# Patient Record
Sex: MALE | Race: WHITE | NOT HISPANIC OR LATINO | Employment: FULL TIME | URBAN - METROPOLITAN AREA
[De-identification: names, ages, dates, MRNs, and addresses within clinical notes are randomized per-mention and may not be internally consistent; named-entity substitution may affect disease eponyms.]

---

## 2017-01-06 DIAGNOSIS — R42 DIZZINESS: ICD-10-CM

## 2017-01-06 DIAGNOSIS — I35.0 SEVERE AORTIC STENOSIS: ICD-10-CM

## 2017-01-06 DIAGNOSIS — R06.02 SHORTNESS OF BREATH: ICD-10-CM

## 2017-01-06 DIAGNOSIS — R42 LIGHTHEADEDNESS: ICD-10-CM

## 2017-01-09 ENCOUNTER — HOSPITAL ENCOUNTER (OUTPATIENT)
Dept: RADIOLOGY | Facility: MEDICAL CENTER | Age: 62
End: 2017-01-09
Attending: INTERNAL MEDICINE
Payer: COMMERCIAL

## 2017-01-09 DIAGNOSIS — I35.0 SEVERE AORTIC STENOSIS: ICD-10-CM

## 2017-01-09 DIAGNOSIS — R42 LIGHTHEADEDNESS: ICD-10-CM

## 2017-01-09 DIAGNOSIS — R42 DIZZINESS: ICD-10-CM

## 2017-01-09 DIAGNOSIS — R06.02 SHORTNESS OF BREATH: ICD-10-CM

## 2017-01-09 PROCEDURE — 74174 CTA ABD&PLVS W/CONTRAST: CPT

## 2017-01-09 PROCEDURE — 700117 HCHG RX CONTRAST REV CODE 255: Performed by: INTERNAL MEDICINE

## 2017-01-09 PROCEDURE — 71275 CT ANGIOGRAPHY CHEST: CPT

## 2017-01-09 RX ADMIN — IOHEXOL 100 ML: 350 INJECTION, SOLUTION INTRAVENOUS at 16:40

## 2017-01-12 ENCOUNTER — HOSPITAL ENCOUNTER (OUTPATIENT)
Dept: OTHER | Facility: MEDICAL CENTER | Age: 62
End: 2017-01-12
Attending: INTERNAL MEDICINE
Payer: COMMERCIAL

## 2017-01-12 DIAGNOSIS — R42 DIZZINESS: ICD-10-CM

## 2017-01-12 DIAGNOSIS — I35.0 SEVERE AORTIC STENOSIS: ICD-10-CM

## 2017-01-12 DIAGNOSIS — R06.02 SHORTNESS OF BREATH: ICD-10-CM

## 2017-01-12 DIAGNOSIS — R42 LIGHTHEADEDNESS: ICD-10-CM

## 2017-01-12 PROCEDURE — 94726 PLETHYSMOGRAPHY LUNG VOLUMES: CPT

## 2017-01-12 PROCEDURE — 94729 DIFFUSING CAPACITY: CPT | Mod: 26 | Performed by: INTERNAL MEDICINE

## 2017-01-12 PROCEDURE — 94726 PLETHYSMOGRAPHY LUNG VOLUMES: CPT | Mod: 26 | Performed by: INTERNAL MEDICINE

## 2017-01-12 PROCEDURE — 94060 EVALUATION OF WHEEZING: CPT | Mod: 26 | Performed by: INTERNAL MEDICINE

## 2017-01-12 PROCEDURE — 94729 DIFFUSING CAPACITY: CPT

## 2017-01-12 PROCEDURE — 94060 EVALUATION OF WHEEZING: CPT

## 2017-01-12 ASSESSMENT — PULMONARY FUNCTION TESTS
FEV1_PERCENT_CHANGE: -2
FEV1_PERCENT_CHANGE: 3
FEV1: 2.83
FEV1_PREDICTED: 3.75
FEV1_PERCENT_PREDICTED: 77
FEV1/FVC_PERCENT_PREDICTED: 76
FEV1/FVC_PERCENT_PREDICTED: 97
FEV1/FVC: 71
FEV1/FVC_PERCENT_PREDICTED: 94
FEV1/FVC: 74.55
FVC_PREDICTED: 4.95
FVC: 3.96
FEV1: 2.9
FVC_PERCENT_PREDICTED: 79
FEV1/FVC_PERCENT_CHANGE: -150
FEV1_PERCENT_PREDICTED: 75
FVC: 3.89
FVC_PERCENT_PREDICTED: 80

## 2017-01-13 ENCOUNTER — OFFICE VISIT (OUTPATIENT)
Dept: CARDIOLOGY | Facility: MEDICAL CENTER | Age: 62
End: 2017-01-13
Payer: OTHER MISCELLANEOUS

## 2017-01-13 ENCOUNTER — HOSPITAL ENCOUNTER (OUTPATIENT)
Dept: RADIOLOGY | Facility: MEDICAL CENTER | Age: 62
End: 2017-01-13
Attending: FAMILY MEDICINE
Payer: COMMERCIAL

## 2017-01-13 VITALS
OXYGEN SATURATION: 98 % | BODY MASS INDEX: 27.9 KG/M2 | HEIGHT: 72 IN | WEIGHT: 206 LBS | HEART RATE: 74 BPM | DIASTOLIC BLOOD PRESSURE: 72 MMHG | SYSTOLIC BLOOD PRESSURE: 108 MMHG

## 2017-01-13 DIAGNOSIS — I35.0 NONRHEUMATIC AORTIC VALVE STENOSIS: ICD-10-CM

## 2017-01-13 DIAGNOSIS — Z95.810 PRESENCE OF BIVENTRICULAR AICD: ICD-10-CM

## 2017-01-13 DIAGNOSIS — I42.0 DILATED CARDIOMYOPATHY (HCC): Chronic | ICD-10-CM

## 2017-01-13 DIAGNOSIS — I25.10 CORONARY ARTERY DISEASE INVOLVING NATIVE CORONARY ARTERY OF NATIVE HEART WITHOUT ANGINA PECTORIS: ICD-10-CM

## 2017-01-13 DIAGNOSIS — I50.22 CHRONIC SYSTOLIC CONGESTIVE HEART FAILURE, NYHA CLASS 3 (HCC): ICD-10-CM

## 2017-01-13 DIAGNOSIS — I35.0 SEVERE AORTIC STENOSIS: ICD-10-CM

## 2017-01-13 PROCEDURE — 99245 OFF/OP CONSLTJ NEW/EST HI 55: CPT | Performed by: INTERNAL MEDICINE

## 2017-01-13 PROCEDURE — 93880 EXTRACRANIAL BILAT STUDY: CPT | Mod: 26 | Performed by: INTERNAL MEDICINE

## 2017-01-13 PROCEDURE — 93880 EXTRACRANIAL BILAT STUDY: CPT

## 2017-01-13 ASSESSMENT — ENCOUNTER SYMPTOMS
PALPITATIONS: 0
MYALGIAS: 0
CHILLS: 0
DIZZINESS: 0
INSOMNIA: 0
ORTHOPNEA: 0
WEAKNESS: 1
PND: 0
ABDOMINAL PAIN: 0
BLURRED VISION: 0
FEVER: 0
LOSS OF CONSCIOUSNESS: 0
BACK PAIN: 1
SHORTNESS OF BREATH: 1

## 2017-01-13 NOTE — PROGRESS NOTES
Subjective:   Alberto Fam is a 61 y.o. male who presents today for interventional consult/TAVR evaluation requested by Abbie Adler for severe symptomatic aortic stenosis.    Thank you for allowing me to evaluate Mr. Fam, who as you know is a year old male with severe aortic stenosis, dilated cardiomyopathy, and history of VSD closure. He was well until one year ago when he began to experience moderate fatigue, weakness, shortness of breath, dyspnea on exertion and frequent dizziness. He denies chest pain or syncope.    Past Medical History   Diagnosis Date   • Congestive heart failure (HCC)    • S/P AV servando ablation     • Dilated cardiomyopathy September 2013     Echocardiogram with dilated LV, moderate concentric LVH, LVEF 35-40%.   • Male erectile disorder     • Ventricular tachycardia ([facility])     • SSS (sick sinus syndrome)     • AV block, 3rd degree     • Aortic stenosis December 2016     Dobutamine stress echo with severe AS (peak 89mmHg, mean 54mmHg, ACE 0.6cm2).   • CHF (congestive heart failure) ([facility])    • Bowel habit changes      Constipation   • Dental disorder      Upper   • Arthritis      Low back   • Hypertension      Pt states well controlled on meds   • Pneumonia 12/2014   • Pain      lower back pain    • Breath shortness      d/t heart condition   • AICD (automatic cardioverter/defibrillator) present      Past Surgical History   Procedure Laterality Date   • Recovery  8/29/2011     Performed by SURGERY, CATH-RECOVERY at SURGERY SAME DAY [facility]   • Aicd implant  August 2011     Medtronic Concerto II CRT-D [id number]   • Ventral septal defect repair  1961   • Pacemaker insertion  2006   • Aicd battery change  November 2016     Generator change with Medtronic Viva S CRT-D [id number] implanted by Dr. Kwong.   • Hernia repair  1966     History reviewed. No pertinent family history.  History   Smoking status   • Never Smoker    Smokeless tobacco   • Never Used     Comment: quit 35 years  ago      Allergies   Allergen Reactions   • Sulfa Drugs Vomiting     Medications reviewed.    Outpatient Encounter Prescriptions as of 1/13/2017   Medication Sig Dispense Refill   • ENTRESTO 49-51 MG Tab tablet Take 1 Tab by mouth every 12 hours. 180 Tab 3   • eplerenone (INSPRA) 25 MG Tab Take 1 Tab by mouth every day. 90 Tab 3   • aspirin 81 MG tablet Take 81 mg by mouth every day.     • carvedilol (COREG) 25 MG Tab Take 1 Tab by mouth 2 times a day, with meals. 180 Tab 3   • docusate sodium (COLACE) 100 MG Cap Take 100 mg by mouth 2 times a day.     • HYDROcodone Bitartrate ER (HYSINGLA ER) 20 MG Tablet Extended Release 24 hour Abuse-Deterrent Take 20 mg by mouth every day.     • therapeutic multivitamin-minerals (THERAGRAN-M) Tab Take 1 Tab by mouth every day.       No facility-administered encounter medications on file as of 1/13/2017.     Review of Systems   Constitutional: Positive for malaise/fatigue. Negative for fever and chills.   HENT: Negative for congestion.    Eyes: Negative for blurred vision.   Respiratory: Positive for shortness of breath.    Cardiovascular: Negative for chest pain, palpitations, orthopnea, leg swelling and PND.   Gastrointestinal: Negative for abdominal pain.   Genitourinary: Negative for dysuria.   Musculoskeletal: Positive for back pain. Negative for myalgias and joint pain.   Skin: Negative for rash.   Neurological: Positive for weakness. Negative for dizziness and loss of consciousness.   Psychiatric/Behavioral: The patient does not have insomnia.         Objective:   /72 mmHg  Pulse 74  Ht 1.829 m (6')  Wt 93.441 kg (206 lb)  BMI 27.93 kg/m2  SpO2 98%    Physical Exam   Constitutional: He is oriented to person, place, and time. He appears well-developed and well-nourished.   HENT:   Head: Normocephalic and atraumatic.   Eyes: Conjunctivae are normal. Pupils are equal, round, and reactive to light.   Neck: Normal range of motion. Neck supple.   Cardiovascular:  Normal rate and regular rhythm.    Murmur heard.  Pulmonary/Chest: Effort normal and breath sounds normal.   Abdominal: Soft. Bowel sounds are normal.   Musculoskeletal: Normal range of motion. He exhibits no edema.   Neurological: He is alert and oriented to person, place, and time.   Skin: Skin is warm and dry.   Psychiatric: He has a normal mood and affect.     CARDIAC STUDIES/PROCEDURES:    CARDIAC CATHETERIZATION CONCLUSIONS by Dr. Arreola (12/16/16)  1.  Minor plaquing in the left anterior descending artery proximal, otherwise, normal coronary angiography.  2.  Moderate dilatation of the ascending aorta.     CT OF CHEST AND ABDOMEN (01/09/17)  1.  Aortic annulus area of 599 sq mm. Atypical morphology of the aortic valve with apparent fusion of the coronary cusps.  2.  Borderline low position of RIGHT coronary artery ostium at 10.1 mm.  3.  No significant atherosclerotic change of the iliofemoral arterial system.  Mild tortuosity, worse on the LEFT.  Minimum luminal diameter of 8.4 mm on the RIGHT and 8.4 mm on the LEFT.  4.  Markedly tortuous and ectatic thoracic aortic arch.  5.  Tortuous abdominal aorta, without aneurysm or significant focal stenosis.  6.  Nonspecific 9 mm RIGHT lower lobe pulmonary nodule.    DOBUTAMINE STRESS ECHOCARDIOGRAM (12/02/16)  With Dobutamine stress test:  Peak transvalvular gradients are - Peak:  89 mmHg, Mean:  54 mmHg.  Peak aortic valve area calculated by the continuity equation is  0.6 cm2.  Suggests severe aortic stenosis;    ECHOCARDIOGRAM CONCLUSIONS (05/05/15)  Left ventricle is moderately dilated. Severely reduced left ventricular   systolic function. Global hypokinesis. Left ventricular ejection   fraction is 20% to 25%. Grade IV diastolic dysfunction.  Severely increased LA volume.  Mild mitral regurgitation.  No aortic stenosis based on gradients across aortic valve. Peak   gradient 29 mmHg. Mean 16 mmHg. Dimensionless index=.25. Ttrace aortic   insufficiency. Gradients  could be underestimated with low LVEF consider   clinical correlation (based on Dimensionless index of 0.25 severe   aortic stenosis).  Mild tricuspid regurgitation. Right ventricular systolic pressure is   estimated to be 23. RA pressure 15 mmHg.  Aortic root diameter 3.7 cm.  Compared to the images of the prior study done 9/4/13  there has been   drop in LVEF from 35-40% to current echo LVEF 20-25%. Prior echo AV MG   23 mmHg and PG 38 mmHg.    EKG performed on (12/14/16) was reviewed: EKG shows paced rhythm.    Laboratory results of(12/14/16) were reviewed. Bun of 21 mg/dl, creatinine levels of 1.13 mg/dl noted.    PULMONARY FUNCTION INTERPRETATION (01/12/17)  FEV1 2.83    Assessment:     1. Severe aortic stenosis     2. Chronic systolic congestive heart failure, NYHA class 3 (HCC)     3. Dilated cardiomyopathy (HCC)   Presence of biventricular AICD     4. VSD repair    5. Coronary artery disease involving native coronary artery of native heart without angina pectoris       Medical Decision Making:  Today's Assessment / Status / Plan:     1. Aortic stenosis: He is symptomatic with his severe aortic stenosis, NYHA class III. He is not a surgical candidate due to excessive risk per Dr. Llanos. We will schedule him for TAVR. He understands the risks and benefits and agrees with plan.  2. History of chronic systolic congestive heart failure with dilated cardiomyopathy and Medtronic Bi-V ICD by Dr. Kwong (11/24/16): The overall volume status is adequate.  3. History of VSD repair from right thoracotomy at [facility] (1961)  4. Coronary artery disease: He remains clinically stable.  We will continue with current medical care.  More than 45 minutes of time was spent do review all above information, discuss the option of cardiac catheterization and TAVR including, alternative options, risk and benefits.    The risks, benefits, and alternatives to TAVR, general anesthesia and transesophageal  echocardiogram were discussed in great detail. Specific risks mentioned   include bleeding, infection, kidney damage, allergic reaction, cardiac perforation with possible tamponade requiring vazquez-cardiocentesis or possible open heart surgery.   Lastly the risks of heart attack, stroke, and death were discussed; the risks of major complications such as stroke caused by the angiogram is 0.8%; the risk of death   is approximately 1.6%. The patient verbalized understanding of these potential complications and wishes to proceed with this procedure. ( Partner II S3 study).    We will follow up in one month in TAVR clinic.    Thank you for this consult.    CC Trevor Grajeda

## 2017-01-13 NOTE — Clinical Note
[facility]   [address], [address]  [place] [address]  Phone: [phone]  Fax: [phone]              Alberto Fam  1955    Encounter Date: 1/13/2017    Grayson Rios M.D.          PROGRESS NOTE:  Subjective:   Alberto Fam is a 61 y.o. male who presents today for interventional consult/TAVR evaluation requested by Abbie Adler for severe symptomatic aortic stenosis.    Thank you for allowing me to evaluate Mr. Fam, who as you know is a year old male with severe aortic stenosis, dilated cardiomyopathy, and history of VSD closure. He was well until one year ago when he began to experience moderate fatigue, weakness, shortness of breath, dyspnea on exertion and frequent dizziness. He denies chest pain or syncope.    Past Medical History   Diagnosis Date   • Congestive heart failure (HCC)    • S/P AV servando ablation     • Dilated cardiomyopathy September 2013     Echocardiogram with dilated LV, moderate concentric LVH, LVEF 35-40%.   • Male erectile disorder     • Ventricular tachycardia (HCC)     • SSS (sick sinus syndrome)     • AV block, 3rd degree     • Aortic stenosis December 2016     Dobutamine stress echo with severe AS (peak 89mmHg, mean 54mmHg, ACE 0.6cm2).   • CHF (congestive heart failure) (HCC)    • Bowel habit changes      Constipation   • Dental disorder      Upper   • Arthritis      Low back   • Hypertension      Pt states well controlled on meds   • Pneumonia 12/2014   • Pain      lower back pain    • Breath shortness      d/t heart condition   • AICD (automatic cardioverter/defibrillator) present      Past Surgical History   Procedure Laterality Date   • Recovery  8/29/2011     Performed by SURGERY, CATH-RECOVERY at SURGERY SAME DAY [facility]   • Aicd implant  August 2011     Medtronic Concerto II CRT-D [id number]   • Ventral septal defect repair  1961   • Pacemaker insertion  2006   • Aicd battery change  November 2016        Generator change with Medtronic Viva S CRT-D [id number] implanted by Dr. Kwong.   • Hernia repair  1966     History reviewed. No pertinent family history.  History   Smoking status   • Never Smoker    Smokeless tobacco   • Never Used     Comment: quit 35 years ago      Allergies   Allergen Reactions   • Sulfa Drugs Vomiting     Medications reviewed.    Outpatient Encounter Prescriptions as of 1/13/2017   Medication Sig Dispense Refill   • ENTRESTO 49-51 MG Tab tablet Take 1 Tab by mouth every 12 hours. 180 Tab 3   • eplerenone (INSPRA) 25 MG Tab Take 1 Tab by mouth every day. 90 Tab 3   • aspirin 81 MG tablet Take 81 mg by mouth every day.     • carvedilol (COREG) 25 MG Tab Take 1 Tab by mouth 2 times a day, with meals. 180 Tab 3   • docusate sodium (COLACE) 100 MG Cap Take 100 mg by mouth 2 times a day.     • HYDROcodone Bitartrate ER (HYSINGLA ER) 20 MG Tablet Extended Release 24 hour Abuse-Deterrent Take 20 mg by mouth every day.     • therapeutic multivitamin-minerals (THERAGRAN-M) Tab Take 1 Tab by mouth every day.       No facility-administered encounter medications on file as of 1/13/2017.     Review of Systems   Constitutional: Positive for malaise/fatigue. Negative for fever and chills.   HENT: Negative for congestion.    Eyes: Negative for blurred vision.   Respiratory: Positive for shortness of breath.    Cardiovascular: Negative for chest pain, palpitations, orthopnea, leg swelling and PND.   Gastrointestinal: Negative for abdominal pain.   Genitourinary: Negative for dysuria.   Musculoskeletal: Positive for back pain. Negative for myalgias and joint pain.   Skin: Negative for rash.   Neurological: Positive for weakness. Negative for dizziness and loss of consciousness.   Psychiatric/Behavioral: The patient does not have insomnia.         Objective:   /72 mmHg  Pulse 74  Ht 1.829 m (6')  Wt 93.441 kg (206 lb)  BMI 27.93 kg/m2  SpO2 98%    Physical Exam   Constitutional: He is oriented to  person, place, and time. He appears well-developed and well-nourished.   HENT:   Head: Normocephalic and atraumatic.   Eyes: Conjunctivae are normal. Pupils are equal, round, and reactive to light.   Neck: Normal range of motion. Neck supple.   Cardiovascular: Normal rate and regular rhythm.    Murmur heard.  Pulmonary/Chest: Effort normal and breath sounds normal.   Abdominal: Soft. Bowel sounds are normal.   Musculoskeletal: Normal range of motion. He exhibits no edema.   Neurological: He is alert and oriented to person, place, and time.   Skin: Skin is warm and dry.   Psychiatric: He has a normal mood and affect.     CARDIAC STUDIES/PROCEDURES:    CARDIAC CATHETERIZATION CONCLUSIONS by Dr. Arreola (12/16/16)  1.  Minor plaquing in the left anterior descending artery proximal, otherwise, normal coronary angiography.  2.  Moderate dilatation of the ascending aorta.     CT OF CHEST AND ABDOMEN (01/09/17)  1.  Aortic annulus area of 599 sq mm. Atypical morphology of the aortic valve with apparent fusion of the coronary cusps.  2.  Borderline low position of RIGHT coronary artery ostium at 10.1 mm.  3.  No significant atherosclerotic change of the iliofemoral arterial system.  Mild tortuosity, worse on the LEFT.  Minimum luminal diameter of 8.4 mm on the RIGHT and 8.4 mm on the LEFT.  4.  Markedly tortuous and ectatic thoracic aortic arch.  5.  Tortuous abdominal aorta, without aneurysm or significant focal stenosis.  6.  Nonspecific 9 mm RIGHT lower lobe pulmonary nodule.    DOBUTAMINE STRESS ECHOCARDIOGRAM (12/02/16)  With Dobutamine stress test:  Peak transvalvular gradients are - Peak:  89 mmHg, Mean:  54 mmHg.  Peak aortic valve area calculated by the continuity equation is  0.6 cm2.  Suggests severe aortic stenosis;    ECHOCARDIOGRAM CONCLUSIONS (05/05/15)  Left ventricle is moderately dilated. Severely reduced left ventricular   systolic function. Global hypokinesis. Left ventricular ejection   fraction is 20% to  25%. Grade IV diastolic dysfunction.  Severely increased LA volume.  Mild mitral regurgitation.  No aortic stenosis based on gradients across aortic valve. Peak   gradient 29 mmHg. Mean 16 mmHg. Dimensionless index=.25. Ttrace aortic   insufficiency. Gradients could be underestimated with low LVEF consider   clinical correlation (based on Dimensionless index of 0.25 severe   aortic stenosis).  Mild tricuspid regurgitation. Right ventricular systolic pressure is   estimated to be 23. RA pressure 15 mmHg.  Aortic root diameter 3.7 cm.  Compared to the images of the prior study done 9/4/13  there has been   drop in LVEF from 35-40% to current echo LVEF 20-25%. Prior echo AV MG   23 mmHg and PG 38 mmHg.    EKG performed on (12/14/16) was reviewed: EKG shows paced rhythm.    Laboratory results of(12/14/16) were reviewed. Bun of 21 mg/dl, creatinine levels of 1.13 mg/dl noted.    PULMONARY FUNCTION INTERPRETATION (01/12/17)  FEV1 2.83    Assessment:     1. Severe aortic stenosis     2. Chronic systolic congestive heart failure, NYHA class 3 (HCC)     3. Dilated cardiomyopathy (HCC)   Presence of biventricular AICD     4. VSD repair    5. Coronary artery disease involving native coronary artery of native heart without angina pectoris       Medical Decision Making:  Today's Assessment / Status / Plan:     1. Aortic stenosis: He is symptomatic with his severe aortic stenosis, NYHA class III. He is not a surgical candidate due to excessive risk per Dr. Llanos. We will schedule him for TAVR. He understands the risks and benefits and agrees with plan.  2. History of chronic systolic congestive heart failure with dilated cardiomyopathy and Medtronic Bi-V ICD by Dr. Kwong (11/24/16): The overall volume status is adequate.  3. History of VSD repair from right thoracotomy at [facility] (1961)  4. Coronary artery disease: He remains clinically stable.  We will continue with current medical care.  More than 45  minutes of time was spent do review all above information, discuss the option of cardiac catheterization and TAVR including, alternative options, risk and benefits.    The risks, benefits, and alternatives to TAVR, general anesthesia and transesophageal echocardiogram were discussed in great detail. Specific risks mentioned   include bleeding, infection, kidney damage, allergic reaction, cardiac perforation with possible tamponade requiring vazquez-cardiocentesis or possible open heart surgery.   Lastly the risks of heart attack, stroke, and death were discussed; the risks of major complications such as stroke caused by the angiogram is 0.8%; the risk of death   is approximately 1.6%. The patient verbalized understanding of these potential complications and wishes to proceed with this procedure. (US Partner II S3 study).    We will follow up in one month in TAVR clinic.    Thank you for this consult.    CC Trevor Grajeda        No Recipients

## 2017-01-13 NOTE — MR AVS SNAPSHOT
Alberto Fam   2017 9:40 AM   Office Visit   MRN: [id number]    Department:  [facility]   Dept Phone:  [phone]    Description:  Male : 1955   Provider:  Grayson Rios M.D.           Reason for Visit     Follow-Up New TAVR      Allergies as of 2017     Allergen Noted Reactions    Sulfa Drugs 2010   Vomiting      You were diagnosed with     Severe aortic stenosis   [101291]       Chronic systolic congestive heart failure, NYHA class 3 (HCC)   [834087]       Dilated cardiomyopathy (HCC)   [211012]       Presence of biventricular AICD   [710755]       Coronary artery disease involving native coronary artery of native heart without angina pectoris   [2728761]         Vital Signs     Blood Pressure Pulse Height Weight Body Mass Index Oxygen Saturation    108/72 mmHg 74 1.829 m (6') 93.441 kg (206 lb) 27.93 kg/m2 98%    Smoking Status                   Never Smoker            Basic Information     Date Of Birth Sex Race Ethnicity Preferred Language    1955 Male White Non- English      Your appointments     2017 12:15 PM   CAROTID DUPLEX with VASCULAR LAB [facility], IH EXAM 8   NON-INVASIVE LAB [facility] ([facility])    [address] [id number] [phone]           No prep            May 30, 2017  9:40 AM   PACER CHECK ONLY with KEIKO Rizvi   [facility]    [address]  [facility] [address] [phone]              Problem List              ICD-10-CM Priority Class Noted - Resolved    Hypertension I10 High  Unknown - Present    S/P AV servando ablation (Chronic) Z98.890 High  3/21/2012 - Present    Dilated cardiomyopathy (HCC) (Chronic) I42.0 High  3/21/2012 - Present    Male erectile disorder (Chronic) N52.9 Medium  3/21/2012 - Present    Ventricular tachycardia (HCC) (Chronic) I47.2 High  3/21/2012 - Present    Atypical chest pain (Chronic) R07.89 Medium  3/21/2012 -  Present    AV block, 3rd degree (HCC) (Chronic) I44.2 High  3/21/2012 - Present    SSS (sick sinus syndrome) (HCC) (Chronic) I49.5 High  9/28/2012 - Present    Presence of biventricular AICD Z95.810 High  6/6/2013 - Present    Arthritis M19.90 Low  8/20/2013 - Present    Chronic systolic congestive heart failure, NYHA class 3 (HCC) I50.22   10/13/2015 - Present    Homograft cardiac valve stenosis I38   12/16/2016 - Present    Severe aortic stenosis I35.0   1/13/2017 - Present    CAD (coronary artery disease) I25.10   1/13/2017 - Present      Health Maintenance        Date Due Completion Dates    IMM DTaP/Tdap/Td Vaccine (1 - Tdap) 4/5/1976 ---    IMM PNEUMOCOCCAL 19-64 (ADULT) MEDIUM RISK SERIES (1 of 1 - PPSV23) 4/5/1976 ---    COLONOSCOPY 4/5/2007 ---    IMM INFLUENZA (1) 9/1/2016 10/30/2009            Current Immunizations     Influenza TIV (IM) 10/30/2009      Below and/or attached are the medications your provider expects you to take. Review all of your home medications and newly ordered medications with your provider and/or pharmacist. Follow medication instructions as directed by your provider and/or pharmacist. Please keep your medication list with you and share with your provider. Update the information when medications are discontinued, doses are changed, or new medications (including over-the-counter products) are added; and carry medication information at all times in the event of emergency situations     Allergies:  SULFA DRUGS - Vomiting               Medications  Valid as of: January 13, 2017 - 11:04 AM    Generic Name Brand Name Tablet Size Instructions for use    Aspirin (Tab) aspirin 81 MG Take 81 mg by mouth every day.        Carvedilol (Tab) COREG 25 MG Take 1 Tab by mouth 2 times a day, with meals.        Docusate Sodium (Cap) COLACE 100 MG Take 100 mg by mouth 2 times a day.        Eplerenone (Tab) INSPRA 25 MG Take 1 Tab by mouth every day.        HYDROcodone Bitartrate (Tablet Extended  Release 24 hour Abuse-Deterrent) HYDROcodone Bitartrate ER 20 MG Take 20 mg by mouth every day.        Multiple Vitamins-Minerals (Tab) THERAGRAN-M  Take 1 Tab by mouth every day.        Sacubitril-Valsartan (Tab) ENTRESTO 49-51 MG Take 1 Tab by mouth every 12 hours.        .                 Medicines prescribed today were sent to:     [id number] - [place] - [address]    [address] [place] [address]    Phone: [phone] Fax: [phone]    Open 24 Hours?: No    [facility] - [place], [place] - [address]    [address] [facility] [address]    Phone: [phone] Fax: [phone]    Open 24 Hours?: No      Medication refill instructions:       If your prescription bottle indicates you have medication refills left, it is not necessary to call your provider’s office. Please contact your pharmacy and they will refill your medication.    If your prescription bottle indicates you do not have any refills left, you may request refills at any time through one of the following ways: The online [employer] system (except Urgent Care), by calling your provider’s office, or by asking your pharmacy to contact your provider’s office with a refill request. Medication refills are processed only during regular business hours and may not be available until the next business day. Your provider may request additional information or to have a follow-up visit with you prior to refilling your medication.   *Please Note: Medication refills are assigned a new Rx number when refilled electronically. Your pharmacy may indicate that no refills were authorized even though a new prescription for the same medication is available at the pharmacy. Please request the medicine by name with the pharmacy before contacting your provider for a refill.           [employer] Access Code: Activation code not generated  Current [employer] Status: Active

## 2017-01-14 NOTE — PROCEDURES
This is a 61-year-old male who is being evaluated for dyspnea on exertion.    His height is 180 cm, weight is 194.6 lbs.  Spirometry demonstrates a FEV1/FVC ratio   of 71%.  FEV1 is 2.83 liters or 75% predicted.  FVC is 3.96 liters or 80% of   predicted.  There is no bronchodilator response.  FEF 25 to 75% is reduced   1.85 liters or 60% of predicted.    Lungs volumes demonstrate a total lung capacity of 6.44 liters or 90% of   predicted.  Residual volume is 2.75 or 121% of predicted.  Diffusion lung   capacity is corrected for hemoglobin is 99% of predicted.    IMPRESSION:  This study demonstrates mild obstructive process.  There is mild   air trapping that is present, but no hyperinflation.  The diffusion lung   capacity is preserved.       ____________________________________     MD KAILA Uriostegui / DHAVAL    DD:  01/13/2017 12:44:07  DT:  01/13/2017 16:16:07    D#:  [id number]  Job#:  [id number]

## 2017-01-16 ENCOUNTER — TELEPHONE (OUTPATIENT)
Dept: CARDIOLOGY | Facility: MEDICAL CENTER | Age: 62
End: 2017-01-16

## 2017-01-16 DIAGNOSIS — I10 ESSENTIAL HYPERTENSION: ICD-10-CM

## 2017-01-16 DIAGNOSIS — I42.0 DILATED CARDIOMYOPATHY (HCC): Chronic | ICD-10-CM

## 2017-01-16 RX ORDER — CARVEDILOL 25 MG/1
25 TABLET ORAL 2 TIMES DAILY WITH MEALS
Qty: 180 TAB | Refills: 3 | Status: ON HOLD | OUTPATIENT
Start: 2017-01-16 | End: 2017-02-16

## 2017-01-16 NOTE — TELEPHONE ENCOUNTER
DONE  Also, Pt requested refill for carvedilol. Refill ordered.  Carmel VOGEL RN    ----- Message from Abbie Elder M.D. sent at 1/16/2017  8:39 AM PST -----  Normal bilateral carotid exam.       ----- Message -----     From: Aaron Jara     Sent: 1/13/2017   3:09 PM       To: Abbie Elder M.D.

## 2017-02-07 ENCOUNTER — HOSPITAL ENCOUNTER (OUTPATIENT)
Dept: RADIOLOGY | Facility: MEDICAL CENTER | Age: 62
DRG: 219 | End: 2017-02-07
Attending: THORACIC SURGERY (CARDIOTHORACIC VASCULAR SURGERY) | Admitting: THORACIC SURGERY (CARDIOTHORACIC VASCULAR SURGERY)
Payer: COMMERCIAL

## 2017-02-07 ENCOUNTER — HOSPITAL ENCOUNTER (OUTPATIENT)
Dept: RADIOLOGY | Facility: MEDICAL CENTER | Age: 62
DRG: 219 | End: 2017-02-07
Attending: FAMILY MEDICINE
Payer: COMMERCIAL

## 2017-02-07 DIAGNOSIS — I35.0 AORTIC VALVE STENOSIS, UNSPECIFIED ETIOLOGY: ICD-10-CM

## 2017-02-07 LAB
ABO GROUP BLD: NORMAL
ALBUMIN SERPL BCP-MCNC: 4.8 G/DL (ref 3.2–4.9)
ALBUMIN/GLOB SERPL: 2.2 G/DL
ALP SERPL-CCNC: 53 U/L (ref 30–99)
ALT SERPL-CCNC: 35 U/L (ref 2–50)
ANION GAP SERPL CALC-SCNC: 6 MMOL/L (ref 0–11.9)
APPEARANCE UR: CLEAR
APTT PPP: 25.4 SEC (ref 24.7–36)
AST SERPL-CCNC: 23 U/L (ref 12–45)
BASOPHILS # BLD AUTO: 0.5 % (ref 0–1.8)
BASOPHILS # BLD: 0.02 K/UL (ref 0–0.12)
BILIRUB SERPL-MCNC: 0.6 MG/DL (ref 0.1–1.5)
BILIRUB UR QL STRIP.AUTO: NEGATIVE
BLD GP AB SCN SERPL QL: NORMAL
BUN SERPL-MCNC: 21 MG/DL (ref 8–22)
CALCIUM SERPL-MCNC: 9.9 MG/DL (ref 8.5–10.5)
CFT BLD TEG: 6.3 MIN (ref 5–10)
CHLORIDE SERPL-SCNC: 103 MMOL/L (ref 96–112)
CLOT ANGLE BLD TEG: 62.2 DEGREES (ref 53–72)
CLOT LYSIS 30M P MA LENFR BLD TEG: 0 % (ref 0–8)
CO2 SERPL-SCNC: 26 MMOL/L (ref 20–33)
COLOR UR: NORMAL
CREAT SERPL-MCNC: 0.99 MG/DL (ref 0.5–1.4)
CT.EXTRINSIC BLD ROTEM: 2.2 MIN (ref 1–3)
EKG IMPRESSION: NORMAL
EOSINOPHIL # BLD AUTO: 0.06 K/UL (ref 0–0.51)
EOSINOPHIL NFR BLD: 1.4 % (ref 0–6.9)
ERYTHROCYTE [DISTWIDTH] IN BLOOD BY AUTOMATED COUNT: 39.2 FL (ref 35.9–50)
EST. AVERAGE GLUCOSE BLD GHB EST-MCNC: 108 MG/DL
GFR SERPL CREATININE-BSD FRML MDRD: >60 ML/MIN/1.73 M 2
GLOBULIN SER CALC-MCNC: 2.2 G/DL (ref 1.9–3.5)
GLUCOSE SERPL-MCNC: 94 MG/DL (ref 65–99)
GLUCOSE UR STRIP.AUTO-MCNC: NEGATIVE MG/DL
HBA1C MFR BLD: 5.4 % (ref 0–5.6)
HCT VFR BLD AUTO: 45 % (ref 42–52)
HGB BLD-MCNC: 15.7 G/DL (ref 14–18)
IMM GRANULOCYTES # BLD AUTO: 0.01 K/UL (ref 0–0.11)
IMM GRANULOCYTES NFR BLD AUTO: 0.2 % (ref 0–0.9)
INR PPP: 1 (ref 0.87–1.13)
KETONES UR STRIP.AUTO-MCNC: NEGATIVE MG/DL
LEUKOCYTE ESTERASE UR QL STRIP.AUTO: NEGATIVE
LYMPHOCYTES # BLD AUTO: 1.52 K/UL (ref 1–4.8)
LYMPHOCYTES NFR BLD: 34.6 % (ref 22–41)
MCF BLD TEG: 57.4 MM (ref 50–70)
MCH RBC QN AUTO: 31.7 PG (ref 27–33)
MCHC RBC AUTO-ENTMCNC: 34.9 G/DL (ref 33.7–35.3)
MCV RBC AUTO: 90.9 FL (ref 81.4–97.8)
MICRO URNS: NORMAL
MONOCYTES # BLD AUTO: 0.29 K/UL (ref 0–0.85)
MONOCYTES NFR BLD AUTO: 6.6 % (ref 0–13.4)
NEUTROPHILS # BLD AUTO: 2.49 K/UL (ref 1.82–7.42)
NEUTROPHILS NFR BLD: 56.7 % (ref 44–72)
NITRITE UR QL STRIP.AUTO: NEGATIVE
NRBC # BLD AUTO: 0 K/UL
NRBC BLD AUTO-RTO: 0 /100 WBC
PA AA BLD-ACNC: 0 %
PA ADP BLD-ACNC: 0 %
PH UR STRIP.AUTO: 6.5 [PH]
PLATELET # BLD AUTO: 118 K/UL (ref 164–446)
PMV BLD AUTO: 10.4 FL (ref 9–12.9)
POTASSIUM SERPL-SCNC: 4.4 MMOL/L (ref 3.6–5.5)
PROT SERPL-MCNC: 7 G/DL (ref 6–8.2)
PROT UR QL STRIP: NEGATIVE MG/DL
PROTHROMBIN TIME: 13.5 SEC (ref 12–14.6)
RBC # BLD AUTO: 4.95 M/UL (ref 4.7–6.1)
RBC UR QL AUTO: NEGATIVE
RH BLD: NORMAL
SODIUM SERPL-SCNC: 135 MMOL/L (ref 135–145)
SP GR UR STRIP.AUTO: 1.01
TEG ALGORITHM TGALG: NORMAL
WBC # BLD AUTO: 4.4 K/UL (ref 4.8–10.8)

## 2017-02-07 PROCEDURE — 85384 FIBRINOGEN ACTIVITY: CPT

## 2017-02-07 PROCEDURE — 85730 THROMBOPLASTIN TIME PARTIAL: CPT

## 2017-02-07 PROCEDURE — 71020 DX-CHEST-2 VIEWS: CPT

## 2017-02-07 PROCEDURE — 86901 BLOOD TYPING SEROLOGIC RH(D): CPT

## 2017-02-07 PROCEDURE — 93010 ELECTROCARDIOGRAM REPORT: CPT | Performed by: INTERNAL MEDICINE

## 2017-02-07 PROCEDURE — 85025 COMPLETE CBC W/AUTO DIFF WBC: CPT

## 2017-02-07 PROCEDURE — 86850 RBC ANTIBODY SCREEN: CPT

## 2017-02-07 PROCEDURE — 86900 BLOOD TYPING SEROLOGIC ABO: CPT

## 2017-02-07 PROCEDURE — 80053 COMPREHEN METABOLIC PANEL: CPT

## 2017-02-07 PROCEDURE — 93005 ELECTROCARDIOGRAM TRACING: CPT | Performed by: THORACIC SURGERY (CARDIOTHORACIC VASCULAR SURGERY)

## 2017-02-07 PROCEDURE — 81003 URINALYSIS AUTO W/O SCOPE: CPT

## 2017-02-07 PROCEDURE — 83036 HEMOGLOBIN GLYCOSYLATED A1C: CPT

## 2017-02-07 PROCEDURE — 85610 PROTHROMBIN TIME: CPT

## 2017-02-07 PROCEDURE — 85347 COAGULATION TIME ACTIVATED: CPT

## 2017-02-07 PROCEDURE — 85576 BLOOD PLATELET AGGREGATION: CPT | Mod: 91

## 2017-02-07 NOTE — CARE PLAN
Problem: Pre Op  Goal: Optimal preparation for CABG/Heart Valve surgery  Intervention: Pre Op education to patient/significant other. Provide patient [facility] Patient Guideline for Cardiac Surgery (See Pt. Ed.)  Discussed anatomy and physiology of cardiac surgery with patient and family to include pre-op regimen. Reviewed post-op expectations to include  the use of incentive spirometry with return demonstration, ventilator management, cardiac monitoring, tubes and drains, early ambulation, and expected length of stay. Also provided information on Cardiac Rehab and how to schedule an appointment. Patient and family state full understanding of all information given.  Reviewed prevention of bacterial endocarditis with patient and family, handout given. Patient and family state full understanding of all information presented.  Intervention: Baseline assessment documented to include IS volume, weight, bilateral BP and peripheral pulses.  2750 mL  Intervention: NPO at midnight except cardiac medications. (No ASA, coumadin or Plavix)  Instructed patient nothing to eat or drink after midnight the night prior to scheduled surgery date.  Intervention: Shower with chlorhexidine x 2  Instructed patient to wash entire body with chlorhexedine wipes prior to bedtime the night before surgery.

## 2017-02-08 ENCOUNTER — HOSPITAL ENCOUNTER (INPATIENT)
Facility: MEDICAL CENTER | Age: 62
LOS: 8 days | DRG: 219 | End: 2017-02-16
Attending: THORACIC SURGERY (CARDIOTHORACIC VASCULAR SURGERY) | Admitting: THORACIC SURGERY (CARDIOTHORACIC VASCULAR SURGERY)
Payer: COMMERCIAL

## 2017-02-08 ENCOUNTER — RESOLUTE PROFESSIONAL BILLING HOSPITAL PROF FEE (OUTPATIENT)
Dept: PULMONOLOGY | Facility: HOSPICE | Age: 62
End: 2017-02-08
Payer: COMMERCIAL

## 2017-02-08 ENCOUNTER — APPOINTMENT (OUTPATIENT)
Dept: RADIOLOGY | Facility: MEDICAL CENTER | Age: 62
DRG: 219 | End: 2017-02-08
Attending: THORACIC SURGERY (CARDIOTHORACIC VASCULAR SURGERY)
Payer: COMMERCIAL

## 2017-02-08 DIAGNOSIS — I42.0 DILATED CARDIOMYOPATHY (HCC): Chronic | ICD-10-CM

## 2017-02-08 DIAGNOSIS — I35.0 SEVERE AORTIC STENOSIS: ICD-10-CM

## 2017-02-08 DIAGNOSIS — I10 ESSENTIAL HYPERTENSION: ICD-10-CM

## 2017-02-08 LAB
ABO GROUP BLD: NORMAL
ACT BLD: 121 SEC (ref 74–137)
ACT BLD: 508 SEC (ref 74–137)
ACT BLD: 641 SEC (ref 74–137)
ACT BLD: 662 SEC (ref 74–137)
APTT PPP: 34.3 SEC (ref 24.7–36)
BARCODED ABORH UBTYP: 7300
BARCODED PRD CODE UBPRD: NORMAL
BARCODED UNIT NUM UBUNT: NORMAL
BASE EXCESS BLDA CALC-SCNC: -1 MMOL/L (ref -4–3)
BASE EXCESS BLDA CALC-SCNC: -2 MMOL/L (ref -4–3)
BASE EXCESS BLDA CALC-SCNC: -5 MMOL/L (ref -4–3)
BASE EXCESS BLDA CALC-SCNC: -5 MMOL/L (ref -4–3)
BASE EXCESS BLDA CALC-SCNC: -6 MMOL/L (ref -4–3)
BASE EXCESS BLDA CALC-SCNC: -7 MMOL/L (ref -4–3)
BASE EXCESS BLDA CALC-SCNC: 3 MMOL/L (ref -4–3)
BODY TEMPERATURE: ABNORMAL DEGREES
CA-I BLD ISE-SCNC: 1.04 MMOL/L (ref 1.1–1.3)
CA-I BLD ISE-SCNC: 1.09 MMOL/L (ref 1.1–1.3)
CA-I BLD ISE-SCNC: 1.19 MMOL/L (ref 1.1–1.3)
CO2 BLDA-SCNC: 18 MMOL/L (ref 20–33)
CO2 BLDA-SCNC: 18 MMOL/L (ref 20–33)
CO2 BLDA-SCNC: 19 MMOL/L (ref 20–33)
CO2 BLDA-SCNC: 20 MMOL/L (ref 20–33)
CO2 BLDA-SCNC: 23 MMOL/L (ref 20–33)
CO2 BLDA-SCNC: 25 MMOL/L (ref 20–33)
CO2 BLDA-SCNC: 30 MMOL/L (ref 20–33)
COMPONENT P 8504P: NORMAL
EKG IMPRESSION: NORMAL
GLUCOSE BLD-MCNC: 102 MG/DL (ref 65–99)
GLUCOSE BLD-MCNC: 104 MG/DL (ref 65–99)
GLUCOSE BLD-MCNC: 115 MG/DL (ref 65–99)
GLUCOSE BLD-MCNC: 134 MG/DL (ref 65–99)
GLUCOSE BLD-MCNC: 137 MG/DL (ref 65–99)
GLUCOSE BLD-MCNC: 83 MG/DL (ref 65–99)
GLUCOSE BLD-MCNC: 94 MG/DL (ref 65–99)
HCO3 BLDA-SCNC: 17.3 MMOL/L (ref 17–25)
HCO3 BLDA-SCNC: 17.6 MMOL/L (ref 17–25)
HCO3 BLDA-SCNC: 17.8 MMOL/L (ref 17–25)
HCO3 BLDA-SCNC: 19 MMOL/L (ref 17–25)
HCO3 BLDA-SCNC: 21.7 MMOL/L (ref 17–25)
HCO3 BLDA-SCNC: 24.2 MMOL/L (ref 17–25)
HCO3 BLDA-SCNC: 28.9 MMOL/L (ref 17–25)
HCT VFR BLD CALC: 29 % (ref 42–52)
HCT VFR BLD CALC: 38 % (ref 42–52)
HCT VFR BLD CALC: 40 % (ref 42–52)
HGB BLD-MCNC: 12.9 G/DL (ref 14–18)
HGB BLD-MCNC: 13.6 G/DL (ref 14–18)
HGB BLD-MCNC: 9.9 G/DL (ref 14–18)
INR PPP: 1.36 (ref 0.87–1.13)
LV EJECT FRACT  99904: 20
MAGNESIUM SERPL-MCNC: 2.3 MG/DL (ref 1.5–2.5)
O2/TOTAL GAS SETTING VFR VENT: 100 %
O2/TOTAL GAS SETTING VFR VENT: 30 %
O2/TOTAL GAS SETTING VFR VENT: 50 %
PCO2 BLDA: 25.5 MMHG (ref 26–37)
PCO2 BLDA: 28.2 MMHG (ref 26–37)
PCO2 BLDA: 29.5 MMHG (ref 26–37)
PCO2 BLDA: 30.3 MMHG (ref 26–37)
PCO2 BLDA: 34 MMHG (ref 26–37)
PCO2 BLDA: 39.9 MMHG (ref 26–37)
PCO2 BLDA: 48.8 MMHG (ref 26–37)
PCO2 TEMP ADJ BLDA: 26.3 MMHG (ref 26–37)
PCO2 TEMP ADJ BLDA: 28.6 MMHG (ref 26–37)
PCO2 TEMP ADJ BLDA: 30.2 MMHG (ref 26–37)
PCO2 TEMP ADJ BLDA: 30.3 MMHG (ref 26–37)
PCO2 TEMP ADJ BLDA: 33.3 MMHG (ref 26–37)
PH BLDA: 7.37 [PH] (ref 7.4–7.5)
PH BLDA: 7.38 [PH] (ref 7.4–7.5)
PH BLDA: 7.39 [PH] (ref 7.4–7.5)
PH BLDA: 7.41 [PH] (ref 7.4–7.5)
PH BLDA: 7.41 [PH] (ref 7.4–7.5)
PH BLDA: 7.42 [PH] (ref 7.4–7.5)
PH BLDA: 7.45 [PH] (ref 7.4–7.5)
PH TEMP ADJ BLDA: 7.37 [PH] (ref 7.4–7.5)
PH TEMP ADJ BLDA: 7.4 [PH] (ref 7.4–7.5)
PH TEMP ADJ BLDA: 7.41 [PH] (ref 7.4–7.5)
PH TEMP ADJ BLDA: 7.42 [PH] (ref 7.4–7.5)
PH TEMP ADJ BLDA: 7.43 [PH] (ref 7.4–7.5)
PLATELET # BLD AUTO: 133 K/UL (ref 164–446)
PO2 BLDA: 101 MMHG (ref 64–87)
PO2 BLDA: 161 MMHG (ref 64–87)
PO2 BLDA: 375 MMHG (ref 64–87)
PO2 BLDA: 391 MMHG (ref 64–87)
PO2 BLDA: 489 MMHG (ref 64–87)
PO2 BLDA: 95 MMHG (ref 64–87)
PO2 BLDA: 99 MMHG (ref 64–87)
PO2 TEMP ADJ BLDA: 102 MMHG (ref 64–87)
PO2 TEMP ADJ BLDA: 102 MMHG (ref 64–87)
PO2 TEMP ADJ BLDA: 161 MMHG (ref 64–87)
PO2 TEMP ADJ BLDA: 388 MMHG (ref 64–87)
PO2 TEMP ADJ BLDA: 99 MMHG (ref 64–87)
POTASSIUM BLD-SCNC: 3.4 MMOL/L (ref 3.6–5.5)
POTASSIUM BLD-SCNC: 3.9 MMOL/L (ref 3.6–5.5)
POTASSIUM BLD-SCNC: 5.2 MMOL/L (ref 3.6–5.5)
POTASSIUM SERPL-SCNC: 2.9 MMOL/L (ref 3.6–5.5)
POTASSIUM SERPL-SCNC: 3.8 MMOL/L (ref 3.6–5.5)
PRODUCT TYPE UPROD: NORMAL
PROTHROMBIN TIME: 17.2 SEC (ref 12–14.6)
SAO2 % BLDA: 100 % (ref 93–99)
SAO2 % BLDA: 98 % (ref 93–99)
SAO2 % BLDA: 99 % (ref 93–99)
SODIUM BLD-SCNC: 135 MMOL/L (ref 135–145)
SODIUM BLD-SCNC: 140 MMOL/L (ref 135–145)
SODIUM BLD-SCNC: 141 MMOL/L (ref 135–145)
SPECIMEN DRAWN FROM PATIENT: ABNORMAL
UNIT STATUS USTAT: NORMAL

## 2017-02-08 PROCEDURE — 02RF08Z REPLACEMENT OF AORTIC VALVE WITH ZOOPLASTIC TISSUE, OPEN APPROACH: ICD-10-PCS | Performed by: THORACIC SURGERY (CARDIOTHORACIC VASCULAR SURGERY)

## 2017-02-08 PROCEDURE — 82962 GLUCOSE BLOOD TEST: CPT | Mod: 91

## 2017-02-08 PROCEDURE — 93005 ELECTROCARDIOGRAM TRACING: CPT | Performed by: CLINICAL NURSE SPECIALIST

## 2017-02-08 PROCEDURE — 85014 HEMATOCRIT: CPT | Mod: 91

## 2017-02-08 PROCEDURE — 85610 PROTHROMBIN TIME: CPT

## 2017-02-08 PROCEDURE — 94150 VITAL CAPACITY TEST: CPT

## 2017-02-08 PROCEDURE — 500890 HCHG PACK, OPEN HEART: Performed by: THORACIC SURGERY (CARDIOTHORACIC VASCULAR SURGERY)

## 2017-02-08 PROCEDURE — 700105 HCHG RX REV CODE 258

## 2017-02-08 PROCEDURE — 500002 HCHG ADHESIVE, DERMABOND: Performed by: THORACIC SURGERY (CARDIOTHORACIC VASCULAR SURGERY)

## 2017-02-08 PROCEDURE — B24BZZ4 ULTRASONOGRAPHY OF HEART WITH AORTA, TRANSESOPHAGEAL: ICD-10-PCS | Performed by: THORACIC SURGERY (CARDIOTHORACIC VASCULAR SURGERY)

## 2017-02-08 PROCEDURE — 85049 AUTOMATED PLATELET COUNT: CPT

## 2017-02-08 PROCEDURE — 160042 HCHG SURGERY MINUTES - EA ADDL 1 MIN LEVEL 5: Performed by: THORACIC SURGERY (CARDIOTHORACIC VASCULAR SURGERY)

## 2017-02-08 PROCEDURE — 501506 HCHG SUTURE GUIDE, VALVE REPLACEMENT: Performed by: THORACIC SURGERY (CARDIOTHORACIC VASCULAR SURGERY)

## 2017-02-08 PROCEDURE — C1729 CATH, DRAINAGE: HCPCS | Performed by: THORACIC SURGERY (CARDIOTHORACIC VASCULAR SURGERY)

## 2017-02-08 PROCEDURE — P9034 PLATELETS, PHERESIS: HCPCS

## 2017-02-08 PROCEDURE — 700111 HCHG RX REV CODE 636 W/ 250 OVERRIDE (IP): Performed by: THORACIC SURGERY (CARDIOTHORACIC VASCULAR SURGERY)

## 2017-02-08 PROCEDURE — 700111 HCHG RX REV CODE 636 W/ 250 OVERRIDE (IP)

## 2017-02-08 PROCEDURE — 700101 HCHG RX REV CODE 250: Performed by: THORACIC SURGERY (CARDIOTHORACIC VASCULAR SURGERY)

## 2017-02-08 PROCEDURE — 99291 CRITICAL CARE FIRST HOUR: CPT | Performed by: INTERNAL MEDICINE

## 2017-02-08 PROCEDURE — 71010 DX-CHEST-PORTABLE (1 VIEW): CPT

## 2017-02-08 PROCEDURE — 88311 DECALCIFY TISSUE: CPT

## 2017-02-08 PROCEDURE — 503033 HCHG COR-KNOT TITANIUM QUICKLOADS: Performed by: THORACIC SURGERY (CARDIOTHORACIC VASCULAR SURGERY)

## 2017-02-08 PROCEDURE — 84132 ASSAY OF SERUM POTASSIUM: CPT | Mod: 91

## 2017-02-08 PROCEDURE — 84295 ASSAY OF SERUM SODIUM: CPT

## 2017-02-08 PROCEDURE — 82330 ASSAY OF CALCIUM: CPT | Mod: 91

## 2017-02-08 PROCEDURE — 700102 HCHG RX REV CODE 250 W/ 637 OVERRIDE(OP): Performed by: CLINICAL NURSE SPECIALIST

## 2017-02-08 PROCEDURE — 501673 HCHG TUBING, PRESSURE 6' W/MALE LL: Performed by: THORACIC SURGERY (CARDIOTHORACIC VASCULAR SURGERY)

## 2017-02-08 PROCEDURE — 700105 HCHG RX REV CODE 258: Performed by: CLINICAL NURSE SPECIALIST

## 2017-02-08 PROCEDURE — 94002 VENT MGMT INPAT INIT DAY: CPT

## 2017-02-08 PROCEDURE — 502627 HCHG HEMOSTAT, SURGICEL 4X4: Performed by: THORACIC SURGERY (CARDIOTHORACIC VASCULAR SURGERY)

## 2017-02-08 PROCEDURE — 30233R1 TRANSFUSION OF NONAUTOLOGOUS PLATELETS INTO PERIPHERAL VEIN, PERCUTANEOUS APPROACH: ICD-10-PCS | Performed by: THORACIC SURGERY (CARDIOTHORACIC VASCULAR SURGERY)

## 2017-02-08 PROCEDURE — 700102 HCHG RX REV CODE 250 W/ 637 OVERRIDE(OP): Performed by: THORACIC SURGERY (CARDIOTHORACIC VASCULAR SURGERY)

## 2017-02-08 PROCEDURE — A4606 OXYGEN PROBE USED W OXIMETER: HCPCS | Performed by: THORACIC SURGERY (CARDIOTHORACIC VASCULAR SURGERY)

## 2017-02-08 PROCEDURE — 501689: Performed by: THORACIC SURGERY (CARDIOTHORACIC VASCULAR SURGERY)

## 2017-02-08 PROCEDURE — 700105 HCHG RX REV CODE 258: Performed by: THORACIC SURGERY (CARDIOTHORACIC VASCULAR SURGERY)

## 2017-02-08 PROCEDURE — 160048 HCHG OR STATISTICAL LEVEL 1-5: Performed by: THORACIC SURGERY (CARDIOTHORACIC VASCULAR SURGERY)

## 2017-02-08 PROCEDURE — 83735 ASSAY OF MAGNESIUM: CPT

## 2017-02-08 PROCEDURE — 93325 DOPPLER ECHO COLOR FLOW MAPG: CPT

## 2017-02-08 PROCEDURE — 501519 HCHG SUTURE, E PACK: Performed by: THORACIC SURGERY (CARDIOTHORACIC VASCULAR SURGERY)

## 2017-02-08 PROCEDURE — 5A1221Z PERFORMANCE OF CARDIAC OUTPUT, CONTINUOUS: ICD-10-PCS | Performed by: THORACIC SURGERY (CARDIOTHORACIC VASCULAR SURGERY)

## 2017-02-08 PROCEDURE — 93010 ELECTROCARDIOGRAM REPORT: CPT | Performed by: INTERNAL MEDICINE

## 2017-02-08 PROCEDURE — P9047 ALBUMIN (HUMAN), 25%, 50ML: HCPCS

## 2017-02-08 PROCEDURE — 700111 HCHG RX REV CODE 636 W/ 250 OVERRIDE (IP): Performed by: CLINICAL NURSE SPECIALIST

## 2017-02-08 PROCEDURE — 82947 ASSAY GLUCOSE BLOOD QUANT: CPT

## 2017-02-08 PROCEDURE — 160009 HCHG ANES TIME/MIN: Performed by: THORACIC SURGERY (CARDIOTHORACIC VASCULAR SURGERY)

## 2017-02-08 PROCEDURE — 501745 HCHG WIRE, SURGICAL STEEL: Performed by: THORACIC SURGERY (CARDIOTHORACIC VASCULAR SURGERY)

## 2017-02-08 PROCEDURE — 503000 HCHG SUTURE, OHS: Performed by: THORACIC SURGERY (CARDIOTHORACIC VASCULAR SURGERY)

## 2017-02-08 PROCEDURE — 500734 HCHG INSERT, STEALTH: Performed by: THORACIC SURGERY (CARDIOTHORACIC VASCULAR SURGERY)

## 2017-02-08 PROCEDURE — 770022 HCHG ROOM/CARE - ICU (200)

## 2017-02-08 PROCEDURE — 700101 HCHG RX REV CODE 250: Performed by: CLINICAL NURSE SPECIALIST

## 2017-02-08 PROCEDURE — 37799 UNLISTED PX VASCULAR SURGERY: CPT

## 2017-02-08 PROCEDURE — 110371 HCHG SHELL REV 272: Performed by: THORACIC SURGERY (CARDIOTHORACIC VASCULAR SURGERY)

## 2017-02-08 PROCEDURE — 160024 HCHG STAT PERFUSION STAT: Performed by: THORACIC SURGERY (CARDIOTHORACIC VASCULAR SURGERY)

## 2017-02-08 PROCEDURE — 85730 THROMBOPLASTIN TIME PARTIAL: CPT

## 2017-02-08 PROCEDURE — C9113 INJ PANTOPRAZOLE SODIUM, VIA: HCPCS | Performed by: CLINICAL NURSE SPECIALIST

## 2017-02-08 PROCEDURE — 85347 COAGULATION TIME ACTIVATED: CPT

## 2017-02-08 PROCEDURE — 700102 HCHG RX REV CODE 250 W/ 637 OVERRIDE(OP): Performed by: ANESTHESIOLOGY

## 2017-02-08 PROCEDURE — 700101 HCHG RX REV CODE 250

## 2017-02-08 PROCEDURE — A9270 NON-COVERED ITEM OR SERVICE: HCPCS | Performed by: CLINICAL NURSE SPECIALIST

## 2017-02-08 PROCEDURE — C1898 LEAD, PMKR, OTHER THAN TRANS: HCPCS | Performed by: THORACIC SURGERY (CARDIOTHORACIC VASCULAR SURGERY)

## 2017-02-08 PROCEDURE — 88305 TISSUE EXAM BY PATHOLOGIST: CPT

## 2017-02-08 PROCEDURE — 110382 HCHG SHELL REV 271: Performed by: THORACIC SURGERY (CARDIOTHORACIC VASCULAR SURGERY)

## 2017-02-08 PROCEDURE — 36430 TRANSFUSION BLD/BLD COMPNT: CPT

## 2017-02-08 PROCEDURE — 82803 BLOOD GASES ANY COMBINATION: CPT | Mod: 91

## 2017-02-08 PROCEDURE — 160031 HCHG SURGERY MINUTES - 1ST 30 MINS LEVEL 5: Performed by: THORACIC SURGERY (CARDIOTHORACIC VASCULAR SURGERY)

## 2017-02-08 PROCEDURE — 500385 HCHG DRAIN, PLEUROVAC ADUL: Performed by: THORACIC SURGERY (CARDIOTHORACIC VASCULAR SURGERY)

## 2017-02-08 PROCEDURE — 502240 HCHG MISC OR SUPPLY RC 0272: Performed by: THORACIC SURGERY (CARDIOTHORACIC VASCULAR SURGERY)

## 2017-02-08 PROCEDURE — C1894 INTRO/SHEATH, NON-LASER: HCPCS | Performed by: THORACIC SURGERY (CARDIOTHORACIC VASCULAR SURGERY)

## 2017-02-08 PROCEDURE — 93321 DOPPLER ECHO F-UP/LMTD STD: CPT

## 2017-02-08 PROCEDURE — 503001 HCHG PERFUSION: Performed by: THORACIC SURGERY (CARDIOTHORACIC VASCULAR SURGERY)

## 2017-02-08 PROCEDURE — 93312 ECHO TRANSESOPHAGEAL: CPT

## 2017-02-08 DEVICE — IMPLANTABLE DEVICE: Type: IMPLANTABLE DEVICE | Status: FUNCTIONAL

## 2017-02-08 RX ORDER — POTASSIUM CHLORIDE 7.45 MG/ML
10 INJECTION INTRAVENOUS DAILY
Status: DISCONTINUED | OUTPATIENT
Start: 2017-02-10 | End: 2017-02-11 | Stop reason: DRUGHIGH

## 2017-02-08 RX ORDER — POTASSIUM CHLORIDE 750 MG/1
10 TABLET, FILM COATED, EXTENDED RELEASE ORAL DAILY
Status: DISCONTINUED | OUTPATIENT
Start: 2017-02-10 | End: 2017-02-11 | Stop reason: DRUGHIGH

## 2017-02-08 RX ORDER — DOCUSATE SODIUM 100 MG/1
100 CAPSULE, LIQUID FILLED ORAL EVERY MORNING
Status: DISCONTINUED | OUTPATIENT
Start: 2017-02-08 | End: 2017-02-16 | Stop reason: HOSPADM

## 2017-02-08 RX ORDER — MIDAZOLAM HYDROCHLORIDE 1 MG/ML
.5-2 INJECTION INTRAMUSCULAR; INTRAVENOUS
Status: DISCONTINUED | OUTPATIENT
Start: 2017-02-08 | End: 2017-02-09

## 2017-02-08 RX ORDER — DIPHENHYDRAMINE HCL 25 MG
25 TABLET ORAL
Status: DISCONTINUED | OUTPATIENT
Start: 2017-02-08 | End: 2017-02-16 | Stop reason: HOSPADM

## 2017-02-08 RX ORDER — NITROGLYCERIN 20 MG/100ML
0-100 INJECTION INTRAVENOUS CONTINUOUS
Status: DISCONTINUED | OUTPATIENT
Start: 2017-02-08 | End: 2017-02-09

## 2017-02-08 RX ORDER — LIDOCAINE HYDROCHLORIDE 10 MG/ML
INJECTION, SOLUTION INFILTRATION; PERINEURAL
Status: COMPLETED
Start: 2017-02-08 | End: 2017-02-08

## 2017-02-08 RX ORDER — CARVEDILOL 3.12 MG/1
3.12 TABLET ORAL 2 TIMES DAILY WITH MEALS
Status: DISCONTINUED | OUTPATIENT
Start: 2017-02-09 | End: 2017-02-12

## 2017-02-08 RX ORDER — OXYCODONE HYDROCHLORIDE 10 MG/1
10 TABLET ORAL
Status: DISCONTINUED | OUTPATIENT
Start: 2017-02-08 | End: 2017-02-16 | Stop reason: HOSPADM

## 2017-02-08 RX ORDER — AMOXICILLIN 250 MG
1 CAPSULE ORAL NIGHTLY
Status: DISCONTINUED | OUTPATIENT
Start: 2017-02-08 | End: 2017-02-16 | Stop reason: HOSPADM

## 2017-02-08 RX ORDER — OMEPRAZOLE 20 MG/1
20 CAPSULE, DELAYED RELEASE ORAL DAILY
Status: DISCONTINUED | OUTPATIENT
Start: 2017-02-08 | End: 2017-02-16 | Stop reason: HOSPADM

## 2017-02-08 RX ORDER — MORPHINE SULFATE 4 MG/ML
2 INJECTION, SOLUTION INTRAMUSCULAR; INTRAVENOUS
Status: DISCONTINUED | OUTPATIENT
Start: 2017-02-08 | End: 2017-02-09

## 2017-02-08 RX ORDER — TRAMADOL HYDROCHLORIDE 50 MG/1
50 TABLET ORAL EVERY 4 HOURS PRN
Status: DISCONTINUED | OUTPATIENT
Start: 2017-02-08 | End: 2017-02-16 | Stop reason: HOSPADM

## 2017-02-08 RX ORDER — MORPHINE SULFATE 4 MG/ML
4 INJECTION, SOLUTION INTRAMUSCULAR; INTRAVENOUS
Status: DISCONTINUED | OUTPATIENT
Start: 2017-02-08 | End: 2017-02-09

## 2017-02-08 RX ORDER — OXYCODONE HYDROCHLORIDE 5 MG/1
5 TABLET ORAL
Status: DISCONTINUED | OUTPATIENT
Start: 2017-02-08 | End: 2017-02-16 | Stop reason: HOSPADM

## 2017-02-08 RX ORDER — ACETAMINOPHEN 325 MG/1
650 TABLET ORAL EVERY 4 HOURS PRN
Status: DISCONTINUED | OUTPATIENT
Start: 2017-02-08 | End: 2017-02-16 | Stop reason: HOSPADM

## 2017-02-08 RX ORDER — ONDANSETRON 2 MG/ML
4 INJECTION INTRAMUSCULAR; INTRAVENOUS EVERY 6 HOURS PRN
Status: DISCONTINUED | OUTPATIENT
Start: 2017-02-08 | End: 2017-02-16 | Stop reason: HOSPADM

## 2017-02-08 RX ORDER — SODIUM CHLORIDE, SODIUM GLUCONATE, SODIUM ACETATE, POTASSIUM CHLORIDE AND MAGNESIUM CHLORIDE 526; 502; 368; 37; 30 MG/100ML; MG/100ML; MG/100ML; MG/100ML; MG/100ML
INJECTION, SOLUTION INTRAVENOUS PRN
Status: DISCONTINUED | OUTPATIENT
Start: 2017-02-08 | End: 2017-02-16 | Stop reason: HOSPADM

## 2017-02-08 RX ORDER — POTASSIUM CHLORIDE 14.9 MG/ML
20 INJECTION INTRAVENOUS ONCE
Status: COMPLETED | OUTPATIENT
Start: 2017-02-08 | End: 2017-02-08

## 2017-02-08 RX ORDER — BISACODYL 10 MG
10 SUPPOSITORY, RECTAL RECTAL
Status: DISCONTINUED | OUTPATIENT
Start: 2017-02-08 | End: 2017-02-16 | Stop reason: HOSPADM

## 2017-02-08 RX ORDER — ACETAMINOPHEN 650 MG/1
650 SUPPOSITORY RECTAL EVERY 4 HOURS PRN
Status: DISCONTINUED | OUTPATIENT
Start: 2017-02-08 | End: 2017-02-16 | Stop reason: HOSPADM

## 2017-02-08 RX ORDER — ALUMINA, MAGNESIA, AND SIMETHICONE 2400; 2400; 240 MG/30ML; MG/30ML; MG/30ML
30 SUSPENSION ORAL EVERY 4 HOURS PRN
Status: DISCONTINUED | OUTPATIENT
Start: 2017-02-08 | End: 2017-02-16 | Stop reason: HOSPADM

## 2017-02-08 RX ORDER — ENEMA 19; 7 G/133ML; G/133ML
1 ENEMA RECTAL
Status: DISCONTINUED | OUTPATIENT
Start: 2017-02-08 | End: 2017-02-16 | Stop reason: HOSPADM

## 2017-02-08 RX ORDER — LACTULOSE 20 G/30ML
30 SOLUTION ORAL
Status: DISCONTINUED | OUTPATIENT
Start: 2017-02-08 | End: 2017-02-16 | Stop reason: HOSPADM

## 2017-02-08 RX ORDER — IBUPROFEN 200 MG
400 TABLET ORAL
Status: ON HOLD | COMMUNITY
End: 2017-02-16

## 2017-02-08 RX ORDER — POTASSIUM CHLORIDE 7.45 MG/ML
10 INJECTION INTRAVENOUS ONCE
Status: COMPLETED | OUTPATIENT
Start: 2017-02-08 | End: 2017-02-08

## 2017-02-08 RX ORDER — EPLERENONE 25 MG/1
25 TABLET, FILM COATED ORAL DAILY
Status: DISCONTINUED | OUTPATIENT
Start: 2017-02-09 | End: 2017-02-10

## 2017-02-08 RX ORDER — AMOXICILLIN 250 MG
1 CAPSULE ORAL
Status: DISCONTINUED | OUTPATIENT
Start: 2017-02-08 | End: 2017-02-16 | Stop reason: HOSPADM

## 2017-02-08 RX ORDER — PANTOPRAZOLE SODIUM 40 MG/10ML
40 INJECTION, POWDER, LYOPHILIZED, FOR SOLUTION INTRAVENOUS DAILY
Status: DISCONTINUED | OUTPATIENT
Start: 2017-02-08 | End: 2017-02-10

## 2017-02-08 RX ORDER — SODIUM CHLORIDE 9 MG/ML
INJECTION, SOLUTION INTRAVENOUS CONTINUOUS
Status: DISCONTINUED | OUTPATIENT
Start: 2017-02-08 | End: 2017-02-16 | Stop reason: HOSPADM

## 2017-02-08 RX ORDER — SODIUM CHLORIDE 9 MG/ML
INJECTION, SOLUTION INTRAVENOUS
Status: DISCONTINUED
Start: 2017-02-08 | End: 2017-02-08

## 2017-02-08 RX ORDER — DEXTROSE MONOHYDRATE 25 G/50ML
25 INJECTION, SOLUTION INTRAVENOUS PRN
Status: ACTIVE | OUTPATIENT
Start: 2017-02-08 | End: 2017-02-09

## 2017-02-08 RX ADMIN — SODIUM CHLORIDE, SODIUM GLUCONATE, SODIUM ACETATE, POTASSIUM CHLORIDE AND MAGNESIUM CHLORIDE: 526; 502; 368; 37; 30 INJECTION, SOLUTION INTRAVENOUS at 15:26

## 2017-02-08 RX ADMIN — PANTOPRAZOLE SODIUM 40 MG: 40 INJECTION, POWDER, FOR SOLUTION INTRAVENOUS at 14:32

## 2017-02-08 RX ADMIN — SODIUM CHLORIDE: 9 INJECTION, SOLUTION INTRAVENOUS at 13:00

## 2017-02-08 RX ADMIN — DIPHENHYDRAMINE HCL 25 MG: 25 TABLET ORAL at 22:00

## 2017-02-08 RX ADMIN — MUPIROCIN 1 APPLICATION: 20 OINTMENT TOPICAL at 14:42

## 2017-02-08 RX ADMIN — MORPHINE SULFATE 2 MG: 4 INJECTION INTRAVENOUS at 19:40

## 2017-02-08 RX ADMIN — VANCOMYCIN HYDROCHLORIDE 1400 MG: 10 INJECTION, POWDER, LYOPHILIZED, FOR SOLUTION INTRAVENOUS at 22:00

## 2017-02-08 RX ADMIN — POTASSIUM CHLORIDE 20 MEQ: 14.9 INJECTION, SOLUTION INTRAVENOUS at 20:03

## 2017-02-08 RX ADMIN — DEXMEDETOMIDINE HYDROCHLORIDE 0.7 MCG/KG/HR: 100 INJECTION, SOLUTION, CONCENTRATE INTRAVENOUS at 14:18

## 2017-02-08 RX ADMIN — LIDOCAINE HYDROCHLORIDE: 10 INJECTION, SOLUTION INFILTRATION; PERINEURAL at 06:30

## 2017-02-08 RX ADMIN — POTASSIUM CHLORIDE 10 MEQ: 10 INJECTION, SOLUTION INTRAVENOUS at 18:40

## 2017-02-08 RX ADMIN — MAGNESIUM SULFATE IN DEXTROSE 1 G: 10 INJECTION, SOLUTION INTRAVENOUS at 14:23

## 2017-02-08 RX ADMIN — MUPIROCIN 1 APPLICATION: 20 OINTMENT TOPICAL at 20:18

## 2017-02-08 RX ADMIN — CEFUROXIME SODIUM 1500 MG: 7.5 INJECTION, POWDER, FOR SOLUTION INTRAVENOUS at 21:27

## 2017-02-08 RX ADMIN — OXYCODONE HYDROCHLORIDE 5 MG: 5 TABLET ORAL at 19:20

## 2017-02-08 RX ADMIN — MUPIROCIN 1 APPLICATION: 20 OINTMENT TOPICAL at 06:24

## 2017-02-08 RX ADMIN — SODIUM CHLORIDE, SODIUM GLUCONATE, SODIUM ACETATE, POTASSIUM CHLORIDE AND MAGNESIUM CHLORIDE: 526; 502; 368; 37; 30 INJECTION, SOLUTION INTRAVENOUS at 20:00

## 2017-02-08 RX ADMIN — ONDANSETRON 4 MG: 2 INJECTION, SOLUTION INTRAMUSCULAR; INTRAVENOUS at 19:26

## 2017-02-08 RX ADMIN — STANDARDIZED SENNA CONCENTRATE AND DOCUSATE SODIUM 1 TABLET: 8.6; 5 TABLET, FILM COATED ORAL at 19:21

## 2017-02-08 RX ADMIN — MORPHINE SULFATE 2 MG: 4 INJECTION INTRAVENOUS at 23:09

## 2017-02-08 RX ADMIN — PROCHLORPERAZINE EDISYLATE 10 MG: 5 INJECTION INTRAMUSCULAR; INTRAVENOUS at 21:42

## 2017-02-08 RX ADMIN — POTASSIUM CHLORIDE 10 MEQ: 10 INJECTION, SOLUTION INTRAVENOUS at 15:33

## 2017-02-08 RX ADMIN — MORPHINE SULFATE 2 MG: 4 INJECTION INTRAVENOUS at 14:01

## 2017-02-08 ASSESSMENT — PAIN SCALES - GENERAL
PAINLEVEL_OUTOF10: 2
PAINLEVEL_OUTOF10: 2
PAINLEVEL_OUTOF10: 6
PAINLEVEL_OUTOF10: 8
PAINLEVEL_OUTOF10: 4
PAINLEVEL_OUTOF10: 0

## 2017-02-08 ASSESSMENT — LIFESTYLE VARIABLES
REASON UNABLE TO ASSESS: INTUBATED
EVER_SMOKED: YES

## 2017-02-08 ASSESSMENT — COPD QUESTIONNAIRES
DO YOU EVER COUGH UP ANY MUCUS OR PHLEGM?: NO/ONLY WITH OCCASIONAL COLDS OR INFECTIONS
HAVE YOU SMOKED AT LEAST 100 CIGARETTES IN YOUR ENTIRE LIFE: YES
COPD SCREENING SCORE: 3
DURING THE PAST 4 WEEKS HOW MUCH DID YOU FEEL SHORT OF BREATH: NONE/LITTLE OF THE TIME

## 2017-02-08 ASSESSMENT — PULMONARY FUNCTION TESTS
FVC: 2
FVC: 2.3

## 2017-02-08 NOTE — PROGRESS NOTES
Pt opening eyes; moving all four extremities to commands; denies pain via shaking head to query.

## 2017-02-08 NOTE — OP REPORT
DATE OF SERVICE:  02/08/2017    REFERRING PHYSICIAN:   Abbie Elder MD    PREOPERATIVE DIAGNOSES:  Severe aortic stenosis (calcific or degenerative),   low flow low gradient aortic stenosis, bicuspid aortic valve, chronic left   ventricular systolic and diastolic failure, congestive heart failure (NYHA   class III), severe nonischemic cardiomyopathy, ventricular tachycardia,   atrioventricular block, status post permanent pacemaker/AICD placement, status   post ventricular septal defect repair, status post right thoracotomy.    POSTOPERATIVE DIAGNOSES:  Severe aortic stenosis (calcific or degenerative),   low flow low gradient aortic stenosis, bicuspid aortic valve, chronic left   ventricular systolic and diastolic failure, congestive heart failure (NYHA   class III), severe nonischemic cardiomyopathy, ventricular tachycardia,   atrioventricular block, status post permanent pacemaker/AICD placement, status   post ventricular septal defect repair, status post right thoracotomy.    PROCEDURE:  Aortic valve replacement (23 mm Moy Perimount Magna   pericardial valve), and intraoperative transesophageal echocardiography.    SURGEON:  Alfonso Llanos MD    FIRST ASSISTANT:  Rafael Chapin MD    SECOND ASSISTANT:  JOHNNY Leon    ANESTHESIOLOGIST:  Carlos Hill MD    ANESTHESIA:  General endotracheal.    DRAINS:  Mediastinal chest tubes x2 (32-Syriac straight and angled).    COMPLICATIONS:  None.    INDICATIONS:  The patient is a very pleasant 61-year-old white male with   worsening shortness of breath and fatigue.  He has a history of severe   nonischemic cardiomyopathy with a 20% left ventricular ejection fraction.  He   has a biventricular pacemaker.  Echocardiography showed severe calcific aortic   stenosis and a bicuspid aortic valve.  Cardiac catheterization did not show   any hemodynamically significant coronary artery disease.    DESCRIPTION OF PROCEDURE:  The patient was brought to the operating room  and   placed on the operating room table in the supine position.  After successful   induction of general anesthesia and endotracheal intubation, the patient was   prepped and draped in the usual sterile fashion.  Dr. Chapin was the first   assistant during chest opening and the valve implantation and JOHNNY Leon, was the second assistant for the remainder of the operation and she also   closed the sternal wound.  Intraoperative transesophageal echocardiography   showed severe aortic stenosis and bicuspid aortic valve.  His left ventricular   ejection fraction was approximately 20%.  An incision was made from the   sternal notch to the xiphoid.  An oscillating type saw was used to transect   the sternum longitudinally.  Care was taken to enter the pericardial cavity   since the patient had a prior cardiac operation as a child.  The pericardium   was opened longitudinally and tented anteriorly with Ethibond stay stitches.    The aortic cannula was inserted first followed by the dual-stage venous   cannula.  An antegrade cardioplegia cannula was placed in the ascending aorta   and a retrograde one in the coronary sinus.  There were dense adhesions   between the right atrium and the pericardium.  There were also adhesions   between the aorta and the pericardium on the right side.  Cardiopulmonary   bypass was instituted.  The aorta was cross-clamped and the patient was given   1 L of cold cardioplegia in an antegrade fashion followed by 200 mL in a   retrograde fashion.  There was prompt cardiac arrest.  Ice slush was placed on   the heart for further myocardial protection.  A phrenic nerve protector pad   was used.  From this point on, cardioplegia was given in a retrograde fashion   every 15-20 minutes while the aorta was cross-clamped.  An oblique aortotomy   was performed and the incision was carried down to the noncoronary sinus.  The   bicuspid aortic valve was heavily calcified.  The leaflets were  excised and   the annulus was debrided with a rongeur.  Pledgeted #2-0 Ethibond stitches   were then placed around the aortic valve annulus in a horizontal mattress   fashion with the pledgets in a subannular position.  A 23 mm Moy Perimount   Magna pericardial valve was then placed in the aortic valve annulus and   secured in place with the Ethibond stitches utilizing the Cor-Knot device.    Care was taken to avoid obstructing the left main coronary artery, since it   had a very low height.  The valve was properly positioned and both coronary   ostia were visualized and were patent.  Rewarming of the patient was   initiated.  The aortotomy was closed in 2 layers using #5-0 Prolene sutures.    Approximately 300 mL of warm blood was given in a retrograde fashion and the   aortic crossclamp was removed.  Aortic crossclamp time was 72 minutes.  Total   cardiopulmonary bypass time was 88 minutes.  The left ventricle was deaired in   the usual fashion.  The carbon dioxide which had been released over the   operative field during operation was discontinued.  The retrograde   cardioplegia cannula was removed.  A straight and an angled 32-Belarusian chest   tubes were placed in mediastinum.  The antegrade cardioplegia cannula was   removed.  There was spontaneous conversion into a regular paced rhythm.  When   he was adequately warmed, he was slowly taken off cardiopulmonary bypass,   which he tolerated well.  The dual-stage venous cannula was removed.    Protamine was given to reverse the effects of heparin.  The aortic cannula was   removed.  When adequate hemostasis had been obtained, the sternum was   reapproximated using size 5 sternal wires and the remainder of the incision   was closed in 3 layers using Vicryl sutures.  Intraoperative transesophageal   echocardiography showed the properly positioned and functioning aortic valve   bioprosthesis without any paravalvular or central leaks.  His left ventricular    ejection fraction remained the same at approximately 20%.  There were no   apparent complications.  The patient tolerated the procedure well and left the   operating room in guarded condition.       ____________________________________     MD HILARY COSTELLO / DHAVAL    DD:  02/08/2017 12:49:35  DT:  02/08/2017 15:12:44    D#:  [id number]  Job#:  [id number]

## 2017-02-08 NOTE — IP AVS SNAPSHOT
TotalHousehold Access Code: Activation code not generated  Current TotalHousehold Status: Active    Our Security Teamhart  A secure, online tool to manage your health information     [employer]’s TotalHousehold® is a secure, online tool that connects you to your personalized health information from the privacy of your home -- day or night - making it very easy for you to manage your healthcare. Once the activation process is completed, you can even access your medical information using the TotalHousehold saran, which is available for free in the Apple Saran store or Google Play store.     TotalHousehold provides the following levels of access (as shown below):   My Chart Features   [facility] Primary Care Doctor [facility]  Specialists [facility]  Urgent  Care Non-[facility]  Primary Care  Doctor   Email your healthcare team securely and privately 24/7 X X X X   Manage appointments: schedule your next appointment; view details of past/upcoming appointments X      Request prescription refills. X      View recent personal medical records, including lab and immunizations X X X X   View health record, including health history, allergies, medications X X X X   Read reports about your outpatient visits, procedures, consult and ER notes X X X X   See your discharge summary, which is a recap of your hospital and/or ER visit that includes your diagnosis, lab results, and care plan. X X       How to register for TotalHousehold:  1. Go to  [email or web address].  2. Click on the Sign Up Now box, which takes you to the New Member Sign Up page. You will need to provide the following information:  a. Enter your TotalHousehold Access Code exactly as it appears at the top of this page. (You will not need to use this code after you’ve completed the sign-up process. If you do not sign up before the expiration date, you must request a new code.)   b. Enter your date of birth.   c. Enter your home email address.   d. Click Submit, and follow the next screen’s instructions.  3. Create a TotalHousehold ID. This will  be your [employer] login ID and cannot be changed, so think of one that is secure and easy to remember.  4. Create a [employer] password. You can change your password at any time.  5. Enter your Password Reset Question and Answer. This can be used at a later time if you forget your password.   6. Enter your e-mail address. This allows you to receive e-mail notifications when new information is available in Mo[employer].  7. Click Sign Up. You can now view your health information.    For assistance activating your [employer] account, call [phone]

## 2017-02-08 NOTE — OR SURGEON
Immediate Post-Operative Note      PreOp Diagnosis: AS    PostOp Diagnosis: AS    Procedure(s):  AVR 23mm Moy Perimount Magna pericardial valve)  JUSTINO    Surgeon(s):  Alfonso Llanos M.D.    Assistants:  1.  Rafael Chapin M.D.  2.  JOHNNY Leon    Anesthesiologist/Type of Anesthesia:  Anesthesiologist: Carlos Hill M.D.  Anesthesia Technician: Cuauhtemoc Hernandez/General    Surgical Staff:  Assistant: KEIKO Mooney  Circulator: Lino Pratt R.N.  Perfusionist: Johnathan Smith  Scrub Person: MARCOS Max IV; Lana Barrow    Specimen: AV    Estimated Blood Loss: Min    Findings: AS    Complications: None        2/8/2017 12:38 PM Alfonso Llanos

## 2017-02-08 NOTE — PROGRESS NOTES
Pulmonary Critical Care Consult Note        Chief Complaint: Symptomatic severe AS     History of Present Illness: 61 y.o. male admitted for for elective AVR for symptomatic AS/bicuspid AV. Has had SOB, fatigue, ROTH and cough for weaks and eveluated outpatient by cardiology and deemed to be a good surgical candidate.    PMH: severe AS, HTN, 3rd degree AV block, V tach s/p AICD, systolic CHF III    PSurgHx: AICD placement, VSD repair, hernia    FHx: non-contributory    SHx: , remote tobacco abuse, no ETOH/IVDA    Allergies: Sulfa    Meds: see below    ROS:  Respiratory: unable to perform due to the patient's inability to effectively communicate, Cardiac: unable to perform due to the patient's inability to effectively communicate, GI: unable to perform due to the patient's inability to effectively communicate.  All other systems negative.    Interval Events:  24 hour interval history reviewed    PFSH:  No change.    Respiratory:  Salas Vent Mode: ASV, FiO2: 30, Static Compliance (ml / cm H2O): 61.6, Control VTE (exp VT): 657  Pulse Oximetry: 100 %  Chest Tube Drains: no air leaks          Exam: unlabored respirations, no intercostal retractions or accessory muscle use and rales bibasilar  ImagingAvailable data reviewed   Recent Labs      02/08/17   0920  02/08/17   1044  02/08/17   1328   ISTATAPH  7.391*  7.380*  7.413   ISTATAPCO2  39.9*  48.8*  34.0   ISTATAPO2  375*  489*  391*   ISTATATCO2  25  30  23   QENUPCQ8CLJ  100*  100*  100*   ISTATARTHCO3  24.2  28.9*  21.7   ISTATARTBE  -1  3  -2   ISTATTEMP  see below  see below  36.5 C   ISTATFIO2  100  100  100   ISTATSPEC  Arterial  Arterial  Arterial   ISTATAPHTC   --    --   7.420   PCTAHSEI9SE   --    --   388*       HemoDynamics:  Pulse: 75, Heart Rate (Monitored): 75  Blood Pressure: 114/81 mmHg, Arterial BP: 103/67 mmHg, NIBP: (!) 92/69 mmHg   Epi gtt    Exam: regular rate and rhythm, regular rhythm (Sinus) - 100% V-paced  Imaging: Available data  reviewed        Neuro:  GCS  3T, precedex gtt       Exam: Heavily sedated  Imaging: Available data reviewed    Fluids:  Intake/Output       17 - 17 0659 (Not Admitted) 17 - 17 0659 (Not Admitted) 17 - 17 0659      [id number] 7491-8827 Total 4365-6730 4777-1442 Total 9433-4515 4486-8151 Total       Intake    Blood  --  -- --  --  -- --  700  -- 700    Cell Saver Volume (mL) -- -- -- -- -- -- 500 -- 500    Platelets Total Volume (Non-Barcoded) -- -- -- -- -- -- 200 -- 200    Total Intake -- -- -- -- -- -- 700 -- 700       Output    Urine  --  -- --  --  -- --  1100  -- 1100    Void (ml) -- -- -- -- -- -- 1100 -- 1100    Total Output -- -- -- -- -- -- 1100 -- 1100       Net I/O     -- -- -- -- -- -- -400 -- -400        Weight: 93.4 kg (205 lb 14.6 oz)  Recent Labs      17   0956  17   1300   SODIUM  135   --    POTASSIUM  4.4  3.8   CHLORIDE  103   --    CO2  26   --    BUN  21   --    CREATININE  0.99   --    MAGNESIUM   --   2.3   CALCIUM  9.9   --        GI/Nutrition:  Exam: abdomen is soft and non-tender, normal active bowel sounds  Imaging: Available data reviewed  NPO  Liver Function  Recent Labs      17   0956   ALTSGPT  35   ASTSGOT  23   ALKPHOSPHAT  53   TBILIRUBIN  0.6   GLUCOSE  94       Heme:  Recent Labs      1756  17   1300   RBC  4.95   --    HEMOGLOBIN  15.7   --    HEMATOCRIT  45.0   --    PLATELETCT  118*  133*   PROTHROMBTM  13.5  17.2*   APTT  25.4  34.3   INR  1.00  1.36*       Infectious Disease:  Temp  Av.8 °C (98.3 °F)  Min: 36.5 °C (97.7 °F)  Max: 37.3 °C (99.1 °F)  Monitored Temp  Av.5 °C (97.7 °F)  Min: 36.5 °C (97.7 °F)  Max: 36.5 °C (97.7 °F)  Micro: reviewed  Recent Labs      17   0956   WBC  4.4*   NEUTSPOLYS  56.70   LYMPHOCYTES  34.60   MONOCYTES  6.60   EOSINOPHILS  1.40   BASOPHILS  0.50   ASTSGOT  23   ALTSGPT  35   ALKPHOSPHAT  53   TBILIRUBIN  0.6     Current Facility-Administered  Medications   Medication Dose Frequency Provider Last Rate Last Dose   • [START ON 2/9/2017] carvedilol (COREG) tablet 3.125 mg  3.125 mg BID WITH MEALS NICHOLAS Mooney.P.N.       • [START ON 2/9/2017] sacubitril-valsartan (ENTRESTO) 49-51 MG tablet 1 Tab  1 Tab Q12HRS Carol Cisse A.P.N.       • [START ON 2/9/2017] eplerenone (INSPRA) tablet 25 mg  25 mg DAILY Carol Cisse A.P.N.       • Respiratory Care per Protocol   Continuous RT Carol Cisse A.P.N.       • mupirocin (BACTROBAN) 2 % ointment 1 Application  1 Application BID NICHOLAS Mooney.P.N.   1 Application at 02/08/17 1442   • Pharmacy Consult Request ...Pain Management Review 1 Each  1 Each PRN Carol Cisse A.P.N.       • docusate sodium (COLACE) capsule 100 mg  100 mg QAM Carol Cisse A.P.N.   Stopped at 02/08/17 1315    And   • senna-docusate (PERICOLACE or SENOKOT S) 8.6-50 MG per tablet 1 Tab  1 Tab Nightly Carol Cisse A.P.N.        And   • senna-docusate (PERICOLACE or SENOKOT S) 8.6-50 MG per tablet 1 Tab  1 Tab Q24HRS PRN Carol Cisse A.P.N.        And   • lactulose 20 GM/30ML solution 30 mL  30 mL Q24HRS PRN Carol Cisse A.P.N.        And   • bisacodyl (DULCOLAX) suppository 10 mg  10 mg Q24HRS PRN Carol Cisse A.P.N.        And   • fleet enema 133 mL  1 Each Once PRN Carol Cisse A.P.N.       • NS infusion   Continuous Carol Cisse A.P.N. 10 mL/hr at 02/08/17 1300     • [START ON 2/9/2017] enoxaparin (LOVENOX) inj 40 mg  40 mg DAILY Carol Cisse A.P.N.       • [START ON 2/10/2017] potassium chloride ER (KLOR-CON) tablet 10 mEq  10 mEq DAILY Carol Cisse, A.P.N.        Or   • [START ON 2/10/2017] potassium chloride in water (KCL) ivpb 10 mEq  10 mEq DAILY Carol Cisse, A.P.N.       • K+ Scale: Goal of 4.5  1 Each Q6HRS Carol Cisse A.P.N.   1 Each at 02/08/17 1315   • magnesium sulfate ivpb premix 1 g  1 g QDAY Carol Cisse, A.P.N. 100 mL/hr at 02/08/17 1423 1 g at 02/08/17 1423   • omeprazole  (PRILOSEC) capsule 20 mg  20 mg DAILY Carol Cisse A.P.N.        Or   • pantoprazole (PROTONIX) injection 40 mg  40 mg DAILY Carol  Betito A.P.N.   40 mg at 02/08/17 1432   • cefUROXime (ZINACEF) 1,500 mg in  mL IVPB  1.5 g Q12HR Carol Cisse A.P.N.       • [START ON 2/9/2017] aspirin EC (ECOTRIN) tablet 81 mg  81 mg DAILY Carol  Betito A.P.N.       • [START ON 2/9/2017] MD ALERT... warfarin (COUMADIN) per pharmacy protocol   PRN Carol  Betito A.P.N.       • electrolyte-A (PLASMALYTE-A) infusion   PRN Carol  Betito A.P.N.       • clevidipine (CLEVIPREX) IV emulsion  0-10 mg/hr Continuous Carol  Betito A.P.N.   Stopped at 02/08/17 1315   • nitroglycerin 50 mg in D5W 250 ml infusion  0-100 mcg/min Continuous Carol  Betito, A.P.N.   Stopped at 02/08/17 1315   • oxycodone immediate-release (ROXICODONE) tablet 5 mg  5 mg Q3HRS PRN Carol  Betito A.P.N.       • oxycodone immediate release (ROXICODONE) tablet 10 mg  10 mg Q3HRS PRN Carol  Betito, A.P.N.       • tramadol (ULTRAM) 50 MG tablet 50 mg  50 mg Q4HRS PRN Carol  Betito, A.P.N.       • dexmedetomidine (PRECEDEX) 200 mcg in NS 50 mL infusion  0-1.5 mcg/kg/hr Continuous Carol  Betito A.P.N. 16.3 mL/hr at 02/08/17 1418 0.7 mcg/kg/hr at 02/08/17 1418   • midazolam (VERSED) 2 MG/2ML injection 0.5-2 mg  0.5-2 mg Q HOUR PRN Carol Cisse, A.P.N.       • sodium bicarbonate 8.4 % injection 50 mEq  50 mEq Q HOUR PRN Carol Cisse A.P.N.       • morphine (pf) 4 mg/ml injection 2 mg  2 mg Q HOUR PRN Carol Cisse, A.P.N.   2 mg at 02/08/17 1401   • ondansetron (ZOFRAN) syringe/vial injection 4 mg  4 mg Q6HRS PRN Carol Cisse, A.P.N.        Or   • prochlorperazine (COMPAZINE) injection 10 mg  10 mg Q6HRS PRN Carol Cisse, A.P.N.       • morphine (pf) 4 mg/ml injection 4 mg  4 mg Q3HRS PRN Carol Cisse, A.P.N.       • acetaminophen (TYLENOL) tablet 650 mg  650 mg Q4HRS PRN Carol Cisse, A.P.N.        Or   • acetaminophen (TYLENOL)  suppository 650 mg  650 mg Q4HRS PRN Carol Cisse, A.P.N.       • mag hydrox-al hydrox-simeth (MAALOX PLUS ES or MYLANTA DS) suspension 30 mL  30 mL Q4HRS PRN Carol Cisse, A.P.N.       • diphenhydrAMINE (BENADRYL) tablet/capsule 25 mg  25 mg HS PRN - MR X 1 Carol Cisse A.P.N.       • vancomycin 1,400 mg in  mL IVPB  15 mg/kg Q12HR Sonia Silva PHARMD       • insulin regular human (HUMULIN/NOVOLIN R) 62.5 Units in  mL infusion per protocol  1-6 Units/hr Continuous Sonia Sivla PHARMD 20 mL/hr at 02/08/17 1507 5 Units/hr at 02/08/17 1507    And   • insulin regular (HUMULIN R) injection 0-14 Units  0-14 Units Once Sonia Silva PHARMD   0 Units at 02/08/17 1400    And   • insulin regular (HUMULIN R) injection 0-10 Units  0-10 Units PRN Sonia Silva PHARMD        And   • dextrose 50% (D50W) injection 25 mL  25 mL PRN Sonia Silva PHARMD       • insulin lispro (HUMALOG) injection 0-20 Units  0-20 Units PRN Sonia Silva PHARMD       • epinephrine 1 mg/mL(1:1000) 4 mg in  mL Infusion  0-0.2 mcg/kg/min Continuous Carlos Hill M.D. 14 mL/hr at 02/08/17 1450 0.04 mcg/kg/min at 02/08/17 1450   • potassium chloride in water (KCL) ivpb 10 mEq  10 mEq Once Carol Cisse A.P.N.         Last reviewed on 2/8/2017  6:13 AM by Johanna Pepper R.N.    Quality  Measures:  Medications reviewed, Labs reviewed, Radiology images reviewed and EKG reviewed  Hewitt catheter: Critically Ill - Requiring Accurate Measurement of Urinary Output and Unconscious / Sedated Patient on a Ventilator  Central line in place: Need for access, Vasopressors and Shock    DVT Prophylaxis: Enoxaparin (Lovenox)  DVT prophylaxis - mechanical: SCDs  Ulcer prophylaxis: Yes    Assessed for rehab: Patient unable to tolerate rehabilitation therapeutic regimen    Assessment/Plan:  Daja-op ventilatory management - intubated 2/8   - cont full vent support with ASV wean   - RT protocols   - monitor fluid  status  Severe AS with bicuspid AV s/p mechanical AVR   - CVS following, BP control   - coumadin once CTs removed, monitor output   V-tach s/p AICD placement   - interrogated 2/8 post-surgery  Acute cardiogenic pulm edema   - monitor for now  CHF III - mild decompensation   - coreg and AE-I once off pressors  Vasoplegic/cardiogenic shock   - cont epi gtt to goal MAP>60   - PA cath numbers prn  Acute blood loss anemia - conservative transfusion strategies  Prophylaxis, NPO    Discussed patient condition and risk of morbidity and/or mortality with Family, RN, RT, Pharmacy, Charge nurse / hot rounds, QA team, Patient and CVS.    The patient remains critically ill.  Critical care time = 34 minutes in directly providing and coordinating critical care and extensive data review.  No time overlap and excludes procedures.

## 2017-02-08 NOTE — IP AVS SNAPSHOT
2/16/2017          Alberto Fam  [address] [place] [address]    Dear Alberto:    [facility] wants to ensure your discharge home is safe and you or your loved ones have had all your questions answered regarding your care after you leave the hospital.    You may receive a telephone call within two days of your discharge.  This call is to make certain you understand your discharge instructions as well as ensure we provided you with the best care possible during your stay with us.     The call will only last approximately 3-5 minutes and will be done by a nurse.    Once again, we want to ensure your discharge home is safe and that you have a clear understanding of any next steps in your care.  If you have any questions or concerns, please do not hesitate to contact us, we are here for you.  Thank you for choosing [facility] for your healthcare needs.    Sincerely,    Daniel Adam    [facility]

## 2017-02-08 NOTE — IP AVS SNAPSHOT
" Home Care Instructions                                                                                                                  Name:Alberto Fam  Medical Record Number:[id number]  CSN: [id number]    YOB: 1955   Age: 61 y.o.  Sex: male  HT:1.803 m (5' 11\") WT: 100.6 kg (221 lb 12.5 oz)          Admit Date: 2/8/2017     Discharge Date:   Today's Date: 2/16/2017  Attending Doctor:  Alfonso Llanos M.D.                  Allergies:  Sulfa drugs            Discharge Instructions       Discharge Instructions    Discharged to home by car with relative. Discharged via wheelchair, hospital escort: Yes.  Special equipment needed: Not Applicable    Be sure to schedule a follow-up appointment with your primary care doctor or any specialists as instructed.     Discharge Plan:      I understand that a diet low in cholesterol, fat, and sodium is recommended for good health. Unless I have been given specific instructions below for another diet, I accept this instruction as my diet prescription.   Other diet: cardiac    Special Instructions: heart surgery and coumadin    Cardiac Surgery Discharge Instructions/[facility]    Activity:  1. NO driving for 4 weeks after surgery. You may ride as a passenger.  2. NO lifting of any item over 10 lbs (e.g. gallon of milk) for 6 weeks after surgery.  Do not raise both arms above head, only one at a time.  Do not push or pull with your arms.  3. Walk as much as possible! Walk a minimum of 4 times per day. Depending on your fatigue and comfort level, you may walk as much as you wish. There is no maximum.  4. Other physical activities (sex, housework, gardening, etc.) are OK after 4 weeks or after 8 weeks if you want to golf (start by putting, then advance to chipping, then to driving).  5. Continue using incentive spirometer for 2 weeks.  If you are going home on oxygen and you were not on oxygen prior to surgery, keep using until you are oxygen free.  6. Weigh " daily and write it down starting  the next morning after you arrive home. Call your doctor for a weight gain of 2-4 lbs in 1-2 days.    Incision Care:  1. SHOWER EVERYDAY-no baths. Make sure to clean your incision(s) twice daily with plain, perfume and dye-free soap (if you shower in the morning, stand at the sink at bedtime and clean incision with soap and water). Then pat incision(s) dry with clean towel. Avoid creams or lotions on the incision(s).    2. If there is any increase in redness or swelling, or separation of the incision line, or thick drainage* from any of the incisions, call [facility] ([phone]). * Clear, thin drainage is not abnormal especially from the leg incision and/or chest tube sites.                    3. Continue to wear your ARVIND Stockings for 4 weeks on the leg(s) with the incision(s) or swelling. You may take off the stocking(s) when in bed or when the leg(s) are elevated.    General Instructions:  1. If you are on the blood thinner Coumadin (warfarin), you will need your coumadin levels checked periodically.  2. You have been referred to Cardiac Rehab which is highly recommended for you after heart surgery. You can start Cardiac Rehab 30 days after surgery.  If you do not have an appointment at the time of discharge call [phone] to schedule an appointment.  3. Your Primary Care Doctor typically handles Home Oxygen. The Home Oxygen service that drops off your tanks should be checking your oxygen saturation levels. Oxygen may be stopped when you are > 90 % saturated on room air. Check with your Primary Care Doctor if you are unsure.    Prescription Refills/Questions:   Call your usual Pharmacy for ALL refills   1. Pain medication questions only: Call [facility] at [phone].  (Remember that refills may require additional physician approval and will need at least 24 hours’ notice. Please call for refills on Mondays through Fridays from 9 am to 4 pm).  2. For all  other medications (except pain medication): Call your Cardiology Group or Primary Care Doctor (not [facility]) for refills or questions.    [facility] IS ALWAYS AVAILABLE TO ANSWER YOUR QUESTIONS. DO NOT HESITATE TO CALL!    · Is patient discharged on Warfarin / Coumadin?   Yes    You are on the blood thinner Coumadin (warfarin) and you will need your coumadin levels checked periodically. For any questions call the [facility] @ [phone]. The home health nurse will draw your blood and the coumadin clinic will call with any change to your dose.      IMPORTANT: HOW TO USE THIS INFORMATION:  This is a summary and does NOT have all possible information about this product. This information does not assure that this product is safe, effective, or appropriate for you. This information is not individual medical advice and does not substitute for the advice of your health care professional. Always ask your health care professional for complete information about this product and your specific health needs.      WARFARIN - ORAL (WARF-uh-rin)      COMMON BRAND NAME(S): Coumadin      WARNING:  Warfarin can cause very serious (possibly fatal) bleeding. This is more likely to occur when you first start taking this medication or if you take too much warfarin. To decrease your risk for bleeding, your doctor or other health care provider will monitor you closely and check your lab results (INR test) to make sure you are not taking too much warfarin. Keep all medical and laboratory appointments. Tell your doctor right away if you notice any signs of serious bleeding. See also Side Effects section.      USES:  This medication is used to treat blood clots (such as in deep vein thrombosis-DVT or pulmonary embolus-PE) and/or to prevent new clots from forming in your body. Preventing harmful blood clots helps to reduce the risk of a stroke or heart attack. Conditions that increase your risk of developing  "blood clots include a certain type of irregular heart rhythm (atrial fibrillation), heart valve replacement, recent heart attack, and certain surgeries (such as hip/knee replacement). Warfarin is commonly called a \"blood thinner,\" but the more correct term is \"anticoagulant.\" It helps to keep blood flowing smoothly in your body by decreasing the amount of certain substances (clotting proteins) in your blood.      HOW TO USE:  Read the Medication Guide provided by your pharmacist before you start taking warfarin and each time you get a refill. If you have any questions, ask your doctor or pharmacist. Take this medication by mouth with or without food as directed by your doctor or other health care professional, usually once a day. It is very important to take it exactly as directed. Do not increase the dose, take it more frequently, or stop using it unless directed by your doctor. Dosage is based on your medical condition, laboratory tests (such as INR), and response to treatment. Your doctor or other health care provider will monitor you closely while you are taking this medication to determine the right dose for you. Use this medication regularly to get the most benefit from it. To help you remember, take it at the same time each day. It is important to eat a balanced, consistent diet while taking warfarin. Some foods can affect how warfarin works in your body and may affect your treatment and dose. Avoid sudden large increases or decreases in your intake of foods high in vitamin K (such as broccoli, cauliflower, cabbage, brussels sprouts, kale, spinach, and other green leafy vegetables, liver, green tea, certain vitamin supplements). If you are trying to lose weight, check with your doctor before you try to go on a diet. Cranberry products may also affect how your warfarin works. Limit the amount of cranberry juice (16 ounces/480 milliliters a day) or other cranberry products you may drink or eat.      SIDE " EFFECTS:  Nausea, loss of appetite, or stomach/abdominal pain may occur. If any of these effects persist or worsen, tell your doctor or pharmacist promptly. Remember that your doctor has prescribed this medication because he or she has judged that the benefit to you is greater than the risk of side effects. Many people using this medication do not have serious side effects. This medication can cause serious bleeding if it affects your blood clotting proteins too much (shown by unusually high INR lab results). Even if your doctor stops your medication, this risk of bleeding can continue for up to a week. Tell your doctor right away if you have any signs of serious bleeding, including: unusual pain/swelling/discomfort, unusual/easy bruising, prolonged bleeding from cuts or gums, persistent/frequent nosebleeds, unusually heavy/prolonged menstrual flow, pink/dark urine, coughing up blood, vomit that is bloody or looks like coffee grounds, severe headache, dizziness/fainting, unusual or persistent tiredness/weakness, bloody/black/tarry stools, chest pain, shortness of breath, difficulty swallowing. Tell your doctor right away if any of these unlikely but serious side effects occur: persistent nausea/vomiting, severe stomach/abdominal pain, yellowing eyes/skin. This drug rarely has caused very serious (possibly fatal) problems if its effects lead to small blood clots (usually at the beginning of treatment). This can lead to severe skin/tissue damage that may require surgery or amputation if left untreated. Patients with certain blood conditions (protein C or S deficiency) may be at greater risk. Get medical help right away if any of these rare but serious side effects occur: painful/red/purplish patches on the skin (such as on the toe, breast, abdomen), change in the amount of urine, vision changes, confusion, slurred speech, weakness on one side of the body. A very serious allergic reaction to this drug is rare. However,  get medical help right away if you notice any symptoms of a serious allergic reaction, including: rash, itching/swelling (especially of the face/tongue/throat), severe dizziness, trouble breathing. This is not a complete list of possible side effects. If you notice other effects not listed above, contact your doctor or pharmacist. In the [place] - Call your doctor for medical advice about side effects. You may report side effects to FDA at 5-108-AOZ-8383. In [place] - Call your doctor for medical advice about side effects. You may report side effects to [facility] [place] at 1-108.769.3350.      PRECAUTIONS:  Before taking warfarin, tell your doctor or pharmacist if you are allergic to it; or if you have any other allergies. This product may contain inactive ingredients, which can cause allergic reactions or other problems. Talk to your pharmacist for more details. Before using this medication, tell your doctor or pharmacist your medical history, especially of: blood disorders (such as anemia, hemophilia), bleeding problems (such as bleeding of the stomach/intestines, bleeding in the brain), blood vessel disorders (such as aneurysms), recent major injury/surgery, liver disease, alcohol use, mental/mood disorders (including memory problems), frequent falls/injuries. It is important that all your doctors and dentists know that you take warfarin. Before having surgery or any medical/dental procedures, tell your doctor or dentist that you are taking this medication and about all the products you use (including prescription drugs, nonprescription drugs, and herbal products). Avoid getting injections into the muscles. If you must have an injection into a muscle (for example, a flu shot), it should be given in the arm. This way, it will be easier to check for bleeding and/or apply pressure bandages. This medication may cause stomach bleeding. Daily use of alcohol while using this medicine will increase your risk for stomach bleeding  and may also affect how this medication works. Limit or avoid alcoholic beverages. If you have not been eating well, if you have an illness or infection that causes fever, vomiting, or diarrhea for more than 2 days, or if you start using any antibiotic medications, contact your doctor or pharmacist immediately because these conditions can affect how warfarin works. This medication can cause heavy bleeding. To lower the chance of getting cut, bruised, or injured, use great caution with sharp objects like safety razors and nail cutters. Use an electric razor when shaving and a soft toothbrush when brushing your teeth. Avoid activities such as contact sports. If you fall or injure yourself, especially if you hit your head, call your doctor immediately. Your doctor may need to check you. The Food & Drug Administration has stated that generic warfarin products are interchangeable. However, consult your doctor or pharmacist before switching warfarin products. Be careful not to take more than one medication that contains warfarin unless specifically directed by the doctor or health care provider who is monitoring your warfarin treatment. Older adults may be at greater risk for bleeding while using this drug. This medication is not recommended for use during pregnancy because of serious (possibly fatal) harm to an unborn baby. Discuss the use of reliable forms of birth control with your doctor. If you become pregnant or think you may be pregnant, tell your doctor immediately. If you are planning pregnancy, discuss a plan for managing your condition with your doctor before you become pregnant. Your doctor may switch the type of medication you use during pregnancy. Very small amounts of this medication may pass into breast milk but is unlikely to harm a nursing infant. Consult your doctor before breast-feeding.      DRUG INTERACTIONS:  Drug interactions may change how your medications work or increase your risk for serious  "side effects. This document does not contain all possible drug interactions. Keep a list of all the products you use (including prescription/nonprescription drugs and herbal products) and share it with your doctor and pharmacist. Do not start, stop, or change the dosage of any medicines without your doctor's approval. Warfarin interacts with many prescription, nonprescription, vitamin, and herbal products. This includes medications that are applied to the skin or inside the vagina or rectum. The interactions with warfarin usually result in an increase or decrease in the \"blood-thinning\" (anticoagulant) effect. Your doctor or other health care professional should closely monitor you to prevent serious bleeding or clotting problems. While taking warfarin, it is very important to tell your doctor or pharmacist of any changes in medications, vitamins, or herbal products that you are taking. Some products that may interact with this drug include: capecitabine, imatinib, mifepristone. Aspirin, aspirin-like drugs (salicylates), and nonsteroidal anti-inflammatory drugs (NSAIDs such as ibuprofen, naproxen, celecoxib) may have effects similar to warfarin. These drugs may increase the risk of bleeding problems if taken during treatment with warfarin. Carefully check all prescription/nonprescription product labels (including drugs applied to the skin such as pain-relieving creams) since the products may contain NSAIDs or salicylates. Talk to your doctor about using a different medication (such as acetaminophen) to treat pain/fever. Low-dose aspirin and related drugs (such as clopidogrel, ticlopidine) should be continued if prescribed by your doctor for specific medical reasons such as heart attack or stroke prevention. Consult your doctor or pharmacist for more details. Many herbal products interact with warfarin. Tell your doctor before taking any herbal products, especially bromelains, coenzyme Q10, cranberry, danshen, dong " quai, fenugreek, garlic, ginkgo biloba, ginseng, and Clarence's wort, among others. This medication may interfere with a certain laboratory test to measure theophylline levels, possibly causing false test results. Make sure laboratory personnel and all your doctors know you use this drug.      OVERDOSE:  If overdose is suspected, contact a poison control center or emergency room immediately. [place] residents can call the  National Poison Hotline at 1-978.568.4298. [place] residents can call a provincial poison control center. Symptoms of overdose may include: bloody/black/tarry stools, pink/dark urine, unusual/prolonged bleeding.      NOTES:  Do not share this medication with others. Laboratory and/or medical tests (such as INR, complete blood count) must be performed periodically to monitor your progress or check for side effects. Consult your doctor for more details.      MISSED DOSE:  For the best possible benefit, do not miss any doses. If you do miss a dose and remember on the same day, take it as soon as you remember. If you remember on the next day, skip the missed dose and resume your usual dosing schedule. Do not double the dose to catch up because this could increase your risk for bleeding. Keep a record of missed doses to give to your doctor or pharmacist. Contact your doctor or pharmacist if you miss 2 or more doses in a row.      STORAGE:  Store at room temperature away from light and moisture. Do not store in the bathroom. Keep all medications away from children and pets. Do not flush medications down the toilet or pour them into a drain unless instructed to do so. Properly discard this product when it is  or no longer needed. Consult your pharmacist or local waste disposal company for more details about how to safely discard your product.      MEDICAL ALERT:  Your condition and medication can cause complications in a medical emergency. For information about enrolling in MedicAlert, call  7-409-365-5465 ([place]) or 1-155.281.8376 ([place]).      Information last revised October 2010 Copyright(c) 2010 [employer]     · Is patient Post Blood Transfusion?  No    Heart Failure  Heart failure is a condition in which the heart has trouble pumping blood. This means your heart does not pump blood efficiently for your body to work well. In some cases of heart failure, fluid may back up into your lungs or you may have swelling (edema) in your lower legs. Heart failure is usually a long-term (chronic) condition. It is important for you to take good care of yourself and follow your health care provider's treatment plan.  CAUSES   Some health conditions can cause heart failure. Those health conditions include:  · High blood pressure (hypertension). Hypertension causes the heart muscle to work harder than normal. When pressure in the blood vessels is high, the heart needs to pump (contract) with more force in order to circulate blood throughout the body. High blood pressure eventually causes the heart to become stiff and weak.  · Coronary artery disease (CAD). CAD is the buildup of cholesterol and fat (plaque) in the arteries of the heart. The blockage in the arteries deprives the heart muscle of oxygen and blood. This can cause chest pain and may lead to a heart attack. High blood pressure can also contribute to CAD.  · Heart attack (myocardial infarction). A heart attack occurs when one or more arteries in the heart become blocked. The loss of oxygen damages the muscle tissue of the heart. When this happens, part of the heart muscle dies. The injured tissue does not contract as well and weakens the heart's ability to pump blood.  · Abnormal heart valves. When the heart valves do not open and close properly, it can cause heart failure. This makes the heart muscle pump harder to keep the blood flowing.  · Heart muscle disease (cardiomyopathy or myocarditis). Heart muscle disease is damage to the heart muscle  from a variety of causes. These can include drug or alcohol abuse, infections, or unknown reasons. These can increase the risk of heart failure.  · Lung disease. Lung disease makes the heart work harder because the lungs do not work properly. This can cause a strain on the heart, leading it to fail.  · Diabetes. Diabetes increases the risk of heart failure. High blood sugar contributes to high fat (lipid) levels in the blood. Diabetes can also cause slow damage to tiny blood vessels that carry important nutrients to the heart muscle. When the heart does not get enough oxygen and food, it can cause the heart to become weak and stiff. This leads to a heart that does not contract efficiently.  · Other conditions can contribute to heart failure. These include abnormal heart rhythms, thyroid problems, and low blood counts (anemia).  Certain unhealthy behaviors can increase the risk of heart failure, including:  · Being overweight.  · Smoking or chewing tobacco.  · Eating foods high in fat and cholesterol.  · Abusing illicit drugs or alcohol.  · Lacking physical activity.  SYMPTOMS   Heart failure symptoms may vary and can be hard to detect. Symptoms may include:  · Shortness of breath with activity, such as climbing stairs.  · Persistent cough.  · Swelling of the feet, ankles, legs, or abdomen.  · Unexplained weight gain.  · Difficulty breathing when lying flat (orthopnea).  · Waking from sleep because of the need to sit up and get more air.  · Rapid heartbeat.  · Fatigue and loss of energy.  · Feeling light-headed, dizzy, or close to fainting.  · Loss of appetite.  · Nausea.  · Increased urination during the night (nocturia).  DIAGNOSIS   A diagnosis of heart failure is based on your history, symptoms, physical examination, and diagnostic tests. Diagnostic tests for heart failure may include:  · Echocardiography.  · Electrocardiography.  · Chest X-ray.  · Blood tests.  · Exercise stress test.  · Cardiac  angiography.  · Radionuclide scans.  TREATMENT   Treatment is aimed at managing the symptoms of heart failure. Medicines, behavioral changes, or surgical intervention may be necessary to treat heart failure.  · Medicines to help treat heart failure may include:  ¨ Angiotensin-converting enzyme (ACE) inhibitors. This type of medicine blocks the effects of a blood protein called angiotensin-converting enzyme. ACE inhibitors relax (dilate) the blood vessels and help lower blood pressure.  ¨ Angiotensin receptor blockers (ARBs). This type of medicine blocks the actions of a blood protein called angiotensin. Angiotensin receptor blockers dilate the blood vessels and help lower blood pressure.  ¨ Water pills (diuretics). Diuretics cause the kidneys to remove salt and water from the blood. The extra fluid is removed through urination. This loss of extra fluid lowers the volume of blood the heart pumps.  ¨ Beta blockers. These prevent the heart from beating too fast and improve heart muscle strength.  ¨ Digitalis. This increases the force of the heartbeat.  · Healthy behavior changes include:  ¨ Obtaining and maintaining a healthy weight.  ¨ Stopping smoking or chewing tobacco.  ¨ Eating heart-healthy foods.  ¨ Limiting or avoiding alcohol.  ¨ Stopping illicit drug use.  ¨ Physical activity as directed by your health care provider.  · Surgical treatment for heart failure may include:  ¨ A procedure to open blocked arteries, repair damaged heart valves, or remove damaged heart muscle tissue.  ¨ A pacemaker to improve heart muscle function and control certain abnormal heart rhythms.  ¨ An internal cardioverter defibrillator to treat certain serious abnormal heart rhythms.  ¨ A left ventricular assist device (LVAD) to assist the pumping ability of the heart.  HOME CARE INSTRUCTIONS   · Take medicines only as directed by your health care provider. Medicines are important in reducing the workload of your heart, slowing the  progression of heart failure, and improving your symptoms.  ¨ Do not stop taking your medicine unless directed by your health care provider.  ¨ Do not skip any dose of medicine.  ¨ Refill your prescriptions before you run out of medicine. Your medicines are needed every day.  · Engage in moderate physical activity if directed by your health care provider. Moderate physical activity can benefit some people. The elderly and people with severe heart failure should consult with a health care provider for physical activity recommendations.  · Eat heart-healthy foods. Food choices should be free of trans fat and low in saturated fat, cholesterol, and salt (sodium). Healthy choices include fresh or frozen fruits and vegetables, fish, lean meats, legumes, fat-free or low-fat dairy products, and whole grain or high fiber foods. Talk to a dietitian to learn more about heart-healthy foods.  · Limit sodium if directed by your health care provider. Sodium restriction may reduce symptoms of heart failure in some people. Talk to a dietitian to learn more about heart-healthy seasonings.  · Use healthy cooking methods. Healthy cooking methods include roasting, grilling, broiling, baking, poaching, steaming, or stir-frying. Talk to a dietitian to learn more about healthy cooking methods.  · Limit fluids if directed by your health care provider. Fluid restriction may reduce symptoms of heart failure in some people.  · Weigh yourself every day. Daily weights are important in the early recognition of excess fluid. You should weigh yourself every morning after you urinate and before you eat breakfast. Wear the same amount of clothing each time you weigh yourself. Record your daily weight. Provide your health care provider with your weight record.  · Monitor and record your blood pressure if directed by your health care provider.  · Check your pulse if directed by your health care provider.  · Lose weight if directed by your health care  provider. Weight loss may reduce symptoms of heart failure in some people.  · Stop smoking or chewing tobacco. Nicotine makes your heart work harder by causing your blood vessels to constrict. Do not use nicotine gum or patches before talking to your health care provider.  · Keep all follow-up visits as directed by your health care provider. This is important.  · Limit alcohol intake to no more than 1 drink per day for nonpregnant women and 2 drinks per day for men. One drink equals 12 ounces of beer, 5 ounces of wine, or 1½ ounces of hard liquor. Drinking more than that is harmful to your heart. Tell your health care provider if you drink alcohol several times a week. Talk with your health care provider about whether alcohol is safe for you. If your heart has already been damaged by alcohol or you have severe heart failure, drinking alcohol should be stopped completely.  · Stop illicit drug use.  · Stay up-to-date with immunizations. It is especially important to prevent respiratory infections through current pneumococcal and influenza immunizations.  · Manage other health conditions such as hypertension, diabetes, thyroid disease, or abnormal heart rhythms as directed by your health care provider.  · Learn to manage stress.  · Plan rest periods when fatigued.  · Learn strategies to manage high temperatures. If the weather is extremely hot:  ¨ Avoid vigorous physical activity.  ¨ Use air conditioning or fans or seek a cooler location.  ¨ Avoid caffeine and alcohol.  ¨ Wear loose-fitting, lightweight, and light-colored clothing.  · Learn strategies to manage cold temperatures. If the weather is extremely cold:  ¨ Avoid vigorous physical activity.  ¨ Layer clothes.  ¨ Wear mittens or gloves, a hat, and a scarf when going outside.  ¨ Avoid alcohol.  · Obtain ongoing education and support as needed.  · Participate in or seek rehabilitation as needed to maintain or improve independence and quality of life.  SEEK MEDICAL  CARE IF:   · You have a rapid weight gain.  · You have increasing shortness of breath that is unusual for you.  · You are unable to participate in your usual physical activities.  · You tire easily.  · You cough more than normal, especially with physical activity.  · You have any or more swelling in areas such as your hands, feet, ankles, or abdomen.  · You are unable to sleep because it is hard to breathe.  · You feel like your heart is beating fast (palpitations).  · You become dizzy or light-headed upon standing up.  SEEK IMMEDIATE MEDICAL CARE IF:   · You have difficulty breathing.  · There is a change in mental status such as decreased alertness or difficulty with concentration.  · You have a pain or discomfort in your chest.  · You have an episode of fainting (syncope).  MAKE SURE YOU:   · Understand these instructions.  · Will watch your condition.  · Will get help right away if you are not doing well or get worse.     This information is not intended to replace advice given to you by your health care provider. Make sure you discuss any questions you have with your health care provider.     Document Released: 12/18/2006 Document Revised: 05/03/2016 Document Reviewed: 01/17/2014  [employer] Interactive Patient Education ©2016 [employer]      Depression / Suicide Risk    As you are discharged from this [facility] facility, it is important to learn how to keep safe from harming yourself.    Recognize the warning signs:  · Abrupt changes in personality, positive or negative- including increase in energy   · Giving away possessions  · Change in eating patterns- significant weight changes-  positive or negative  · Change in sleeping patterns- unable to sleep or sleeping all the time   · Unwillingness or inability to communicate  · Depression  · Unusual sadness, discouragement and loneliness  · Talk of wanting to die  · Neglect of personal appearance   · Rebelliousness- reckless behavior  · Withdrawal from  people/activities they love  · Confusion- inability to concentrate     If you or a loved one observes any of these behaviors or has concerns about self-harm, here's what you can do:  · Talk about it- your feelings and reasons for harming yourself  · Remove any means that you might use to hurt yourself (examples: pills, rope, extension cords, firearm)  · Get professional help from the community (Mental Health, Substance Abuse, psychological counseling)  · Do not be alone:Call your Safe Contact- someone whom you trust who will be there for you.  · Call your local CRISIS HOTLINE 611-0746 or 852-226-3754  · Call your local Children's Mobile Crisis Response Team [place] (966) 088-0269 or www.[employer]  · Call the toll free National Suicide Prevention Hotlines   · National Suicide Prevention Lifeline 658-252-NYKA (2195)  · National Hope Line Network 800-SUICIDE (052-2921)        Your appointments     Feb 21, 2017 10:20 AM   HOSPITAL FOLLOW UP with KEIKO Rizvi   [facility] (--)    [address]  [facility] [address] [phone]            May 30, 2017  9:40 AM   PACER CHECK ONLY with KEIKO Rizvi   [facility] (--)    [address]  [facility] [address] [phone]              Follow-up Information     1. Follow up with Alfonso Llanos M.D. On 3/20/2017.    Specialty:  Cardiac Surgery    Why:  11:15 am    Contact information    [phone]  [facility] [address]  [phone]          2. Follow up with Douglas Marcus M.D. On 3/1/2017.    Specialty:  Surgery    Why:  10:15 am    Contact information    [address]  [address]  [facility] [id number] [phone]           Discharge Medication Instructions:    Below are the medications your physician expects you to take upon discharge:    Review all your home medications and newly ordered medications  with your doctor and/or pharmacist. Follow medication instructions as directed by your doctor and/or pharmacist.    Please keep your medication list with you and share with your physician.               Medication List      START taking these medications        Instructions    furosemide 40 MG Tabs   Commonly known as:  LASIX   Next Dose Due:  2/16 Afternoon    Take 1 Tab by mouth 2 Times a Day. For seven days then daily   Dose:  40 mg       hydrocodone-acetaminophen 5-325 MG Tabs per tablet   Commonly known as:  NORCO   Next Dose Due:  As Needed    Take 1 Tab by mouth every four hours as needed.   Dose:  1 Tab       potassium chloride SA 20 MEQ Tbcr   Last time this was given:  20 mEq on 2/16/2017  7:52 AM   Commonly known as:  Kdur   Next Dose Due:  2/16 Afternoon    Take 1 Tab by mouth 2 Times a Day. For seven days then daily   Dose:  20 mEq       warfarin 5 MG Tabs   Last time this was given:  7.5 mg on 2/15/2017  5:36 PM   Commonly known as:  COUMADIN   Next Dose Due:  2/16 Evening    Take 1.5 Tabs by mouth COUMADIN-DAILY.   Dose:  7.5 mg         CHANGE how you take these medications        Instructions    carvedilol 3.125 MG Tabs   What changed:    - medication strength  - how much to take   Last time this was given:  3.125 mg on 2/12/2017  7:56 AM   Commonly known as:  COREG   Next Dose Due:  2/16 at dinner    Take 1 Tab by mouth 2 times a day, with meals.   Dose:  3.125 mg         CONTINUE taking these medications        Instructions    aspirin 81 MG tablet   Next Dose Due:  2/17    Take 81 mg by mouth every day.   Dose:  81 mg       eplerenone 25 MG Tabs   Last time this was given:  25 mg on 2/10/2017  9:20 AM   Commonly known as:  INSPRA   Next Dose Due:  2/17    Take 1 Tab by mouth every day.   Dose:  25 mg       therapeutic multivitamin-minerals Tabs   Next Dose Due:  2/17    Take 1 Tab by mouth every day.   Dose:  1 Tab         STOP taking these medications     ENTRESTO 49-51 MG Tabs tablet   Generic  drug:  sacubitril-valsartan       ibuprofen 200 MG Tabs   Commonly known as:  MOTRIN               Instructions           Diet / Nutrition:    Follow any diet instructions given to you by your doctor or the dietician, including how much salt (sodium) you are allowed each day.    If you are overweight, talk to your doctor about a weight reduction plan.    Activity:    Remain physically active following your doctor's instructions about exercise and activity.    Rest often.     Any time you become even a little tired or short of breath, SIT DOWN and rest.    Worsening Symptoms:    Report any of the following signs and symptoms to the doctor's office immediately:    *Pain of jaw, arm, or neck  *Chest pain not relieved by medication                               *Dizziness or loss of consciousness  *Difficulty breathing even when at rest   *More tired than usual                                       *Bleeding drainage or swelling of surgical site  *Swelling of feet, ankles, legs or stomach                 *Fever (>100ºF)  *Pink or blood tinged sputum  *Weight gain (3lbs/day or 5lbs /week)           *Shock from internal defibrillator (if applicable)  *Palpitations or irregular heartbeats                *Cool and/or numb extremities    Stroke Awareness    Common Risk Factors for Stroke include:    Age  Atrial Fibrillation  Carotid Artery Stenosis  Diabetes Mellitus  Excessive alcohol consumption  High blood pressure  Overweight   Physical inactivity  Smoking    Warning signs and symptoms of a stroke include:    *Sudden numbness or weakness of the face, arm or leg (especially on one side of the body).  *Sudden confusion, trouble speaking or understanding.  *Sudden trouble seeing in one or both eyes.  *Sudden trouble walking, dizziness, loss of balance or coordination.Sudden severe headache with no known cause.    It is very important to get treatment quickly when a stroke occurs. If you experience any of the above warning  signs, call [phone] immediately.                   Disclaimer         Quit Smoking / Tobacco Use:    I understand the use of any tobacco products increases my chance of suffering from future heart disease or stroke and could cause other illnesses which may shorten my life. Quitting the use of tobacco products is the single most important thing I can do to improve my health. For further information on smoking / tobacco cessation call a Toll Free Quit Line at 1-758.959.6418 (*National Cancer [place]) or 1-554.232.6035 (American Lung Association) or you can access the web based program at www.lungusa.org.    [place] Tobacco Users Help Line:  (973) 169-5685       Toll Free: 1-543.228.8814  Quit Tobacco Program [facility] (516)007-3480    Crisis Hotline:    [place] Crisis Hotline:  5-182-GWBCMDN or 1-768.104.8185    [place] Crisis Hotline:    1-496.314.3772 or 980-350-8103    Discharge Survey:   Thank you for choosing [facility]. We hope we did everything we could to make your hospital stay a pleasant one. You may be receiving a phone survey and we would appreciate your time and participation in answering the questions. Your input is very valuable to us in our efforts to improve our service to our patients and their families.        My signature on this form indicates that:    1. I have reviewed and understand the above information.  2. My questions regarding this information have been answered to my satisfaction.  3. I have formulated a plan with my discharge nurse to obtain my prescribed medications for home.                  Disclaimer         __________________________________                     __________       ________                       Patient Signature                                                 Date                    Time

## 2017-02-08 NOTE — IP AVS SNAPSHOT
" <p align=\"LEFT\"><IMG SRC=\"//[email or web address]/blob$/[email or web address]\" alt=\"Image\" WIDTH=\"50%\" HEIGHT=\"200\" BORDER=\"\"></p>                   Name:Alberto Fam  Medical Record Number:[id number]  CSN: [id number]    YOB: 1955   Age: 61 y.o.  Sex: male  HT:1.803 m (5' 11\") WT: 100.6 kg (221 lb 12.5 oz)          Admit Date: 2/8/2017     Discharge Date:   Today's Date: 2/16/2017  Attending Doctor:  Alfonso Llanos M.D.                  Allergies:  Sulfa drugs          Your appointments     Feb 21, 2017 10:20 AM   HOSPITAL FOLLOW UP with KEIKO Rizvi   [facility] (--)    [address]  [facility] [address] [phone]            May 30, 2017  9:40 AM   PACER CHECK ONLY with KEIKO Rizvi   [facility] (--)    [address]  [facility] [address] [phone]              Follow-up Information     1. Follow up with Alfonso Llanos M.D. On 3/20/2017.    Specialty:  Cardiac Surgery    Why:  11:15 am    Contact information    [address]  [address]  [facility] [address]  [phone]          2. Follow up with Douglas Marcus M.D. On 3/1/2017.    Specialty:  Surgery    Why:  10:15 am    Contact information    [address]  [address]  [facility] [id number] [phone]           Medication List      Take these Medications        Instructions    aspirin 81 MG tablet    Take 81 mg by mouth every day.   Dose:  81 mg       carvedilol 3.125 MG Tabs   What changed:    - medication strength  - how much to take   Commonly known as:  COREG    Take 1 Tab by mouth 2 times a day, with meals.   Dose:  3.125 mg       eplerenone 25 MG Tabs   Commonly known as:  INSPRA    Take 1 Tab by mouth every day.   Dose:  25 mg       furosemide 40 MG Tabs   Commonly known as:  LASIX    Take 1 Tab by mouth 2 Times a Day. For seven days then daily   Dose:  40 mg       hydrocodone-acetaminophen 5-325 MG Tabs " per tablet   Commonly known as:  NORCO    Take 1 Tab by mouth every four hours as needed.   Dose:  1 Tab       potassium chloride SA 20 MEQ Tbcr   Commonly known as:  Kdur    Take 1 Tab by mouth 2 Times a Day. For seven days then daily   Dose:  20 mEq       therapeutic multivitamin-minerals Tabs    Take 1 Tab by mouth every day.   Dose:  1 Tab       warfarin 5 MG Tabs   Commonly known as:  COUMADIN    Take 1.5 Tabs by mouth COUMADIN-DAILY.   Dose:  7.5 mg

## 2017-02-08 NOTE — PROGRESS NOTES
Received PT from OR team.  Bedside report received from Dr. Hill.  Draiden verified at bedside, see MAR.  Orders to keep systolic BP .  [place] Jenae catheter not wedging, okay to use diastolic PA pressure for wedge number. Pt attached to monitor, alarms set properly, call light within reach.      [employer] technician called to interrogate Pt's pacemaker and AICD.

## 2017-02-09 ENCOUNTER — APPOINTMENT (OUTPATIENT)
Dept: RADIOLOGY | Facility: MEDICAL CENTER | Age: 62
DRG: 219 | End: 2017-02-09
Attending: CLINICAL NURSE SPECIALIST
Payer: COMMERCIAL

## 2017-02-09 LAB
ALBUMIN SERPL BCP-MCNC: 3 G/DL (ref 3.2–4.9)
ANION GAP SERPL CALC-SCNC: 8 MMOL/L (ref 0–11.9)
BUN SERPL-MCNC: 17 MG/DL (ref 8–22)
CALCIUM SERPL-MCNC: 7.3 MG/DL (ref 8.5–10.5)
CHLORIDE SERPL-SCNC: 107 MMOL/L (ref 96–112)
CO2 SERPL-SCNC: 23 MMOL/L (ref 20–33)
CREAT SERPL-MCNC: 0.86 MG/DL (ref 0.5–1.4)
EKG IMPRESSION: NORMAL
ERYTHROCYTE [DISTWIDTH] IN BLOOD BY AUTOMATED COUNT: 41.6 FL (ref 35.9–50)
GFR SERPL CREATININE-BSD FRML MDRD: >60 ML/MIN/1.73 M 2
GLUCOSE BLD-MCNC: 102 MG/DL (ref 65–99)
GLUCOSE BLD-MCNC: 102 MG/DL (ref 65–99)
GLUCOSE BLD-MCNC: 108 MG/DL (ref 65–99)
GLUCOSE BLD-MCNC: 113 MG/DL (ref 65–99)
GLUCOSE BLD-MCNC: 135 MG/DL (ref 65–99)
GLUCOSE BLD-MCNC: 75 MG/DL (ref 65–99)
GLUCOSE BLD-MCNC: 84 MG/DL (ref 65–99)
GLUCOSE BLD-MCNC: 84 MG/DL (ref 65–99)
GLUCOSE BLD-MCNC: 85 MG/DL (ref 65–99)
GLUCOSE BLD-MCNC: 86 MG/DL (ref 65–99)
GLUCOSE BLD-MCNC: 92 MG/DL (ref 65–99)
GLUCOSE BLD-MCNC: 93 MG/DL (ref 65–99)
GLUCOSE SERPL-MCNC: 107 MG/DL (ref 65–99)
HCT VFR BLD AUTO: 33.7 % (ref 42–52)
HGB BLD-MCNC: 11.6 G/DL (ref 14–18)
INR PPP: 1.13 (ref 0.87–1.13)
MCH RBC QN AUTO: 32.3 PG (ref 27–33)
MCHC RBC AUTO-ENTMCNC: 34.4 G/DL (ref 33.7–35.3)
MCV RBC AUTO: 93.9 FL (ref 81.4–97.8)
PLATELET # BLD AUTO: 89 K/UL (ref 164–446)
PMV BLD AUTO: 10.2 FL (ref 9–12.9)
POTASSIUM SERPL-SCNC: 4.5 MMOL/L (ref 3.6–5.5)
POTASSIUM SERPL-SCNC: 4.5 MMOL/L (ref 3.6–5.5)
PROTHROMBIN TIME: 14.9 SEC (ref 12–14.6)
RBC # BLD AUTO: 3.59 M/UL (ref 4.7–6.1)
SODIUM SERPL-SCNC: 138 MMOL/L (ref 135–145)
WBC # BLD AUTO: 11.6 K/UL (ref 4.8–10.8)

## 2017-02-09 PROCEDURE — 700105 HCHG RX REV CODE 258: Performed by: CLINICAL NURSE SPECIALIST

## 2017-02-09 PROCEDURE — A9270 NON-COVERED ITEM OR SERVICE: HCPCS | Performed by: CLINICAL NURSE SPECIALIST

## 2017-02-09 PROCEDURE — 700102 HCHG RX REV CODE 250 W/ 637 OVERRIDE(OP): Performed by: PHARMACIST

## 2017-02-09 PROCEDURE — 99233 SBSQ HOSP IP/OBS HIGH 50: CPT | Performed by: INTERNAL MEDICINE

## 2017-02-09 PROCEDURE — 84132 ASSAY OF SERUM POTASSIUM: CPT

## 2017-02-09 PROCEDURE — 770020 HCHG ROOM/CARE - TELE (206)

## 2017-02-09 PROCEDURE — A9270 NON-COVERED ITEM OR SERVICE: HCPCS | Performed by: PHARMACIST

## 2017-02-09 PROCEDURE — 700112 HCHG RX REV CODE 229: Performed by: CLINICAL NURSE SPECIALIST

## 2017-02-09 PROCEDURE — 71010 DX-CHEST-PORTABLE (1 VIEW): CPT

## 2017-02-09 PROCEDURE — 85610 PROTHROMBIN TIME: CPT

## 2017-02-09 PROCEDURE — 85027 COMPLETE CBC AUTOMATED: CPT

## 2017-02-09 PROCEDURE — 700111 HCHG RX REV CODE 636 W/ 250 OVERRIDE (IP)

## 2017-02-09 PROCEDURE — 93010 ELECTROCARDIOGRAM REPORT: CPT | Performed by: INTERNAL MEDICINE

## 2017-02-09 PROCEDURE — 700102 HCHG RX REV CODE 250 W/ 637 OVERRIDE(OP): Performed by: THORACIC SURGERY (CARDIOTHORACIC VASCULAR SURGERY)

## 2017-02-09 PROCEDURE — 82040 ASSAY OF SERUM ALBUMIN: CPT

## 2017-02-09 PROCEDURE — 80048 BASIC METABOLIC PNL TOTAL CA: CPT

## 2017-02-09 PROCEDURE — 700102 HCHG RX REV CODE 250 W/ 637 OVERRIDE(OP): Performed by: CLINICAL NURSE SPECIALIST

## 2017-02-09 PROCEDURE — 700102 HCHG RX REV CODE 250 W/ 637 OVERRIDE(OP)

## 2017-02-09 PROCEDURE — 700111 HCHG RX REV CODE 636 W/ 250 OVERRIDE (IP): Performed by: CLINICAL NURSE SPECIALIST

## 2017-02-09 PROCEDURE — 93005 ELECTROCARDIOGRAM TRACING: CPT | Performed by: CLINICAL NURSE SPECIALIST

## 2017-02-09 PROCEDURE — 82962 GLUCOSE BLOOD TEST: CPT | Mod: 91

## 2017-02-09 PROCEDURE — 700105 HCHG RX REV CODE 258

## 2017-02-09 RX ORDER — WARFARIN SODIUM 5 MG/1
5 TABLET ORAL
Status: DISCONTINUED | OUTPATIENT
Start: 2017-02-10 | End: 2017-02-11

## 2017-02-09 RX ORDER — WARFARIN SODIUM 7.5 MG/1
7.5 TABLET ORAL
Status: COMPLETED | OUTPATIENT
Start: 2017-02-09 | End: 2017-02-09

## 2017-02-09 RX ADMIN — MUPIROCIN 1 APPLICATION: 20 OINTMENT TOPICAL at 09:06

## 2017-02-09 RX ADMIN — MAGNESIUM SULFATE IN DEXTROSE 1 G: 10 INJECTION, SOLUTION INTRAVENOUS at 13:34

## 2017-02-09 RX ADMIN — CARVEDILOL 3.12 MG: 3.12 TABLET, FILM COATED ORAL at 17:16

## 2017-02-09 RX ADMIN — INSULIN HUMAN 13 UNITS: 100 INJECTION, SUSPENSION SUBCUTANEOUS at 21:48

## 2017-02-09 RX ADMIN — CARVEDILOL 3.12 MG: 3.12 TABLET, FILM COATED ORAL at 10:30

## 2017-02-09 RX ADMIN — VANCOMYCIN HYDROCHLORIDE 1400 MG: 10 INJECTION, POWDER, LYOPHILIZED, FOR SOLUTION INTRAVENOUS at 10:34

## 2017-02-09 RX ADMIN — CEFUROXIME SODIUM 1500 MG: 7.5 INJECTION, POWDER, FOR SOLUTION INTRAVENOUS at 09:05

## 2017-02-09 RX ADMIN — MUPIROCIN 1 APPLICATION: 20 OINTMENT TOPICAL at 21:46

## 2017-02-09 RX ADMIN — INSULIN HUMAN 13 UNITS: 100 INJECTION, SUSPENSION SUBCUTANEOUS at 11:01

## 2017-02-09 RX ADMIN — ENOXAPARIN SODIUM 40 MG: 100 INJECTION SUBCUTANEOUS at 21:46

## 2017-02-09 RX ADMIN — SODIUM CHLORIDE, SODIUM GLUCONATE, SODIUM ACETATE, POTASSIUM CHLORIDE AND MAGNESIUM CHLORIDE: 526; 502; 368; 37; 30 INJECTION, SOLUTION INTRAVENOUS at 01:00

## 2017-02-09 RX ADMIN — OXYCODONE HYDROCHLORIDE 10 MG: 10 TABLET ORAL at 13:34

## 2017-02-09 RX ADMIN — OXYCODONE HYDROCHLORIDE 5 MG: 5 TABLET ORAL at 09:04

## 2017-02-09 RX ADMIN — STANDARDIZED SENNA CONCENTRATE AND DOCUSATE SODIUM 1 TABLET: 8.6; 5 TABLET, FILM COATED ORAL at 21:46

## 2017-02-09 RX ADMIN — SODIUM CHLORIDE 2.5 UNITS/HR: 9 INJECTION, SOLUTION INTRAVENOUS at 04:09

## 2017-02-09 RX ADMIN — OXYCODONE HYDROCHLORIDE 10 MG: 10 TABLET ORAL at 16:06

## 2017-02-09 RX ADMIN — OMEPRAZOLE 20 MG: 20 CAPSULE, DELAYED RELEASE ORAL at 09:04

## 2017-02-09 RX ADMIN — SACUBITRIL AND VALSARTAN 1 TABLET: 49; 51 TABLET, FILM COATED ORAL at 21:46

## 2017-02-09 RX ADMIN — EPLERENONE 25 MG: 25 TABLET, FILM COATED ORAL at 09:04

## 2017-02-09 RX ADMIN — OXYCODONE HYDROCHLORIDE 10 MG: 10 TABLET ORAL at 22:09

## 2017-02-09 RX ADMIN — DOCUSATE SODIUM 100 MG: 100 CAPSULE ORAL at 09:04

## 2017-02-09 RX ADMIN — OXYCODONE HYDROCHLORIDE 10 MG: 10 TABLET ORAL at 19:09

## 2017-02-09 RX ADMIN — WARFARIN SODIUM 7.5 MG: 7.5 TABLET ORAL at 17:21

## 2017-02-09 RX ADMIN — SACUBITRIL AND VALSARTAN 1 TABLET: 49; 51 TABLET, FILM COATED ORAL at 10:30

## 2017-02-09 ASSESSMENT — PAIN SCALES - GENERAL
PAINLEVEL_OUTOF10: 8
PAINLEVEL_OUTOF10: 5
PAINLEVEL_OUTOF10: 3
PAINLEVEL_OUTOF10: 4
PAINLEVEL_OUTOF10: 2
PAINLEVEL_OUTOF10: 3
PAINLEVEL_OUTOF10: 8
PAINLEVEL_OUTOF10: 6
PAINLEVEL_OUTOF10: 4
PAINLEVEL_OUTOF10: 4
PAINLEVEL_OUTOF10: 3

## 2017-02-09 ASSESSMENT — ENCOUNTER SYMPTOMS
CARDIOVASCULAR NEGATIVE: 1
EYES NEGATIVE: 1
RESPIRATORY NEGATIVE: 1
NEUROLOGICAL NEGATIVE: 1
GASTROINTESTINAL NEGATIVE: 1
CONSTITUTIONAL NEGATIVE: 1
PSYCHIATRIC NEGATIVE: 1
MUSCULOSKELETAL NEGATIVE: 1

## 2017-02-09 ASSESSMENT — LIFESTYLE VARIABLES
EVER_SMOKED: YES
DO YOU DRINK ALCOHOL: NO

## 2017-02-09 NOTE — PROGRESS NOTES
1845  Bedside report received from Pauly Day Shift RN and pt care assumed at 1845.  Lines and infusions verified.  Insulin infusing at 5 units/hr, epinephrine infusing at 0.03 mcg/kg/min and NS infusing at 10 mL/hr through RIJ.  Pt in the middle of receiving 30 mEq of potassium through central line for K of 2.9.  [place] dressing clean, dry, and intact.  Mediastinal chest tubes in place, draining appropriately, dressing clean, dry and intact.  L radial art line and R femoral art line in place, waveforms appropriate and correlate.      2000  Paged Surgeons.  Updated JOHNNY Moralez on pt status.  Discussed pain control.  Per maynor Gastelum to continue to give morphine throughout the night if needed and readdress pain control in am.  Received approval to pull R femoral art line.    2145  Pt dangled at side of bed at approx. 2145 for 5 minutes.  Prior to mobilization pt given compazine for nausea due to zofran not working earlier in shift.  Following compazine, pt's BP dropped, pt began to have uncontrollable shakes, increased work of breathing and pt's temperature increased from 37.8 to 38.1.  Epinephrine titrated from 0.03 to 0.06 mcg/kg/min in response to BP decrease.  Pt assisted back to bed and given benadryl for possible allergic reaction as evidenced by temperature increase, increase work of breathing, and shaking. Charge RN was assisting with mobilization.  Will continue to monitor.

## 2017-02-09 NOTE — FLOWSHEET NOTE
02/09/17 1447   Events/Summary/Plan   Events/Summary/Plan IS    Non-Invasive Resp Device Site Inspection Completed Intact   Interdisciplinary Plan of Care-Goals (Indications)   Blunt Chest Indications Thoracic Surgery   Interdisciplinary Plan of Care-Outcomes    Blunt Chest IS Outcome Patient can Explain Need for I.S., Continues to use I.S., is Free of Active Lung Disease and Reaches a Stable Baseline   Education   Education Yes - Pt. / Family has been Instructed in use of Respiratory Equipment   Incentive Spirometry Group   Incentive Spirometry Instruction Yes   Breathing Exercises Yes   Incentive Spirometer Volume 750 mL   Chest Exam   Work Of Breathing / Effort Mild;Shallow   Respiration 20   Pulse 94   Heart Rate (Monitored) 94   Breath Sounds   Pre/Post Intervention Pre Intervention Assessment   RUL Breath Sounds Clear   RML Breath Sounds Diminished   RLL Breath Sounds Diminished   CYNDI Breath Sounds Clear   LLL Breath Sounds Diminished   Oximetry   Continuous Oximetry Yes   Oxygen   Pulse Oximetry 96 %   O2 (LPM) 1   O2 Daily Delivery Respiratory  Silicone Nasal Cannula

## 2017-02-09 NOTE — PROGRESS NOTES
12 hour chart check     Monitor Summary: Paced 80s-100s, until mobilizing to chair and then pt began to alternate between pacing 40s-50s and pacing 80s-90s

## 2017-02-09 NOTE — PROGRESS NOTES
Removed pt's mediastinal tube per APRN orders. Total output this shift 70 ml. Pt tolerated procedure well. Sternal dressing removed.

## 2017-02-09 NOTE — PROGRESS NOTES
Pulmonary Critical Care Consult Note        Chief Complaint: Symptomatic severe AS     Date of service: 2/9/17    History of Present Illness: 61 y.o. male admitted for for elective AVR for symptomatic AS/bicuspid AV. Has had SOB, fatigue, ROTH and cough for weaks and eveluated outpatient by cardiology and deemed to be a good surgical candidate.    ROS:  Respiratory: negative, Cardiac: positive chest pain and negative orthopnea, GI: negative.  All other systems negative.    Interval Events:  24 hour interval history reviewed    - extubated without difficulties   - having difficulty with pain control   - bradycardia despite AICD/pacer --> interrogation ordered   - remains on insulin gtt    PFSH:  No change.    Respiratory:   1 lpm n/c, IS 1750mL  Pulse Oximetry: 95 %  Chest Tube Drains: no air leaks     Mediastinal Chest Tube 1: 20 ml    Exam: unlabored respirations, no intercostal retractions or accessory muscle use and rales bibasilar  ImagingAvailable data reviewed   Recent Labs      02/08/17   1502  02/08/17   1605  02/08/17   1652   ISTATAPH  7.408  7.415  7.446   ISTATAPCO2  28.2  29.5  25.5*   ISTATAPO2  101*  99*  95*   ISTATATCO2  19*  20  18*   [id number]  98  98  98   ISTATARTHCO3  17.8  19.0  17.6   ISTATARTBE  -6*  -5*  -5*   ISTATTEMP  37.3 C  37.5 C  37.7 C   ISTATFIO2  30  30  30   ISTATSPEC  Arterial  Arterial  Arterial   ISTATAPHTC  7.404  7.408  7.435   [id number]  102*  102*  99*       HemoDynamics:  Pulse: 86, Heart Rate (Monitored): 85  Arterial BP: (!) 241/234 mmHg, NIBP: 108/59 mmHg  CVP (mm Hg): (!) 12 MM HG    Exam: regular rate and rhythm, regular rhythm (Sinus) - 100% V-paced  Imaging: Available data reviewed        Neuro:  GCS Total [place] Coma Score: 15       Exam: no focal deficits noted mental status intact oriented for age x3  Imaging: Available data reviewed    Fluids:  Intake/Output       02/07/17 0700 - 02/08/17 0659 (Not Admitted) 02/08/17 0700 - 02/09/17 0659 02/09/17 0700 -  02/10/17 0659      0700-1859 1900-0659 Total 0700-1859 1900-0659 Total 0700-1859 1900-0659 Total       Intake    P.O.  --  -- --  --  400 400  --  -- --    P.O. -- -- -- -- 400 400 -- -- --    I.V.  --  -- --  3998.5  2040.7 6039.2  --  -- --    Precedex Volume -- -- -- 60.1 -- 60.1 -- -- --    Insulin Volume -- -- -- 100 132.5 232.5 -- -- --    Epinephrine Volume -- -- -- 88.4 108.2 196.5 -- -- --    IV Volume (Plasmalyte) -- -- -- 1000 1250 2250 -- -- --    IV Volume (Normal Saline) -- -- -- 50 -- 50 -- -- --    Surgery Volume (Surgery Intake) -- -- -- 2500 -- 2500 -- -- --    IV Piggyback Volume (IV Piggyback) -- -- -- 200 550 750 -- -- --    Blood  --  -- --  1250  -- 1250  --  -- --    Cell Saver Volume (mL) -- -- -- 1050 -- 1050 -- -- --    Platelets Total Volume (Non-Barcoded) -- -- -- 200 -- 200 -- -- --    Total Intake -- -- -- 5248.5 2440.7 7689.2 -- -- --       Output    Urine  --  -- --  1525  855 2380  --  -- --    Indwelling Cathether -- -- --  -- -- --    Void (ml) -- -- -- 1100 -- 1100 -- -- --    Drains  --  -- --  250  170 420  --  -- --    Mediastinal Chest Tube 1 -- -- -- 250 170 420 -- -- --    Stool  --  -- --  --  -- --  --  -- --    Number of Times Stooled -- -- -- 0 x -- 0 x -- -- --    Total Output -- -- -- 1775 1025 2800 -- -- --       Net I/O     -- -- -- 3473.5 1415.7 4889.2 -- -- --        Weight: 97.1 kg (214 lb 1.1 oz)  Recent Labs      02/07/17   0956  02/08/17   1300  02/08/17   1700 02/09/17 02/09/17   0500   SODIUM  135   --    --   138   --    POTASSIUM  4.4  3.8  2.9*  4.5  4.5   CHLORIDE  103   --    --   107   --    CO2  26   --    --   23   --    BUN  21   --    --   17   --    CREATININE  0.99   --    --   0.86   --    MAGNESIUM   --   2.3   --    --    --    CALCIUM  9.9   --    --   7.3*   --        GI/Nutrition:  Exam: abdomen is soft and non-tender, normal active bowel sounds  Imaging: Available data reviewed  taking PO  Liver Function  Recent Labs       17   0956 17   ALTSGPT  35   --    ASTSGOT  23   --    ALKPHOSPHAT  53   --    TBILIRUBIN  0.6   --    GLUCOSE  94  107*       Heme:  Recent Labs      17   0956  17   1300 17   0500   RBC  4.95   --   3.59*   --    HEMOGLOBIN  15.7   --   11.6*   --    HEMATOCRIT  45.0   --   33.7*   --    PLATELETCT  118*  133*  89*   --    PROTHROMBTM  13.5  17.2*   --   14.9*   APTT  25.4  34.3   --    --    INR  1.00  1.36*   --   1.13       Infectious Disease:  Temp  Av.5 °C (99.5 °F)  Min: 36.5 °C (97.7 °F)  Max: 38 °C (100.4 °F)  Monitored Temp  Av.1 °C (98.8 °F)  Min: 36.5 °C (97.7 °F)  Max: 37.7 °C (99.9 °F)  Micro: reviewed  Recent Labs      17   0956 17   WBC  4.4*  11.6*   NEUTSPOLYS  56.70   --    LYMPHOCYTES  34.60   --    MONOCYTES  6.60   --    EOSINOPHILS  1.40   --    BASOPHILS  0.50   --    ASTSGOT  23   --    ALTSGPT  35   --    ALKPHOSPHAT  53   --    TBILIRUBIN  0.6   --      Current Facility-Administered Medications   Medication Dose Frequency Provider Last Rate Last Dose   • insulin lispro (HUMALOG) injection 5 Units  5 Units TID ENOCH Llanos M.D.       • insulin NPH (HUMULIN,NOVOLIN) injection 13 Units  13 Units Q8HRS Alfonso Llanos M.D.       • insulin lispro (HUMALOG) injection 9 Units  9 Units TID ENOCH Llanos M.D.       • insulin lispro (HUMALOG) injection 0-20 Units  0-20 Units ACHS & 0200 Alfonso Llanos M.D.       • carvedilol (COREG) tablet 3.125 mg  3.125 mg BID WITH MEALS Carol Cisse, A.P.N.       • sacubitril-valsartan (ENTRESTO) 49-51 MG tablet 1 Tab  1 Tab Q12HRS Carol Cisse, A.P.N.       • eplerenone (INSPRA) tablet 25 mg  25 mg DAILY Carol Cisse A.P.N.       • Respiratory Care per Protocol   Continuous RT Carol Cisse A.P.N.       • mupirocin (BACTROBAN) 2 % ointment 1 Application  1 Application BID NICHOLAS Mooney.P.NMaren   1 Application at 17 2018   • Pharmacy Consult Request ...Pain Management  Review 1 Each  1 Each PRN Carol Cisse A.P.N.       • docusate sodium (COLACE) capsule 100 mg  100 mg QAM Carol Cisse A.P.N.   Stopped at 02/08/17 1315    And   • senna-docusate (PERICOLACE or SENOKOT S) 8.6-50 MG per tablet 1 Tab  1 Tab Nightly Carol EVA Betito A.P.N.   1 Tab at 02/08/17 1921    And   • senna-docusate (PERICOLACE or SENOKOT S) 8.6-50 MG per tablet 1 Tab  1 Tab Q24HRS PRN Carol Cisse A.P.N.        And   • lactulose 20 GM/30ML solution 30 mL  30 mL Q24HRS PRN Carol Cisse A.P.N.        And   • bisacodyl (DULCOLAX) suppository 10 mg  10 mg Q24HRS PRN Carol Cisse A.P.N.        And   • fleet enema 133 mL  1 Each Once PRN Carol Cisse A.P.N.       • NS infusion   Continuous Carol Cisse A.P.N. 10 mL/hr at 02/08/17 1900     • enoxaparin (LOVENOX) inj 40 mg  40 mg DAILY Carol Cisse, A.P.N.       • [START ON 2/10/2017] potassium chloride ER (KLOR-CON) tablet 10 mEq  10 mEq DAILY Carol Cisse A.P.N.        Or   • [START ON 2/10/2017] potassium chloride in water (KCL) ivpb 10 mEq  10 mEq DAILY Carol Cisse A.P.N.       • K+ Scale: Goal of 4.5  1 Each Q6HRS Carol Cisse A.P.N.   Stopped at 02/09/17 0000   • magnesium sulfate ivpb premix 1 g  1 g QDAY Carol Cisse A.P.N.   Stopped at 02/08/17 1523   • omeprazole (PRILOSEC) capsule 20 mg  20 mg DAILY Carol Cisse A.P.N.        Or   • pantoprazole (PROTONIX) injection 40 mg  40 mg DAILY Carol Cisse A.P.N.   40 mg at 02/08/17 1432   • cefUROXime (ZINACEF) 1,500 mg in  mL IVPB  1.5 g Q12HR NICHOLAS Mooney.P.N.   Stopped at 02/08/17 2157   • aspirin EC (ECOTRIN) tablet 81 mg  81 mg DAILY WADE MooneyP.N.       • MD ALERT... warfarin (COUMADIN) per pharmacy protocol   PRN NICHOLAS Mooney.P.N.       • electrolyte-A (PLASMALYTE-A) infusion   PRN NICHOLAS Mooney.P.N.       • clevidipine (CLEVIPREX) IV emulsion  0-10 mg/hr Continuous Carol Cisse A.P.N.   Stopped at 02/08/17 1315   • nitroglycerin 50 mg in  D5W 250 ml infusion  0-100 mcg/min Continuous Carol Cisse, A.P.N.   Stopped at 02/08/17 1315   • oxycodone immediate-release (ROXICODONE) tablet 5 mg  5 mg Q3HRS PRN Carol  Betito, A.P.N.   5 mg at 02/08/17 1920   • oxycodone immediate release (ROXICODONE) tablet 10 mg  10 mg Q3HRS PRN Carol  Betito, A.P.N.       • tramadol (ULTRAM) 50 MG tablet 50 mg  50 mg Q4HRS PRN Carol  Betito, A.P.N.       • dexmedetomidine (PRECEDEX) 200 mcg in NS 50 mL infusion  0-1.5 mcg/kg/hr Continuous Carol  Betito, A.P.N.   Stopped at 02/08/17 1733   • midazolam (VERSED) 2 MG/2ML injection 0.5-2 mg  0.5-2 mg Q HOUR PRN Carol  Betito, A.P.N.       • sodium bicarbonate 8.4 % injection 50 mEq  50 mEq Q HOUR PRN Carol  Betito, A.P.N.       • morphine (pf) 4 mg/ml injection 2 mg  2 mg Q HOUR PRN Carol  Betito, A.P.N.   2 mg at 02/08/17 2309   • ondansetron (ZOFRAN) syringe/vial injection 4 mg  4 mg Q6HRS PRN Carol  Betito, A.P.N.   4 mg at 02/08/17 1926    Or   • prochlorperazine (COMPAZINE) injection 10 mg  10 mg Q6HRS PRN Carol  Betito, A.P.N.   10 mg at 02/08/17 2142   • morphine (pf) 4 mg/ml injection 4 mg  4 mg Q3HRS PRN Carol  Betito, A.P.N.       • acetaminophen (TYLENOL) tablet 650 mg  650 mg Q4HRS PRN Carol  Betito, A.P.N.        Or   • acetaminophen (TYLENOL) suppository 650 mg  650 mg Q4HRS PRN Carol  Betito, A.P.N.       • mag hydrox-al hydrox-simeth (MAALOX PLUS ES or MYLANTA DS) suspension 30 mL  30 mL Q4HRS PRN Carol Cisse, A.P.N.       • diphenhydrAMINE (BENADRYL) tablet/capsule 25 mg  25 mg HS PRN - MR X 1 Carol Cisse A.P.N.   25 mg at 02/08/17 2200   • vancomycin 1,400 mg in  mL IVPB  15 mg/kg Q12HR Sonia Silva PHARMD   Stopped at 02/09/17 0000   • insulin regular human (HUMULIN/NOVOLIN R) 62.5 Units in  mL infusion per protocol  1-6 Units/hr Continuous Sonia Silva PHARMD 10 mL/hr at 02/09/17 0600 2.5 Units/hr at 02/09/17 0600    And   • insulin regular (HUMULIN R) injection 0-14  Units  0-14 Units Once Sonia Silva PHARMD   0 Units at 02/08/17 1400    And   • insulin regular (HUMULIN R) injection 0-10 Units  0-10 Units PRN Sonia Silva PHARMD        And   • dextrose 50% (D50W) injection 25 mL  25 mL PRN GINGER FrazierD       • epinephrine 1 mg/mL(1:1000) 4 mg in  mL Infusion  0-0.2 mcg/kg/min Continuous Carlos Hill M.D.   Stopped at 02/09/17 0100     Last reviewed on 2/8/2017  6:13 AM by Johanna Pepper R.N.    Quality  Measures:  Medications reviewed, Labs reviewed, Radiology images reviewed and EKG reviewed    Central line in place: Need for access    DVT Prophylaxis: Enoxaparin (Lovenox)  DVT prophylaxis - mechanical: SCDs  Ulcer prophylaxis: Yes    Assessed for rehab: Patient was assess for and/or received rehabilitation services during this hospitalization    Assessment/Plan:  Daja-op ventilatory management - intubated 2/8, extubated 2/8   - encourage IS, OOB to chair   - RT protocols   - monitor fluid status  Severe AS with bicuspid AV s/p mechanical AVR   - CVS following, BP control   - coumadin once CTs removed, monitor output   V-tach s/p AICD placement   - interrogated 2/8 post-surgery, re-interrogation  Acute cardiogenic pulm edema   - monitor for now  CHF III - mild decompensation  Vasoplegic/cardiogenic shock - resolved  Acute blood loss anemia - conservative transfusion strategies  Prophylaxis, diet    Discussed patient condition and risk of morbidity and/or mortality with Family, RN, RT, Pharmacy, Charge nurse / hot rounds, QA team, Patient and CVS.

## 2017-02-09 NOTE — PROGRESS NOTES
L art line and swan vanessa pulled prior to getting up to chair.  During mobilization to chair patient began alternating between pacing in the 40s-50s to pacing in the 90s.  No change in mentation or other vitals.  Baseline pace rate throughout night shift was 80s-100s.  Discussed with Charge RN.  Pt not transitioned to tele status due to change in pacing.  BP checks returned to q15 minutes while pacer rate varying.

## 2017-02-09 NOTE — PROGRESS NOTES
RN and RT in agreement.  Patient extubated from 100 % ASV, 30 % FiO2, and 8 PEEP. FVC 2290 L, NIF -30 cm H2O, RR 12, pInsp 5 cm H2O. See 1652 ABG     Total time intubated from reaching room 6 hours and 30 minutes.     Patient currently tolerating 3 L NC.

## 2017-02-09 NOTE — CARE PLAN
Problem: Day of surgery post CABG/Heart valve replacement  Goal: Stabilization in immediate post op period  Outcome: PROGRESSING AS EXPECTED  Intervention: VS q 15 min x 4 hours, then q 1 hour. Include temperature immediately upon arrival. Check CO/CI q 2-4 hours and PRN  Documented in EPIC flow sheets                Intervention: If radial artery used, elevate arm, no BP checks or needle sticks from affected arm, monitor ulnar pulse and capillary refill  Left radial arterial line and right femoral arterial line assessed; capillary refill < 3 seconds throughout all extremities; pulses 2+ in all extremities      Intervention: First post op hour labs and diagnostics per MD order  Labs collected and sent according to orders   Intervention: Serum K q 6 hours x 24 hours. ABG and CBC prn.  Completed   Intervention: For FSBS greater than 130, start Post Cardiac Surgery Insulin Drip Protocol  Insulin drip in use   Intervention: FSBS frequency as per Cardiac Surgery Insulin Drip Protocol  fsbs q 1 hour   Intervention: Chest tube to 20 cm suction, record CT drainage with VS  Chest tube to 20 cm water suction      Intervention: For CT drainage > 300 cc in first post op hour and/or 150 cc in subsequent hours: platelets, coag screen, fibrinogen, H&H per order  Not applicable; CT drainage 220mL total, currently   Intervention: Titrate and wean off vasoactive drips per patient’s condition and per MD order while maintaining SBP  mmHg per MD order  Epinephrine drip titrated per active orders      Intervention: VAP protocol in place  VAP protocol in place      Intervention: Wean from vent per protocol (see protocol), extubation goal with 2-6 hours post op.  Weaning from ventilator per protocol; Pt not yet able to maintain adequate arousal for discontinuation of ventilator   Intervention: Bedrest until extubated and groin lines out  Pt still on bedrest      Intervention: Maintain all original surgical dressings for 24  hours  Surgical dressings maintained

## 2017-02-09 NOTE — RESPIRATORY CARE
Extubation    Cuff leak noted yes  Stridor present no              Patient toleration well  RCP Complete? yes  Events/Summary/Plan: Pt extubated. Parameters obtained (02/08/17 9802)

## 2017-02-09 NOTE — PROGRESS NOTES
Pt meet extubation parameters but is unable to maintain adequate arousal for greater than 10 minutes.  Family and this RN at bedside attempting to keep Pt stimulated enough to maintain arousal.

## 2017-02-09 NOTE — DIETARY
Nutrition Services: Consult cardiac rehab diet education    Pt is a 61 y.o. Male with Dx: AVR     PMH: Severe aortic stenosis (calcific or degenerative), low flow low gradient aortic stenosis, bicuspid aortic valve, chronic left ventricular systolic and diastolic failure, congestive heart failure (NYHA class III), severe nonischemic cardiomyopathy, ventricular tachycardia, atrioventricular block, status post permanent pacemaker/AICD placement, status post ventricular septal defect repair, status post right thoracotomy.  BMI= 29.87  Labs: HA1c 5.4% (WNL 2/7); no current lipid panel but last WNL 2013     S/p AVR 2/8. Pt on post-op clear liquid diet. Plan pacer interrogation today. Cardiac rehab diet education not indicated per RD judgment.    RD available.

## 2017-02-09 NOTE — PROGRESS NOTES
Cardiovascular Surgery Progress Note    Name: Alberto Fam  MRN: [id number]  : 1955  Admit Date: 2017  5:57 AM  Procedure:  Procedure(s) and Anesthesia Type:     * STERNOTOMY - REDO - General     * AORTIC VALVE REPLACEMENT - General     * JUSTINO - General  1 Day Post-Op    Vitals:  Patient Vitals for the past 8 hrs:   Temp Monitored Temp SpO2 O2 Delivery O2 (LPM) Pulse Heart Rate (Monitored) Resp NIBP Weight   17 0706 - - 95 % - 1 86 85 16 - -   17 0615 - - - - - - - - - 97.1 kg (214 lb 1.1 oz)   17 0600 - 37.7 °C (99.9 °F) 97 % - - 94 99 (!) 31 - -   17 0500 - - 97 % - - 87 90 17 108/59 mmHg -   17 0415 - - 97 % - - 91 90 (!) 26 - -   17 0400 37.6 °C (99.7 °F) - 97 % Silicone Nasal Cannula 1 93 92 16 (!) 99/58 mmHg -   17 0345 - - 97 % - - 91 91 16 - -   17 0330 - - 98 % - - 91 90 16 - -   17 0315 - - 96 % - - 96 96 20 - -   17 0300 - - 97 % - - 90 93 17 (!) 88/57 mmHg -   17 0245 - - 96 % - - 89 89 16 - -   17 0230 - - 97 % - - 89 89 16 - -   17 0215 - - 97 % - - 94 94 15 - -   17 0200 - - 96 % - - 88 93 14 109/55 mmHg -   17 0145 - - 97 % - - 93 92 15 - -     Temp (24hrs), Av.5 °C (99.5 °F), Min:36.5 °C (97.7 °F), Max:38 °C (100.4 °F)      Respiratory:  [place] Vent Mode: ASV, FiO2: 30, P Peak (PIP): 21, P MEAN: 11 Respiration: 16, Pulse Oximetry: 95 %, O2 Daily Delivery Respiratory : Silicone Nasal Cannula  Chest Tube Group 1 (A) Left;Anterior 32-Tube Status / Drainage: Patent;Sutured in Place;Small;Sanguinous, Chest Tube Group 2 (B) Right;Anterior 32-Tube Status / Drainage: Patent;Small;Sanguinous;Sutured in Place, Chest Tube Group 1 (A) Left;Anterior 32-Device: Suction 20 cm Water;Closed Drainage System, Chest Tube Group 2 (B) Right;Anterior 32-Device: Suction 20 cm Water;Closed Drainage System  Chest Tube Drains:     Mediastinal Chest Tube 1: 20 ml    Fluids:    Intake/Output Summary (Last 24 hours) at  02/09/17 0941  Last data filed at 02/09/17 0600   Gross per 24 hour   Intake 7689.17 ml   Output   2800 ml   Net 4889.17 ml     Admit weight: Weight: 93.4 kg (205 lb 14.6 oz)  Current weight: Weight: 97.1 kg (214 lb 1.1 oz) (02/09/17 0615)    Labs:  Recent Labs      02/07/17   0956  02/08/17   1300 02/09/17   WBC  4.4*   --   11.6*   RBC  4.95   --   3.59*   HEMOGLOBIN  15.7   --   11.6*   HEMATOCRIT  45.0   --   33.7*   MCV  90.9   --   93.9   MCH  31.7   --   32.3   MCHC  34.9   --   34.4   RDW  39.2   --   41.6   PLATELETCT  118*  133*  89*   MPV  10.4   --   10.2     Recent Labs      02/07/17   0956   NEUTSPOLYS  56.70   LYMPHOCYTES  34.60   MONOCYTES  6.60   EOSINOPHILS  1.40   BASOPHILS  0.50     Recent Labs      02/07/17   0956   02/08/17   1700 02/09/17 02/09/17   0500   SODIUM  135   --    --   138   --    POTASSIUM  4.4   < >  2.9*  4.5  4.5   CHLORIDE  103   --    --   107   --    CO2  26   --    --   23   --    GLUCOSE  94   --    --   107*   --    BUN  21   --    --   17   --    CREATININE  0.99   --    --   0.86   --    CALCIUM  9.9   --    --   7.3*   --     < > = values in this interval not displayed.     Recent Labs      02/07/17   0956  02/08/17   1300  02/09/17   0500   APTT  25.4  34.3   --    INR  1.00  1.36*  1.13       Medications:  • insulin lispro  5 Units     • insulin NPH  13 Units     • insulin lispro  9 Units     • insulin lispro  0-20 Units     • carvedilol  3.125 mg     • sacubitril-valsartan  1 Tab     • eplerenone  25 mg     • mupirocin  1 Application     • docusate sodium  100 mg      And   • senna-docusate  1 Tab     • enoxaparin  40 mg     • [START ON 2/10/2017] potassium chloride (KCl)  10 mEq      Or   • [START ON 2/10/2017] potassium chloride in water  10 mEq     • K+ Scale: Goal of 4.5  1 Each     • magnesium sulfate  1 g Stopped (02/08/17 1523)   • omeprazole  20 mg      Or   • pantoprazole  40 mg     • aspirin EC  81 mg     • vancomycin (VANCOCIN) IVPB (maintenance dose)   15 mg/kg Stopped (02/09/17 0000)   • insulin regular  0-14 Units         Exam:   Review of Systems   Constitutional: Negative.    HENT: Negative.    Eyes: Negative.    Respiratory: Negative.    Cardiovascular: Negative.    Gastrointestinal: Negative.    Genitourinary: Negative.    Musculoskeletal: Negative.    Skin: Negative.    Neurological: Negative.    Psychiatric/Behavioral: Negative.        Physical Exam   Constitutional: He is oriented to person, place, and time. He appears well-developed and well-nourished.   HENT:   Head: Normocephalic and atraumatic.   Eyes: Pupils are equal, round, and reactive to light.   Neck: Normal range of motion. Neck supple.   Cardiovascular: Normal rate and regular rhythm.    Pulmonary/Chest: Effort normal.   Decreased at bases.    Abdominal: Soft. Bowel sounds are normal.   Musculoskeletal: Normal range of motion.   Neurological: He is alert and oriented to person, place, and time.   Skin: Skin is warm and dry.   Wounds CDI   Psychiatric: He has a normal mood and affect.       Quality Measures:   EKG reviewed, Medications reviewed, Radiology images reviewed and Labs reviewed  Rosales catheter: No Rosales  Central line in place: Need for access    DVT Prophylaxis: Enoxaparin (Lovenox) and Warfarin (Coumadin)  DVT prophylaxis - mechanical: SCDs  Ulcer prophylaxis: Yes    Assessed for rehab: Patient returned to prior level of function, rehabilitation not indicated at this time      Assessment/Plan:  POD 1 Extubated, HDS, no drips, chest tube output minimal and negative for air leak.  Neuro grossly intact.  Pacer not capturing until 40 BPM.  Will have re-interrogated.  Hold coreg for now until PPM re-interrogated.  Plan: Transition to tele status.  DC chest tubes.  DC rosales.Transition to tele status.  DC chest tubes.  DC rosales.    Active Hospital Problems    Diagnosis   • Severe aortic stenosis [I35.0]

## 2017-02-09 NOTE — FLOWSHEET NOTE
02/09/17 0706   Events/Summary/Plan   Events/Summary/Plan IS    Non-Invasive Resp Device Site Inspection Completed Intact   Interdisciplinary Plan of Care-Goals (Indications)   Blunt Chest Indications Thoracic Surgery   Interdisciplinary Plan of Care-Outcomes    Blunt Chest IS Outcome Patient can Explain Need for I.S., Continues to use I.S., is Free of Active Lung Disease and Reaches a Stable Baseline   Incentive Spirometry Group   Incentive Spirometry Instruction Yes   Breathing Exercises Yes   Incentive Spirometer Volume 1000 mL  (pt having some pain right now and very tired )   Respiratory WDL   Respiratory (WDL) X   Chest Exam   Work Of Breathing / Effort Mild   Respiration 16   Pulse 86   Heart Rate (Monitored) 85   Breath Sounds   Pre/Post Intervention Pre Intervention Assessment   RUL Breath Sounds Clear   RML Breath Sounds Diminished   RLL Breath Sounds Diminished   CYNDI Breath Sounds Clear   LLL Breath Sounds Diminished   Oximetry   Continuous Oximetry Yes   O2 Alarms Set & Reviewed Yes   Oxygen   Pulse Oximetry 95 %   O2 (LPM) 1   O2 Daily Delivery Respiratory  Silicone Nasal Cannula

## 2017-02-09 NOTE — CARE PLAN
Problem: Post Op Day 1 CABG/Heart Valve Replacement  Goal: Optimal care of the post op CABG/heart valve replacement Post Op Day 1  Intervention: EKG and CXR completed  Completed and Reviewed  Intervention: All valve patients: PT/INR daily  Completed  Intervention: Daily Weights  Completed  Intervention: Up in chair for all meals  Up to chair for breakfast  Intervention: IS q 1 hour while awake and record best IS volume  Best IS 1750  Intervention: Graduated elastic stockings  Ordered  Intervention: Transfer to [facility] status, begin VS q 4 hours  N/A  Intervention: After 24th hour post-anesthesia end time, transition patient to Cardiac Surgery SQ Insulin Protocol  Orders entered   Intervention: If patient is CABG or on home beta-blocker, start Beta-Blocker on POD 1 or POD 2 or contraindication documented  Verified

## 2017-02-09 NOTE — FLOWSHEET NOTE
02/09/17 1306   Events/Summary/Plan   Events/Summary/Plan IS   Non-Invasive Resp Device Site Inspection Completed Intact   Interdisciplinary Plan of Care-Goals (Indications)   Blunt Chest Indications Thoracic Surgery   Interdisciplinary Plan of Care-Outcomes    Blunt Chest IS Outcome Patient can Explain Need for I.S., Continues to use I.S., is Free of Active Lung Disease and Reaches a Stable Baseline   Education   Education Yes - Pt. / Family has been Instructed in use of Respiratory Equipment   Incentive Spirometry Group   Incentive Spirometry Instruction Yes   Breathing Exercises Yes   Incentive Spirometer Volume 1000 mL  (still having a lot of pain )   Chest Exam   Work Of Breathing / Effort Mild   Respiration 20   Pulse 84   Heart Rate (Monitored) 86   Breath Sounds   Pre/Post Intervention Pre Intervention Assessment   RUL Breath Sounds Clear   RML Breath Sounds Diminished   RLL Breath Sounds Diminished   CYNDI Breath Sounds Clear   LLL Breath Sounds Diminished   Oxygen   Pulse Oximetry 97 %   O2 (LPM) 1   O2 Daily Delivery Respiratory  Silicone Nasal Cannula

## 2017-02-09 NOTE — DISCHARGE PLANNING
AVR 2/8.  PO day 1  Chest tubes out.  Family is bedside.  Patient is 60 y/o   [place] resident. Single story home with 1 step to enter. Patient was IPTA .  No home 02 or DME Uses [facility] in [facility] for Rx needs and has medication coverage,  [employer] insurance.  Spouse Mary [phone]  Is POA.  Mother Radha will be staying with patient and spouse at DC.  Spouse works and has variable scheduled.  She will be off for about a week after DC.      Will follow for post acute needs.

## 2017-02-10 LAB
ACT BLD: 137 SEC (ref 74–137)
ACT BLD: 549 SEC (ref 74–137)
ANION GAP SERPL CALC-SCNC: 9 MMOL/L (ref 0–11.9)
BASE EXCESS BLDA CALC-SCNC: -1 MMOL/L (ref -4–3)
BASE EXCESS BLDA CALC-SCNC: 0 MMOL/L (ref -4–3)
BASE EXCESS BLDA CALC-SCNC: 2 MMOL/L (ref -4–3)
BASE EXCESS BLDV CALC-SCNC: 0 MMOL/L (ref -4–3)
BODY TEMPERATURE: ABNORMAL DEGREES
BUN SERPL-MCNC: 30 MG/DL (ref 8–22)
CA-I BLD ISE-SCNC: 1.05 MMOL/L (ref 1.1–1.3)
CA-I BLD ISE-SCNC: 1.08 MMOL/L (ref 1.1–1.3)
CA-I BLD ISE-SCNC: 1.09 MMOL/L (ref 1.1–1.3)
CA-I BLD ISE-SCNC: 1.11 MMOL/L (ref 1.1–1.3)
CALCIUM SERPL-MCNC: 8.2 MG/DL (ref 8.5–10.5)
CHLORIDE SERPL-SCNC: 103 MMOL/L (ref 96–112)
CO2 BLDA-SCNC: 25 MMOL/L (ref 20–33)
CO2 BLDA-SCNC: 27 MMOL/L (ref 20–33)
CO2 BLDA-SCNC: 28 MMOL/L (ref 20–33)
CO2 BLDV-SCNC: 27 MMOL/L (ref 20–33)
CO2 SERPL-SCNC: 19 MMOL/L (ref 20–33)
CREAT SERPL-MCNC: 1.26 MG/DL (ref 0.5–1.4)
ERYTHROCYTE [DISTWIDTH] IN BLOOD BY AUTOMATED COUNT: 45.4 FL (ref 35.9–50)
GFR SERPL CREATININE-BSD FRML MDRD: 58 ML/MIN/1.73 M 2
GLUCOSE BLD-MCNC: 102 MG/DL (ref 65–99)
GLUCOSE BLD-MCNC: 118 MG/DL (ref 65–99)
GLUCOSE BLD-MCNC: 123 MG/DL (ref 65–99)
GLUCOSE BLD-MCNC: 134 MG/DL (ref 65–99)
GLUCOSE BLD-MCNC: 164 MG/DL (ref 65–99)
GLUCOSE BLD-MCNC: 164 MG/DL (ref 65–99)
GLUCOSE SERPL-MCNC: 131 MG/DL (ref 65–99)
HCO3 BLDA-SCNC: 24.1 MMOL/L (ref 17–25)
HCO3 BLDA-SCNC: 25.6 MMOL/L (ref 17–25)
HCO3 BLDA-SCNC: 26.7 MMOL/L (ref 17–25)
HCO3 BLDV-SCNC: 25.7 MMOL/L (ref 24–28)
HCT VFR BLD AUTO: 37 % (ref 42–52)
HCT VFR BLD CALC: 30 % (ref 42–52)
HCT VFR BLD CALC: 32 % (ref 42–52)
HCT VFR BLD CALC: 33 % (ref 42–52)
HCT VFR BLD CALC: 33 % (ref 42–52)
HGB BLD-MCNC: 10.2 G/DL (ref 14–18)
HGB BLD-MCNC: 10.9 G/DL (ref 14–18)
HGB BLD-MCNC: 11.2 G/DL (ref 14–18)
HGB BLD-MCNC: 11.2 G/DL (ref 14–18)
HGB BLD-MCNC: 12.3 G/DL (ref 14–18)
INR PPP: 1.17 (ref 0.87–1.13)
MCH RBC QN AUTO: 32.3 PG (ref 27–33)
MCHC RBC AUTO-ENTMCNC: 33.2 G/DL (ref 33.7–35.3)
MCV RBC AUTO: 97.1 FL (ref 81.4–97.8)
O2/TOTAL GAS SETTING VFR VENT: 100 %
O2/TOTAL GAS SETTING VFR VENT: 100 %
O2/TOTAL GAS SETTING VFR VENT: 80 %
O2/TOTAL GAS SETTING VFR VENT: 80 %
PCO2 BLDA: 42.9 MMHG (ref 26–37)
PCO2 BLDA: 43.5 MMHG (ref 26–37)
PCO2 BLDA: 44.6 MMHG (ref 26–37)
PCO2 BLDV: 46.4 MMHG (ref 41–51)
PH BLDA: 7.36 [PH] (ref 7.4–7.5)
PH BLDA: 7.37 [PH] (ref 7.4–7.5)
PH BLDA: 7.4 [PH] (ref 7.4–7.5)
PH BLDV: 7.35 [PH] (ref 7.31–7.45)
PLATELET # BLD AUTO: 74 K/UL (ref 164–446)
PMV BLD AUTO: 10.3 FL (ref 9–12.9)
PO2 BLDA: 268 MMHG (ref 64–87)
PO2 BLDA: 320 MMHG (ref 64–87)
PO2 BLDA: 399 MMHG (ref 64–87)
PO2 BLDV: 50 MMHG (ref 25–40)
POTASSIUM BLD-SCNC: 4.1 MMOL/L (ref 3.6–5.5)
POTASSIUM BLD-SCNC: 5.1 MMOL/L (ref 3.6–5.5)
POTASSIUM BLD-SCNC: 5.8 MMOL/L (ref 3.6–5.5)
POTASSIUM BLD-SCNC: 5.8 MMOL/L (ref 3.6–5.5)
POTASSIUM SERPL-SCNC: 4.4 MMOL/L (ref 3.6–5.5)
PROTHROMBIN TIME: 15.3 SEC (ref 12–14.6)
RBC # BLD AUTO: 3.81 M/UL (ref 4.7–6.1)
SAO2 % BLDA: 100 % (ref 93–99)
SAO2 % BLDV: 83 %
SODIUM BLD-SCNC: 135 MMOL/L (ref 135–145)
SODIUM BLD-SCNC: 135 MMOL/L (ref 135–145)
SODIUM BLD-SCNC: 137 MMOL/L (ref 135–145)
SODIUM BLD-SCNC: 139 MMOL/L (ref 135–145)
SODIUM SERPL-SCNC: 131 MMOL/L (ref 135–145)
SPECIMEN DRAWN FROM PATIENT: ABNORMAL
WBC # BLD AUTO: 14 K/UL (ref 4.8–10.8)

## 2017-02-10 PROCEDURE — 97162 PT EVAL MOD COMPLEX 30 MIN: CPT

## 2017-02-10 PROCEDURE — A9270 NON-COVERED ITEM OR SERVICE: HCPCS | Performed by: NURSE PRACTITIONER

## 2017-02-10 PROCEDURE — 82962 GLUCOSE BLOOD TEST: CPT | Mod: 91

## 2017-02-10 PROCEDURE — 700111 HCHG RX REV CODE 636 W/ 250 OVERRIDE (IP): Performed by: NURSE PRACTITIONER

## 2017-02-10 PROCEDURE — 85027 COMPLETE CBC AUTOMATED: CPT

## 2017-02-10 PROCEDURE — 85610 PROTHROMBIN TIME: CPT

## 2017-02-10 PROCEDURE — G8987 SELF CARE CURRENT STATUS: HCPCS | Mod: CJ

## 2017-02-10 PROCEDURE — 97535 SELF CARE MNGMENT TRAINING: CPT

## 2017-02-10 PROCEDURE — 700111 HCHG RX REV CODE 636 W/ 250 OVERRIDE (IP): Performed by: CLINICAL NURSE SPECIALIST

## 2017-02-10 PROCEDURE — 770020 HCHG ROOM/CARE - TELE (206)

## 2017-02-10 PROCEDURE — 700102 HCHG RX REV CODE 250 W/ 637 OVERRIDE(OP): Performed by: CLINICAL NURSE SPECIALIST

## 2017-02-10 PROCEDURE — 700102 HCHG RX REV CODE 250 W/ 637 OVERRIDE(OP): Performed by: NURSE PRACTITIONER

## 2017-02-10 PROCEDURE — 99233 SBSQ HOSP IP/OBS HIGH 50: CPT | Performed by: INTERNAL MEDICINE

## 2017-02-10 PROCEDURE — A9270 NON-COVERED ITEM OR SERVICE: HCPCS | Performed by: CLINICAL NURSE SPECIALIST

## 2017-02-10 PROCEDURE — 80048 BASIC METABOLIC PNL TOTAL CA: CPT

## 2017-02-10 PROCEDURE — G8978 MOBILITY CURRENT STATUS: HCPCS | Mod: CJ

## 2017-02-10 PROCEDURE — 97165 OT EVAL LOW COMPLEX 30 MIN: CPT

## 2017-02-10 PROCEDURE — G8979 MOBILITY GOAL STATUS: HCPCS | Mod: CI

## 2017-02-10 PROCEDURE — 51798 US URINE CAPACITY MEASURE: CPT

## 2017-02-10 PROCEDURE — 700112 HCHG RX REV CODE 229: Performed by: CLINICAL NURSE SPECIALIST

## 2017-02-10 PROCEDURE — G8988 SELF CARE GOAL STATUS: HCPCS | Mod: CI

## 2017-02-10 RX ORDER — POTASSIUM CHLORIDE 750 MG/1
10 TABLET, FILM COATED, EXTENDED RELEASE ORAL 2 TIMES DAILY
Status: DISCONTINUED | OUTPATIENT
Start: 2017-02-10 | End: 2017-02-15

## 2017-02-10 RX ORDER — FUROSEMIDE 10 MG/ML
20 INJECTION INTRAMUSCULAR; INTRAVENOUS
Status: DISCONTINUED | OUTPATIENT
Start: 2017-02-10 | End: 2017-02-11 | Stop reason: DRUGHIGH

## 2017-02-10 RX ORDER — TAMSULOSIN HYDROCHLORIDE 0.4 MG/1
0.4 CAPSULE ORAL
Status: DISCONTINUED | OUTPATIENT
Start: 2017-02-10 | End: 2017-02-16 | Stop reason: HOSPADM

## 2017-02-10 RX ADMIN — FUROSEMIDE 20 MG: 10 INJECTION, SOLUTION INTRAVENOUS at 10:49

## 2017-02-10 RX ADMIN — TRAMADOL HYDROCHLORIDE 50 MG: 50 TABLET, COATED ORAL at 20:18

## 2017-02-10 RX ADMIN — OXYCODONE HYDROCHLORIDE 10 MG: 10 TABLET ORAL at 19:02

## 2017-02-10 RX ADMIN — POTASSIUM CHLORIDE 10 MEQ: 750 TABLET, FILM COATED, EXTENDED RELEASE ORAL at 20:05

## 2017-02-10 RX ADMIN — STANDARDIZED SENNA CONCENTRATE AND DOCUSATE SODIUM 1 TABLET: 8.6; 5 TABLET, FILM COATED ORAL at 20:05

## 2017-02-10 RX ADMIN — MUPIROCIN 1 APPLICATION: 20 OINTMENT TOPICAL at 09:21

## 2017-02-10 RX ADMIN — MAGNESIUM SULFATE IN DEXTROSE 1 G: 10 INJECTION, SOLUTION INTRAVENOUS at 12:31

## 2017-02-10 RX ADMIN — FUROSEMIDE 20 MG: 10 INJECTION, SOLUTION INTRAVENOUS at 15:28

## 2017-02-10 RX ADMIN — DOCUSATE SODIUM 100 MG: 100 CAPSULE ORAL at 09:20

## 2017-02-10 RX ADMIN — POTASSIUM CHLORIDE 10 MEQ: 750 TABLET, FILM COATED, EXTENDED RELEASE ORAL at 09:20

## 2017-02-10 RX ADMIN — OXYCODONE HYDROCHLORIDE 5 MG: 5 TABLET ORAL at 22:18

## 2017-02-10 RX ADMIN — OXYCODONE HYDROCHLORIDE 5 MG: 5 TABLET ORAL at 05:47

## 2017-02-10 RX ADMIN — OMEPRAZOLE 20 MG: 20 CAPSULE, DELAYED RELEASE ORAL at 09:19

## 2017-02-10 RX ADMIN — SACUBITRIL AND VALSARTAN 1 TABLET: 49; 51 TABLET, FILM COATED ORAL at 09:20

## 2017-02-10 RX ADMIN — EPLERENONE 25 MG: 25 TABLET, FILM COATED ORAL at 09:20

## 2017-02-10 RX ADMIN — TRAMADOL HYDROCHLORIDE 50 MG: 50 TABLET, COATED ORAL at 15:04

## 2017-02-10 RX ADMIN — OXYCODONE HYDROCHLORIDE 10 MG: 10 TABLET ORAL at 01:10

## 2017-02-10 RX ADMIN — TAMSULOSIN HYDROCHLORIDE 0.4 MG: 0.4 CAPSULE ORAL at 10:49

## 2017-02-10 RX ADMIN — OXYCODONE HYDROCHLORIDE 5 MG: 5 TABLET ORAL at 09:20

## 2017-02-10 RX ADMIN — MUPIROCIN 1 APPLICATION: 20 OINTMENT TOPICAL at 20:05

## 2017-02-10 ASSESSMENT — PAIN SCALES - GENERAL
PAINLEVEL_OUTOF10: 2
PAINLEVEL_OUTOF10: 4
PAINLEVEL_OUTOF10: 4
PAINLEVEL_OUTOF10: 5
PAINLEVEL_OUTOF10: 3
PAINLEVEL_OUTOF10: 2
PAINLEVEL_OUTOF10: 3
PAINLEVEL_OUTOF10: 5
PAINLEVEL_OUTOF10: 4
PAINLEVEL_OUTOF10: 5
PAINLEVEL_OUTOF10: 4
PAINLEVEL_OUTOF10: 4
PAINLEVEL_OUTOF10: 7
PAINLEVEL_OUTOF10: 4

## 2017-02-10 ASSESSMENT — GAIT ASSESSMENTS
ASSISTIVE DEVICE: FRONT WHEEL WALKER
DISTANCE (FEET): 75
DEVIATION: BRADYKINETIC;DECREASED HEEL STRIKE
GAIT LEVEL OF ASSIST: CONTACT GUARD ASSIST

## 2017-02-10 ASSESSMENT — ACTIVITIES OF DAILY LIVING (ADL): TOILETING: INDEPENDENT

## 2017-02-10 ASSESSMENT — ENCOUNTER SYMPTOMS
GASTROINTESTINAL NEGATIVE: 1
CONSTITUTIONAL NEGATIVE: 1
MUSCULOSKELETAL NEGATIVE: 1
EYES NEGATIVE: 1
CARDIOVASCULAR NEGATIVE: 1
PSYCHIATRIC NEGATIVE: 1
RESPIRATORY NEGATIVE: 1
NEUROLOGICAL NEGATIVE: 1

## 2017-02-10 NOTE — CARE PLAN
Problem: Oxygenation:  Goal: Maintain adequate oxygenation dependent on patient condition  Outcome: PROGRESSING AS EXPECTED    Problem: Hyperinflation:  Goal: Prevent or improve atelectasis  Outcome: PROGRESSING AS EXPECTED  PT IS is 1000

## 2017-02-10 NOTE — THERAPY
"Occupational Therapy Evaluation completed.   Functional Status:  Pt s/p OHS demonstrating mild to moderate decline with ADLs due to decreased activity tolerance, generalized weakness associated with post op, pain, limited knowledge of post-op precautions and decreased balance. Pt and mother provided with education on cardiac, sternal and acitivity pacing; provided handout for further reinforcement. Pt performed bed mobility with min a from raised hob and use of gb's, STS x 3 with cga, reported relief from back pain in standing and no c/o dizziness, LB dressing mod a to thread BLE, unable to utilize cross leg method, UB cga/min a for techniques,  t/f eob->chair with cga and cues for body mechanics and graded control while coming to sit. Pt would benefit from acute skilled services while in house for further training with ADLs. Pt would likely be able to d/c with assist from family; however that's dependent on pt's progress with therapy or might need skilled therapy prior to d/c home.  Plan of Care: Will benefit from Occupational Therapy 3 times per week  Discharge Recommendations:  Equipment: Will Continue to Assess for Equipment Needs. Post-acute therapy recommended See above.     See \"Rehab Therapy-Acute\" Patient Summary Report for complete documentation.    "

## 2017-02-10 NOTE — CARE PLAN
Problem: Post op day 2 CABG/Heart Valve Replacement  Goal: Optimal care of the post op CABG/heart valve replacement post op day 2  Outcome: PROGRESSING AS EXPECTED  Intervention: FSBS: when 2 consecutive BS < 130 after post op day 2, discontinue FSBS unless patient is insulin dependent diabetic  Completed. Insulin protocol dc'd  Intervention: Daily Weights  Completed and documented in EPIC  Intervention: Up in chair for all meals  Completed. Up to chair for breakfast.  Intervention: Ambulate, increasing the distance each time x 3 and before bed  Complete.  Intervention: Stand at sink and wash up with assistance. Clean incisions twice daily with soap and water.  Completed.   Intervention: IS q 1 hour while awake and record best IS volume  Completed. Best IS 1000  Intervention: Consider pacer wire removal by MD ARNDT  Intervention: Consider removal of rosales and chest tube if not already done  Previously done.

## 2017-02-10 NOTE — PROGRESS NOTES
Inpatient Anticoagulation Service Note    Date: 2/9/2017  Reason for Anticoagulation: Bioprosthetic Valve Replacement  Hemoglobin Value: 11.6  Hematocrit Value: 33.7  Lab Platelet Value: 89  Target INR: 2.0 to 3.0  INR from last 7 days     Date/Time INR Value    02/09/17 0500 1.13    02/08/17 1300 (!)1.36    02/07/17 0956 1        Dose from last 7 days     Date/Time Dose (mg)    02/09/17 1600 7.5        Significant Interactions: Aspirin, Proton Pump Inhibitor  Bridge Therapy: No (Enoxaparin 40 mg SubQ daily)       Comments: Therapy with warfarin initiated following valve replacement - chest tubes removed today. Baseline INR subtherapeutic.     Plan:  Will give 7.5 mg dose tonight in order to spur upward INR trend and start 5 mg daily dosing thereafter. Pharmacy will continue to follow INR trend for dosing adjustments as appropriate.     Education Material Provided?: No    Pharmacist suggested discharge dosing: SALLY LindseyD, BCPS

## 2017-02-10 NOTE — PROGRESS NOTES
Pulmonary Critical Care Consult Note        Chief Complaint: Symptomatic severe AS     Date of service: 2/10/17    History of Present Illness: 61 y.o. male admitted for for elective AVR for symptomatic AS/bicuspid AV. Has had SOB, fatigue, ROTH and cough for weaks and eveluated outpatient by cardiology and deemed to be a good surgical candidate.    ROS:  Respiratory: negative, Cardiac: positive chest pain and negative orthopnea, GI: negative.  All other systems negative.    Interval Events:  24 hour interval history reviewed    - still having pain control issues   - urinary retention requiring replacement of rosales   - intermittent bradycardia with walking (asymptomatic)    PFSH:  No change.    Respiratory:   1-2 lpm n/c, IS 1750mL  Pulse Oximetry: 93 %  Chest Tube Drains: no air leaks     Mediastinal Chest Tube 1: 150 ml - removed    Exam: unlabored respirations, no intercostal retractions or accessory muscle use and clear to auscultation without rales or wheezes  ImagingAvailable data reviewed   Recent Labs      02/08/17   1502  02/08/17   1605  02/08/17   1652   ISTATAPH  7.408  7.415  7.446   ISTATAPCO2  28.2  29.5  25.5*   ISTATAPO2  101*  99*  95*   ISTATATCO2  19*  20  18*   [id number]  98  98  98   ISTATARTHCO3  17.8  19.0  17.6   ISTATARTBE  -6*  -5*  -5*   ISTATTEMP  37.3 C  37.5 C  37.7 C   ISTATFIO2  30  30  30   ISTATSPEC  Arterial  Arterial  Arterial   ISTATAPHTC  7.404  7.408  7.435   YFORCEYO5KS  102*  102*  99*       HemoDynamics:  Pulse: (!) 101, Heart Rate (Monitored): (!) 107  NIBP: 112/70 mmHg      Exam: regular rate and rhythm, regular rhythm (Sinus) - 100% V-paced  Imaging: Available data reviewed        Neuro:  GCS [place] Coma Score: 15       Exam: no focal deficits noted mental status intact oriented for age x3  Imaging: Available data reviewed    Fluids:  Intake/Output       02/08/17 0700 - 02/09/17 0659 02/09/17 0700 - 02/10/17 0659 02/10/17 0700 - 02/11/17 0659      8047-7113  1900-0659 Total 0700-1859 1900-0659 Total 0700-1859 1900-0659 Total       Intake    P.O.  --  400 400  950  360 1310  --  -- --    P.O. -- 400 400  -- -- --    I.V.  3998.5  2040.7 6039.2  60  -- 60  --  -- --    Precedex Volume 60.1 -- 60.1 -- -- -- -- -- --    Insulin Volume 100 132.5 232.5 60 -- 60 -- -- --    Epinephrine Volume 88.4 108.2 196.5 -- -- -- -- -- --    IV Volume (Plasmalyte) 1000 1250 2250 -- -- -- -- -- --    IV Volume (Normal Saline) 50 -- 50 -- -- -- -- -- --    Surgery Volume (Surgery Intake) 2500 -- 2500 -- -- -- -- -- --    IV Piggyback Volume (IV Piggyback) 200 550 750 -- -- -- -- -- --    Blood  1250  -- 1250  --  -- --  --  -- --    Cell Saver Volume (mL) 1050 -- 1050 -- -- -- -- -- --    Platelets Total Volume (Non-Barcoded) 200 -- 200 -- -- -- -- -- --    Total Intake 5248.5 2440.7 7689.2 1315 669 2934 -- -- --       Output    Urine  1525  855 2380  625  0 625  --  -- --    Number of Times Voided -- -- -- -- 0 x 0 x -- -- --    Indwelling Cathether  625 -- 625 -- -- --    Void (ml) 1100 -- 1100 -- 0 0 -- -- --    Drains  250  170 420  150  -- 150  --  -- --    Mediastinal Chest Tube 1 250 170 420 150 -- 150 -- -- --    Stool  --  -- --  --  -- --  --  -- --    Number of Times Stooled 0 x -- 0 x -- 0 x 0 x -- -- --    Total Output 1775 1025 2800 775 0 775 -- -- --       Net I/O     3473.5 1415.7 4889.2 235 360 595 -- -- --        Weight: 98.9 kg (218 lb 0.6 oz)  Recent Labs      02/07/17   0956  02/08/17   1300  02/09/17 02/09/17   0500  02/10/17   0245   SODIUM  135   --    --   138   --   131*   POTASSIUM  4.4  3.8   < >  4.5  4.5  4.4   CHLORIDE  103   --    --   107   --   103   CO2  26   --    --   23   --   19*   BUN  21   --    --   17   --   30*   CREATININE  0.99   --    --   0.86   --   1.26   MAGNESIUM   --   2.3   --    --    --    --    CALCIUM  9.9   --    --   7.3*   --   8.2*    < > = values in this interval not displayed.       GI/Nutrition:  Exam:  abdomen is soft and non-tender, normal active bowel sounds  Imaging: Available data reviewed  taking PO  Liver Function  Recent Labs      17   0956 02/09/17  02/10/17   0245   ALTSGPT  35   --    --    ASTSGOT  23   --    --    ALKPHOSPHAT  53   --    --    TBILIRUBIN  0.6   --    --    GLUCOSE  94  107*  131*       Heme:  Recent Labs      17   0956  17   1300 17   0500  02/10/17   0245   RBC  4.95   --   3.59*   --   3.81*   HEMOGLOBIN  15.7   --   11.6*   --   12.3*   HEMATOCRIT  45.0   --   33.7*   --   37.0*   PLATELETCT  118*  133*  89*   --   74*   PROTHROMBTM  13.5  17.2*   --   14.9*  15.3*   APTT  25.4  34.3   --    --    --    INR  1.00  1.36*   --   1.13  1.17*       Infectious Disease:  Temp  Av.3 °C (99.2 °F)  Min: 37.1 °C (98.8 °F)  Max: 38 °C (100.4 °F)  Micro: reviewed  Recent Labs      17   0956 02/09/17  02/10/17   0245   WBC  4.4*  11.6*  14.0*   NEUTSPOLYS  56.70   --    --    LYMPHOCYTES  34.60   --    --    MONOCYTES  6.60   --    --    EOSINOPHILS  1.40   --    --    BASOPHILS  0.50   --    --    ASTSGOT  23   --    --    ALTSGPT  35   --    --    ALKPHOSPHAT  53   --    --    TBILIRUBIN  0.6   --    --      Current Facility-Administered Medications   Medication Dose Frequency Provider Last Rate Last Dose   • warfarin (COUMADIN) tablet 5 mg  5 mg COUMADIN-DAILY Adam Mullins, PHARMD       • carvedilol (COREG) tablet 3.125 mg  3.125 mg BID WITH MEALS Carol Cisse, A.P.N.   3.125 mg at 17 1716   • sacubitril-valsartan (ENTRESTO) 49-51 MG tablet 1 Tab  1 Tab Q12HRS Carol Cisse, A.P.N.   1 Tab at 17   • eplerenone (INSPRA) tablet 25 mg  25 mg DAILY Carol Cisse A.P.N.   25 mg at 17   • Respiratory Care per Protocol   Continuous RT Carol Cisse, A.P.N.       • mupirocin (BACTROBAN) 2 % ointment 1 Application  1 Application BID Carol Cisse A.P.N.   1 Application at 17   • Pharmacy Consult Request  ...Pain Management Review 1 Each  1 Each PRN Carol Cisse A.P.N.       • docusate sodium (COLACE) capsule 100 mg  100 mg QAM Carol Cisse A.P.N.   100 mg at 02/09/17 0904    And   • senna-docusate (PERICOLACE or SENOKOT S) 8.6-50 MG per tablet 1 Tab  1 Tab Nightly Carol Cisse A.P.N.   1 Tab at 02/09/17 2146    And   • senna-docusate (PERICOLACE or SENOKOT S) 8.6-50 MG per tablet 1 Tab  1 Tab Q24HRS PRN Carol Cisse, A.P.N.        And   • lactulose 20 GM/30ML solution 30 mL  30 mL Q24HRS PRN Carol Cisse A.P.N.        And   • bisacodyl (DULCOLAX) suppository 10 mg  10 mg Q24HRS PRN Carol Cisse A.P.N.        And   • fleet enema 133 mL  1 Each Once PRN Carol Cisse A.P.N.       • NS infusion   Continuous Carol Cisse A.P.N. 10 mL/hr at 02/08/17 1900     • enoxaparin (LOVENOX) inj 40 mg  40 mg DAILY Carol Cisse, A.P.N.   40 mg at 02/09/17 2146   • potassium chloride ER (KLOR-CON) tablet 10 mEq  10 mEq DAILY Carol Cisse A.P.N.        Or   • potassium chloride in water (KCL) ivpb 10 mEq  10 mEq DAILY Carol Cisse, A.P.N.       • magnesium sulfate ivpb premix 1 g  1 g QDAY Carol Cisse A.P.N.   Stopped at 02/09/17 1434   • omeprazole (PRILOSEC) capsule 20 mg  20 mg DAILY Carol Cisse, A.P.N.   20 mg at 02/09/17 0904    Or   • pantoprazole (PROTONIX) injection 40 mg  40 mg DAILY Carol Cisse, A.P.N.   40 mg at 02/08/17 1432   • aspirin EC (ECOTRIN) tablet 81 mg  81 mg DAILY Carol Cisse, A.P.N.   Stopped at 02/09/17 0900   • MD ALERT... warfarin (COUMADIN) per pharmacy protocol   PRN Carol Cisse A.P.N.       • electrolyte-A (PLASMALYTE-A) infusion   PRN Carol Cisse A.P.N.       • oxycodone immediate-release (ROXICODONE) tablet 5 mg  5 mg Q3HRS PRN Carol Cisse A.P.N.   5 mg at 02/10/17 0547   • oxycodone immediate release (ROXICODONE) tablet 10 mg  10 mg Q3HRS PRN Carol Cisse A.P.N.   10 mg at 02/10/17 0110   • tramadol (ULTRAM) 50 MG tablet 50 mg  50 mg Q4HRS PRN  Carol Cisse, A.P.N.       • ondansetron (ZOFRAN) syringe/vial injection 4 mg  4 mg Q6HRS PRN Carol Cisse, A.P.N.   4 mg at 02/08/17 1926    Or   • prochlorperazine (COMPAZINE) injection 10 mg  10 mg Q6HRS PRN Carol Cisse, A.P.N.   10 mg at 02/08/17 2142   • acetaminophen (TYLENOL) tablet 650 mg  650 mg Q4HRS PRN Carol Cisse, A.P.N.        Or   • acetaminophen (TYLENOL) suppository 650 mg  650 mg Q4HRS PRN Carol Cisse, A.P.N.       • mag hydrox-al hydrox-simeth (MAALOX PLUS ES or MYLANTA DS) suspension 30 mL  30 mL Q4HRS PRN Carol Cisse, A.P.N.       • diphenhydrAMINE (BENADRYL) tablet/capsule 25 mg  25 mg HS PRN - MR X 1 Carol Cisse, A.P.N.   25 mg at 02/08/17 2200     Last reviewed on 2/8/2017  6:13 AM by Johanna Pepper R.N.    Quality  Measures:  Medications reviewed, Labs reviewed, Radiology images reviewed and EKG reviewed    Central line in place: Need for access    DVT Prophylaxis: Enoxaparin (Lovenox)  DVT prophylaxis - mechanical: SCDs  Ulcer prophylaxis: Yes    Assessed for rehab: Patient was assess for and/or received rehabilitation services during this hospitalization    Assessment/Plan:  Daja-op ventilatory management - intubated 2/8, extubated 2/8   - encourage IS, OOB to chair   - RT protocols   - diurese  Severe AS with bicuspid AV s/p mechanical AVR   - CVS following, BP control   - coumadin   - increase analgesia  V-tach s/p AICD placement   - interrogated 2/8 post-surgery, re-interrogation ok  Acute cardiogenic pulm edema   - diurese  CHF III - mild decompensation  Vasoplegic/cardiogenic shock - resolved  Acute blood loss anemia - conservative transfusion strategies  Urinary retention - rosales for now  JON - ?obstructive uropathy   - monitor  Leukocytosis with fever   - monitor off abx for now  Prophylaxis, diet    Discussed patient condition and risk of morbidity and/or mortality with Family, RN, RT, Pharmacy, Charge nurse / hot rounds, QA team, Patient and CVS.

## 2017-02-10 NOTE — CARE PLAN
Problem: Post op day 2 CABG/Heart Valve Replacement  Goal: Optimal care of the post op CABG/heart valve replacement post op day 2  Intervention: FSBS: when 2 consecutive BS < 130 after post op day 2, discontinue FSBS unless patient is insulin dependent diabetic  FSBS DC'd  Intervention: Up in chair for all meals  Up in chair for all meals  Intervention: Ambulate, increasing the distance each time x 3 and before bed  Increasing distance and frequency of walks  Intervention: Stand at sink and wash up with assistance. Clean incisions twice daily with soap and water.  Incision cleaned at sink with education  Intervention: IS q 1 hour while awake and record best IS volume  Encouraged to utilize IS every waking hour  Intervention: Consider removal of rosales and chest tube if not already done  Chest tube DC'd but Rosales needed to be reinserted after no urine output over 12 hours

## 2017-02-10 NOTE — CARE PLAN
Problem: Post Op Day 1 CABG/Heart Valve Replacement  Goal: Optimal care of the post op CABG/heart valve replacement Post Op Day 1  Intervention: All valve patients: PT/INR daily  Complete          Intervention: Antibiotics are discontinued within 24 hours of anesthesia end time unless indication documented for continuation beyond 24 hours  Complete          Intervention: Up in chair for all meals  complete  Intervention: Ambulate in am if stable. First ambulation is 25 feet. Repeat x 3 as tolerated. Ambulate again before bed.  Pt ambulated 25 ft with 2 person assist and wheelchair push without incident   Intervention: Discontinue rosales catheter unless documented reason for continuation  Removed rosales at 1430      Intervention: Remove original surgical dressing after 24 hrs, leave open to air unless otherwise specified by physician  complete  Intervention: Consider chest tube removal by MD  Chest tube removed  Intervention: IS q 1 hour while awake and record best IS volume  IS 1000, encouraged while awake      Intervention: Saline lock IV  complete  Intervention: Transfer to tele status, begin VS q 4 hours  Pt tele status  Intervention: After 24th hour post-anesthesia end time, transition patient to Cardiac Surgery SQ Insulin Protocol  Insulin gtt stopped per protocol.  SQ insulin ordered per MAR

## 2017-02-10 NOTE — PROGRESS NOTES
Cardiovascular Surgery Progress Note    Name: Alberto Fam  MRN: [id number]  : 1955  Admit Date: 2017  5:57 AM  Procedure:  Procedure(s) and Anesthesia Type:     * STERNOTOMY - REDO - General     * AORTIC VALVE REPLACEMENT - General     * JUSTINO - General  2 Day Post-Op    Vitals:  Patient Vitals for the past 8 hrs:   Temp SpO2 O2 Delivery O2 (LPM) Pulse Heart Rate (Monitored) Resp NIBP Weight   02/10/17 1011 - 94 % - 2 (!) 106 - 18 - -   02/10/17 0619 - 93 % - 2 (!) 101 - 18 - -   02/10/17 0600 - 93 % - - (!) 59 - - - 98.9 kg (218 lb 0.6 oz)   02/10/17 0540 - 90 % - - - - - - -   02/10/17 0500 - 95 % - - 97 - - - -   02/10/17 0400 37.1 °C (98.8 °F) 95 % Silicone Nasal Cannula 2 100 - 18 112/70 mmHg -   02/10/17 0300 - 94 % - - (!) 102 (!) 107 (!) 26 - -     Temp (24hrs), Av.3 °C (99.2 °F), Min:37.1 °C (98.8 °F), Max:38 °C (100.4 °F)      Respiratory:    Respiration: 18, Pulse Oximetry: 94 %, O2 Daily Delivery Respiratory : Silicone Nasal Cannula     Chest Tube Drains:     Mediastinal Chest Tube 1: 150 ml    Fluids:    Intake/Output Summary (Last 24 hours) at 02/10/17 1048  Last data filed at 02/10/17 0600   Gross per 24 hour   Intake    980 ml   Output    500 ml   Net    480 ml     Admit weight: Weight: 93.4 kg (205 lb 14.6 oz)  Current weight: Weight: 98.9 kg (218 lb 0.6 oz) (02/10/17 0600)    Labs:  Recent Labs      17   1300 02/09/17  02/10/17   0245   WBC   --   11.6*  14.0*   RBC   --   3.59*  3.81*   HEMOGLOBIN   --   11.6*  12.3*   HEMATOCRIT   --   33.7*  37.0*   MCV   --   93.9  97.1   MCH   --   32.3  32.3   MCHC   --   34.4  33.2*   RDW   --   41.6  45.4   PLATELETCT  133*  89*  74*   MPV   --   10.2  10.3         Recent Labs     17   0500  02/10/17   0245   SODIUM  138   --   131*   POTASSIUM  4.5  4.5  4.4   CHLORIDE  107   --   103   CO2  23   --   19*   GLUCOSE  107*   --   131*   BUN  17   --   30*   CREATININE  0.86   --   1.26   CALCIUM  7.3*   --   8.2*      Recent Labs      02/08/17   1300  02/09/17   0500  02/10/17   0245   APTT  34.3   --    --    INR  1.36*  1.13  1.17*       Medications:  • furosemide  20 mg     • potassium chloride (KCl)  10 mEq     • tamsulosin  0.4 mg     • warfarin  5 mg     • carvedilol  3.125 mg     • mupirocin  1 Application     • docusate sodium  100 mg      And   • senna-docusate  1 Tab     • potassium chloride (KCl)  10 mEq      Or   • potassium chloride in water  10 mEq     • magnesium sulfate  1 g Stopped (02/09/17 1434)   • omeprazole  20 mg     • aspirin EC  81 mg         Exam:   Review of Systems   Constitutional: Negative.    HENT: Negative.    Eyes: Negative.    Respiratory: Negative.    Cardiovascular: Negative.    Gastrointestinal: Negative.    Genitourinary: Negative.    Musculoskeletal: Negative.    Skin: Negative.    Neurological: Negative.    Psychiatric/Behavioral: Negative.        Physical Exam   Constitutional: He is oriented to person, place, and time. He appears well-developed and well-nourished.   HENT:   Head: Normocephalic and atraumatic.   Eyes: Pupils are equal, round, and reactive to light.   Neck: Normal range of motion. Neck supple.   Cardiovascular: Normal rate and regular rhythm.    Pulmonary/Chest: Effort normal.   Decreased at bases.    Abdominal: Soft. Bowel sounds are normal.   Musculoskeletal: Normal range of motion. He exhibits edema.   Neurological: He is alert and oriented to person, place, and time.   Skin: Skin is warm and dry.   Wounds CDI   Psychiatric: He has a normal mood and affect.       Quality Measures:   EKG reviewed, Medications reviewed, Radiology images reviewed and Labs reviewed  Hewitt catheter: No Hewitt  Central line in place: Need for access    DVT Prophylaxis: Enoxaparin (Lovenox) and Warfarin (Coumadin)  DVT prophylaxis - mechanical: SCDs  Ulcer prophylaxis: Yes    Assessed for rehab: Patient returned to prior level of function, rehabilitation not indicated at this  time      Assessment/Plan:  POD 1 Extubated, HDS, no drips, chest tube output minimal and negative for air leak.  Neuro grossly intact.  Pacer not capturing until 40 BPM.  Will have re-interrogated.  Hold coreg for now until PPM re-interrogated.  Plan: Transition to tele status.  DC chest tubes.  DC rosales.Transition to tele status.  DC chest tubes.  DC rosales.  POD 2 BP stable/low.  NSR/ST.  PPM fucntion normal. Urine retention. +5L, wts up. PLAN:  DC inspra/entresto until BP improved and to allow for diuresis.  Continue coreg as BP allows. Reinsert rosales/add flomax.  Plts low, w/w. PT/OT.       Active Hospital Problems    Diagnosis   • Severe aortic stenosis [I35.0]

## 2017-02-10 NOTE — PROGRESS NOTES
Inpatient Anticoagulation Service Note    Date: 2/10/2017  Reason for Anticoagulation: Bioprosthetic Valve Replacement  Hemoglobin Value: 12.3  Hematocrit Value: 37  Lab Platelet Value: 74  Target INR: 2.0 to 3.0  INR from last 7 days     Date/Time INR Value    02/10/17 0245 (!)1.17    02/09/17 0500 1.13    02/08/17 1300 (!)1.36    02/07/17 0956 1        Dose from last 7 days     Date/Time Dose (mg)    02/10/17 1200 5    02/09/17 1600 7.5        Significant Interactions: Aspirin, Proton Pump Inhibitor  Bridge Therapy: No (Enoxaparin 40 mg SubQ daily)         Comments: Minimal change in INR overnight following initiation of warfarin therapy yesterday - remains subtherapeutic. No new drug interactions, H/H anemic but improved, no overt bleeding noted.     Plan:  Will continue with 5 mg dose tonight as previously planned and follow up on INR tomorrow.     Education Material Provided?: No    Pharmacist suggested discharge dosing: SALLY Mullins PharmD, BCPS

## 2017-02-10 NOTE — THERAPY
Physical Therapy Contact Note    Pt just back to bed for rosales placement and pain meds; will attempt around noon as able for mobility;    Mandy Krishnan, PT, DPT Pager: [phone]

## 2017-02-11 ENCOUNTER — APPOINTMENT (OUTPATIENT)
Dept: RADIOLOGY | Facility: MEDICAL CENTER | Age: 62
DRG: 219 | End: 2017-02-11
Attending: INTERNAL MEDICINE
Payer: COMMERCIAL

## 2017-02-11 LAB
ANION GAP SERPL CALC-SCNC: 4 MMOL/L (ref 0–11.9)
BUN SERPL-MCNC: 39 MG/DL (ref 8–22)
CALCIUM SERPL-MCNC: 8.3 MG/DL (ref 8.5–10.5)
CHLORIDE SERPL-SCNC: 101 MMOL/L (ref 96–112)
CO2 SERPL-SCNC: 26 MMOL/L (ref 20–33)
CREAT SERPL-MCNC: 1.4 MG/DL (ref 0.5–1.4)
ERYTHROCYTE [DISTWIDTH] IN BLOOD BY AUTOMATED COUNT: 43.2 FL (ref 35.9–50)
GFR SERPL CREATININE-BSD FRML MDRD: 52 ML/MIN/1.73 M 2
GLUCOSE SERPL-MCNC: 106 MG/DL (ref 65–99)
HCT VFR BLD AUTO: 31.2 % (ref 42–52)
HGB BLD-MCNC: 10.6 G/DL (ref 14–18)
INR PPP: 1.21 (ref 0.87–1.13)
MCH RBC QN AUTO: 32.3 PG (ref 27–33)
MCHC RBC AUTO-ENTMCNC: 34 G/DL (ref 33.7–35.3)
MCV RBC AUTO: 95.1 FL (ref 81.4–97.8)
PLATELET # BLD AUTO: 53 K/UL (ref 164–446)
PMV BLD AUTO: 10.2 FL (ref 9–12.9)
POTASSIUM SERPL-SCNC: 3.9 MMOL/L (ref 3.6–5.5)
PROTHROMBIN TIME: 15.7 SEC (ref 12–14.6)
RBC # BLD AUTO: 3.28 M/UL (ref 4.7–6.1)
SODIUM SERPL-SCNC: 131 MMOL/L (ref 135–145)
WBC # BLD AUTO: 8.8 K/UL (ref 4.8–10.8)

## 2017-02-11 PROCEDURE — 770022 HCHG ROOM/CARE - ICU (200)

## 2017-02-11 PROCEDURE — 700105 HCHG RX REV CODE 258

## 2017-02-11 PROCEDURE — 80048 BASIC METABOLIC PNL TOTAL CA: CPT

## 2017-02-11 PROCEDURE — 700105 HCHG RX REV CODE 258: Performed by: CLINICAL NURSE SPECIALIST

## 2017-02-11 PROCEDURE — 302152 K-PAD 12X17: Performed by: CLINICAL NURSE SPECIALIST

## 2017-02-11 PROCEDURE — 700111 HCHG RX REV CODE 636 W/ 250 OVERRIDE (IP): Performed by: NURSE PRACTITIONER

## 2017-02-11 PROCEDURE — 99291 CRITICAL CARE FIRST HOUR: CPT | Performed by: INTERNAL MEDICINE

## 2017-02-11 PROCEDURE — 700102 HCHG RX REV CODE 250 W/ 637 OVERRIDE(OP): Performed by: CLINICAL NURSE SPECIALIST

## 2017-02-11 PROCEDURE — 71010 DX-CHEST-PORTABLE (1 VIEW): CPT

## 2017-02-11 PROCEDURE — 85610 PROTHROMBIN TIME: CPT

## 2017-02-11 PROCEDURE — 700102 HCHG RX REV CODE 250 W/ 637 OVERRIDE(OP): Performed by: NURSE PRACTITIONER

## 2017-02-11 PROCEDURE — 700101 HCHG RX REV CODE 250: Performed by: INTERNAL MEDICINE

## 2017-02-11 PROCEDURE — A9270 NON-COVERED ITEM OR SERVICE: HCPCS | Performed by: CLINICAL NURSE SPECIALIST

## 2017-02-11 PROCEDURE — 85027 COMPLETE CBC AUTOMATED: CPT

## 2017-02-11 PROCEDURE — 700111 HCHG RX REV CODE 636 W/ 250 OVERRIDE (IP): Performed by: CLINICAL NURSE SPECIALIST

## 2017-02-11 PROCEDURE — 700111 HCHG RX REV CODE 636 W/ 250 OVERRIDE (IP): Performed by: INTERNAL MEDICINE

## 2017-02-11 PROCEDURE — 700101 HCHG RX REV CODE 250

## 2017-02-11 PROCEDURE — 700112 HCHG RX REV CODE 229: Performed by: CLINICAL NURSE SPECIALIST

## 2017-02-11 PROCEDURE — 700105 HCHG RX REV CODE 258: Performed by: INTERNAL MEDICINE

## 2017-02-11 PROCEDURE — A9270 NON-COVERED ITEM OR SERVICE: HCPCS | Performed by: NURSE PRACTITIONER

## 2017-02-11 PROCEDURE — 700101 HCHG RX REV CODE 250: Performed by: CLINICAL NURSE SPECIALIST

## 2017-02-11 RX ORDER — SODIUM CHLORIDE 9 MG/ML
500 INJECTION, SOLUTION INTRAVENOUS ONCE
Status: COMPLETED | OUTPATIENT
Start: 2017-02-11 | End: 2017-02-11

## 2017-02-11 RX ORDER — LIDOCAINE 50 MG/G
1 PATCH TOPICAL EVERY 24 HOURS
Status: DISCONTINUED | OUTPATIENT
Start: 2017-02-11 | End: 2017-02-16 | Stop reason: HOSPADM

## 2017-02-11 RX ORDER — SODIUM CHLORIDE 9 MG/ML
INJECTION, SOLUTION INTRAVENOUS
Status: COMPLETED
Start: 2017-02-11 | End: 2017-02-11

## 2017-02-11 RX ORDER — LIDOCAINE 50 MG/G
1 PATCH TOPICAL EVERY 24 HOURS
Status: DISCONTINUED | OUTPATIENT
Start: 2017-02-11 | End: 2017-02-11

## 2017-02-11 RX ORDER — NOREPINEPHRINE BITARTRATE 1 MG/ML
INJECTION, SOLUTION INTRAVENOUS
Status: COMPLETED
Start: 2017-02-11 | End: 2017-02-11

## 2017-02-11 RX ADMIN — TRAMADOL HYDROCHLORIDE 50 MG: 50 TABLET, COATED ORAL at 01:18

## 2017-02-11 RX ADMIN — OXYCODONE HYDROCHLORIDE 10 MG: 10 TABLET ORAL at 04:42

## 2017-02-11 RX ADMIN — MUPIROCIN 1 APPLICATION: 20 OINTMENT TOPICAL at 20:45

## 2017-02-11 RX ADMIN — POTASSIUM CHLORIDE 10 MEQ: 750 TABLET, FILM COATED, EXTENDED RELEASE ORAL at 20:44

## 2017-02-11 RX ADMIN — MUPIROCIN 1 APPLICATION: 20 OINTMENT TOPICAL at 10:04

## 2017-02-11 RX ADMIN — SODIUM CHLORIDE: 9 INJECTION, SOLUTION INTRAVENOUS at 08:11

## 2017-02-11 RX ADMIN — OMEPRAZOLE 20 MG: 20 CAPSULE, DELAYED RELEASE ORAL at 10:02

## 2017-02-11 RX ADMIN — FUROSEMIDE 20 MG: 10 INJECTION, SOLUTION INTRAVENOUS at 05:35

## 2017-02-11 RX ADMIN — DOCUSATE SODIUM 100 MG: 100 CAPSULE ORAL at 10:03

## 2017-02-11 RX ADMIN — OXYCODONE HYDROCHLORIDE 10 MG: 10 TABLET ORAL at 01:18

## 2017-02-11 RX ADMIN — LIDOCAINE 1 PATCH: 50 PATCH CUTANEOUS at 10:38

## 2017-02-11 RX ADMIN — TAMSULOSIN HYDROCHLORIDE 0.4 MG: 0.4 CAPSULE ORAL at 10:03

## 2017-02-11 RX ADMIN — TRAMADOL HYDROCHLORIDE 50 MG: 50 TABLET, COATED ORAL at 23:00

## 2017-02-11 RX ADMIN — SODIUM CHLORIDE 1000 ML: 9 INJECTION, SOLUTION INTRAVENOUS at 08:36

## 2017-02-11 RX ADMIN — TRAMADOL HYDROCHLORIDE 50 MG: 50 TABLET, COATED ORAL at 19:08

## 2017-02-11 RX ADMIN — NOREPINEPHRINE BITARTRATE 8 MG: 1 INJECTION INTRAVENOUS at 08:45

## 2017-02-11 RX ADMIN — SODIUM CHLORIDE 500 ML: 9 INJECTION, SOLUTION INTRAVENOUS at 09:00

## 2017-02-11 RX ADMIN — POTASSIUM CHLORIDE 10 MEQ: 750 TABLET, FILM COATED, EXTENDED RELEASE ORAL at 10:02

## 2017-02-11 RX ADMIN — SODIUM CHLORIDE: 9 INJECTION, SOLUTION INTRAVENOUS at 08:45

## 2017-02-11 RX ADMIN — TRAMADOL HYDROCHLORIDE 50 MG: 50 TABLET, COATED ORAL at 05:35

## 2017-02-11 RX ADMIN — NOREPINEPHRINE BITARTRATE 20 MCG/MIN: 1 INJECTION INTRAVENOUS at 10:03

## 2017-02-11 RX ADMIN — ONDANSETRON 4 MG: 2 INJECTION, SOLUTION INTRAMUSCULAR; INTRAVENOUS at 10:04

## 2017-02-11 RX ADMIN — STANDARDIZED SENNA CONCENTRATE AND DOCUSATE SODIUM 1 TABLET: 8.6; 5 TABLET, FILM COATED ORAL at 20:44

## 2017-02-11 ASSESSMENT — PAIN SCALES - GENERAL
PAINLEVEL_OUTOF10: 4
PAINLEVEL_OUTOF10: 5
PAINLEVEL_OUTOF10: 3
PAINLEVEL_OUTOF10: 5
PAINLEVEL_OUTOF10: 7
PAINLEVEL_OUTOF10: 5
PAINLEVEL_OUTOF10: 6
PAINLEVEL_OUTOF10: 8
PAINLEVEL_OUTOF10: 4
PAINLEVEL_OUTOF10: 7
PAINLEVEL_OUTOF10: 6
PAINLEVEL_OUTOF10: 8

## 2017-02-11 ASSESSMENT — ENCOUNTER SYMPTOMS
MUSCULOSKELETAL NEGATIVE: 1
CONSTITUTIONAL NEGATIVE: 1
GASTROINTESTINAL NEGATIVE: 1
EYES NEGATIVE: 1
PSYCHIATRIC NEGATIVE: 1
CARDIOVASCULAR NEGATIVE: 1
NEUROLOGICAL NEGATIVE: 1
RESPIRATORY NEGATIVE: 1

## 2017-02-11 NOTE — PROGRESS NOTES
Pulmonary Critical Care Consult Note        Chief Complaint: Symptomatic severe AS     Date of service: 2/11/17    History of Present Illness: 61 y.o. male admitted for for elective AVR for symptomatic AS/bicuspid AV. Has had SOB, fatigue, ROTH and cough for weaks and eveluated outpatient by cardiology and deemed to be a good surgical candidate.    ROS:  Respiratory: negative, Cardiac: negative chest pain and negative orthopnea, GI: negative.  All other systems negative.    Interval Events:  24 hour interval history reviewed    - given pain meds and lasix overnight and dropped BP this morning with associated dizziness --> IVFs given and started on levophed gtt   - improving WBC, afebrile   - worsening JON    PFSH:  No change.    Respiratory:   3 lpm n/c, IS 1750mL  Pulse Oximetry: 92 %    Exam: unlabored respirations, no intercostal retractions or accessory muscle use and rhonchi bibasilar  ImagingAvailable data reviewed - unchanged  Recent Labs      02/08/17   1502  02/08/17   1605  02/08/17   1652   ISTATAPH  7.408  7.415  7.446   ISTATAPCO2  28.2  29.5  25.5*   ISTATAPO2  101*  99*  95*   ISTATATCO2  19*  20  18*   [id number]  98  98  98   ISTATARTHCO3  17.8  19.0  17.6   ISTATARTBE  -6*  -5*  -5*   ISTATTEMP  37.3 C  37.5 C  37.7 C   ISTATFIO2  30  30  30   ISTATSPEC  Arterial  Arterial  Arterial   ISTATAPHTC  7.404  7.408  7.435   [id number]  102*  102*  99*       HemoDynamics:  Pulse: 73, Heart Rate (Monitored): 94  NIBP: (!) 94/64 mmHg  Levophed gtt @ 15    Exam: regular rate and rhythm, regular rhythm (Sinus) - paced rhythm  Imaging: Available data reviewed        Neuro:  GCS Total Caro Coma Score: 15       Exam: no focal deficits noted mental status intact oriented for age x3  Imaging: Available data reviewed    Fluids:  Intake/Output       02/09/17 0700 - 02/10/17 0659 02/10/17 0700 - 02/11/17 0659 02/11/17 0700 - 02/12/17 0659      7550-1886 7895-0444 Total 1201-2407 9112-3219 Total 7547-3181  0229-7146 Total       Intake    P.O.  950  360 1310  390  650 1040  --  -- --    P.O.   -- -- --    I.V.  60  -- 60  --  -- --  --  -- --    Insulin Volume 60 -- 60 -- -- -- -- -- --    Total Intake 5730 191 5735  -- -- --       Output    Urine  625  0 625  1250  700 1950  --  -- --    Number of Times Voided -- 0 x 0 x -- -- -- -- -- --    Indwelling Cathether 625 -- 625 6741 956 4430 -- -- --    Void (ml) -- 0 0 0 -- 0 -- -- --    Drains  150  -- 150  --  -- --  --  -- --    Mediastinal Chest Tube 1 150 -- 150 -- -- -- -- -- --    Stool  --  -- --  --  -- --  --  -- --    Number of Times Stooled -- 0 x 0 x 0 x -- 0 x -- -- --    Total Output 775 0 775 8281 718 6950 -- -- --       Net I/O     235 360 595 -860 -50 -910 -- -- --        Weight: 99.3 kg (218 lb 14.7 oz)  Recent Labs      02/08/17   1300  02/09/17 02/09/17   0500  02/10/17   0245  02/11/17   0240   SODIUM   --    --   138   --   131*  131*   POTASSIUM  3.8   < >  4.5  4.5  4.4  3.9   CHLORIDE   --    --   107   --   103  101   CO2   --    --   23   --   19*  26   BUN   --    --   17   --   30*  39*   CREATININE   --    --   0.86   --   1.26  1.40   MAGNESIUM  2.3   --    --    --    --    --    CALCIUM   --    --   7.3*   --   8.2*  8.3*    < > = values in this interval not displayed.       GI/Nutrition:  Exam: abdomen is soft and non-tender, normal active bowel sounds  Imaging: Available data reviewed  taking PO  Liver Function  Recent Labs     02/09/17  02/10/17   0245  02/11/17   0240   GLUCOSE  107*  131*  106*       Heme:  Recent Labs      02/08/17   1300 02/09/17 02/09/17   0500  02/10/17   0245 02/11/17 0240   RBC   --   3.59*   --   3.81*  3.28*   HEMOGLOBIN   --   11.6*   --   12.3*  10.6*   HEMATOCRIT   --   33.7*   --   37.0*  31.2*   PLATELETCT  133*  89*   --   74*  53*   PROTHROMBTM  17.2*   --   14.9*  15.3*  15.7*   APTT  34.3   --    --    --    --    INR  1.36*   --   1.13  1.17*  1.21*        Infectious Disease:  Temp  Av.9 °C (100.3 °F)  Min: 37.6 °C (99.7 °F)  Max: 38.3 °C (100.9 °F)  Micro: reviewed  Recent Labs     02/09/17  02/10/17   0245  17   0240   WBC  11.6*  14.0*  8.8     Current Facility-Administered Medications   Medication Dose Frequency Provider Last Rate Last Dose   • furosemide (LASIX) injection 20 mg  20 mg BID DIURETIC Oriana Johnson, A.P.N.   20 mg at 17 0535   • potassium chloride ER (KLOR-CON) tablet 10 mEq  10 mEq BID Oriana Johnson, A.P.N.   10 mEq at 02/10/17 2005   • tamsulosin (FLOMAX) capsule 0.4 mg  0.4 mg AFTER BREAKFAST Oriana Johnson, A.P.N.   0.4 mg at 02/10/17 1049   • warfarin (COUMADIN) tablet 5 mg  5 mg COUMADIN-DAILY Adam Mullins, PHARMD   Stopped at 02/10/17 1800   • carvedilol (COREG) tablet 3.125 mg  3.125 mg BID WITH MEALS Carol Cisse A.P.N.   Stopped at 02/10/17 0730   • Respiratory Care per Protocol   Continuous RT Carol Cisse A.P.N.       • mupirocin (BACTROBAN) 2 % ointment 1 Application  1 Application BID Carol Cisse A.P.N.   1 Application at 02/10/17 2005   • docusate sodium (COLACE) capsule 100 mg  100 mg QAM Carol Cisse, A.P.N.   100 mg at 02/10/17 0920    And   • senna-docusate (PERICOLACE or SENOKOT S) 8.6-50 MG per tablet 1 Tab  1 Tab Nightly Carol Cisse A.P.N.   1 Tab at 02/10/17 2005    And   • senna-docusate (PERICOLACE or SENOKOT S) 8.6-50 MG per tablet 1 Tab  1 Tab Q24HRS PRN Carol Cisse A.P.N.        And   • lactulose 20 GM/30ML solution 30 mL  30 mL Q24HRS PRN Carol Cisse A.P.N.        And   • bisacodyl (DULCOLAX) suppository 10 mg  10 mg Q24HRS PRN Carol Cisse A.P.N.        And   • fleet enema 133 mL  1 Each Once PRN NICHOLAS Mooney.P.N.       • NS infusion   Continuous NICHOLAS Mooney.P.N. 10 mL/hr at 17 1900     • potassium chloride ER (KLOR-CON) tablet 10 mEq  10 mEq DAILY Carol Cisse A.P.N.   10 mEq at 02/10/17 0920    Or   • potassium chloride in water (KCL)  ivpb 10 mEq  10 mEq DAILY Carol Cisse A.P.N.       • omeprazole (PRILOSEC) capsule 20 mg  20 mg DAILY Carol Cisse A.P.N.   20 mg at 02/10/17 0919   • aspirin EC (ECOTRIN) tablet 81 mg  81 mg DAILY Carol Cisse A.P.N.   Stopped at 02/09/17 0900   • MD ALERT... warfarin (COUMADIN) per pharmacy protocol   PRN Carol Cisse A.P.N.       • electrolyte-A (PLASMALYTE-A) infusion   PRN Carol Cisse, A.P.N.       • oxycodone immediate-release (ROXICODONE) tablet 5 mg  5 mg Q3HRS PRN Carol Cisse A.P.N.   5 mg at 02/10/17 2218   • oxycodone immediate release (ROXICODONE) tablet 10 mg  10 mg Q3HRS PRN Carol Cisse A.P.N.   10 mg at 02/11/17 0442   • tramadol (ULTRAM) 50 MG tablet 50 mg  50 mg Q4HRS PRN Carol Cisse, A.P.N.   50 mg at 02/11/17 0535   • ondansetron (ZOFRAN) syringe/vial injection 4 mg  4 mg Q6HRS PRN Carol Cisse, A.P.N.   4 mg at 02/08/17 1926    Or   • prochlorperazine (COMPAZINE) injection 10 mg  10 mg Q6HRS PRN Carol Cisse, A.P.N.   10 mg at 02/08/17 2142   • acetaminophen (TYLENOL) tablet 650 mg  650 mg Q4HRS PRN Carol Cisse, A.P.N.        Or   • acetaminophen (TYLENOL) suppository 650 mg  650 mg Q4HRS PRN Carol Cisse, A.P.N.       • mag hydrox-al hydrox-simeth (MAALOX PLUS ES or MYLANTA DS) suspension 30 mL  30 mL Q4HRS PRN Carol Cisse, A.P.N.       • diphenhydrAMINE (BENADRYL) tablet/capsule 25 mg  25 mg HS PRN - MR X 1 Carol Cisse A.P.N.   25 mg at 02/08/17 2200     Last reviewed on 2/8/2017  6:13 AM by Johanna Pepper R.N.    Quality  Measures:  Medications reviewed, Labs reviewed, Radiology images reviewed and EKG reviewed    Central line in place: Need for access and Shock    DVT Prophylaxis: Enoxaparin (Lovenox)  DVT prophylaxis - mechanical: SCDs  Ulcer prophylaxis: Yes    Assessed for rehab: Patient was assess for and/or received rehabilitation services during this hospitalization    Assessment/Plan:  Daja-op ventilatory management - intubated 2/8,  extubated 2/8   - encourage IS, ambulation when able   - RT protocols  Severe AS with bicuspid AV s/p mechanical AVR   - CVS following   - coumadin   - caution with narcotics, start lidoderm patch  V-tach s/p AICD placement   - interrogated 2/8 post-surgery, re-interrogation ok  Acute cardiogenic pulm edema   - hold on diurese for now  CHF III - mild decompensation  Undifferentiate shock - returned 2/11, hypovolemia vs medication effect   - re-check CXR   - levophed to goal MAP>65   - IVF bolus, check CVP  Acute blood loss anemia - conservative transfusion strategies  Urinary retention - rosales for now  JON - ?obstructive uropathy   - monitor, holding diuresis  Leukocytosis with fever - resolved   - monitor off abx for now  Prophylaxis, diet    Discussed patient condition and risk of morbidity and/or mortality with Family, RN, RT, Pharmacy, Charge nurse / hot rounds, QA team, Patient and CVS.  Critical care time: 31 min. No time overlap, procedures not included in time.

## 2017-02-11 NOTE — CARE PLAN
Problem: Post op day 2 CABG/Heart Valve Replacement  Goal: Optimal care of the post op CABG/heart valve replacement post op day 2  Intervention: FSBS: when 2 consecutive BS < 130 after post op day 2, discontinue FSBS unless patient is insulin dependent diabetic  FSBS discontinued.  Intervention: Stand at sink and wash up with assistance. Clean incisions twice daily with soap and water.  Bed bath given.  Intervention: Consider removal of rosales and chest tube if not already done  Rosales in place per MD order.

## 2017-02-11 NOTE — PROGRESS NOTES
Lethargic during shift change with hypotension, Charge RN and Dr. Gonda made aware, ns bolus 1000 ml as ordered, switched back from Tele to ICU status, transferred care to ANTHONY Willis per Charge RN.

## 2017-02-11 NOTE — THERAPY
"Physical Therapy Evaluation completed.   Bed Mobility:  Supine to Sit:  (pt seated in chair pre/post session)  Transfers: Sit to Stand: Minimal Assist  Gait: Level Of Assist: Contact Guard Assist with Front-Wheel Walker       Plan of Care: Will benefit from Physical Therapy 3 times per week  Discharge Recommendations: Equipment: Will Continue to Assess for Equipment Needs.     Pt is a 62 y/o male s/p elective AVR. Pt presents to physical therapy with decreased activity tolerance and general deconditioning. Pt ambulatory X 75' and requests to stop due to fatigue, no LOB noted with FWW. Pt highly receptive of cardiac rehab education. Acute PT will continue to follow; anticipate slow and steady progress. Anticipate d/c home as pt will have 24h supervision available when medically appropriate. Please refer to oupatient cardiac rehab when in the appropriate phase of healing.     See \"Rehab Therapy-Acute\" Patient Summary Report for complete documentation.     "

## 2017-02-11 NOTE — PROGRESS NOTES
Cardiovascular Surgery Progress Note    Name: Alberto Fam  MRN: [id number]  : 1955  Admit Date: 2017  5:57 AM  Procedure:  Procedure(s) and Anesthesia Type:     * STERNOTOMY - REDO - General     * AORTIC VALVE REPLACEMENT - General     * JUSTINO - General  3 Day Post-Op    Vitals:  Patient Vitals for the past 8 hrs:   Temp SpO2 O2 Delivery O2 (LPM) Pulse Heart Rate (Monitored) Resp NIBP Weight   17 1000 - 95 % - - (!) 55 (!) 55 (!) 21 100/55 mmHg -   17 0900 - 91 % - - (!) 52 (!) 46 17 121/47 mmHg -   17 0800 - 96 % None (Room Air) 0 70 - 18 (!) 85/49 mmHg -   17 0740 - 92 % - 3 73 - 20 - -   17 0600 37.8 °C (100 °F) - - - - - - - -   17 0535 - 97 % - - 84 - - - -   17 0500 - 94 % - - 94 94 (!) 28 (!) 94/64 mmHg -   17 0442 37.7 °C (99.9 °F) - - - - - - - -   17 0400 37.7 °C (99.9 °F) 96 % Silicone Nasal Cannula 2 86 93 17 (!) 73/57 mmHg 99.3 kg (218 lb 14.7 oz)   17 0300 - 95 % - - 89 91 19 (!) 88/62 mmHg -     Temp (24hrs), Av.9 °C (100.3 °F), Min:37.6 °C (99.7 °F), Max:38.3 °C (100.9 °F)      Respiratory:    Respiration: (!) 21, Pulse Oximetry: 95 %, O2 Daily Delivery Respiratory : Silicone Nasal Cannula     Chest Tube Drains:          Fluids:    Intake/Output Summary (Last 24 hours) at 17 1034  Last data filed at 17 1000   Gross per 24 hour   Intake   1060 ml   Output   2090 ml   Net  -1030 ml     Admit weight: Weight: 93.4 kg (205 lb 14.6 oz)  Current weight: Weight: 99.3 kg (218 lb 14.7 oz) (17 0400)    Labs:  Recent Labs     02/09/17  02/10/17   02417   WBC  11.6*  14.0*  8.8   RBC  3.59*  3.81*  3.28*   HEMOGLOBIN  11.6*  12.3*  10.6*   HEMATOCRIT  33.7*  37.0*  31.2*   MCV  93.9  97.1  95.1   MCH  32.3  32.3  32.3   MCHC  34.4  33.2*  34.0   RDW  41.6  45.4  43.2   PLATELETCT  89*  74*  53*   MPV  10.2  10.3  10.2         Recent Labs     17   0500  02/10/17   0245  02/11/17   0240    SODIUM  138   --   131*  131*   POTASSIUM  4.5  4.5  4.4  3.9   CHLORIDE  107   --   103  101   CO2  23   --   19*  26   GLUCOSE  107*   --   131*  106*   BUN  17   --   30*  39*   CREATININE  0.86   --   1.26  1.40   CALCIUM  7.3*   --   8.2*  8.3*     Recent Labs      02/08/17   1300  02/09/17   0500  02/10/17   0245  02/11/17   0240   APTT  34.3   --    --    --    INR  1.36*  1.13  1.17*  1.21*       Medications:  • lidocaine  1 Patch     • lidocaine  1 Patch     • potassium chloride (KCl)  10 mEq     • tamsulosin  0.4 mg     • warfarin  5 mg     • carvedilol  3.125 mg     • mupirocin  1 Application     • docusate sodium  100 mg      And   • senna-docusate  1 Tab     • omeprazole  20 mg     • aspirin EC  81 mg         Exam:   Review of Systems   Constitutional: Negative.    HENT: Negative.    Eyes: Negative.    Respiratory: Negative.    Cardiovascular: Negative.    Gastrointestinal: Negative.    Genitourinary: Negative.    Musculoskeletal: Negative.    Skin: Negative.    Neurological: Negative.    Psychiatric/Behavioral: Negative.        Physical Exam   Constitutional: He is oriented to person, place, and time. He appears well-developed and well-nourished.   HENT:   Head: Normocephalic and atraumatic.   Eyes: Pupils are equal, round, and reactive to light.   Neck: Normal range of motion. Neck supple.   Cardiovascular: Normal rate and regular rhythm.    Pulmonary/Chest: Effort normal.   Decreased at bases.    Abdominal: Soft. Bowel sounds are normal.   Musculoskeletal: Normal range of motion. He exhibits edema.   Neurological: He is alert and oriented to person, place, and time.   Skin: Skin is warm and dry.   Wounds CDI   Psychiatric: He has a normal mood and affect.       Quality Measures:   EKG reviewed, Medications reviewed, Radiology images reviewed and Labs reviewed  Hewitt catheter: No Hewitt  Central line in place: Need for access    DVT Prophylaxis: Enoxaparin (Lovenox) and Warfarin (Coumadin)  DVT  prophylaxis - mechanical: SCDs  Ulcer prophylaxis: Yes    Assessed for rehab: Patient returned to prior level of function, rehabilitation not indicated at this time      Assessment/Plan:  POD 1 Extubated, HDS, no drips, chest tube output minimal and negative for air leak.  Neuro grossly intact.  Pacer not capturing until 40 BPM.  Will have re-interrogated.  Hold coreg for now until PPM re-interrogated.  Plan: Transition to tele status.  DC chest tubes.  DC rosales.Transition to tele status.  DC chest tubes.  DC rosales.  POD 2 BP stable/low.  NSR/ST.  PPM fucntion normal. Urine retention. +5L, wts up. PLAN:  DC inspra/entresto until BP improved and to allow for diuresis.  Continue coreg as BP allows. Reinsert rosales/add flomax.  Plts low, w/w. PT/OT.     POD 3 HOTN this AM.  On levophed.  Given lasix with BP low.  DC'd lasix and K.  Getting fluid bolus.  Pain in scapula right side.  Lidocaine patch and heating pad.  Watch BP.  CPM.     Active Hospital Problems    Diagnosis   • Severe aortic stenosis [I35.0]

## 2017-02-12 LAB
ANION GAP SERPL CALC-SCNC: 6 MMOL/L (ref 0–11.9)
BUN SERPL-MCNC: 33 MG/DL (ref 8–22)
CALCIUM SERPL-MCNC: 8.5 MG/DL (ref 8.5–10.5)
CHLORIDE SERPL-SCNC: 99 MMOL/L (ref 96–112)
CO2 SERPL-SCNC: 25 MMOL/L (ref 20–33)
CREAT SERPL-MCNC: 1.23 MG/DL (ref 0.5–1.4)
ERYTHROCYTE [DISTWIDTH] IN BLOOD BY AUTOMATED COUNT: 42.1 FL (ref 35.9–50)
GFR SERPL CREATININE-BSD FRML MDRD: >60 ML/MIN/1.73 M 2
GLUCOSE SERPL-MCNC: 132 MG/DL (ref 65–99)
HCT VFR BLD AUTO: 31.2 % (ref 42–52)
HGB BLD-MCNC: 10.7 G/DL (ref 14–18)
INR PPP: 1.21 (ref 0.87–1.13)
MCH RBC QN AUTO: 32 PG (ref 27–33)
MCHC RBC AUTO-ENTMCNC: 34.3 G/DL (ref 33.7–35.3)
MCV RBC AUTO: 93.4 FL (ref 81.4–97.8)
PLATELET # BLD AUTO: 86 K/UL (ref 164–446)
PMV BLD AUTO: 11 FL (ref 9–12.9)
POTASSIUM SERPL-SCNC: 4.2 MMOL/L (ref 3.6–5.5)
PROTHROMBIN TIME: 15.7 SEC (ref 12–14.6)
RBC # BLD AUTO: 3.34 M/UL (ref 4.7–6.1)
SODIUM SERPL-SCNC: 130 MMOL/L (ref 135–145)
WBC # BLD AUTO: 8.4 K/UL (ref 4.8–10.8)

## 2017-02-12 PROCEDURE — 85610 PROTHROMBIN TIME: CPT

## 2017-02-12 PROCEDURE — 770022 HCHG ROOM/CARE - ICU (200)

## 2017-02-12 PROCEDURE — 85027 COMPLETE CBC AUTOMATED: CPT

## 2017-02-12 PROCEDURE — 80048 BASIC METABOLIC PNL TOTAL CA: CPT

## 2017-02-12 PROCEDURE — 700102 HCHG RX REV CODE 250 W/ 637 OVERRIDE(OP): Performed by: NURSE PRACTITIONER

## 2017-02-12 PROCEDURE — 700101 HCHG RX REV CODE 250: Performed by: INTERNAL MEDICINE

## 2017-02-12 PROCEDURE — 700105 HCHG RX REV CODE 258: Performed by: INTERNAL MEDICINE

## 2017-02-12 PROCEDURE — 700101 HCHG RX REV CODE 250: Performed by: CLINICAL NURSE SPECIALIST

## 2017-02-12 PROCEDURE — 99291 CRITICAL CARE FIRST HOUR: CPT | Performed by: INTERNAL MEDICINE

## 2017-02-12 PROCEDURE — 94760 N-INVAS EAR/PLS OXIMETRY 1: CPT

## 2017-02-12 PROCEDURE — A9270 NON-COVERED ITEM OR SERVICE: HCPCS | Performed by: CLINICAL NURSE SPECIALIST

## 2017-02-12 PROCEDURE — A9270 NON-COVERED ITEM OR SERVICE: HCPCS | Performed by: NURSE PRACTITIONER

## 2017-02-12 PROCEDURE — 700112 HCHG RX REV CODE 229: Performed by: CLINICAL NURSE SPECIALIST

## 2017-02-12 PROCEDURE — 700105 HCHG RX REV CODE 258: Performed by: CLINICAL NURSE SPECIALIST

## 2017-02-12 PROCEDURE — 700102 HCHG RX REV CODE 250 W/ 637 OVERRIDE(OP): Performed by: CLINICAL NURSE SPECIALIST

## 2017-02-12 RX ORDER — SODIUM CHLORIDE 9 MG/ML
500 INJECTION, SOLUTION INTRAVENOUS
Status: ACTIVE | OUTPATIENT
Start: 2017-02-12 | End: 2017-02-12

## 2017-02-12 RX ADMIN — STANDARDIZED SENNA CONCENTRATE AND DOCUSATE SODIUM 1 TABLET: 8.6; 5 TABLET, FILM COATED ORAL at 12:04

## 2017-02-12 RX ADMIN — MUPIROCIN 1 APPLICATION: 20 OINTMENT TOPICAL at 20:22

## 2017-02-12 RX ADMIN — SODIUM CHLORIDE: 9 INJECTION, SOLUTION INTRAVENOUS at 09:46

## 2017-02-12 RX ADMIN — STANDARDIZED SENNA CONCENTRATE AND DOCUSATE SODIUM 1 TABLET: 8.6; 5 TABLET, FILM COATED ORAL at 20:22

## 2017-02-12 RX ADMIN — LACTULOSE 30 ML: 10 SOLUTION ORAL at 18:19

## 2017-02-12 RX ADMIN — POTASSIUM CHLORIDE 10 MEQ: 750 TABLET, FILM COATED, EXTENDED RELEASE ORAL at 07:56

## 2017-02-12 RX ADMIN — TRAMADOL HYDROCHLORIDE 50 MG: 50 TABLET, COATED ORAL at 07:56

## 2017-02-12 RX ADMIN — TRAMADOL HYDROCHLORIDE 50 MG: 50 TABLET, COATED ORAL at 20:26

## 2017-02-12 RX ADMIN — LIDOCAINE 1 PATCH: 50 PATCH CUTANEOUS at 11:00

## 2017-02-12 RX ADMIN — NOREPINEPHRINE BITARTRATE 14 MCG/MIN: 1 INJECTION INTRAVENOUS at 05:20

## 2017-02-12 RX ADMIN — TRAMADOL HYDROCHLORIDE 50 MG: 50 TABLET, COATED ORAL at 12:04

## 2017-02-12 RX ADMIN — MUPIROCIN 1 APPLICATION: 20 OINTMENT TOPICAL at 07:57

## 2017-02-12 RX ADMIN — TRAMADOL HYDROCHLORIDE 50 MG: 50 TABLET, COATED ORAL at 16:00

## 2017-02-12 RX ADMIN — CARVEDILOL 3.12 MG: 3.12 TABLET, FILM COATED ORAL at 07:56

## 2017-02-12 RX ADMIN — DOCUSATE SODIUM 100 MG: 100 CAPSULE ORAL at 07:56

## 2017-02-12 RX ADMIN — TAMSULOSIN HYDROCHLORIDE 0.4 MG: 0.4 CAPSULE ORAL at 07:56

## 2017-02-12 RX ADMIN — OMEPRAZOLE 20 MG: 20 CAPSULE, DELAYED RELEASE ORAL at 07:56

## 2017-02-12 RX ADMIN — TRAMADOL HYDROCHLORIDE 50 MG: 50 TABLET, COATED ORAL at 03:46

## 2017-02-12 RX ADMIN — POTASSIUM CHLORIDE 10 MEQ: 750 TABLET, FILM COATED, EXTENDED RELEASE ORAL at 20:22

## 2017-02-12 ASSESSMENT — ENCOUNTER SYMPTOMS
NEUROLOGICAL NEGATIVE: 1
CARDIOVASCULAR NEGATIVE: 1
MUSCULOSKELETAL NEGATIVE: 1
GASTROINTESTINAL NEGATIVE: 1
PSYCHIATRIC NEGATIVE: 1
RESPIRATORY NEGATIVE: 1
EYES NEGATIVE: 1
CONSTITUTIONAL NEGATIVE: 1

## 2017-02-12 ASSESSMENT — PAIN SCALES - GENERAL
PAINLEVEL_OUTOF10: 6
PAINLEVEL_OUTOF10: 5
PAINLEVEL_OUTOF10: 4
PAINLEVEL_OUTOF10: 5
PAINLEVEL_OUTOF10: 6
PAINLEVEL_OUTOF10: 5
PAINLEVEL_OUTOF10: 5
PAINLEVEL_OUTOF10: 4
PAINLEVEL_OUTOF10: 7
PAINLEVEL_OUTOF10: 7
PAINLEVEL_OUTOF10: 4
PAINLEVEL_OUTOF10: 3
PAINLEVEL_OUTOF10: 7
PAINLEVEL_OUTOF10: 5
PAINLEVEL_OUTOF10: 4
PAINLEVEL_OUTOF10: 4
PAINLEVEL_OUTOF10: 6

## 2017-02-12 NOTE — PROGRESS NOTES
Pulmonary Critical Care Consult Note        Chief Complaint: Symptomatic severe AS     Date of service: 2/12/17    History of Present Illness: 61 y.o. male admitted for for elective AVR for symptomatic AS/bicuspid AV. Has had SOB, fatigue, ROTH and cough for weaks and eveluated outpatient by cardiology and deemed to be a good surgical candidate.    ROS:  Respiratory: negative, Cardiac: negative chest pain and negative orthopnea, GI: negative.  All other systems negative.    Interval Events:  24 hour interval history reviewed    - remains on levophed but improving   - improving JON, drop in Na    PFSH:  No change.    Respiratory:   2 lpm n/c, IS 1200mL  Pulse Oximetry: 97 %    Exam: unlabored respirations, no intercostal retractions or accessory muscle use and rhonchi bibasilar  ImagingAvailable data reviewed        Invalid input(s): [id number]    HemoDynamics:  Pulse: 60, Heart Rate (Monitored): 60  NIBP: 108/60 mmHg  Levophed gtt @ 15--> 10    Exam: regular rate and rhythm, regular rhythm (Sinus) - paced rhythm  Imaging: Available data reviewed        Neuro:  GCS Total Caro Coma Score: 15       Exam: no focal deficits noted mental status intact oriented for age x3  Imaging: Available data reviewed    Fluids:  Intake/Output       02/10/17 0700 - 02/11/17 0659 02/11/17 0700 - 02/12/17 0659 02/12/17 0700 - 02/13/17 0659      2177-5855 4105-0450 Total 5149-2099 2455-1324 Total 4771-6353 9295-7731 Total       Intake    P.O.  390  650 1040  500  900 1400  --  -- --    P.O.   -- -- --    I.V.  --  -- --  276.9  315.6 592.5  --  -- --    Norepinephrine Volume -- -- -- 276.9 315.6 592.5 -- -- --    Total Intake  776.9 1215.6 1992.5 -- -- --       Output    Urine  1250  700 1950  655  1275 1930  --  -- --    Indwelling Cathether 2414 971 1317 655 1275 1930 -- -- --    Void (ml) 0 -- 0 -- -- -- -- -- --    Stool  --  -- --  --  -- --  --  -- --    Number of Times Stooled 0 x -- 0 x --  -- -- -- -- --    Total Output 8984 754 1812 655 1275 1930 -- -- --       Net I/O     -860 -50 -910 121.9 -59.4 62.5 -- -- --        Weight: 101.3 kg (223 lb 5.2 oz)  Recent Labs      02/10/17   0245  02/11/17   0240  02/12/17   0305   SODIUM  131*  131*  130*   POTASSIUM  4.4  3.9  4.2   CHLORIDE  103  101  99   CO2  19*  26  25   BUN  30*  39*  33*   CREATININE  1.26  1.40  1.23   CALCIUM  8.2*  8.3*  8.5       GI/Nutrition:  Exam: abdomen is soft and non-tender, normal active bowel sounds  Imaging: Available data reviewed  taking PO  Liver Function  Recent Labs      02/10/17   0245  02/11/17   0240  02/12/17   0305   GLUCOSE  131*  106*  132*       Heme:  Recent Labs      02/10/17   0245  02/11/17   0240  02/12/17   0305   RBC  3.81*  3.28*  3.34*   HEMOGLOBIN  12.3*  10.6*  10.7*   HEMATOCRIT  37.0*  31.2*  31.2*   PLATELETCT  74*  53*  86*   PROTHROMBTM  15.3*  15.7*  15.7*   INR  1.17*  1.21*  1.21*       Infectious Disease:  Temp  Av.6 °C (99.7 °F)  Min: 37 °C (98.6 °F)  Max: 38.3 °C (100.9 °F)  Micro: reviewed  Recent Labs      02/10/17   0245  02/11/17   0240  02/12/17   0305   WBC  14.0*  8.8  8.4     Current Facility-Administered Medications   Medication Dose Frequency Provider Last Rate Last Dose   • norepinephrine (LEVOPHED) 8 mg in  mL Infusion  0.5-30 mcg/min Continuous Jeremy M Gonda, M.D. 26.3 mL/hr at 17 0520 14 mcg/min at 17 0520   • lidocaine (LIDODERM) 5 % 1 Patch  1 Patch Q24HR Carol Cisse AMarenPMarenNMaren   1 Patch at 17 1038   • potassium chloride ER (KLOR-CON) tablet 10 mEq  10 mEq BID Oriana ELLEN Dalalt, A.P.N.   10 mEq at 17   • tamsulosin (FLOMAX) capsule 0.4 mg  0.4 mg AFTER BREAKFAST Oriana Johnson, A.P.N.   0.4 mg at 17   • carvedilol (COREG) tablet 3.125 mg  3.125 mg BID WITH MEALS Carol Cisse A.P.N.   3.125 mg at 17   • Respiratory Care per Protocol   Continuous RT Carol Cisse, A.P.N.       • mupirocin (BACTROBAN) 2 %  ointment 1 Application  1 Application BID Carol Cisse, A.P.N.   1 Application at 02/12/17 0757   • docusate sodium (COLACE) capsule 100 mg  100 mg QAM Carol Cisse, A.P.N.   100 mg at 02/12/17 0756    And   • senna-docusate (PERICOLACE or SENOKOT S) 8.6-50 MG per tablet 1 Tab  1 Tab Nightly Carol Cisse A.P.N.   1 Tab at 02/11/17 2044    And   • senna-docusate (PERICOLACE or SENOKOT S) 8.6-50 MG per tablet 1 Tab  1 Tab Q24HRS PRN Carol Cisse A.P.N.        And   • lactulose 20 GM/30ML solution 30 mL  30 mL Q24HRS PRN Carol Cisse, A.P.N.        And   • bisacodyl (DULCOLAX) suppository 10 mg  10 mg Q24HRS PRN Carol Cisse, A.P.N.        And   • fleet enema 133 mL  1 Each Once PRN Carol Cisse, A.P.N.       • NS infusion   Continuous Carol Cisse, A.P.N. 10 mL/hr at 02/11/17 0811     • omeprazole (PRILOSEC) capsule 20 mg  20 mg DAILY Carol Cisse, A.P.N.   20 mg at 02/12/17 0756   • aspirin EC (ECOTRIN) tablet 81 mg  81 mg DAILY Carol Cisse, A.P.N.   Stopped at 02/09/17 0900   • electrolyte-A (PLASMALYTE-A) infusion   PRN Carol Cisse, A.P.N.       • oxycodone immediate-release (ROXICODONE) tablet 5 mg  5 mg Q3HRS PRN Carol Cisse, A.P.N.   5 mg at 02/10/17 2218   • oxycodone immediate release (ROXICODONE) tablet 10 mg  10 mg Q3HRS PRN Carol Cisse, A.P.N.   10 mg at 02/11/17 0442   • tramadol (ULTRAM) 50 MG tablet 50 mg  50 mg Q4HRS PRN Carol Cisse, A.P.N.   50 mg at 02/12/17 0756   • ondansetron (ZOFRAN) syringe/vial injection 4 mg  4 mg Q6HRS PRN Carol Cisse, A.P.N.   4 mg at 02/11/17 1004    Or   • prochlorperazine (COMPAZINE) injection 10 mg  10 mg Q6HRS PRN Carol Cisse, A.P.N.   10 mg at 02/08/17 2142   • acetaminophen (TYLENOL) tablet 650 mg  650 mg Q4HRS PRN Carol Cisse, A.P.N.        Or   • acetaminophen (TYLENOL) suppository 650 mg  650 mg Q4HRS PRN Carol Cisse, A.P.N.       • mag hydrox-al hydrox-simeth (MAALOX PLUS ES or MYLANTA DS) suspension 30 mL  30 mL Q4HRS  PRN Carol Cisse A.P.N.       • diphenhydrAMINE (BENADRYL) tablet/capsule 25 mg  25 mg HS PRN - MR X 1 Carol Cisse A.P.N.   25 mg at 02/08/17 2200     Last reviewed on 2/8/2017  6:13 AM by Johanna Pepper R.N.    Quality  Measures:  Medications reviewed, Labs reviewed, Radiology images reviewed and EKG reviewed    Central line in place: Need for access and Shock    DVT Prophylaxis: Enoxaparin (Lovenox)  DVT prophylaxis - mechanical: SCDs  Ulcer prophylaxis: Yes    Assessed for rehab: Patient was assess for and/or received rehabilitation services during this hospitalization    Assessment/Plan:  Daja-op ventilatory management - intubated 2/8, extubated 2/8   - encourage IS, ambulation   - RT protocols  Severe AS with bicuspid AV s/p mechanical AVR   - CVS following   - coumadin   - caution with narcotics, cont lidoderm patch  V-tach s/p AICD placement   - interrogated 2/8 post-surgery, re-interrogation ok  Acute cardiogenic pulm edema - resolving  CHF III - mild decompensation  Undifferentiate shock - returned 2/11, hypovolemia vs medication effect   - cont levophed to goal MAP>65   - IVF bolus if low CVP (<8)  Acute blood loss anemia - conservative transfusion strategies  Urinary retention - resolved  JON - ?obstructive uropathy - improving   - monitor, holding diuresis  Leukocytosis with fever - resolved   - monitor off abx for now  Prophylaxis, diet, therapies    Discussed patient condition and risk of morbidity and/or mortality with Family, RN, RT, Pharmacy, Charge nurse / hot rounds, QA team, Patient and CVS.  Critical care time: 31 min. No time overlap, procedures not included in time.

## 2017-02-12 NOTE — CARE PLAN
Problem: Post Op Day 3 CABG/Heart Valve replacement  Goal: Optimal care of the post op CABG/Heart Valve replacement post op day 3  Intervention: Daily Weights  Done  Intervention: Shower daily and clean incisions twice daily with soap and water  Stood at sink and washed incision.  Intervention: Up in chair for all meals  Done  Intervention: Ambulate, increasing the distance each time x 3 and before bed  Ambulated, distance did not change.  Intervention: IS q 1 hour while awake and record best IS volume  IS volume is 1250  Intervention: Consider removal of rosales, chest tube and pacer wire if not already done  Rosales in place per MD.

## 2017-02-12 NOTE — PROGRESS NOTES
Cardiovascular Surgery Progress Note    Name: Alberto Fam  MRN: [id number]  : 1955  Admit Date: 2017  5:57 AM  Procedure:  Procedure(s) and Anesthesia Type:     * STERNOTOMY - REDO - General     * AORTIC VALVE REPLACEMENT - General     * JUSTINO - General  4 Day Post-Op    Vitals:  Patient Vitals for the past 8 hrs:   Temp SpO2 O2 Delivery O2 (LPM) Pulse Heart Rate (Monitored) Resp NIBP Weight   17 0800 37.2 °C (99 °F) 97 % Silicone Nasal Cannula 2 60 60 18 113/53 mmHg -   17 0745 - 98 % - - 60 - - 124/60 mmHg -   17 0700 - 97 % - - 60 60 17 117/66 mmHg -   17 0600 37 °C (98.6 °F) 97 % - - 60 60 17 108/60 mmHg -   17 0500 - 96 % - - 63 67 18 119/51 mmHg -   17 0400 37.4 °C (99.3 °F) 96 % Silicone Nasal Cannula 2 64 60 20 - 101.3 kg (223 lb 5.2 oz)   17 0346 - 98 % - - 70 70 17 104/61 mmHg -   17 0300 - 98 % - - 66 (!) 102 18 104/61 mmHg -   17 0200 37.5 °C (99.5 °F) 98 % - - 66 99 16 123/48 mmHg -     Temp (24hrs), Av.6 °C (99.6 °F), Min:37 °C (98.6 °F), Max:38.3 °C (100.9 °F)      Respiratory:    Respiration: 18, Pulse Oximetry: 97 %, O2 Daily Delivery Respiratory : Silicone Nasal Cannula     Chest Tube Drains:          Fluids:    Intake/Output Summary (Last 24 hours) at 17 0909  Last data filed at 17 0800   Gross per 24 hour   Intake 2145.11 ml   Output   2125 ml   Net  20.11 ml     Admit weight: Weight: 93.4 kg (205 lb 14.6 oz)  Current weight: Weight: 101.3 kg (223 lb 5.2 oz) (17 0400)    Labs:  Recent Labs      02/10/17   0245  17   0240  17   0305   WBC  14.0*  8.8  8.4   RBC  3.81*  3.28*  3.34*   HEMOGLOBIN  12.3*  10.6*  10.7*   HEMATOCRIT  37.0*  31.2*  31.2*   MCV  97.1  95.1  93.4   MCH  32.3  32.3  32.0   MCHC  33.2*  34.0  34.3   RDW  45.4  43.2  42.1   PLATELETCT  74*  53*  86*   MPV  10.3  10.2  11.0         Recent Labs      02/10/17   0245  17   0305   SODIUM  131*  131*  130*    POTASSIUM  4.4  3.9  4.2   CHLORIDE  103  101  99   CO2  19*  26  25   GLUCOSE  131*  106*  132*   BUN  30*  39*  33*   CREATININE  1.26  1.40  1.23   CALCIUM  8.2*  8.3*  8.5     Recent Labs      02/10/17   0245  02/11/17   0240  02/12/17   0305   INR  1.17*  1.21*  1.21*       Medications:  • lidocaine  1 Patch     • potassium chloride (KCl)  10 mEq     • tamsulosin  0.4 mg     • carvedilol  3.125 mg     • mupirocin  1 Application     • docusate sodium  100 mg      And   • senna-docusate  1 Tab     • omeprazole  20 mg     • aspirin EC  81 mg         Exam:   Review of Systems   Constitutional: Negative.    HENT: Negative.    Eyes: Negative.    Respiratory: Negative.    Cardiovascular: Negative.    Gastrointestinal: Negative.    Genitourinary: Negative.    Musculoskeletal: Negative.    Skin: Negative.    Neurological: Negative.    Psychiatric/Behavioral: Negative.        Physical Exam   Constitutional: He is oriented to person, place, and time. He appears well-developed and well-nourished.   HENT:   Head: Normocephalic and atraumatic.   Eyes: Pupils are equal, round, and reactive to light.   Neck: Normal range of motion. Neck supple.   Cardiovascular: Normal rate and regular rhythm.    Pulmonary/Chest: Effort normal.   Decreased at bases.    Abdominal: Soft. Bowel sounds are normal.   Musculoskeletal: Normal range of motion. He exhibits edema.   Neurological: He is alert and oriented to person, place, and time.   Skin: Skin is warm and dry.   Wounds CDI   Psychiatric: He has a normal mood and affect.       Quality Measures:   EKG reviewed, Medications reviewed, Radiology images reviewed and Labs reviewed  Hewitt catheter: No Hewitt  Central line in place: Need for access    DVT Prophylaxis: Enoxaparin (Lovenox) and Warfarin (Coumadin)  DVT prophylaxis - mechanical: SCDs  Ulcer prophylaxis: Yes    Assessed for rehab: Patient returned to prior level of function, rehabilitation not indicated at this  time      Assessment/Plan:  POD 1 Extubated, HDS, no drips, chest tube output minimal and negative for air leak.  Neuro grossly intact.  Pacer not capturing until 40 BPM.  Will have re-interrogated.  Hold coreg for now until PPM re-interrogated.  Plan: Transition to tele status.  DC chest tubes.  DC rosales.Transition to tele status.  DC chest tubes.  DC rosales.  POD 2 BP stable/low.  NSR/ST.  PPM fucntion normal. Urine retention. +5L, wts up. PLAN:  DC inspra/entresto until BP improved and to allow for diuresis.  Continue coreg as BP allows. Reinsert rosales/add flomax.  Plts low, w/w. PT/OT.     POD 3 HOTN this AM.  On levophed.  Given lasix with BP low.  DC'd lasix and K.  Getting fluid bolus.  Pain in scapula right side.  Lidocaine patch and heating pad.  Watch BP.  CPM.   POD 4 BP still low.  On levo drip low dose.  Wean levo.  HDS, neuro intact.  Wounds CDI.  Paced rhythm.  Pain in scapula improved.  CPM.     Active Hospital Problems    Diagnosis   • Severe aortic stenosis [I35.0]

## 2017-02-12 NOTE — PROGRESS NOTES
Bedside report received. Pt is currently awake, respirations are even and unlabored. All lines, drips, and orders verified. States he has no needs at this time. Call light is within reach, hourly rounding in place.

## 2017-02-12 NOTE — FLOWSHEET NOTE
02/11/17 1915   Events/Summary/Plan   Events/Summary/Plan IS   Non-Invasive Resp Device Site Inspection Completed Intact   Interdisciplinary Plan of Care-Goals (Indications)   Blunt Chest Indications Chest Trauma   Interdisciplinary Plan of Care-Outcomes    Blunt Chest IS Outcome Patient is Using I.S., Exceeds 60% of Predicted, and Understands Need for I.S.   Hyperinflation Protocol Goals/Outcome Greater Than 60% of Predicted I.S. Volume x 24 hrs   Education   Education Yes - Pt. / Family has been Instructed in use of Respiratory Equipment;Yes - Pt. / Family has been Instructed in use of Respiratory Medications and Adverse Reactions   Incentive Spirometry Group   Incentive Spirometry Instruction Yes   Breathing Exercises Yes   Incentive Spirometer Volume 1250 mL   Respiratory WDL   Respiratory (WDL) X   Chest Exam   Work Of Breathing / Effort Mild   Respiration 18   Pulse 60   Heart Rate (Monitored) 61   Breath Sounds   Pre/Post Intervention Pre Intervention Assessment   RUL Breath Sounds Clear   RML Breath Sounds Diminished   RLL Breath Sounds Diminished   CYNDI Breath Sounds Clear   LLL Breath Sounds Diminished   Secretions   Cough Non Productive   Oximetry   Continuous Oximetry Yes   O2 Alarms Set & Reviewed Yes   Oxygen   Pulse Oximetry 98 %   O2 (LPM) 2   O2 Daily Delivery Respiratory  Silicone Nasal Cannula

## 2017-02-13 ENCOUNTER — APPOINTMENT (OUTPATIENT)
Dept: RADIOLOGY | Facility: MEDICAL CENTER | Age: 62
DRG: 219 | End: 2017-02-13
Attending: NURSE PRACTITIONER
Payer: COMMERCIAL

## 2017-02-13 LAB
ANION GAP SERPL CALC-SCNC: 6 MMOL/L (ref 0–11.9)
BUN SERPL-MCNC: 30 MG/DL (ref 8–22)
CALCIUM SERPL-MCNC: 8.2 MG/DL (ref 8.5–10.5)
CHLORIDE SERPL-SCNC: 100 MMOL/L (ref 96–112)
CO2 SERPL-SCNC: 25 MMOL/L (ref 20–33)
CREAT SERPL-MCNC: 0.99 MG/DL (ref 0.5–1.4)
ERYTHROCYTE [DISTWIDTH] IN BLOOD BY AUTOMATED COUNT: 42.1 FL (ref 35.9–50)
GFR SERPL CREATININE-BSD FRML MDRD: >60 ML/MIN/1.73 M 2
GLUCOSE SERPL-MCNC: 104 MG/DL (ref 65–99)
HCT VFR BLD AUTO: 27.8 % (ref 42–52)
HGB BLD-MCNC: 9.4 G/DL (ref 14–18)
INR PPP: 1.17 (ref 0.87–1.13)
MCH RBC QN AUTO: 32 PG (ref 27–33)
MCHC RBC AUTO-ENTMCNC: 33.8 G/DL (ref 33.7–35.3)
MCV RBC AUTO: 94.6 FL (ref 81.4–97.8)
PLATELET # BLD AUTO: 89 K/UL (ref 164–446)
PMV BLD AUTO: 10.2 FL (ref 9–12.9)
POTASSIUM SERPL-SCNC: 4.1 MMOL/L (ref 3.6–5.5)
PROTHROMBIN TIME: 15.3 SEC (ref 12–14.6)
RBC # BLD AUTO: 2.94 M/UL (ref 4.7–6.1)
SODIUM SERPL-SCNC: 131 MMOL/L (ref 135–145)
WBC # BLD AUTO: 6.1 K/UL (ref 4.8–10.8)

## 2017-02-13 PROCEDURE — 700102 HCHG RX REV CODE 250 W/ 637 OVERRIDE(OP): Performed by: CLINICAL NURSE SPECIALIST

## 2017-02-13 PROCEDURE — 99233 SBSQ HOSP IP/OBS HIGH 50: CPT | Performed by: INTERNAL MEDICINE

## 2017-02-13 PROCEDURE — A9270 NON-COVERED ITEM OR SERVICE: HCPCS | Performed by: CLINICAL NURSE SPECIALIST

## 2017-02-13 PROCEDURE — 700105 HCHG RX REV CODE 258: Performed by: INTERNAL MEDICINE

## 2017-02-13 PROCEDURE — 85610 PROTHROMBIN TIME: CPT

## 2017-02-13 PROCEDURE — 85027 COMPLETE CBC AUTOMATED: CPT

## 2017-02-13 PROCEDURE — A9270 NON-COVERED ITEM OR SERVICE: HCPCS

## 2017-02-13 PROCEDURE — 71020 DX-CHEST-2 VIEWS: CPT

## 2017-02-13 PROCEDURE — 94760 N-INVAS EAR/PLS OXIMETRY 1: CPT

## 2017-02-13 PROCEDURE — 700101 HCHG RX REV CODE 250: Performed by: INTERNAL MEDICINE

## 2017-02-13 PROCEDURE — 700102 HCHG RX REV CODE 250 W/ 637 OVERRIDE(OP)

## 2017-02-13 PROCEDURE — 700101 HCHG RX REV CODE 250: Performed by: CLINICAL NURSE SPECIALIST

## 2017-02-13 PROCEDURE — 80048 BASIC METABOLIC PNL TOTAL CA: CPT

## 2017-02-13 PROCEDURE — 770022 HCHG ROOM/CARE - ICU (200)

## 2017-02-13 PROCEDURE — 700112 HCHG RX REV CODE 229: Performed by: CLINICAL NURSE SPECIALIST

## 2017-02-13 PROCEDURE — A9270 NON-COVERED ITEM OR SERVICE: HCPCS | Performed by: NURSE PRACTITIONER

## 2017-02-13 PROCEDURE — 700102 HCHG RX REV CODE 250 W/ 637 OVERRIDE(OP): Performed by: NURSE PRACTITIONER

## 2017-02-13 RX ORDER — WARFARIN SODIUM 7.5 MG/1
7.5 TABLET ORAL
Status: DISCONTINUED | OUTPATIENT
Start: 2017-02-13 | End: 2017-02-16 | Stop reason: HOSPADM

## 2017-02-13 RX ADMIN — WARFARIN SODIUM 7.5 MG: 7.5 TABLET ORAL at 17:46

## 2017-02-13 RX ADMIN — NOREPINEPHRINE BITARTRATE 5 MCG/MIN: 1 INJECTION INTRAVENOUS at 00:15

## 2017-02-13 RX ADMIN — TRAMADOL HYDROCHLORIDE 50 MG: 50 TABLET, COATED ORAL at 17:46

## 2017-02-13 RX ADMIN — STANDARDIZED SENNA CONCENTRATE AND DOCUSATE SODIUM 1 TABLET: 8.6; 5 TABLET, FILM COATED ORAL at 20:01

## 2017-02-13 RX ADMIN — TRAMADOL HYDROCHLORIDE 50 MG: 50 TABLET, COATED ORAL at 09:50

## 2017-02-13 RX ADMIN — TAMSULOSIN HYDROCHLORIDE 0.4 MG: 0.4 CAPSULE ORAL at 09:50

## 2017-02-13 RX ADMIN — DOCUSATE SODIUM 100 MG: 100 CAPSULE ORAL at 09:50

## 2017-02-13 RX ADMIN — LIDOCAINE 1 PATCH: 50 PATCH CUTANEOUS at 11:00

## 2017-02-13 RX ADMIN — OMEPRAZOLE 20 MG: 20 CAPSULE, DELAYED RELEASE ORAL at 09:50

## 2017-02-13 RX ADMIN — TRAMADOL HYDROCHLORIDE 50 MG: 50 TABLET, COATED ORAL at 00:26

## 2017-02-13 RX ADMIN — TRAMADOL HYDROCHLORIDE 50 MG: 50 TABLET, COATED ORAL at 22:00

## 2017-02-13 RX ADMIN — TRAMADOL HYDROCHLORIDE 50 MG: 50 TABLET, COATED ORAL at 05:04

## 2017-02-13 RX ADMIN — POTASSIUM CHLORIDE 10 MEQ: 750 TABLET, FILM COATED, EXTENDED RELEASE ORAL at 20:01

## 2017-02-13 RX ADMIN — POTASSIUM CHLORIDE 10 MEQ: 750 TABLET, FILM COATED, EXTENDED RELEASE ORAL at 09:50

## 2017-02-13 RX ADMIN — TRAMADOL HYDROCHLORIDE 50 MG: 50 TABLET, COATED ORAL at 13:45

## 2017-02-13 RX ADMIN — DIPHENHYDRAMINE HCL 25 MG: 25 TABLET ORAL at 23:31

## 2017-02-13 RX ADMIN — BISACODYL 10 MG: 10 SUPPOSITORY RECTAL at 05:55

## 2017-02-13 ASSESSMENT — PAIN SCALES - GENERAL
PAINLEVEL_OUTOF10: 7
PAINLEVEL_OUTOF10: 6
PAINLEVEL_OUTOF10: 6
PAINLEVEL_OUTOF10: 5
PAINLEVEL_OUTOF10: 5
PAINLEVEL_OUTOF10: 6
PAINLEVEL_OUTOF10: 4
PAINLEVEL_OUTOF10: 7
PAINLEVEL_OUTOF10: 6
PAINLEVEL_OUTOF10: 5
PAINLEVEL_OUTOF10: 6
PAINLEVEL_OUTOF10: 7
PAINLEVEL_OUTOF10: 5
PAINLEVEL_OUTOF10: 4
PAINLEVEL_OUTOF10: 7
PAINLEVEL_OUTOF10: 5
PAINLEVEL_OUTOF10: 5
PAINLEVEL_OUTOF10: 4
PAINLEVEL_OUTOF10: 5
PAINLEVEL_OUTOF10: 5

## 2017-02-13 ASSESSMENT — ENCOUNTER SYMPTOMS
GASTROINTESTINAL NEGATIVE: 1
CARDIOVASCULAR NEGATIVE: 1
NEUROLOGICAL NEGATIVE: 1
MUSCULOSKELETAL NEGATIVE: 1
RESPIRATORY NEGATIVE: 1
PSYCHIATRIC NEGATIVE: 1
EYES NEGATIVE: 1
CONSTITUTIONAL NEGATIVE: 1

## 2017-02-13 ASSESSMENT — LIFESTYLE VARIABLES
EVER FELT BAD OR GUILTY ABOUT YOUR DRINKING: NO
TOTAL SCORE: 1
ON A TYPICAL DAY WHEN YOU DRINK ALCOHOL HOW MANY DRINKS DO YOU HAVE: 2
HOW MANY TIMES IN THE PAST YEAR HAVE YOU HAD 5 OR MORE DRINKS IN A DAY: 1
DOES PATIENT WANT TO STOP DRINKING: NO
AVERAGE NUMBER OF DAYS PER WEEK YOU HAVE A DRINK CONTAINING ALCOHOL: 2
TOTAL SCORE: 1
TOTAL SCORE: 1
EVER HAD A DRINK FIRST THING IN THE MORNING TO STEADY YOUR NERVES TO GET RID OF A HANGOVER: NO
HAVE PEOPLE ANNOYED YOU BY CRITICIZING YOUR DRINKING: NO
ALCOHOL_USE: NO
EVER_SMOKED: YES
HAVE YOU EVER FELT YOU SHOULD CUT DOWN ON YOUR DRINKING: YES
CONSUMPTION TOTAL: POSITIVE

## 2017-02-13 NOTE — CARE PLAN
Problem: Oxygenation:  Goal: Maintain adequate oxygenation dependent on patient condition  2 LPM    Problem: Hyperinflation:  Goal: Prevent or improve atelectasis  IS [id number]

## 2017-02-13 NOTE — CARE PLAN
Problem: Post Op Day 4 CABG/Heart Valve Replacement  Goal: Optimal care of the Post Op CABG/Heart Valve replacement Post Op Day 4  Intervention: Daily Weights  Pt weighed via stand-up scale and weight documented   Intervention: Shower daily and clean incisions twice daily with soap and water  Pt showered and incisions cleansed w/ soap and water   Intervention: Up in chair for all meals  Pt in chair for breakfast   Intervention: Ambulate, increasing the distance each time x 3 and before bed  Pt ambulated 4x throughout night- 1x around entire unit each time   Intervention: IS q 1 hour while awake and record best IS volume  IS done Q1h while awake- best value 1250   Intervention: Consider removal of rosales, chest tube and pacer wire if not already done  Rosales removed, CT removed

## 2017-02-13 NOTE — PROGRESS NOTES
Inpatient Anticoagulation Service Note    Date: 2/13/2017  Reason for Anticoagulation: Bioprosthetic Valve Replacement        Hemoglobin Value: 9.4  Hematocrit Value: 27.8  Lab Platelet Value: 89  Target INR: 2.0 to 3.0    INR from last 7 days     Date/Time INR Value    02/13/17 0500 (!)1.17    02/12/17 0305 (!)1.21    02/11/17 0240 (!)1.21    02/10/17 0245 (!)1.17    02/09/17 0500 1.13    02/08/17 1300 (!)1.36    02/07/17 0956 1        Dose from last 7 days     Date/Time Dose (mg)    02/13/17 0500 7.5    02/12/17 0305 0    02/11/17 0240 0    02/10/17 1200 0    02/09/17 1600 7.5        Significant Interactions: Aspirin, Proton Pump Inhibitor  Bridge Therapy: No     Comments: Anticoagulation has been on hold for a low platelet count.  The platelet count is up to 89,000 today.  Per Bonnie Weber, ok to resume warfarin, but do not resume Lovenox.    Plan:  INR with morning labs  Education Material Provided?: No  Pharmacist suggested discharge dosing: unable to determine at this time     Sonia Silva

## 2017-02-13 NOTE — PROGRESS NOTES
Cardiovascular Surgery Progress Note    Name: Alberto Fam  MRN: [id number]  : 1955  Admit Date: 2017  5:57 AM  Procedure:  Procedure(s) and Anesthesia Type:     * STERNOTOMY - REDO - General     * AORTIC VALVE REPLACEMENT - General     * JUSTINO - General  5 Day Post-Op    Vitals:  Patient Vitals for the past 8 hrs:   Temp SpO2 O2 Delivery O2 (LPM) Pulse Heart Rate (Monitored) Resp NIBP Weight   17 0800 - 93 % Silicone Nasal Cannula 2 (!) 57 60 16 103/61 mmHg -   17 0721 - 95 % - 2 60 60 20 - -   17 0700 - - - - 60 60 18 (!) 94/49 mmHg -   17 0600 - 96 % Silicone Nasal Cannula 2 64 64 (!) 22 126/46 mmHg -   17 0500 - 97 % - - 60 61 19 (!) 92/62 mmHg -   17 0400 36.6 °C (97.9 °F) 97 % Silicone Nasal Cannula 3 60 60 17 (!) 96/53 mmHg 104.3 kg (229 lb 15 oz)   17 0300 - 95 % - - (!) 57 63 18 (!) 84/57 mmHg -   17 0200 - 93 % Silicone Nasal Cannula 3 66 60 (!) 26 106/60 mmHg -     Temp (24hrs), Av.9 °C (98.4 °F), Min:36.6 °C (97.9 °F), Max:37.2 °C (98.9 °F)      Respiratory:    Respiration: 16, Pulse Oximetry: 93 %, O2 Daily Delivery Respiratory : Silicone Nasal Cannula     Chest Tube Drains:          Fluids:    Intake/Output Summary (Last 24 hours) at 17 0929  Last data filed at 17 0800   Gross per 24 hour   Intake 1220.38 ml   Output    700 ml   Net 520.38 ml     Admit weight: Weight: 93.4 kg (205 lb 14.6 oz)  Current weight: Weight: 104.3 kg (229 lb 15 oz) (17 0400)    Labs:  Recent Labs      17   0240  17   0305  17   0500   WBC  8.8  8.4  6.1   RBC  3.28*  3.34*  2.94*   HEMOGLOBIN  10.6*  10.7*  9.4*   HEMATOCRIT  31.2*  31.2*  27.8*   MCV  95.1  93.4  94.6   MCH  32.3  32.0  32.0   MCHC  34.0  34.3  33.8   RDW  43.2  42.1  42.1   PLATELETCT  53*  86*  89*   MPV  10.2  11.0  10.2         Recent Labs      17   0240  17   0305  17   0500   SODIUM  131*  130*  131*   POTASSIUM  3.9  4.2  4.1   CHLORIDE   101  99  100   CO2  26  25  25   GLUCOSE  106*  132*  104*   BUN  39*  33*  30*   CREATININE  1.40  1.23  0.99   CALCIUM  8.3*  8.5  8.2*     Recent Labs      02/11/17   0240  02/12/17   0305  02/13/17   0500   INR  1.21*  1.21*  1.17*       Medications:  • lidocaine  1 Patch     • potassium chloride (KCl)  10 mEq     • tamsulosin  0.4 mg     • docusate sodium  100 mg      And   • senna-docusate  1 Tab     • omeprazole  20 mg     • aspirin EC  81 mg         Exam:   Review of Systems   Constitutional: Negative.    HENT: Negative.    Eyes: Negative.    Respiratory: Negative.    Cardiovascular: Negative.    Gastrointestinal: Negative.    Genitourinary: Negative.    Musculoskeletal: Negative.    Skin: Negative.    Neurological: Negative.    Psychiatric/Behavioral: Negative.        Physical Exam   Constitutional: He is oriented to person, place, and time. He appears well-developed and well-nourished.   HENT:   Head: Normocephalic and atraumatic.   Eyes: Pupils are equal, round, and reactive to light.   Neck: Normal range of motion. Neck supple.   Cardiovascular: Normal rate and regular rhythm.    paced   Pulmonary/Chest: Effort normal.   Decreased at bases.    Abdominal: Soft. Bowel sounds are normal.   Musculoskeletal: Normal range of motion. He exhibits edema.   Neurological: He is alert and oriented to person, place, and time.   Skin: Skin is warm and dry.   Wounds CDI   Psychiatric: He has a normal mood and affect.       Quality Measures:   EKG reviewed, Medications reviewed, Radiology images reviewed and Labs reviewed  Hewitt catheter: No Hewitt  Central line in place: Need for access    DVT Prophylaxis: Enoxaparin (Lovenox) and Warfarin (Coumadin)  DVT prophylaxis - mechanical: SCDs  Ulcer prophylaxis: Yes    Assessed for rehab: Patient returned to prior level of function, rehabilitation not indicated at this time      Assessment/Plan:  POD 1 Extubated, HDS, no drips, chest tube output minimal and negative for air  leak.  Neuro grossly intact.  Pacer not capturing until 40 BPM.  Will have re-interrogated.  Hold coreg for now until PPM re-interrogated.  Plan: Transition to tele status.  DC chest tubes.  DC rosales.Transition to tele status.  DC chest tubes.  DC rosales.  POD 2 BP stable/low.  NSR/ST.  PPM fucntion normal. Urine retention. +5L, wts up. PLAN:  DC inspra/entresto until BP improved and to allow for diuresis.  Continue coreg as BP allows. Reinsert rosales/add flomax.  Plts low, w/w. PT/OT.     POD 3 HOTN this AM.  On levophed.  Given lasix with BP low.  DC'd lasix and K.  Getting fluid bolus.  Pain in scapula right side.  Lidocaine patch and heating pad.  Watch BP.  CPM.   POD 4 BP still low.  On levo drip low dose.  Wean levo.  HDS, neuro intact.  Wounds CDI.  Paced rhythm.  Pain in scapula improved.  CPM.   POD 5 HDS off levo gtt, fluid overload- will start diuresis tomorrow if holds pressure off pressors, CXR today, amb, enc IS    Patient seen, examined and plan reviewed with midlevel provider. I agree with the plan.      Active Hospital Problems    Diagnosis   • Severe aortic stenosis [I35.0]

## 2017-02-13 NOTE — PROGRESS NOTES
Pulmonary Critical Care Consult Note        Chief Complaint: Symptomatic severe AS     Date of service: 2/13/17    History of Present Illness: 61 y.o. male admitted for for elective AVR for symptomatic AS/bicuspid AV. Has had SOB, fatigue, ROTH and cough for weaks and eveluated outpatient by cardiology and deemed to be a good surgical candidate.    ROS:  Respiratory: negative cough, negative hemoptysis, positive pleuritic chest pain, negative shortness of breath and negative sputum production, Cardiac: negative chest pain, negative palpitations and negative orthopnea, GI: positive nausea, negative vomiting, negative abdominal pain and negative diarrhea.      Interval Events:  24 hour interval history reviewed    - nausea - better after nap   - improving JNO, drop in Na   - 100% paced   - NE drip all night and with walking this AM - stopped about 1 hr ago   - 1350 IS   - Plt 89 - start ASA/Lovenox? Will D/w CVS    PFSH:  No change.    Respiratory:   2 lpm n/c, NAD speaking in full sentences  Pulse Oximetry: 97 %    Exam: unlabored respirations, no intercostal retractions or accessory muscle use and rhonchi bibasilar  ImagingAvailable data reviewed        Invalid input(s): [id number]    HemoDynamics:  Pulse: 60, Heart Rate (Monitored): 61  NIBP: (!) 92/62 mmHg  CVP (mm Hg): (!) 15 MM HGLevophed gtt @ 15 --> 10 --> 2-3    Exam: regular rate and rhythm, regular rhythm (Sinus) - paced rhythm  Imaging: Available data reviewed        Neuro:  GCS Total Caro Coma Score: 15       Exam: no focal deficits noted mental status intact oriented for age x3  Imaging: Available data reviewed    Fluids:  Intake/Output       02/11/17 0700 - 02/12/17 0659 02/12/17 0700 - 02/13/17 0659 02/13/17 0700 - 02/14/17 0659      1042-0751 3108-3347 Total 3275-9007 5004-7822 Total 9906-4364 8508-4390 Total       Intake    P.O.  500  900 1400  660  350 1010  --  -- --    P.O.   -- -- --    I.V.  276.9  315.6 592.5  262   196.9 458.8  --  -- --    Norepinephrine Volume 276.9 315.6 592.5 142 76.9 218.8 -- -- --    IV Volume (Normal Saline) -- -- -- 120 120 240 -- -- --    Total Intake 776.9 1215.6 1992.5 922 546.9 1468.8 -- -- --       Output    Urine  655  1275 1930  525  300 825  --  -- --    Number of Times Voided -- -- -- -- 3 x 3 x -- -- --    Indwelling Cathether 655 1275 1930 405 -- 405 -- -- --    Void (ml) -- -- -- 120 300 420 -- -- --    Total Output 655 1275 1930 525 300 825 -- -- --       Net I/O     121.9 -59.4 62.5 397 246.9 643.8 -- -- --        Weight: 104.3 kg (229 lb 15 oz)  Recent Labs      175  17   0500   SODIUM  131*  130*  131*   POTASSIUM  3.9  4.2  4.1   CHLORIDE  101  99  100   CO2  26  25  25   BUN  39*  33*  30*   CREATININE  1.40  1.23  0.99   CALCIUM  8.3*  8.5  8.2*       GI/Nutrition:  Exam: abdomen is soft and non-tender, normal active bowel sounds  Imaging: Available data reviewed  taking PO  Liver Function  Recent Labs      175  17   0500   GLUCOSE  106*  132*  104*       Heme:  Recent Labs      175  17   0500   RBC  3.28*  3.34*  2.94*   HEMOGLOBIN  10.6*  10.7*  9.4*   HEMATOCRIT  31.2*  31.2*  27.8*   PLATELETCT  53*  86*  89*   PROTHROMBTM  15.7*  15.7*  15.3*   INR  1.21*  1.21*  1.17*       Infectious Disease:  Temp  Av.9 °C (98.5 °F)  Min: 36.6 °C (97.9 °F)  Max: 37.2 °C (99 °F)  Micro: reviewed  Recent Labs      17   0240  17   0305  17   0500   WBC  8.8  8.4  6.1     Current Facility-Administered Medications   Medication Dose Frequency Provider Last Rate Last Dose   • norepinephrine (LEVOPHED) 8 mg in  mL Infusion  0.5-30 mcg/min Continuous Jeremy M Gonda, M.D. 3.8 mL/hr at 17 0503 2 mcg/min at 17 0503   • lidocaine (LIDODERM) 5 % 1 Patch  1 Patch Q24HR WADE MooneyPMarenNMaren   1 Patch at 17 1100   • potassium chloride ER (KLOR-CON) tablet 10  mEq  10 mEq BID Oriana Johnson, A.P.N.   10 mEq at 02/12/17 2022   • tamsulosin (FLOMAX) capsule 0.4 mg  0.4 mg AFTER BREAKFAST Oriana Johnson, A.P.N.   0.4 mg at 02/12/17 0756   • Respiratory Care per Protocol   Continuous RT Carol Cisse, A.P.N.       • docusate sodium (COLACE) capsule 100 mg  100 mg QAM Carol Cisse, A.P.N.   100 mg at 02/12/17 0756    And   • senna-docusate (PERICOLACE or SENOKOT S) 8.6-50 MG per tablet 1 Tab  1 Tab Nightly Carol Cisse, A.P.N.   1 Tab at 02/12/17 2022    And   • senna-docusate (PERICOLACE or SENOKOT S) 8.6-50 MG per tablet 1 Tab  1 Tab Q24HRS PRN Carol Cisse A.P.N.   1 Tab at 02/12/17 1204    And   • lactulose 20 GM/30ML solution 30 mL  30 mL Q24HRS PRN Carol Cisse, A.P.N.   30 mL at 02/12/17 1819    And   • bisacodyl (DULCOLAX) suppository 10 mg  10 mg Q24HRS PRN Carol Cisse, A.P.N.        And   • fleet enema 133 mL  1 Each Once PRN Carol Cisse, A.P.N.       • NS infusion   Continuous Carol Cisse A.P.N. 10 mL/hr at 02/12/17 0946     • omeprazole (PRILOSEC) capsule 20 mg  20 mg DAILY Carol Cisse, A.P.N.   20 mg at 02/12/17 0756   • aspirin EC (ECOTRIN) tablet 81 mg  81 mg DAILY Carol Cisse, A.P.N.   Stopped at 02/09/17 0900   • electrolyte-A (PLASMALYTE-A) infusion   PRN Carol Cisse, A.P.N.       • oxycodone immediate-release (ROXICODONE) tablet 5 mg  5 mg Q3HRS PRN Carol Cisse A.P.N.   5 mg at 02/10/17 2218   • oxycodone immediate release (ROXICODONE) tablet 10 mg  10 mg Q3HRS PRN Carol Cisse, A.P.N.   10 mg at 02/11/17 0442   • tramadol (ULTRAM) 50 MG tablet 50 mg  50 mg Q4HRS PRN Carol Cisse, A.P.N.   50 mg at 02/13/17 0504   • ondansetron (ZOFRAN) syringe/vial injection 4 mg  4 mg Q6HRS PRN Carol Cisse, A.P.N.   4 mg at 02/11/17 1004    Or   • prochlorperazine (COMPAZINE) injection 10 mg  10 mg Q6HRS PRN Carol Cisse, A.P.N.   10 mg at 02/08/17 2142   • acetaminophen (TYLENOL) tablet 650 mg  650 mg Q4HRS PRN Carol Cisse,  A.P.N.        Or   • acetaminophen (TYLENOL) suppository 650 mg  650 mg Q4HRS PRN Carol Cisse A.P.N.       • mag hydrox-al hydrox-simeth (MAALOX PLUS ES or MYLANTA DS) suspension 30 mL  30 mL Q4HRS PRN Carol Cisse A.P.N.       • diphenhydrAMINE (BENADRYL) tablet/capsule 25 mg  25 mg HS PRN - MR X 1 Carol Cisse A.P.N.   25 mg at 02/08/17 2200     Last reviewed on 2/8/2017  6:13 AM by Johanna Pepper R.N.    Quality  Measures:  Medications reviewed, Labs reviewed, Radiology images reviewed and EKG reviewed    Central line in place: Need for access and Shock    DVT Prophylaxis: Enoxaparin (Lovenox)  DVT prophylaxis - mechanical: SCDs  Ulcer prophylaxis: Yes    Assessed for rehab: Patient was assess for and/or received rehabilitation services during this hospitalization    Assessment/Plan:  Daja-op ventilatory management - intubated 2/8, extubated 2/8   - encourage IS, ambulation   - RT protocols  Severe AS with bicuspid AV s/p mechanical AVR   - CVS following   - coumadin   - caution with narcotics, cont lidoderm patch  V-tach s/p AICD placement   - interrogated 2/8 post-surgery, re-interrogation ok  Acute cardiogenic pulm edema - resolving  CHF III - mild decompensation  Undifferentiate shock - returned 2/11, hypovolemia vs medication effect - persisting vasoplegia?   - cont levophed to goal MAP>65   - wean off AM 2/13 as tolerated   - IVF bolus if low CVP (<8)  Acute blood loss anemia - conservative transfusion strategies  Urinary retention - resolved  JON - ?obstructive uropathy - improving! - creat normal   - monitor, holding diuresis  Leukocytosis with fever - resolved   - monitor off abx for now  Prophylaxis, diet, therapies    Discussed patient condition and risk of morbidity and/or mortality with Family, RN, RT, Pharmacy, Dietary, , Charge nurse / hot rounds, QA team, Patient and CVS.

## 2017-02-13 NOTE — CARE PLAN
Problem: Post Op Day 4 CABG/Heart Valve Replacement  Goal: Optimal care of the Post Op CABG/Heart Valve replacement Post Op Day 4  Outcome: PROGRESSING AS EXPECTED  Intervention: Daily Weights  Done with NOC RN  Intervention: Shower daily and clean incisions twice daily with soap and water  Done.   Intervention: Up in chair for all meals  Done.   Intervention: Ambulate, increasing the distance each time x 3 and before bed  Pt able to walk 1/2 of unit. Education given to patient and family about increasing distance each time.   Intervention: IS q 1 hour while awake and record best IS volume  IS 1250  Intervention: Consider removal of rosales, chest tube and pacer wire if not already done  Done. Rsoales removed today per verbal order from JOHNNY Leon  Intervention: Discharge Education  In progress.   Intervention: Add special instructions for cardiac surgery discharge instructions - [facility] to after visit summary and review with patient  In progress.

## 2017-02-13 NOTE — FLOWSHEET NOTE
02/12/17 2024   Events/Summary/Plan   Non-Invasive Resp Device Site Inspection Completed Intact   Interdisciplinary Plan of Care-Goals (Indications)   Blunt Chest Indications Chest Trauma   Interdisciplinary Plan of Care-Outcomes    Blunt Chest IS Outcome Patient is Using I.S., Exceeds 60% of Predicted, and Understands Need for I.S.   Hyperinflation Protocol Goals/Outcome Greater Than 60% of Predicted I.S. Volume x 24 hrs   Education   Education Yes - Pt. / Family has been Instructed in use of Respiratory Equipment;Yes - Pt. / Family has been Instructed in use of Respiratory Medications and Adverse Reactions   Incentive Spirometry Group   Incentive Spirometry Instruction Yes   Breathing Exercises Yes   Incentive Spirometer Volume 1250 mL   Respiratory WDL   Respiratory (WDL) X   Chest Exam   Work Of Breathing / Effort Mild   Respiration 17   Pulse (!) 58   Heart Rate (Monitored) 60   Breath Sounds   Pre/Post Intervention Pre Intervention Assessment   RUL Breath Sounds Clear   RML Breath Sounds Diminished   RLL Breath Sounds Diminished   CYNDI Breath Sounds Clear   LLL Breath Sounds Diminished   Secretions   Cough Non Productive   Oximetry   Continuous Oximetry Yes   O2 Alarms Set & Reviewed Yes   Oxygen   Pulse Oximetry 94 %   O2 (LPM) 2   O2 Daily Delivery Respiratory  Silicone Nasal Cannula

## 2017-02-14 ENCOUNTER — HOME HEALTH ADMISSION (OUTPATIENT)
Dept: HOME HEALTH SERVICES | Facility: HOME HEALTHCARE | Age: 62
End: 2017-02-14
Payer: COMMERCIAL

## 2017-02-14 LAB
ANION GAP SERPL CALC-SCNC: 9 MMOL/L (ref 0–11.9)
BUN SERPL-MCNC: 23 MG/DL (ref 8–22)
CALCIUM SERPL-MCNC: 9 MG/DL (ref 8.5–10.5)
CHLORIDE SERPL-SCNC: 98 MMOL/L (ref 96–112)
CO2 SERPL-SCNC: 23 MMOL/L (ref 20–33)
CREAT SERPL-MCNC: 0.93 MG/DL (ref 0.5–1.4)
ERYTHROCYTE [DISTWIDTH] IN BLOOD BY AUTOMATED COUNT: 41.4 FL (ref 35.9–50)
GFR SERPL CREATININE-BSD FRML MDRD: >60 ML/MIN/1.73 M 2
GLUCOSE SERPL-MCNC: 107 MG/DL (ref 65–99)
HCT VFR BLD AUTO: 30.6 % (ref 42–52)
HGB BLD-MCNC: 10.5 G/DL (ref 14–18)
INR PPP: 1.14 (ref 0.87–1.13)
MCH RBC QN AUTO: 32.2 PG (ref 27–33)
MCHC RBC AUTO-ENTMCNC: 34.3 G/DL (ref 33.7–35.3)
MCV RBC AUTO: 93.9 FL (ref 81.4–97.8)
PLATELET # BLD AUTO: 114 K/UL (ref 164–446)
PMV BLD AUTO: 9.9 FL (ref 9–12.9)
POTASSIUM SERPL-SCNC: 4 MMOL/L (ref 3.6–5.5)
PROTHROMBIN TIME: 15 SEC (ref 12–14.6)
RBC # BLD AUTO: 3.26 M/UL (ref 4.7–6.1)
SODIUM SERPL-SCNC: 130 MMOL/L (ref 135–145)
WBC # BLD AUTO: 5.7 K/UL (ref 4.8–10.8)

## 2017-02-14 PROCEDURE — 99233 SBSQ HOSP IP/OBS HIGH 50: CPT | Performed by: INTERNAL MEDICINE

## 2017-02-14 PROCEDURE — 700111 HCHG RX REV CODE 636 W/ 250 OVERRIDE (IP): Performed by: CLINICAL NURSE SPECIALIST

## 2017-02-14 PROCEDURE — 700112 HCHG RX REV CODE 229: Performed by: CLINICAL NURSE SPECIALIST

## 2017-02-14 PROCEDURE — 700102 HCHG RX REV CODE 250 W/ 637 OVERRIDE(OP)

## 2017-02-14 PROCEDURE — 700111 HCHG RX REV CODE 636 W/ 250 OVERRIDE (IP): Performed by: NURSE PRACTITIONER

## 2017-02-14 PROCEDURE — A9270 NON-COVERED ITEM OR SERVICE: HCPCS | Performed by: NURSE PRACTITIONER

## 2017-02-14 PROCEDURE — 85027 COMPLETE CBC AUTOMATED: CPT

## 2017-02-14 PROCEDURE — 80048 BASIC METABOLIC PNL TOTAL CA: CPT

## 2017-02-14 PROCEDURE — 700102 HCHG RX REV CODE 250 W/ 637 OVERRIDE(OP): Performed by: NURSE PRACTITIONER

## 2017-02-14 PROCEDURE — A9270 NON-COVERED ITEM OR SERVICE: HCPCS | Performed by: CLINICAL NURSE SPECIALIST

## 2017-02-14 PROCEDURE — 770020 HCHG ROOM/CARE - TELE (206)

## 2017-02-14 PROCEDURE — 700102 HCHG RX REV CODE 250 W/ 637 OVERRIDE(OP): Performed by: CLINICAL NURSE SPECIALIST

## 2017-02-14 PROCEDURE — A9270 NON-COVERED ITEM OR SERVICE: HCPCS

## 2017-02-14 PROCEDURE — 85610 PROTHROMBIN TIME: CPT

## 2017-02-14 RX ORDER — FUROSEMIDE 10 MG/ML
10 INJECTION INTRAMUSCULAR; INTRAVENOUS
Status: DISCONTINUED | OUTPATIENT
Start: 2017-02-14 | End: 2017-02-14

## 2017-02-14 RX ORDER — FUROSEMIDE 10 MG/ML
20 INJECTION INTRAMUSCULAR; INTRAVENOUS
Status: DISCONTINUED | OUTPATIENT
Start: 2017-02-14 | End: 2017-02-15

## 2017-02-14 RX ADMIN — DIPHENHYDRAMINE HCL 25 MG: 25 TABLET ORAL at 22:19

## 2017-02-14 RX ADMIN — OMEPRAZOLE 20 MG: 20 CAPSULE, DELAYED RELEASE ORAL at 07:32

## 2017-02-14 RX ADMIN — FUROSEMIDE 20 MG: 10 INJECTION, SOLUTION INTRAVENOUS at 16:26

## 2017-02-14 RX ADMIN — POTASSIUM CHLORIDE 10 MEQ: 750 TABLET, FILM COATED, EXTENDED RELEASE ORAL at 07:33

## 2017-02-14 RX ADMIN — TRAMADOL HYDROCHLORIDE 50 MG: 50 TABLET, COATED ORAL at 02:00

## 2017-02-14 RX ADMIN — POTASSIUM CHLORIDE 10 MEQ: 750 TABLET, FILM COATED, EXTENDED RELEASE ORAL at 20:10

## 2017-02-14 RX ADMIN — TRAMADOL HYDROCHLORIDE 50 MG: 50 TABLET, COATED ORAL at 14:55

## 2017-02-14 RX ADMIN — TAMSULOSIN HYDROCHLORIDE 0.4 MG: 0.4 CAPSULE ORAL at 07:32

## 2017-02-14 RX ADMIN — TRAMADOL HYDROCHLORIDE 50 MG: 50 TABLET, COATED ORAL at 20:09

## 2017-02-14 RX ADMIN — WARFARIN SODIUM 7.5 MG: 7.5 TABLET ORAL at 17:33

## 2017-02-14 RX ADMIN — FUROSEMIDE 10 MG: 10 INJECTION, SOLUTION INTRAVENOUS at 11:42

## 2017-02-14 RX ADMIN — STANDARDIZED SENNA CONCENTRATE AND DOCUSATE SODIUM 1 TABLET: 8.6; 5 TABLET, FILM COATED ORAL at 20:09

## 2017-02-14 RX ADMIN — ASPIRIN 81 MG: 81 TABLET ORAL at 07:32

## 2017-02-14 RX ADMIN — DOCUSATE SODIUM 100 MG: 100 CAPSULE ORAL at 07:32

## 2017-02-14 ASSESSMENT — PAIN SCALES - GENERAL
PAINLEVEL_OUTOF10: 6
PAINLEVEL_OUTOF10: 5
PAINLEVEL_OUTOF10: 5
PAINLEVEL_OUTOF10: 2
PAINLEVEL_OUTOF10: 2
PAINLEVEL_OUTOF10: 3
PAINLEVEL_OUTOF10: 3
PAINLEVEL_OUTOF10: 5
PAINLEVEL_OUTOF10: 5
PAINLEVEL_OUTOF10: 3
PAINLEVEL_OUTOF10: 7

## 2017-02-14 ASSESSMENT — ENCOUNTER SYMPTOMS
NEUROLOGICAL NEGATIVE: 1
MUSCULOSKELETAL NEGATIVE: 1
CONSTITUTIONAL NEGATIVE: 1
RESPIRATORY NEGATIVE: 1
EYES NEGATIVE: 1
PSYCHIATRIC NEGATIVE: 1
GASTROINTESTINAL NEGATIVE: 1
CARDIOVASCULAR NEGATIVE: 1

## 2017-02-14 NOTE — CARE PLAN
Problem: Oxygenation:  Goal: Maintain adequate oxygenation dependent on patient condition  2 LPM    Problem: Hyperinflation:  Goal: Prevent or improve atelectasis  IS BID

## 2017-02-14 NOTE — PROGRESS NOTES
Cardiovascular Surgery Progress Note    Name: Alberto Fam  MRN: [id number]  : 1955  Admit Date: 2017  5:57 AM  Procedure:  Procedure(s) and Anesthesia Type:     * STERNOTOMY - REDO - General     * AORTIC VALVE REPLACEMENT - General     * JUSTINO - General  6 Day Post-Op    Vitals:  Patient Vitals for the past 8 hrs:   Temp SpO2 O2 Delivery O2 (LPM) Pulse Heart Rate (Monitored) Resp NIBP Weight   17 0800 37.1 °C (98.7 °F) 98 % None (Room Air) 0 60 60 (!) 21 131/62 mmHg -   17 0600 - - Silicone Nasal Cannula 1 - - - - -   17 0500 - 96 % - - 61 61 18 136/59 mmHg -   17 0400 37.2 °C (99 °F) 93 % Silicone Nasal Cannula 1 60 60 17 120/68 mmHg 104.1 kg (229 lb 8 oz)   17 0300 - 92 % - - 60 61 18 120/68 mmHg -   17 0200 - 97 % None (Room Air) - 60 61 (!) 24 - -     Temp (24hrs), Av.1 °C (98.8 °F), Min:36.8 °C (98.3 °F), Max:37.3 °C (99.1 °F)      Respiratory:    Respiration: (!) 21, Pulse Oximetry: 98 %, O2 Daily Delivery Respiratory : Silicone Nasal Cannula     Chest Tube Drains:          Fluids:    Intake/Output Summary (Last 24 hours) at 17 0905  Last data filed at 17 0800   Gross per 24 hour   Intake    950 ml   Output   1575 ml   Net   -625 ml     Admit weight: Weight: 93.4 kg (205 lb 14.6 oz)  Current weight: Weight: 104.1 kg (229 lb 8 oz) (17 0400)    Labs:  Recent Labs      17   0305  17   0500  17   0517   WBC  8.4  6.1  5.7   RBC  3.34*  2.94*  3.26*   HEMOGLOBIN  10.7*  9.4*  10.5*   HEMATOCRIT  31.2*  27.8*  30.6*   MCV  93.4  94.6  93.9   MCH  32.0  32.0  32.2   MCHC  34.3  33.8  34.3   RDW  42.1  42.1  41.4   PLATELETCT  86*  89*  114*   MPV  11.0  10.2  9.9         Recent Labs      17   0305  17   0500  17   0517   SODIUM  130*  131*  130*   POTASSIUM  4.2  4.1  4.0   CHLORIDE  99  100  98   CO2  25  25  23   GLUCOSE  132*  104*  107*   BUN  33*  30*  23*   CREATININE  1.23  0.99  0.93   CALCIUM  8.5  8.2*   9.0     Recent Labs      02/12/17   0305  02/13/17   0500  02/14/17   0517   INR  1.21*  1.17*  1.14*       Medications:  • warfarin  7.5 mg     • lidocaine  1 Patch     • potassium chloride (KCl)  10 mEq     • tamsulosin  0.4 mg     • docusate sodium  100 mg      And   • senna-docusate  1 Tab     • omeprazole  20 mg     • aspirin EC  81 mg         Exam:   Review of Systems   Constitutional: Negative.    HENT: Negative.    Eyes: Negative.    Respiratory: Negative.    Cardiovascular: Negative.    Gastrointestinal: Negative.    Genitourinary: Negative.    Musculoskeletal: Negative.    Skin: Negative.    Neurological: Negative.    Psychiatric/Behavioral: Negative.        Physical Exam   Constitutional: He is oriented to person, place, and time. He appears well-developed and well-nourished.   HENT:   Head: Normocephalic and atraumatic.   Eyes: Pupils are equal, round, and reactive to light.   Neck: Normal range of motion. Neck supple.   Cardiovascular: Normal rate and regular rhythm.    paced   Pulmonary/Chest: Effort normal.   Decreased at bases.    Abdominal: Soft. Bowel sounds are normal.   Musculoskeletal: Normal range of motion. He exhibits edema.   Neurological: He is alert and oriented to person, place, and time.   Skin: Skin is warm and dry.   Wounds CDI   Psychiatric: He has a normal mood and affect.       Quality Measures:   EKG reviewed, Medications reviewed, Radiology images reviewed and Labs reviewed  Hewitt catheter: No Hewitt  Central line in place: Need for access    DVT Prophylaxis: Enoxaparin (Lovenox) and Warfarin (Coumadin)  DVT prophylaxis - mechanical: SCDs  Ulcer prophylaxis: Yes    Assessed for rehab: Patient returned to prior level of function, rehabilitation not indicated at this time      Assessment/Plan:  POD 1 Extubated, HDS, no drips, chest tube output minimal and negative for air leak.  Neuro grossly intact.  Pacer not capturing until 40 BPM.  Will have re-interrogated.  Hold coreg for now  until PPM re-interrogated.  Plan: Transition to tele status.  DC chest tubes.  DC rosales.Transition to tele status.  DC chest tubes.  DC rosales.  POD 2 BP stable/low.  NSR/ST.  PPM fucntion normal. Urine retention. +5L, wts up. PLAN:  DC inspra/entresto until BP improved and to allow for diuresis.  Continue coreg as BP allows. Reinsert rosales/add flomax.  Plts low, w/w. PT/OT.     POD 3 HOTN this AM.  On levophed.  Given lasix with BP low.  DC'd lasix and K.  Getting fluid bolus.  Pain in scapula right side.  Lidocaine patch and heating pad.  Watch BP.  CPM.   POD 4 BP still low.  On levo drip low dose.  Wean levo.  HDS, neuro intact.  Wounds CDI.  Paced rhythm.  Pain in scapula improved.  CPM.   POD 5 HDS off levo gtt, fluid overload- will start diuresis tomorrow if holds pressure off pressors, CXR today, amb, enc IS  POD 6 HDS, neuro intact.  Feels much better.  Lines out.  Ambulating halls.  Volume overload.  Gentle diurese today.      Patient seen, examined and plan reviewed with midlevel provider. I agree with the plan.      Active Hospital Problems    Diagnosis   • Severe aortic stenosis [I35.0]

## 2017-02-14 NOTE — DISCHARGE PLANNING
[facility] received a HH choice form. Per the choice form the referral has been sent to [facility]

## 2017-02-14 NOTE — CARE PLAN
Problem: Post Op Day 4 CABG/Heart Valve Replacement  Goal: Optimal care of the Post Op CABG/Heart Valve replacement Post Op Day 4  Post Op Day 5  Intervention: Daily Weights  Done.   Intervention: Shower daily and clean incisions twice daily with soap and water  Done.   Intervention: Up in chair for all meals  Done.   Intervention: Ambulate, increasing the distance each time x 3 and before bed  Done.   Intervention: IS q 1 hour while awake and record best IS volume  IS 1350  Intervention: Consider removal of rosales, chest tube and pacer wire if not already done  Done.   Intervention: Discharge Education  In progress.   Intervention: Add special instructions for cardiac surgery discharge instructions - [facility] to after visit summary and review with patient  In progress.

## 2017-02-14 NOTE — DISCHARGE PLANNING
Discussed HHC order with patient and spouse Mary.  Choice form completed for [facility].  Faxed to [facility].      Doing well.  Ambulating in the [place].  A/O x4.  No pain issues.      Wait for medical clearance.

## 2017-02-14 NOTE — THERAPY
Physical Therapy Contact Note    Pt in shower upon attempt, will attempt back when able;     Mandy Krishnan, PT, DPT Pager: [phone]

## 2017-02-14 NOTE — PROGRESS NOTES
Inpatient Anticoagulation Service Note    Date: 2017  Reason for Anticoagulation: Bioprosthetic Valve Replacement (AVR)        Hemoglobin Value: 10.5  Hematocrit Value: 30.6  Lab Platelet Value: 114  Target INR: 2.0 to 3.0    INR from last 7 days     Date/Time INR Value    17 0517 (!)1.14    17 0500 (!)1.17    17 0305 (!)1.21    17 0240 (!)1.21    02/10/17 0245 (!)1.17    17 0500 1.13    17 1300 (!)1.36        Dose from last 7 days     Date/Time Dose (mg)    17 0517 7.5    17 0500 7.5    17 0305 0    17 0240 0    02/10/17 1200 0    17 1600 7.5        Significant Interactions: Aspirin, Proton Pump Inhibitor  Bridge Therapy: No    Comments: see note    Plan:  INR with morning labs  Education Material Provided?: No  Pharmacist suggested discharge dosin.5 mg daily with outpatient follow up in 1 to 2 days     Sonia Silva

## 2017-02-14 NOTE — PROGRESS NOTES
Pulmonary Critical Care Consult Note        Chief Complaint: Symptomatic severe AS     Date of service: 2/14/17    History of Present Illness: 61 y.o. male admitted for for elective AVR for symptomatic AS/bicuspid AV. Has had SOB, fatigue, ROTH and cough for weaks and eveluated outpatient by cardiology and deemed to be a good surgical candidate.    ROS:  Respiratory: negative cough, negative hemoptysis, positive pleuritic chest pain, negative shortness of breath and negative sputum production, Cardiac: negative chest pain, negative palpitations and negative orthopnea, GI: positive nausea, negative vomiting, negative abdominal pain and negative diarrhea.      Interval Events:  24 hour interval history reviewed     Lined pulled  No N/V - looks feels better  Hemodynamics ok off NE since 2/13  Mobilizing in halls  IS 1450  1 lpm NC O2 - to RA later in day  Coumadin started   No Lovenox bridge per CVS  Plt better - no bleeding    PFSH:  No change.    Respiratory:   2 lpm n/c, NAD speaking in full sentences  Pulse Oximetry: 96 %    Exam: unlabored respirations, no intercostal retractions or accessory muscle use and rhonchi bibasilar  ImagingAvailable data reviewed        Invalid input(s): [id number]    HemoDynamics:  Pulse: 61, Heart Rate (Monitored): 61  NIBP: 136/59 mmHg  Levophed gtt off 2/13 AM    Exam: regular rate and rhythm, regular rhythm (Sinus) - paced rhythm  Imaging: Available data reviewed        Neuro:  GCS Total Fonda Coma Score: 15       Exam: no focal deficits noted mental status intact oriented for age x3  Imaging: Available data reviewed    Fluids:  Intake/Output       02/12/17 0700 - 02/13/17 0659 02/13/17 0700 - 02/14/17 0659 02/14/17 0700 - 02/15/17 0659      [id number] 9906-6432 Total 7736-4973 7650-5786 Total 4009-5469 6523-4040 Total       Intake    P.O.  660  350 1010  500  300 800  --  -- --    P.O.  500 300 800 -- -- --    I.V.  262  196.9 458.8  23.7  -- 23.7  --  -- --     Norepinephrine Volume 142 76.9 218.8 3.7 -- 3.7 -- -- --    IV Volume (Normal Saline) 120 120 240 20 -- 20 -- -- --    Total Intake 922 546.9 1468.8 523.7 300 823.7 -- -- --       Output    Urine  525  400 925  200  1175 1375  --  -- --    Number of Times Voided -- 3 x 3 x 2 x 4 x 6 x -- -- --    Indwelling Cathether 405 -- 405 -- -- -- -- -- --    Void (ml) 120 400  1375 -- -- --    Stool  --  -- --  --  -- --  --  -- --    Number of Times Stooled -- 1 x 1 x -- -- -- -- -- --    Total Output 525 400  1375 -- -- --       Net I/O     397 146.9 543.8 323.7 -875 -551.3 -- -- --        Weight: 104.1 kg (229 lb 8 oz)  Recent Labs      17   0500  17   0517   SODIUM  130*  131*  130*   POTASSIUM  4.2  4.1  4.0   CHLORIDE  99  100  98   CO2  25  25  23   BUN  33*  30*  23*   CREATININE  1.23  0.99  0.93   CALCIUM  8.5  8.2*  9.0       GI/Nutrition:  Exam: abdomen is soft and non-tender, normal active bowel sounds  Imaging: Available data reviewed  taking PO  Liver Function  Recent Labs      17   0500  17   0517   GLUCOSE  132*  104*  107*       Heme:  Recent Labs      17   0500  17   0517   RBC  3.34*  2.94*  3.26*   HEMOGLOBIN  10.7*  9.4*  10.5*   HEMATOCRIT  31.2*  27.8*  30.6*   PLATELETCT  86*  89*  114*   PROTHROMBTM  15.7*  15.3*  15.0*   INR  1.21*  1.17*  1.14*       Infectious Disease:  Temp  Av.1 °C (98.7 °F)  Min: 36.8 °C (98.3 °F)  Max: 37.3 °C (99.1 °F)  Micro: reviewed  Recent Labs      17   0305  17   0500  17   0517   WBC  8.4  6.1  5.7     Current Facility-Administered Medications   Medication Dose Frequency Provider Last Rate Last Dose   • warfarin (COUMADIN) tablet 7.5 mg  7.5 mg COUMADIN-DAILY Sonia Silva, PHARMD   7.5 mg at 17 1256   • MD ALERT... warfarin (COUMADIN) per pharmacy protocol   PRN Bonnie Weber, A.P.N.       • norepinephrine (LEVOPHED) 8 mg in NS  250 mL Infusion  0.5-30 mcg/min Continuous Jeremy M Gonda, M.D.   Stopped at 02/13/17 0753   • lidocaine (LIDODERM) 5 % 1 Patch  1 Patch Q24HR Carol Cisse, A.P.N.   1 Patch at 02/13/17 1100   • potassium chloride ER (KLOR-CON) tablet 10 mEq  10 mEq BID Oriana Johnson, A.P.N.   10 mEq at 02/13/17 2001   • tamsulosin (FLOMAX) capsule 0.4 mg  0.4 mg AFTER BREAKFAST Oriana Johnosn, A.P.N.   0.4 mg at 02/13/17 0950   • Respiratory Care per Protocol   Continuous RT Carol Cisse, A.P.N.       • docusate sodium (COLACE) capsule 100 mg  100 mg QAM Carol Cisse, A.P.N.   100 mg at 02/13/17 0950    And   • senna-docusate (PERICOLACE or SENOKOT S) 8.6-50 MG per tablet 1 Tab  1 Tab Nightly Carol Cisse A.P.N.   1 Tab at 02/13/17 2001    And   • senna-docusate (PERICOLACE or SENOKOT S) 8.6-50 MG per tablet 1 Tab  1 Tab Q24HRS PRN Carol Cisse, A.P.N.   1 Tab at 02/12/17 1204    And   • lactulose 20 GM/30ML solution 30 mL  30 mL Q24HRS PRN Carol Cisse, A.P.N.   30 mL at 02/12/17 1819    And   • bisacodyl (DULCOLAX) suppository 10 mg  10 mg Q24HRS PRN Carol Cisse, A.P.N.   10 mg at 02/13/17 0555    And   • fleet enema 133 mL  1 Each Once PRN Carol Cisse, A.P.N.       • NS infusion   Continuous Carol Cisse, A.P.N.   Stopped at 02/13/17 0900   • omeprazole (PRILOSEC) capsule 20 mg  20 mg DAILY Carol Cisse, A.P.N.   20 mg at 02/13/17 0950   • aspirin EC (ECOTRIN) tablet 81 mg  81 mg DAILY Carol M Cisse, A.P.N.   Stopped at 02/09/17 0900   • electrolyte-A (PLASMALYTE-A) infusion   PRN Carol Cisse A.P.N.       • oxycodone immediate-release (ROXICODONE) tablet 5 mg  5 mg Q3HRS PRN Carol Cisse A.P.N.   5 mg at 02/10/17 2218   • oxycodone immediate release (ROXICODONE) tablet 10 mg  10 mg Q3HRS PRN Carol Cisse A.P.N.   10 mg at 02/11/17 0442   • tramadol (ULTRAM) 50 MG tablet 50 mg  50 mg Q4HRS PRN Carol Cisse A.P.N.   50 mg at 02/14/17 0200   • ondansetron (ZOFRAN) syringe/vial injection 4 mg   4 mg Q6HRS PRN Carol Cisse, A.P.N.   4 mg at 02/11/17 1004    Or   • prochlorperazine (COMPAZINE) injection 10 mg  10 mg Q6HRS PRN Carol Cisse, A.P.N.   10 mg at 02/08/17 2142   • acetaminophen (TYLENOL) tablet 650 mg  650 mg Q4HRS PRN Carol Cisse, A.P.N.        Or   • acetaminophen (TYLENOL) suppository 650 mg  650 mg Q4HRS PRN Carol Cisse, A.P.N.       • mag hydrox-al hydrox-simeth (MAALOX PLUS ES or MYLANTA DS) suspension 30 mL  30 mL Q4HRS PRN Carol Cisse, A.P.N.       • diphenhydrAMINE (BENADRYL) tablet/capsule 25 mg  25 mg HS PRN - MR X 1 Carol Cisse, A.P.N.   25 mg at 02/13/17 2331     Last reviewed on 2/8/2017  6:13 AM by Johanna Pepper R.N.    Quality  Measures:  Medications reviewed, Labs reviewed, Radiology images reviewed and EKG reviewed    Central line in place: Need for access and Shock    DVT Prophylaxis: Enoxaparin (Lovenox)  DVT prophylaxis - mechanical: SCDs  Ulcer prophylaxis: Yes    Assessed for rehab: Patient was assess for and/or received rehabilitation services during this hospitalization    Assessment/Plan:  Daja-op ventilatory management - intubated 2/8, extubated 2/8   - encourage IS, ambulation in [place]   - RT protocols  Severe AS with bicuspid AV s/p mechanical AVR   - CVS following   - coumadin per CVS - no bridge per CVS   - caution with narcotics, cont lidoderm patch  V-tach s/p AICD placement   - interrogated 2/8 post-surgery, re-interrogation ok  Acute cardiogenic pulm edema - resolving  CHF III - mild decompensation - better  Undifferentiate shock - returned 2/11, hypovolemia vs medication effect - persisting vasoplegia?   - cont levophed to goal MAP>65   - wean off AM 2/13 as tolerated   - IVF bolus if low CVP (<8)  Acute blood loss anemia - conservative transfusion strategies  Urinary retention - resolved  JON - ?obstructive uropathy - improving! - creat normal   - monitor, holding diuresis  Leukocytosis with fever - resolved   - monitor off abx for  now  Prophylaxis, diet, therapies    To rehab tomorrow vs home with help???    Discussed patient condition and risk of morbidity and/or mortality with Family, RN, RT, Pharmacy, Dietary, , Charge nurse / hot rounds, QA team, Patient and CVS.

## 2017-02-14 NOTE — FLOWSHEET NOTE
02/13/17 2145   Events/Summary/Plan   Non-Invasive Resp Device Site Inspection Completed Intact   Interdisciplinary Plan of Care-Goals (Indications)   Blunt Chest Indications Chest Trauma   Interdisciplinary Plan of Care-Outcomes    Blunt Chest IS Outcome Patient at Stable Baseline   Education   Education Yes - Pt. / Family has been Instructed in use of Respiratory Equipment;Yes - Pt. / Family has been Instructed in use of Respiratory Medications and Adverse Reactions   RT Assessment of Delivered Medications   Evaluation of Medication Delivery Daily Yes-- Pt /Family has been Instructed in use of Respiratory Medications and Adverse Reactions   Incentive Spirometry Group   Incentive Spirometer Volume 1250 mL   Respiratory WDL   Respiratory (WDL) X   Chest Exam   Work Of Breathing / Effort Mild   Respiration (!) 23   Pulse (!) 56   Heart Rate (Monitored) 61   Breath Sounds   Pre/Post Intervention Pre Intervention Assessment   RUL Breath Sounds Clear   RML Breath Sounds Diminished   RLL Breath Sounds Diminished   CYNDI Breath Sounds Clear   LLL Breath Sounds Diminished   Secretions   Cough Non Productive   Oximetry   Continuous Oximetry Yes   O2 Alarms Set & Reviewed Yes   Oxygen   Pulse Oximetry 94 %   O2 (LPM) 2   O2 Daily Delivery Respiratory  Silicone Nasal Cannula

## 2017-02-14 NOTE — CARE PLAN
Problem: Post Op Day 4 CABG/Heart Valve Replacement  Goal: Optimal care of the Post Op CABG/Heart Valve replacement Post Op Day 4  Intervention: Daily Weights  Pt weighed in a.m.- 104.1kg   Intervention: Shower daily and clean incisions twice daily with soap and water  Pt showered and incisions cleansed w/ soap and water   Intervention: Up in chair for all meals  Pt in chair for breakfast   Intervention: Ambulate, increasing the distance each time x 3 and before bed  Pt ambulated once before bed and again in a.m. Pt frequently ambulated to bathroom and up to chair throughout night. Pt attempted to walk in hallway w/o wheelchair push- made it 100 feet then insisted on pushing wheelchair the rest of the way   Intervention: IS q 1 hour while awake and record best IS volume  IS done Q1h while awake- best value 1350   Intervention: Consider removal of rosales, chest tube and pacer wire if not already done  Rosales and CT previously removed

## 2017-02-14 NOTE — FACE TO FACE
Face to Face Supporting Documentation - Home Health    The encounter with this patient was in whole or in part the primary reason for home health admission.    Date of encounter:   Patient:                    MRN:                       YOB: 2015  Alberto Fam  [id number]  1955     Home health to see patient for:  Skilled Nursing care for assessment, interventions & education    Skilled need for:  Surgical Aftercare wound care, medication management, disease education.      Skilled nursing interventions to include:  Wound Care    Homebound status evidenced by:  Need the aid of supportive devices such as crutches, canes, wheelchairs or walkers. Leaving home requires a considerable and taxing effort. There is a normal inability to leave the home.    Community Physician to provide follow up care: Trevor Stephen D.O.     Optional Interventions? No      I certify the face to face encounter for this home health care referral meets the CMS requirements and the encounter/clinical assessment with the patient was, in whole, or in part, for the medical condition(s) listed above, which is the primary reason for home health care. Based on my clinical findings: the service(s) are medically necessary, support the need for home health care, and the homebound criteria are met.  I certify that this patient has had a face to face encounter by myself.  SULMA Mooney. - NPI: [id number]

## 2017-02-15 LAB
ANION GAP SERPL CALC-SCNC: 6 MMOL/L (ref 0–11.9)
BUN SERPL-MCNC: 21 MG/DL (ref 8–22)
CALCIUM SERPL-MCNC: 9 MG/DL (ref 8.5–10.5)
CHLORIDE SERPL-SCNC: 100 MMOL/L (ref 96–112)
CO2 SERPL-SCNC: 25 MMOL/L (ref 20–33)
CREAT SERPL-MCNC: 1.08 MG/DL (ref 0.5–1.4)
ERYTHROCYTE [DISTWIDTH] IN BLOOD BY AUTOMATED COUNT: 41.8 FL (ref 35.9–50)
GFR SERPL CREATININE-BSD FRML MDRD: >60 ML/MIN/1.73 M 2
GLUCOSE SERPL-MCNC: 102 MG/DL (ref 65–99)
HCT VFR BLD AUTO: 29.4 % (ref 42–52)
HGB BLD-MCNC: 10 G/DL (ref 14–18)
INR PPP: 1.32 (ref 0.87–1.13)
MCH RBC QN AUTO: 31.7 PG (ref 27–33)
MCHC RBC AUTO-ENTMCNC: 34 G/DL (ref 33.7–35.3)
MCV RBC AUTO: 93.3 FL (ref 81.4–97.8)
PLATELET # BLD AUTO: 137 K/UL (ref 164–446)
PMV BLD AUTO: 10.2 FL (ref 9–12.9)
POTASSIUM SERPL-SCNC: 3.8 MMOL/L (ref 3.6–5.5)
PROTHROMBIN TIME: 16.8 SEC (ref 12–14.6)
RBC # BLD AUTO: 3.15 M/UL (ref 4.7–6.1)
SODIUM SERPL-SCNC: 131 MMOL/L (ref 135–145)
WBC # BLD AUTO: 6.4 K/UL (ref 4.8–10.8)

## 2017-02-15 PROCEDURE — 700102 HCHG RX REV CODE 250 W/ 637 OVERRIDE(OP)

## 2017-02-15 PROCEDURE — A9270 NON-COVERED ITEM OR SERVICE: HCPCS | Performed by: NURSE PRACTITIONER

## 2017-02-15 PROCEDURE — 85610 PROTHROMBIN TIME: CPT

## 2017-02-15 PROCEDURE — 700102 HCHG RX REV CODE 250 W/ 637 OVERRIDE(OP): Performed by: CLINICAL NURSE SPECIALIST

## 2017-02-15 PROCEDURE — 700112 HCHG RX REV CODE 229: Performed by: CLINICAL NURSE SPECIALIST

## 2017-02-15 PROCEDURE — A9270 NON-COVERED ITEM OR SERVICE: HCPCS | Performed by: CLINICAL NURSE SPECIALIST

## 2017-02-15 PROCEDURE — 700102 HCHG RX REV CODE 250 W/ 637 OVERRIDE(OP): Performed by: NURSE PRACTITIONER

## 2017-02-15 PROCEDURE — G8980 MOBILITY D/C STATUS: HCPCS | Mod: CI

## 2017-02-15 PROCEDURE — 85027 COMPLETE CBC AUTOMATED: CPT

## 2017-02-15 PROCEDURE — 97530 THERAPEUTIC ACTIVITIES: CPT

## 2017-02-15 PROCEDURE — 99232 SBSQ HOSP IP/OBS MODERATE 35: CPT | Performed by: INTERNAL MEDICINE

## 2017-02-15 PROCEDURE — 700111 HCHG RX REV CODE 636 W/ 250 OVERRIDE (IP): Performed by: NURSE PRACTITIONER

## 2017-02-15 PROCEDURE — 770020 HCHG ROOM/CARE - TELE (206)

## 2017-02-15 PROCEDURE — 80048 BASIC METABOLIC PNL TOTAL CA: CPT

## 2017-02-15 PROCEDURE — 306311 ANTI-EMBOLISM STOCKINGS XXLRG LNG: Performed by: THORACIC SURGERY (CARDIOTHORACIC VASCULAR SURGERY)

## 2017-02-15 PROCEDURE — G8979 MOBILITY GOAL STATUS: HCPCS | Mod: CI

## 2017-02-15 PROCEDURE — A9270 NON-COVERED ITEM OR SERVICE: HCPCS

## 2017-02-15 RX ORDER — POTASSIUM CHLORIDE 20 MEQ/1
20 TABLET, EXTENDED RELEASE ORAL 2 TIMES DAILY
Status: DISCONTINUED | OUTPATIENT
Start: 2017-02-15 | End: 2017-02-16 | Stop reason: HOSPADM

## 2017-02-15 RX ORDER — FUROSEMIDE 10 MG/ML
40 INJECTION INTRAMUSCULAR; INTRAVENOUS
Status: DISCONTINUED | OUTPATIENT
Start: 2017-02-15 | End: 2017-02-16 | Stop reason: HOSPADM

## 2017-02-15 RX ADMIN — LACTULOSE 30 ML: 10 SOLUTION ORAL at 08:47

## 2017-02-15 RX ADMIN — TRAMADOL HYDROCHLORIDE 50 MG: 50 TABLET, COATED ORAL at 12:00

## 2017-02-15 RX ADMIN — FUROSEMIDE 40 MG: 10 INJECTION, SOLUTION INTRAVENOUS at 17:35

## 2017-02-15 RX ADMIN — POTASSIUM CHLORIDE 20 MEQ: 1500 TABLET, EXTENDED RELEASE ORAL at 21:32

## 2017-02-15 RX ADMIN — TRAMADOL HYDROCHLORIDE 50 MG: 50 TABLET, COATED ORAL at 04:58

## 2017-02-15 RX ADMIN — OXYCODONE HYDROCHLORIDE 5 MG: 5 TABLET ORAL at 21:40

## 2017-02-15 RX ADMIN — ASPIRIN 81 MG: 81 TABLET ORAL at 07:25

## 2017-02-15 RX ADMIN — STANDARDIZED SENNA CONCENTRATE AND DOCUSATE SODIUM 1 TABLET: 8.6; 5 TABLET, FILM COATED ORAL at 08:49

## 2017-02-15 RX ADMIN — STANDARDIZED SENNA CONCENTRATE AND DOCUSATE SODIUM 1 TABLET: 8.6; 5 TABLET, FILM COATED ORAL at 21:33

## 2017-02-15 RX ADMIN — TRAMADOL HYDROCHLORIDE 50 MG: 50 TABLET, COATED ORAL at 21:40

## 2017-02-15 RX ADMIN — POTASSIUM CHLORIDE 20 MEQ: 1500 TABLET, EXTENDED RELEASE ORAL at 07:30

## 2017-02-15 RX ADMIN — WARFARIN SODIUM 7.5 MG: 7.5 TABLET ORAL at 17:36

## 2017-02-15 RX ADMIN — TRAMADOL HYDROCHLORIDE 50 MG: 50 TABLET, COATED ORAL at 17:39

## 2017-02-15 RX ADMIN — TAMSULOSIN HYDROCHLORIDE 0.4 MG: 0.4 CAPSULE ORAL at 07:25

## 2017-02-15 RX ADMIN — TRAMADOL HYDROCHLORIDE 50 MG: 50 TABLET, COATED ORAL at 00:57

## 2017-02-15 RX ADMIN — DOCUSATE SODIUM 100 MG: 100 CAPSULE ORAL at 07:24

## 2017-02-15 RX ADMIN — FUROSEMIDE 20 MG: 10 INJECTION, SOLUTION INTRAVENOUS at 04:58

## 2017-02-15 RX ADMIN — OMEPRAZOLE 20 MG: 20 CAPSULE, DELAYED RELEASE ORAL at 07:24

## 2017-02-15 ASSESSMENT — PATIENT HEALTH QUESTIONNAIRE - PHQ9
SUM OF ALL RESPONSES TO PHQ QUESTIONS 1-9: 0
1. LITTLE INTEREST OR PLEASURE IN DOING THINGS: NOT AT ALL
SUM OF ALL RESPONSES TO PHQ9 QUESTIONS 1 AND 2: 0
2. FEELING DOWN, DEPRESSED, IRRITABLE, OR HOPELESS: NOT AT ALL

## 2017-02-15 ASSESSMENT — GAIT ASSESSMENTS
DISTANCE (FEET): 240
GAIT LEVEL OF ASSIST: SUPERVISED

## 2017-02-15 ASSESSMENT — PAIN SCALES - GENERAL
PAINLEVEL_OUTOF10: 5
PAINLEVEL_OUTOF10: 4
PAINLEVEL_OUTOF10: 7
PAINLEVEL_OUTOF10: 0
PAINLEVEL_OUTOF10: 6
PAINLEVEL_OUTOF10: 4
PAINLEVEL_OUTOF10: 6
PAINLEVEL_OUTOF10: 4
PAINLEVEL_OUTOF10: 4

## 2017-02-15 ASSESSMENT — ENCOUNTER SYMPTOMS
CONSTITUTIONAL NEGATIVE: 1
CARDIOVASCULAR NEGATIVE: 1
NEUROLOGICAL NEGATIVE: 1
EYES NEGATIVE: 1
PSYCHIATRIC NEGATIVE: 1
MUSCULOSKELETAL NEGATIVE: 1
RESPIRATORY NEGATIVE: 1
GASTROINTESTINAL NEGATIVE: 1

## 2017-02-15 NOTE — CARE PLAN
Problem: Post Op Day 4 CABG/Heart Valve Replacement  Goal: Optimal care of the Post Op CABG/Heart Valve replacement Post Op Day 4  Intervention: Daily Weights  Pt weighed via stand-up scale- 102.5kg   Intervention: Shower daily and clean incisions twice daily with soap and water  Pt showered and incisions cleansed w/ soap and water   Intervention: Up in chair for all meals  Pt in chair for breakfast   Intervention: Ambulate, increasing the distance each time x 3 and before bed  Pt ambulated 3x throughout night w/ no assistive devices, stand-by assist. Tolerated well w/ less SOB than previous nights   Intervention: IS q 1 hour while awake and record best IS volume  IS performed Q1h while awake- best value 1350   Intervention: Consider removal of rosales, chest tube and pacer wire if not already done  All previously removed

## 2017-02-15 NOTE — PROGRESS NOTES
Pulmonary Critical Care Consult Note        Chief Complaint: Symptomatic severe AS     Date of service: 2/15/17    History of Present Illness: 61 y.o. male admitted for for elective AVR for symptomatic AS/bicuspid AV. Has had SOB, fatigue, ROTH and cough for weaks and eveluated outpatient by cardiology and deemed to be a good surgical candidate.    ROS:  Respiratory: negative cough, negative hemoptysis, positive pleuritic chest pain, negative shortness of breath and negative sputum production, Cardiac: negative chest pain, negative palpitations and negative orthopnea, GI: positive nausea, negative vomiting, negative abdominal pain and negative diarrhea.      Interval Events:  24 hour interval history reviewed     Ambulating in halls  No inpatient rehab - out patient cardiac program being arranged  Hemodynamics noted  Coumadin - no bridge - no bleeding  IS 1450 - no change  Now RA - sats ok with walking?  Afebrile - WBC normal  No BM yet  Still edematous    PFSH:  No change.    Respiratory:   2 lpm n/c, NAD speaking in full sentences  Pulse Oximetry: 92 %    Exam: unlabored respirations, no intercostal retractions or accessory muscle use and rhonchi bibasilar  ImagingAvailable data reviewed        Invalid input(s): [id number]    HemoDynamics:  Pulse: 66, Heart Rate (Monitored): 60  NIBP: 134/75 mmHg  Levophed gtt off 2/13 AM    Exam: regular rate and rhythm, regular rhythm (Sinus) - paced rhythm  Imaging: Available data reviewed        Neuro:  GCS Total [place] Coma Score: 15       Exam: no focal deficits noted mental status intact oriented for age x3  Imaging: Available data reviewed    Fluids:  Intake/Output       02/13/17 0700 - 02/14/17 0659 02/14/17 0700 - 02/15/17 0659 02/15/17 0700 - 02/16/17 0659      0298-4037 5107-4807 Total 0248-0575 2458-2139 Total 6342-5052 7279-7355 Total       Intake    P.O.  500  300 800  1310  300 1610  --  -- --    P.O. 500  300 1610 -- -- --    I.V.  23.7  -- 23.7   --  -- --  --  -- --    Norepinephrine Volume 3.7 -- 3.7 -- -- -- -- -- --    IV Volume (Normal Saline) 20 -- 20 -- -- -- -- -- --    Total Intake 523.7 300 823.7 9284 416 2462 -- -- --       Output    Urine  200  1175 1375  1800  850 2650  --  -- --    Number of Times Voided 2 x 4 x 6 x 7 x 4 x 11 x -- -- --    Void (ml) 200 1175 1375 3076 603 7727 -- -- --    Total Output 200 1175 1375 4240 883 1002 -- -- --       Net I/O     323.7 -875 -551.3 -381 -744 -1040 -- -- --           Recent Labs      02/13/17   0500  02/14/17   0517  02/15/17   0454   SODIUM  131*  130*  131*   POTASSIUM  4.1  4.0  3.8   CHLORIDE  100  98  100   CO2  25  23  25   BUN  30*  23*  21   CREATININE  0.99  0.93  1.08   CALCIUM  8.2*  9.0  9.0       GI/Nutrition:  Exam: abdomen is soft and non-tender, normal active bowel sounds  Imaging: Available data reviewed  taking PO better  Liver Function  Recent Labs      02/13/17   0500  02/14/17   0517  02/15/17   0454   GLUCOSE  104*  107*  102*       Heme:  Recent Labs      02/13/17   0500  02/14/17   0517  02/15/17   0454   RBC  2.94*  3.26*  3.15*   HEMOGLOBIN  9.4*  10.5*  10.0*   HEMATOCRIT  27.8*  30.6*  29.4*   PLATELETCT  89*  114*  137*   PROTHROMBTM  15.3*  15.0*   --    INR  1.17*  1.14*   --        Infectious Disease:  Temp  Av.1 °C (98.8 °F)  Min: 36.9 °C (98.4 °F)  Max: 37.3 °C (99.1 °F)  Micro: reviewed  Recent Labs      170  02/14/17   0517  02/15/17   0454   WBC  6.1  5.7  6.4     Current Facility-Administered Medications   Medication Dose Frequency Provider Last Rate Last Dose   • furosemide (LASIX) injection 20 mg  20 mg BID DIURETIC Bonnie Weber, A.P.N.   20 mg at 02/15/17 1788   • warfarin (COUMADIN) tablet 7.5 mg  7.5 mg COUMADIN-DAILY Sonia Silva, PHARMD   7.5 mg at 17 5342   • MD ALERT... warfarin (COUMADIN) per pharmacy protocol   PRN Bonnie Weber, A.P.N.       • norepinephrine (LEVOPHED) 8 mg in  mL Infusion  0.5-30 mcg/min Continuous  Jeremy M Gonda, M.D.   Stopped at 02/13/17 0753   • lidocaine (LIDODERM) 5 % 1 Patch  1 Patch Q24HR Carol Cisse A.P.N.   Stopped at 02/14/17 1100   • potassium chloride ER (KLOR-CON) tablet 10 mEq  10 mEq BID Oriana Johnson, A.P.N.   10 mEq at 02/14/17 2010   • tamsulosin (FLOMAX) capsule 0.4 mg  0.4 mg AFTER BREAKFAST Oriana Johnson, A.P.N.   0.4 mg at 02/14/17 0732   • Respiratory Care per Protocol   Continuous RT Carol Cisse, A.P.N.       • docusate sodium (COLACE) capsule 100 mg  100 mg QAM Carol Cisse, A.P.N.   100 mg at 02/14/17 0732    And   • senna-docusate (PERICOLACE or SENOKOT S) 8.6-50 MG per tablet 1 Tab  1 Tab Nightly Carol Cisse A.P.N.   1 Tab at 02/14/17 2009    And   • senna-docusate (PERICOLACE or SENOKOT S) 8.6-50 MG per tablet 1 Tab  1 Tab Q24HRS PRN Carol Cisse A.P.N.   1 Tab at 02/12/17 1204    And   • lactulose 20 GM/30ML solution 30 mL  30 mL Q24HRS PRN Carol Cisse A.P.N.   30 mL at 02/12/17 1819    And   • bisacodyl (DULCOLAX) suppository 10 mg  10 mg Q24HRS PRN Carol Cisse, A.P.N.   10 mg at 02/13/17 0555    And   • fleet enema 133 mL  1 Each Once PRN Carol Cisse, A.P.N.       • NS infusion   Continuous Carol Cisse A.P.N.   Stopped at 02/13/17 0900   • omeprazole (PRILOSEC) capsule 20 mg  20 mg DAILY Carol Cisse, A.P.N.   20 mg at 02/14/17 0732   • aspirin EC (ECOTRIN) tablet 81 mg  81 mg DAILY Carol Cisse, A.P.N.   81 mg at 02/14/17 0732   • electrolyte-A (PLASMALYTE-A) infusion   PRN Carol Cisse, A.P.N.       • oxycodone immediate-release (ROXICODONE) tablet 5 mg  5 mg Q3HRS PRN Carol Cisse, A.P.N.   5 mg at 02/10/17 2218   • oxycodone immediate release (ROXICODONE) tablet 10 mg  10 mg Q3HRS PRN Carol Cisse A.P.N.   10 mg at 02/11/17 0442   • tramadol (ULTRAM) 50 MG tablet 50 mg  50 mg Q4HRS PRN Carol Cisse, A.P.N.   50 mg at 02/15/17 0458   • ondansetron (ZOFRAN) syringe/vial injection 4 mg  4 mg Q6HRS PRN Carol Cisse, A.P.N.   4 mg  at 02/11/17 1004    Or   • prochlorperazine (COMPAZINE) injection 10 mg  10 mg Q6HRS PRN Carol Cisse, A.P.N.   10 mg at 02/08/17 2142   • acetaminophen (TYLENOL) tablet 650 mg  650 mg Q4HRS PRN Carol Cisse, A.P.N.        Or   • acetaminophen (TYLENOL) suppository 650 mg  650 mg Q4HRS PRN Carol Cisse, A.P.N.       • mag hydrox-al hydrox-simeth (MAALOX PLUS ES or MYLANTA DS) suspension 30 mL  30 mL Q4HRS PRN Carol Cises, A.P.N.       • diphenhydrAMINE (BENADRYL) tablet/capsule 25 mg  25 mg HS PRN - MR X 1 Carol Cisse, A.P.N.   25 mg at 02/14/17 2219     Last reviewed on 2/8/2017  6:13 AM by Johanna Pepper R.N.    Quality  Measures:  Medications reviewed, Labs reviewed, Radiology images reviewed and EKG reviewed    Central line in place: Need for access and Shock    DVT Prophylaxis: Enoxaparin (Lovenox)  DVT prophylaxis - mechanical: SCDs  Ulcer prophylaxis: Yes    Assessed for rehab: Patient was assess for and/or received rehabilitation services during this hospitalization    Assessment/Plan:  Daja-op ventilatory management - intubated 2/8, extubated 2/8   - encourage IS, ambulation in [place]   - RT protocols  Severe AS with bicuspid AV s/p mechanical AVR   - CVS following   - coumadin per CVS - no bridge per CVS   - caution with narcotics, cont lidoderm patch  V-tach s/p AICD placement   - interrogated 2/8 post-surgery, re-interrogation ok  Acute cardiogenic pulm edema - resolving  CHF III - mild decompensation - better  Undifferentiate shock - returned 2/11, hypovolemia vs medication effect - persisting vasoplegia?   - cont levophed to goal MAP>65   - wean off AM 2/13 as tolerated   - IVF bolus if low CVP (<8)  Acute blood loss anemia - conservative transfusion strategies  Urinary retention - resolved  JON - ?obstructive uropathy - improving! - creat normal   - monitor, holding diuresis  Leukocytosis with fever - resolved   - monitor off abx for now  Prophylaxis, diet, therapies    Keep one more  day for further forced diuresis  Needs to move bowels - aggressive bowel care protocols    Discussed patient condition and risk of morbidity and/or mortality with Family, RN, RT, Pharmacy, Dietary, , Charge nurse / hot rounds, QA team, Patient and CVS.

## 2017-02-15 NOTE — PROGRESS NOTES
Inpatient Anticoagulation Service Note    Date: 2/15/2017  Reason for Anticoagulation: Bioprosthetic Valve Replacement (AVR)        Hemoglobin Value: 10  Hematocrit Value: 29.4  Lab Platelet Value: 137  Target INR: 2.0 to 3.0    INR from last 7 days     Date/Time INR Value    02/15/17 1100 (!)1.32    17 0517 (!)1.14    17 0500 (!)1.17    17 0305 (!)1.21    17 0240 (!)1.21    02/10/17 0245 (!)1.17    17 0500 1.13        Dose from last 7 days     Date/Time Dose (mg)    02/15/17 1100 7.5    17 0517 7.5    17 0500 7.5    17 0305 0    17 0240 0    02/10/17 1200 0    17 1600 7.5        Significant Interactions: Aspirin, Proton Pump Inhibitor  Bridge Therapy: No     Comments: Patient is doing well post operatively.  Possible discharge home tomorrow.  INR is starting to increase toward goal.  H/H remains stable and there is no notation of bleeding.  Continue warfarin 7.5 mg daily.    Plan:  INR with morning labs  Education Material Provided?: Yes (AET )  Pharmacist suggested discharge dosin.5 mg daily with outpatient follow up in 1 to 2 days     Sonia Silva

## 2017-02-15 NOTE — CARE PLAN
Problem: Safety  Goal: Will remain free from injury  Safety assessments and interventions as documented on flow sheet.     Problem: Pain Management  Goal: Pain level will decrease to patient’s comfort goal  Pain assessments and interventions as charted on flow sheet and MAR.

## 2017-02-15 NOTE — PROGRESS NOTES
Assumed care of pt. Introduced and ID verified. Assessment complete and am meds given. Call light in reach, safety precautions in place. No further needs at this time.

## 2017-02-15 NOTE — PROGRESS NOTES
Sitting up in bedside chair. Family at bedside. Pt. Has walked in [place] multiple times independently as well as with PT. Also was able to walk stairs. Vss, voiding qs, had bm today after taking lactalose. Pt. States he's anxious to go home. Waiting for Dr. Stone.

## 2017-02-15 NOTE — THERAPY
"Physical Therapy Treatment completed.   Bed Mobility:  Supine to Sit:  (NT, in chair post)  Transfers: Sit to Stand: Stand by Assist  Gait: Level Of Assist: Supervised with No Equipment Needed       Plan of Care: no further needs in acute care setting   Discharge Recommendations: Equipment: No Equipment Needed  Unless identified by OT   Pt with much improved balance and activity tolerance; denies symptoms throughout, maintains RPE below 13. Discussed return to work timeline and need for follow up outpt cardiac rehab when appropriate in stage of recovery. No further acute PT needs at this time.   See \"Rehab Therapy-Acute\" Patient Summary Report for complete documentation.       "

## 2017-02-15 NOTE — PROGRESS NOTES
Cardiovascular Surgery Progress Note    Name: Albetro Fam  MRN: [id number]  : 1955  Admit Date: 2017  5:57 AM  Procedure:  Procedure(s) and Anesthesia Type:     * STERNOTOMY - REDO - General     * AORTIC VALVE REPLACEMENT - General     * JUSTINO - General  7 Day Post-Op    Vitals:  Patient Vitals for the past 8 hrs:   Temp SpO2 O2 Delivery O2 (LPM) Pulse Heart Rate (Monitored) Resp NIBP Weight   02/15/17 0940 - - None (Room Air) 0 87 - - - -   02/15/17 0800 - - - - - 60 19 (!) 99/61 mmHg -   02/15/17 0734 36.8 °C (98.2 °F) - None (Room Air) - - - - (!) 99/61 mmHg -   02/15/17 0700 - - - - - 65 (!) 21 139/67 mmHg -   02/15/17 0600 - - None (Room Air) - - 61 13 - -   02/15/17 0500 - - - - - 60 17 134/75 mmHg -   02/15/17 0400 37.2 °C (99 °F) 92 % None (Room Air) - - 60 (!) 23 134/75 mmHg 102.5 kg (225 lb 15.5 oz)     Temp (24hrs), Av.1 °C (98.7 °F), Min:36.8 °C (98.2 °F), Max:37.3 °C (99.1 °F)      Respiratory:    Respiration: 19, Pulse Oximetry: 92 %     Chest Tube Drains:          Fluids:    Intake/Output Summary (Last 24 hours) at 02/15/17 1126  Last data filed at 02/15/17 0600   Gross per 24 hour   Intake   1320 ml   Output   2450 ml   Net  -1130 ml     Admit weight: Weight: 93.4 kg (205 lb 14.6 oz)  Current weight: Weight: 102.5 kg (225 lb 15.5 oz) (02/15/17 0400)    Labs:  Recent Labs      17   0500  17   0517  02/15/17   0454   WBC  6.1  5.7  6.4   RBC  2.94*  3.26*  3.15*   HEMOGLOBIN  9.4*  10.5*  10.0*   HEMATOCRIT  27.8*  30.6*  29.4*   MCV  94.6  93.9  93.3   MCH  32.0  32.2  31.7   MCHC  33.8  34.3  34.0   RDW  42.1  41.4  41.8   PLATELETCT  89*  114*  137*   MPV  10.2  9.9  10.2         Recent Labs      17   0500  17   0517  02/15/17   0454   SODIUM  131*  130*  131*   POTASSIUM  4.1  4.0  3.8   CHLORIDE  100  98  100   CO2  25  23  25   GLUCOSE  104*  107*  102*   BUN  30*  23*  21   CREATININE  0.99  0.93  1.08   CALCIUM  8.2*  9.0  9.0     Recent Labs       02/13/17   0500  02/14/17   0517   INR  1.17*  1.14*       Medications:  • furosemide  40 mg     • potassium chloride (KCl)  20 mEq     • warfarin  7.5 mg     • lidocaine  1 Patch     • tamsulosin  0.4 mg     • docusate sodium  100 mg      And   • senna-docusate  1 Tab     • omeprazole  20 mg     • aspirin EC  81 mg         Exam:   Review of Systems   Constitutional: Negative.    HENT: Negative.    Eyes: Negative.    Respiratory: Negative.    Cardiovascular: Negative.    Gastrointestinal: Negative.    Genitourinary: Negative.    Musculoskeletal: Negative.    Skin: Negative.    Neurological: Negative.    Psychiatric/Behavioral: Negative.        Physical Exam   Constitutional: He is oriented to person, place, and time. He appears well-developed and well-nourished.   HENT:   Head: Normocephalic and atraumatic.   Eyes: Pupils are equal, round, and reactive to light.   Neck: Normal range of motion. Neck supple.   Cardiovascular: Normal rate and regular rhythm.    paced   Pulmonary/Chest: Effort normal.   Decreased at bases.    Abdominal: Soft. Bowel sounds are normal.   Musculoskeletal: Normal range of motion. He exhibits edema.   Neurological: He is alert and oriented to person, place, and time.   Skin: Skin is warm and dry.   Wounds CDI   Psychiatric: He has a normal mood and affect.       Quality Measures:   EKG reviewed, Medications reviewed, Radiology images reviewed and Labs reviewed  Hewitt catheter: No Hewitt  Central line in place: Need for access    DVT Prophylaxis: Enoxaparin (Lovenox) and Warfarin (Coumadin)  DVT prophylaxis - mechanical: SCDs  Ulcer prophylaxis: Yes    Assessed for rehab: Patient returned to prior level of function, rehabilitation not indicated at this time      Assessment/Plan:  POD 1 Extubated, HDS, no drips, chest tube output minimal and negative for air leak.  Neuro grossly intact.  Pacer not capturing until 40 BPM.  Will have re-interrogated.  Hold coreg for now until PPM re-interrogated.   Plan: Transition to tele status.  DC chest tubes.  DC rosales.Transition to tele status.  DC chest tubes.  DC rosales.  POD 2 BP stable/low.  NSR/ST.  PPM fucntion normal. Urine retention. +5L, wts up. PLAN:  DC inspra/entresto until BP improved and to allow for diuresis.  Continue coreg as BP allows. Reinsert rosales/add flomax.  Plts low, w/w. PT/OT.     POD 3 HOTN this AM.  On levophed.  Given lasix with BP low.  DC'd lasix and K.  Getting fluid bolus.  Pain in scapula right side.  Lidocaine patch and heating pad.  Watch BP.  CPM.   POD 4 BP still low.  On levo drip low dose.  Wean levo.  HDS, neuro intact.  Wounds CDI.  Paced rhythm.  Pain in scapula improved.  CPM.   POD 5 HDS off levo gtt, fluid overload- will start diuresis tomorrow if holds pressure off pressors, CXR today, amb, enc IS  POD 6 HDS, neuro intact.  Feels much better.  Lines out.  Ambulating halls.  Volume overload.  Gentle diurese today.    POD 7 diuresing well.  Feels better each day.  HDS, SR, Ambulating halls.  Blood pressure tolerating lasix.  Will keep one more day to diurese and the home in am.     Patient seen, examined and plan reviewed with midlevel provider. I agree with the plan.      Active Hospital Problems    Diagnosis   • Severe aortic stenosis [I35.0]

## 2017-02-16 VITALS
WEIGHT: 221.78 LBS | OXYGEN SATURATION: 90 % | DIASTOLIC BLOOD PRESSURE: 81 MMHG | RESPIRATION RATE: 18 BRPM | TEMPERATURE: 98.9 F | HEIGHT: 71 IN | HEART RATE: 60 BPM | BODY MASS INDEX: 31.05 KG/M2 | SYSTOLIC BLOOD PRESSURE: 114 MMHG

## 2017-02-16 LAB
ANION GAP SERPL CALC-SCNC: 12 MMOL/L (ref 0–11.9)
BUN SERPL-MCNC: 21 MG/DL (ref 8–22)
CALCIUM SERPL-MCNC: 8.7 MG/DL (ref 8.5–10.5)
CHLORIDE SERPL-SCNC: 96 MMOL/L (ref 96–112)
CO2 SERPL-SCNC: 25 MMOL/L (ref 20–33)
CREAT SERPL-MCNC: 1.08 MG/DL (ref 0.5–1.4)
ERYTHROCYTE [DISTWIDTH] IN BLOOD BY AUTOMATED COUNT: 42.5 FL (ref 35.9–50)
GFR SERPL CREATININE-BSD FRML MDRD: >60 ML/MIN/1.73 M 2
GLUCOSE SERPL-MCNC: 120 MG/DL (ref 65–99)
HCT VFR BLD AUTO: 28.6 % (ref 42–52)
HGB BLD-MCNC: 9.9 G/DL (ref 14–18)
INR PPP: 1.43 (ref 0.87–1.13)
MCH RBC QN AUTO: 32.6 PG (ref 27–33)
MCHC RBC AUTO-ENTMCNC: 34.6 G/DL (ref 33.7–35.3)
MCV RBC AUTO: 94.1 FL (ref 81.4–97.8)
PLATELET # BLD AUTO: 151 K/UL (ref 164–446)
PMV BLD AUTO: 10.1 FL (ref 9–12.9)
POTASSIUM SERPL-SCNC: 4 MMOL/L (ref 3.6–5.5)
PROTHROMBIN TIME: 17.9 SEC (ref 12–14.6)
RBC # BLD AUTO: 3.04 M/UL (ref 4.7–6.1)
SODIUM SERPL-SCNC: 133 MMOL/L (ref 135–145)
WBC # BLD AUTO: 5.6 K/UL (ref 4.8–10.8)

## 2017-02-16 PROCEDURE — A9270 NON-COVERED ITEM OR SERVICE: HCPCS | Performed by: CLINICAL NURSE SPECIALIST

## 2017-02-16 PROCEDURE — 99232 SBSQ HOSP IP/OBS MODERATE 35: CPT | Performed by: INTERNAL MEDICINE

## 2017-02-16 PROCEDURE — 85610 PROTHROMBIN TIME: CPT

## 2017-02-16 PROCEDURE — A9270 NON-COVERED ITEM OR SERVICE: HCPCS | Performed by: NURSE PRACTITIONER

## 2017-02-16 PROCEDURE — 700102 HCHG RX REV CODE 250 W/ 637 OVERRIDE(OP): Performed by: NURSE PRACTITIONER

## 2017-02-16 PROCEDURE — 85027 COMPLETE CBC AUTOMATED: CPT

## 2017-02-16 PROCEDURE — 700111 HCHG RX REV CODE 636 W/ 250 OVERRIDE (IP): Performed by: NURSE PRACTITIONER

## 2017-02-16 PROCEDURE — 80048 BASIC METABOLIC PNL TOTAL CA: CPT

## 2017-02-16 PROCEDURE — 700102 HCHG RX REV CODE 250 W/ 637 OVERRIDE(OP): Performed by: CLINICAL NURSE SPECIALIST

## 2017-02-16 PROCEDURE — 700112 HCHG RX REV CODE 229: Performed by: CLINICAL NURSE SPECIALIST

## 2017-02-16 RX ORDER — FUROSEMIDE 40 MG/1
40 TABLET ORAL 2 TIMES DAILY
Qty: 37 TAB | Refills: 3 | Status: SHIPPED | OUTPATIENT
Start: 2017-02-16 | End: 2017-02-20

## 2017-02-16 RX ORDER — HYDROCODONE BITARTRATE AND ACETAMINOPHEN 5; 325 MG/1; MG/1
1-2 TABLET ORAL EVERY 4 HOURS PRN
Status: DISCONTINUED | OUTPATIENT
Start: 2017-02-16 | End: 2017-02-16 | Stop reason: HOSPADM

## 2017-02-16 RX ORDER — CARVEDILOL 3.12 MG/1
3.12 TABLET ORAL 2 TIMES DAILY WITH MEALS
Qty: 60 TAB | Refills: 3 | Status: SHIPPED | OUTPATIENT
Start: 2017-02-16 | End: 2017-02-20

## 2017-02-16 RX ORDER — POTASSIUM CHLORIDE 20 MEQ/1
20 TABLET, EXTENDED RELEASE ORAL 2 TIMES DAILY
Qty: 37 TAB | Refills: 3 | Status: SHIPPED | OUTPATIENT
Start: 2017-02-16 | End: 2017-02-20

## 2017-02-16 RX ORDER — HYDROCODONE BITARTRATE AND ACETAMINOPHEN 5; 325 MG/1; MG/1
1 TABLET ORAL EVERY 4 HOURS PRN
Qty: 45 TAB | Refills: 0 | Status: SHIPPED | OUTPATIENT
Start: 2017-02-16 | End: 2017-02-20

## 2017-02-16 RX ORDER — WARFARIN SODIUM 5 MG/1
7.5 TABLET ORAL
Qty: 45 TAB | Refills: 3 | Status: SHIPPED | OUTPATIENT
Start: 2017-02-16 | End: 2017-05-31

## 2017-02-16 RX ADMIN — DOCUSATE SODIUM 100 MG: 100 CAPSULE ORAL at 07:52

## 2017-02-16 RX ADMIN — FUROSEMIDE 40 MG: 10 INJECTION, SOLUTION INTRAVENOUS at 06:29

## 2017-02-16 RX ADMIN — OMEPRAZOLE 20 MG: 20 CAPSULE, DELAYED RELEASE ORAL at 07:52

## 2017-02-16 RX ADMIN — ASPIRIN 81 MG: 81 TABLET ORAL at 07:52

## 2017-02-16 RX ADMIN — TAMSULOSIN HYDROCHLORIDE 0.4 MG: 0.4 CAPSULE ORAL at 07:52

## 2017-02-16 RX ADMIN — POTASSIUM CHLORIDE 20 MEQ: 1500 TABLET, EXTENDED RELEASE ORAL at 07:52

## 2017-02-16 RX ADMIN — TRAMADOL HYDROCHLORIDE 50 MG: 50 TABLET, COATED ORAL at 06:29

## 2017-02-16 ASSESSMENT — ENCOUNTER SYMPTOMS
GASTROINTESTINAL NEGATIVE: 1
PSYCHIATRIC NEGATIVE: 1
MUSCULOSKELETAL NEGATIVE: 1
CONSTITUTIONAL NEGATIVE: 1
NEUROLOGICAL NEGATIVE: 1
EYES NEGATIVE: 1
RESPIRATORY NEGATIVE: 1
CARDIOVASCULAR NEGATIVE: 1

## 2017-02-16 ASSESSMENT — LIFESTYLE VARIABLES: EVER_SMOKED: YES

## 2017-02-16 ASSESSMENT — PAIN SCALES - GENERAL
PAINLEVEL_OUTOF10: 4
PAINLEVEL_OUTOF10: 3
PAINLEVEL_OUTOF10: 3
PAINLEVEL_OUTOF10: 4
PAINLEVEL_OUTOF10: 5

## 2017-02-16 NOTE — PROGRESS NOTES
Pt was discharged at 1130 to home. IV was d/c'd. Monitor off. Paperwork and prescriptions discussed and questions answered.

## 2017-02-16 NOTE — CARE PLAN
Problem: Safety  Goal: Will remain free from injury  Intervention: Provide assistance with mobility    02/15/17 0600   OTHER   Assistance / Tolerance Standby Assist   Ambulates independently without difficulty. Steady gate. On room air.      Problem: Knowledge Deficit  Goal: Knowledge of disease process/condition, treatment plan, diagnostic tests, and medications will improve  Outcome: PROGRESSING AS EXPECTED  Plan of care discussed and questions answered.

## 2017-02-16 NOTE — CARE PLAN
Problem: Venous Thromboembolism (VTW)/Deep Vein Thrombosis (DVT) Prevention:  Goal: Patient will participate in Venous Thrombosis (VTE)/Deep Vein Thrombosis (DVT)Prevention Measures  Outcome: MET Date Met:  02/16/17  Patient on Coumadin. ARVIND hose worn throughout day; SCD's all throughout night. Ambulates frequently.     Problem: Mobility  Goal: Risk for activity intolerance will decrease  Outcome: MET Date Met:  02/16/17  Patient tolerating activity very well. Occassionally needs help getting out of bed, steady on feet. Able to get in and out of chair up self; no assistance required on walks.

## 2017-02-16 NOTE — DISCHARGE PLANNING
[facility] accepting and scheduled to see patient in AM.  Final DC orders faxed to [facility].  [facility] has medical clearance for home today.  No further needs identified.

## 2017-02-16 NOTE — CARE PLAN
Problem: Urinary Elimination:  Goal: Ability to reestablish a normal urinary elimination pattern will improve  Outcome: PROGRESSING AS EXPECTED  Pt is continent and has adequate urine output as documented in doc flowsheet          Problem: Skin Integrity  Goal: Risk for impaired skin integrity will decrease  Outcome: PROGRESSING AS EXPECTED  Pt is low risk for skin breakdown. Pt repositions self frequently. Pillows are used for support, positioning and to float bony prominences. Skin assessment documented in doc flow sheet.

## 2017-02-16 NOTE — PROGRESS NOTES
Bedside report received from Brigida CRUZ. Patient up to chair; son at bedside. VS stable; AOx4, denies pain at this time. Orders verified. Plan of care assumed.

## 2017-02-16 NOTE — PROGRESS NOTES
Pulmonary Critical Care Consult Note        Chief Complaint: Symptomatic severe AS     Date of service: 2/16/17    History of Present Illness: 61 y.o. male admitted for for elective AVR for symptomatic AS/bicuspid AV. Has had SOB, fatigue, ROTH and cough for weaks and eveluated outpatient by cardiology and deemed to be a good surgical candidate.    ROS:  Respiratory: negative cough, negative hemoptysis, positive pleuritic chest pain, negative shortness of breath and negative sputum production, Cardiac: negative chest pain, negative palpitations and negative orthopnea, GI: positive nausea, negative vomiting, negative abdominal pain and negative diarrhea.      Interval Events:  24 hour interval history reviewed     Ambulating in halls well  No inpatient rehab - out patient cardiac program being arranged to start soon  Hemodynamics ok  Coumadin - no bridge - no bleeding  IS 1450 - no change  Now RA - sats ok with walking?  Afebrile - WBC normal  Good BM finally yesterday  Good response to lasix - edema better    PFSH:  No change.    Respiratory:   Room air - NAD speaking in full sentences  Pulse Oximetry: 94 %    Exam: unlabored respirations, no intercostal retractions or accessory muscle use and rhonchi bibasilar  ImagingAvailable data reviewed        Invalid input(s): [id number]    HemoDynamics:  Pulse: 61, Heart Rate (Monitored): 60  NIBP: 106/89 mmHg  Levophed gtt off 2/13 AM    Exam: regular rate and rhythm, regular rhythm (Sinus) - paced rhythm  Imaging: Available data reviewed        Neuro:  GCS Total Cromwell Coma Score: 15       Exam: no focal deficits noted mental status intact oriented for age x3  Imaging: Available data reviewed    Fluids:  Intake/Output       02/14/17 0700 - 02/15/17 0659 02/15/17 0700 - 02/16/17 0659 02/16/17 0700 - 02/17/17 0659      8920-7077 0295-7206 Total 6175-9885 2670-0728 Total 4260-5128 4716-9838 Total       Intake    P.O.  1310  400 1710  450  390 840  --  -- --    P.O. 1310  400 1710 450 390 840 -- -- --    Total Intake 7664 658 8716 450 390 840 -- -- --       Output    Urine  1800  1150 2950  2250  1150 3400  --  -- --    Number of Times Voided 7 x 4 x 11 x 1 x 4 x 5 x -- -- --    Void (ml) 1800 1150 2950 2250 1150 3400 -- -- --    Stool  --  -- --  --  -- --  --  -- --    Number of Times Stooled -- -- -- 2 x -- 2 x -- -- --    Total Output 1800 1150 2950 2250 1150 3400 -- -- --       Net I/O     -156 -493 -1240 -7391 -275 -2560 -- -- --        Weight: 100.6 kg (221 lb 12.5 oz)  Recent Labs      02/14/17   0517  02/15/17   0454  17   0400   SODIUM  130*  131*  133*   POTASSIUM  4.0  3.8  4.0   CHLORIDE  98  100  96   CO2  23  25  25   BUN  23*  21  21   CREATININE  0.93  1.08  1.08   CALCIUM  9.0  9.0  8.7       GI/Nutrition:  Exam: abdomen is soft and non-tender, normal active bowel sounds  Imaging: Available data reviewed  taking PO better  Liver Function  Recent Labs      02/14/17   0517  02/15/17   0454  17   0400   GLUCOSE  107*  102*  120*       Heme:  Recent Labs      02/14/17   0517  02/15/17   0454  02/15/17   1100  17   0400   RBC  3.26*  3.15*   --   3.04*   HEMOGLOBIN  10.5*  10.0*   --   9.9*   HEMATOCRIT  30.6*  29.4*   --   28.6*   PLATELETCT  114*  137*   --   151*   PROTHROMBTM  15.0*   --   16.8*  17.9*   INR  1.14*   --   1.32*  1.43*       Infectious Disease:  Temp  Av.1 °C (98.8 °F)  Min: 36.6 °C (97.9 °F)  Max: 37.8 °C (100.1 °F)  Micro: reviewed  Recent Labs      02/14/17   0517  02/15/17   0454  17   0400   WBC  5.7  6.4  5.6     Current Facility-Administered Medications   Medication Dose Frequency Provider Last Rate Last Dose   • furosemide (LASIX) injection 40 mg  40 mg BID DIURETIC Bonnie Weber, A.P.N.   40 mg at 02/15/17 1735   • potassium chloride SA (Kdur) tablet 20 mEq  20 mEq BID Bonnie Weber, A.P.N.   20 mEq at 02/15/17 2132   • warfarin (COUMADIN) tablet 7.5 mg  7.5 mg COUMADIN-DAILY Sonia Silva, PHARMD   7.5 mg at  02/15/17 1736   • MD ALERT... warfarin (COUMADIN) per pharmacy protocol   PRN Bonnie Weber, A.P.N.       • lidocaine (LIDODERM) 5 % 1 Patch  1 Patch Q24HR Carol EVA Betito A.P.N.   Stopped at 02/14/17 1100   • tamsulosin (FLOMAX) capsule 0.4 mg  0.4 mg AFTER BREAKFAST Oriana Johnson, A.P.N.   0.4 mg at 02/15/17 0725   • Respiratory Care per Protocol   Continuous RT Carol EVA Betito A.P.N.       • docusate sodium (COLACE) capsule 100 mg  100 mg QAM Carol Cisse, A.P.N.   100 mg at 02/15/17 0724    And   • senna-docusate (PERICOLACE or SENOKOT S) 8.6-50 MG per tablet 1 Tab  1 Tab Nightly Carol Cisse A.P.N.   1 Tab at 02/15/17 2133    And   • senna-docusate (PERICOLACE or SENOKOT S) 8.6-50 MG per tablet 1 Tab  1 Tab Q24HRS PRN Carol Cisse A.P.N.   1 Tab at 02/15/17 0849    And   • lactulose 20 GM/30ML solution 30 mL  30 mL Q24HRS PRN Carol Cisse A.P.N.   30 mL at 02/15/17 0847    And   • bisacodyl (DULCOLAX) suppository 10 mg  10 mg Q24HRS PRN Carol Cisse, A.P.N.   10 mg at 02/13/17 0555    And   • fleet enema 133 mL  1 Each Once PRN Carol Cisse, A.P.N.       • NS infusion   Continuous Carol Cisse A.P.N.   Stopped at 02/13/17 0900   • omeprazole (PRILOSEC) capsule 20 mg  20 mg DAILY Carol Cisse, A.P.N.   20 mg at 02/15/17 0724   • aspirin EC (ECOTRIN) tablet 81 mg  81 mg DAILY Carol Cisse, A.P.N.   81 mg at 02/15/17 0725   • electrolyte-A (PLASMALYTE-A) infusion   PRN Carol M Cisse, A.P.N.       • oxycodone immediate-release (ROXICODONE) tablet 5 mg  5 mg Q3HRS PRN Carol Cisse, A.P.N.   5 mg at 02/15/17 2140   • oxycodone immediate release (ROXICODONE) tablet 10 mg  10 mg Q3HRS PRN Carol Cisse, A.P.N.   10 mg at 02/11/17 0442   • tramadol (ULTRAM) 50 MG tablet 50 mg  50 mg Q4HRS PRN Carol Cisse, A.P.N.   50 mg at 02/15/17 2140   • ondansetron (ZOFRAN) syringe/vial injection 4 mg  4 mg Q6HRS PRN Carol Cisse, A.P.N.   4 mg at 02/11/17 1004    Or   • prochlorperazine (COMPAZINE)  injection 10 mg  10 mg Q6HRS PRN Carol Cisse, A.P.N.   10 mg at 02/08/17 2142   • acetaminophen (TYLENOL) tablet 650 mg  650 mg Q4HRS PRN Carol Cisse, A.P.N.        Or   • acetaminophen (TYLENOL) suppository 650 mg  650 mg Q4HRS PRN Carol Cisse, A.P.N.       • mag hydrox-al hydrox-simeth (MAALOX PLUS ES or MYLANTA DS) suspension 30 mL  30 mL Q4HRS PRN Carol Cisse, A.P.N.       • diphenhydrAMINE (BENADRYL) tablet/capsule 25 mg  25 mg HS PRN - MR X 1 Carol Cisse, A.P.N.   25 mg at 02/14/17 2219     Last reviewed on 2/8/2017  6:13 AM by Johanna Pepper R.N.    Quality  Measures:  Medications reviewed, Labs reviewed, Radiology images reviewed and EKG reviewed    Central line in place: Need for access and Shock    DVT Prophylaxis: Enoxaparin (Lovenox)  DVT prophylaxis - mechanical: SCDs  Ulcer prophylaxis: Yes    Assessed for rehab: Patient was assess for and/or received rehabilitation services during this hospitalization    Assessment/Plan:  Daja-op ventilatory management - intubated 2/8, extubated 2/8   - encourage IS, ambulation in [place]   - RT protocols  Severe AS with bicuspid AV s/p mechanical AVR   - CVS following   - coumadin per CVS - no bridge per CVS   - caution with narcotics, cont lidoderm patch  V-tach s/p AICD placement   - interrogated 2/8 post-surgery, re-interrogation ok  Acute cardiogenic pulm edema - resolving  CHF III - mild decompensation - better  Undifferentiate shock - returned 2/11, hypovolemia vs medication effect - persisting vasoplegia?   - cont levophed to goal MAP>65   - wean off AM 2/13 as tolerated   - IVF bolus if low CVP (<8)  Acute blood loss anemia - conservative transfusion strategies  Urinary retention - resolved  JON - ?obstructive uropathy - improving! - creat normal   - monitor, holding diuresis  Leukocytosis with fever - resolved   - monitor off abx for now  Prophylaxis, diet, therapies    Ok to go  To all oral meds  CVS rehab  D/c plans and safety  instructions reviewed with patient at length    Discussed patient condition and risk of morbidity and/or mortality with Family, RN, RT, Pharmacy, Dietary, , Charge nurse / hot rounds, QA team, Patient and CVS.

## 2017-02-16 NOTE — PROGRESS NOTES
Cardiovascular Surgery Progress Note    Name: Alberto Fam  MRN: [id number]  : 1955  Admit Date: 2017  5:57 AM  Procedure:  Procedure(s) and Anesthesia Type:     * STERNOTOMY - REDO - General     * AORTIC VALVE REPLACEMENT - General     * JUSTINO - General  8 Day Post-Op    Vitals:  Patient Vitals for the past 8 hrs:   Temp SpO2 O2 Delivery Pulse Resp NIBP Weight   17 0800 37.2 °C (98.9 °F) 90 % None (Room Air) 60 18 137/89 mmHg -   17 0640 37.2 °C (98.9 °F) - - - - - -   17 0400 37.8 °C (100.1 °F) 94 % None (Room Air) 61 18 106/89 mmHg 100.6 kg (221 lb 12.5 oz)     Temp (24hrs), Av.2 °C (98.9 °F), Min:36.6 °C (97.9 °F), Max:37.8 °C (100.1 °F)      Respiratory:    Respiration: 18, Pulse Oximetry: 90 %     Chest Tube Drains:          Fluids:    Intake/Output Summary (Last 24 hours) at 17 0810  Last data filed at 17 0700   Gross per 24 hour   Intake    690 ml   Output   3900 ml   Net  -3210 ml     Admit weight: Weight: 93.4 kg (205 lb 14.6 oz)  Current weight: Weight: 100.6 kg (221 lb 12.5 oz) (17 0400)    Labs:  Recent Labs      17   0517  02/15/17   0454  17   0400   WBC  5.7  6.4  5.6   RBC  3.26*  3.15*  3.04*   HEMOGLOBIN  10.5*  10.0*  9.9*   HEMATOCRIT  30.6*  29.4*  28.6*   MCV  93.9  93.3  94.1   MCH  32.2  31.7  32.6   MCHC  34.3  34.0  34.6   RDW  41.4  41.8  42.5   PLATELETCT  114*  137*  151*   MPV  9.9  10.2  10.1         Recent Labs      17   0517  02/15/17   0454  17   0400   SODIUM  130*  131*  133*   POTASSIUM  4.0  3.8  4.0   CHLORIDE  98  100  96   CO2  23  25  25   GLUCOSE  107*  102*  120*   BUN  23*  21  21   CREATININE  0.93  1.08  1.08   CALCIUM  9.0  9.0  8.7     Recent Labs      17   0517  02/15/17   1100  17   0400   INR  1.14*  1.32*  1.43*       Medications:  • furosemide  40 mg     • potassium chloride (KCl)  20 mEq     • warfarin  7.5 mg     • lidocaine  1 Patch     • tamsulosin  0.4 mg     • docusate  sodium  100 mg      And   • senna-docusate  1 Tab     • omeprazole  20 mg     • aspirin EC  81 mg         Exam:   Review of Systems   Constitutional: Negative.    HENT: Negative.    Eyes: Negative.    Respiratory: Negative.    Cardiovascular: Negative.    Gastrointestinal: Negative.    Genitourinary: Negative.    Musculoskeletal: Negative.    Skin: Negative.    Neurological: Negative.    Psychiatric/Behavioral: Negative.        Physical Exam   Constitutional: He is oriented to person, place, and time. He appears well-developed and well-nourished.   HENT:   Head: Normocephalic and atraumatic.   Eyes: Pupils are equal, round, and reactive to light.   Neck: Normal range of motion. Neck supple.   Cardiovascular: Normal rate and regular rhythm.    paced   Pulmonary/Chest: Effort normal.   Decreased at bases.    Abdominal: Soft. Bowel sounds are normal.   Musculoskeletal: Normal range of motion.   Neurological: He is alert and oriented to person, place, and time.   Skin: Skin is warm and dry.   Wounds CDI   Psychiatric: He has a normal mood and affect.       Quality Measures:   EKG reviewed, Medications reviewed, Radiology images reviewed and Labs reviewed  Rosales catheter: No Rosales  Central line in place: Need for access    DVT Prophylaxis: Enoxaparin (Lovenox) and Warfarin (Coumadin)  DVT prophylaxis - mechanical: SCDs  Ulcer prophylaxis: Yes    Assessed for rehab: Patient returned to prior level of function, rehabilitation not indicated at this time      Assessment/Plan:  POD 1 Extubated, HDS, no drips, chest tube output minimal and negative for air leak.  Neuro grossly intact.  Pacer not capturing until 40 BPM.  Will have re-interrogated.  Hold coreg for now until PPM re-interrogated.  Plan: Transition to tele status.  DC chest tubes.  DC rosales.Transition to tele status.  DC chest tubes.  DC rosales.  POD 2 BP stable/low.  NSR/ST.  PPM fucntion normal. Urine retention. +5L, wts up. PLAN:  DC inspra/entresto until BP  improved and to allow for diuresis.  Continue coreg as BP allows. Reinsert rosales/add flomax.  Plts low, w/w. PT/OT.     POD 3 HOTN this AM.  On levophed.  Given lasix with BP low.  DC'd lasix and K.  Getting fluid bolus.  Pain in scapula right side.  Lidocaine patch and heating pad.  Watch BP.  CPM.   POD 4 BP still low.  On levo drip low dose.  Wean levo.  HDS, neuro intact.  Wounds CDI.  Paced rhythm.  Pain in scapula improved.  CPM.   POD 5 HDS off levo gtt, fluid overload- will start diuresis tomorrow if holds pressure off pressors, CXR today, amb, enc IS  POD 6 HDS, neuro intact.  Feels much better.  Lines out.  Ambulating halls.  Volume overload.  Gentle diurese today.    POD 7 diuresing well.  Feels better each day.  HDS, SR, Ambulating halls.  Blood pressure tolerating lasix.  Will keep one more day to diurese and the home in am.   POD 8 HDS, diuresed well, SR, amblating, room air, home today with home health    Patient seen, examined and plan reviewed with midlevel provider. I agree with the plan.      Active Hospital Problems    Diagnosis   • Severe aortic stenosis [I35.0]

## 2017-02-16 NOTE — FACE TO FACE
Face to Face Supporting Documentation - Home Health    The encounter with this patient was in whole or in part the primary reason for home health admission.    Date of encounter:   Patient:                    MRN:                       YOB: 2015  Alberto Fam  [id number]  1955     Home health to see patient for:  Skilled Nursing care for assessment, interventions & education    Skilled need for:  Surgical Aftercare vital signs, wound care    Skilled nursing interventions to include:  Wound Care    Homebound status evidenced by:  Need the aid of supportive devices such as crutches, canes, wheelchairs or walkers or Needs the assistance of another person in order to leave the home. Leaving home requires a considerable and taxing effort. There is a normal inability to leave the home.    Community Physician to provide follow up care: Trevor Stephen D.O.     Optional Interventions? No      I certify the face to face encounter for this home health care referral meets the CMS requirements and the encounter/clinical assessment with the patient was, in whole, or in part, for the medical condition(s) listed above, which is the primary reason for home health care. Based on my clinical findings: the service(s) are medically necessary, support the need for home health care, and the homebound criteria are met.  I certify that this patient has had a face to face encounter by myself.  SULMA Luo. - NPI: [id number]

## 2017-02-16 NOTE — DISCHARGE INSTRUCTIONS
Discharge Instructions    Discharged to home by car with relative. Discharged via wheelchair, hospital escort: Yes.  Special equipment needed: Not Applicable    Be sure to schedule a follow-up appointment with your primary care doctor or any specialists as instructed.     Discharge Plan:      I understand that a diet low in cholesterol, fat, and sodium is recommended for good health. Unless I have been given specific instructions below for another diet, I accept this instruction as my diet prescription.   Other diet: cardiac    Special Instructions: heart surgery and coumadin    Cardiac Surgery Discharge Instructions/[facility]    Activity:  1. NO driving for 4 weeks after surgery. You may ride as a passenger.  2. NO lifting of any item over 10 lbs (e.g. gallon of milk) for 6 weeks after surgery.  Do not raise both arms above head, only one at a time.  Do not push or pull with your arms.  3. Walk as much as possible! Walk a minimum of 4 times per day. Depending on your fatigue and comfort level, you may walk as much as you wish. There is no maximum.  4. Other physical activities (sex, housework, gardening, etc.) are OK after 4 weeks or after 8 weeks if you want to golf (start by putting, then advance to chipping, then to driving).  5. Continue using incentive spirometer for 2 weeks.  If you are going home on oxygen and you were not on oxygen prior to surgery, keep using until you are oxygen free.  6. Weigh daily and write it down starting  the next morning after you arrive home. Call your doctor for a weight gain of 2-4 lbs in 1-2 days.    Incision Care:  1. SHOWER EVERYDAY-no baths. Make sure to clean your incision(s) twice daily with plain, perfume and dye-free soap (if you shower in the morning, stand at the sink at bedtime and clean incision with soap and water). Then pat incision(s) dry with clean towel. Avoid creams or lotions on the incision(s).    2. If there is any increase in redness or  swelling, or separation of the incision line, or thick drainage* from any of the incisions, call [facility] ([phone]). * Clear, thin drainage is not abnormal especially from the leg incision and/or chest tube sites.                    3. Continue to wear your ARVIND Stockings for 4 weeks on the leg(s) with the incision(s) or swelling. You may take off the stocking(s) when in bed or when the leg(s) are elevated.    General Instructions:  1. If you are on the blood thinner Coumadin (warfarin), you will need your coumadin levels checked periodically.  2. You have been referred to Cardiac Rehab which is highly recommended for you after heart surgery. You can start Cardiac Rehab 30 days after surgery.  If you do not have an appointment at the time of discharge call [phone] to schedule an appointment.  3. Your Primary Care Doctor typically handles Home Oxygen. The Home Oxygen service that drops off your tanks should be checking your oxygen saturation levels. Oxygen may be stopped when you are > 90 % saturated on room air. Check with your Primary Care Doctor if you are unsure.    Prescription Refills/Questions:   Call your usual Pharmacy for ALL refills   1. Pain medication questions only: Call [facility] at [phone].  (Remember that refills may require additional physician approval and will need at least 24 hours’ notice. Please call for refills on Mondays through Fridays from 9 am to 4 pm).  2. For all other medications (except pain medication): Call your Cardiology Group or Primary Care Doctor (not [facility]) for refills or questions.    [facility] IS ALWAYS AVAILABLE TO ANSWER YOUR QUESTIONS. DO NOT HESITATE TO CALL!    · Is patient discharged on Warfarin / Coumadin?   Yes    You are on the blood thinner Coumadin (warfarin) and you will need your coumadin levels checked periodically. For any questions call the [facility] @ [phone]. The home health nurse  "will draw your blood and the coumadin clinic will call with any change to your dose.      IMPORTANT: HOW TO USE THIS INFORMATION:  This is a summary and does NOT have all possible information about this product. This information does not assure that this product is safe, effective, or appropriate for you. This information is not individual medical advice and does not substitute for the advice of your health care professional. Always ask your health care professional for complete information about this product and your specific health needs.      WARFARIN - ORAL (WARF-uh-rin)      COMMON BRAND NAME(S): Coumadin      WARNING:  Warfarin can cause very serious (possibly fatal) bleeding. This is more likely to occur when you first start taking this medication or if you take too much warfarin. To decrease your risk for bleeding, your doctor or other health care provider will monitor you closely and check your lab results (INR test) to make sure you are not taking too much warfarin. Keep all medical and laboratory appointments. Tell your doctor right away if you notice any signs of serious bleeding. See also Side Effects section.      USES:  This medication is used to treat blood clots (such as in deep vein thrombosis-DVT or pulmonary embolus-PE) and/or to prevent new clots from forming in your body. Preventing harmful blood clots helps to reduce the risk of a stroke or heart attack. Conditions that increase your risk of developing blood clots include a certain type of irregular heart rhythm (atrial fibrillation), heart valve replacement, recent heart attack, and certain surgeries (such as hip/knee replacement). Warfarin is commonly called a \"blood thinner,\" but the more correct term is \"anticoagulant.\" It helps to keep blood flowing smoothly in your body by decreasing the amount of certain substances (clotting proteins) in your blood.      HOW TO USE:  Read the Medication Guide provided by your pharmacist before you start " taking warfarin and each time you get a refill. If you have any questions, ask your doctor or pharmacist. Take this medication by mouth with or without food as directed by your doctor or other health care professional, usually once a day. It is very important to take it exactly as directed. Do not increase the dose, take it more frequently, or stop using it unless directed by your doctor. Dosage is based on your medical condition, laboratory tests (such as INR), and response to treatment. Your doctor or other health care provider will monitor you closely while you are taking this medication to determine the right dose for you. Use this medication regularly to get the most benefit from it. To help you remember, take it at the same time each day. It is important to eat a balanced, consistent diet while taking warfarin. Some foods can affect how warfarin works in your body and may affect your treatment and dose. Avoid sudden large increases or decreases in your intake of foods high in vitamin K (such as broccoli, cauliflower, cabbage, brussels sprouts, kale, spinach, and other green leafy vegetables, liver, green tea, certain vitamin supplements). If you are trying to lose weight, check with your doctor before you try to go on a diet. Cranberry products may also affect how your warfarin works. Limit the amount of cranberry juice (16 ounces/480 milliliters a day) or other cranberry products you may drink or eat.      SIDE EFFECTS:  Nausea, loss of appetite, or stomach/abdominal pain may occur. If any of these effects persist or worsen, tell your doctor or pharmacist promptly. Remember that your doctor has prescribed this medication because he or she has judged that the benefit to you is greater than the risk of side effects. Many people using this medication do not have serious side effects. This medication can cause serious bleeding if it affects your blood clotting proteins too much (shown by unusually high INR lab  results). Even if your doctor stops your medication, this risk of bleeding can continue for up to a week. Tell your doctor right away if you have any signs of serious bleeding, including: unusual pain/swelling/discomfort, unusual/easy bruising, prolonged bleeding from cuts or gums, persistent/frequent nosebleeds, unusually heavy/prolonged menstrual flow, pink/dark urine, coughing up blood, vomit that is bloody or looks like coffee grounds, severe headache, dizziness/fainting, unusual or persistent tiredness/weakness, bloody/black/tarry stools, chest pain, shortness of breath, difficulty swallowing. Tell your doctor right away if any of these unlikely but serious side effects occur: persistent nausea/vomiting, severe stomach/abdominal pain, yellowing eyes/skin. This drug rarely has caused very serious (possibly fatal) problems if its effects lead to small blood clots (usually at the beginning of treatment). This can lead to severe skin/tissue damage that may require surgery or amputation if left untreated. Patients with certain blood conditions (protein C or S deficiency) may be at greater risk. Get medical help right away if any of these rare but serious side effects occur: painful/red/purplish patches on the skin (such as on the toe, breast, abdomen), change in the amount of urine, vision changes, confusion, slurred speech, weakness on one side of the body. A very serious allergic reaction to this drug is rare. However, get medical help right away if you notice any symptoms of a serious allergic reaction, including: rash, itching/swelling (especially of the face/tongue/throat), severe dizziness, trouble breathing. This is not a complete list of possible side effects. If you notice other effects not listed above, contact your doctor or pharmacist. In the [place] - Call your doctor for medical advice about side effects. You may report side effects to FDA at 0-047-FDA-1907. In [place] - Call your doctor for medical advice  about side effects. You may report side effects to [facility] [place] at 1-432.850.6995.      PRECAUTIONS:  Before taking warfarin, tell your doctor or pharmacist if you are allergic to it; or if you have any other allergies. This product may contain inactive ingredients, which can cause allergic reactions or other problems. Talk to your pharmacist for more details. Before using this medication, tell your doctor or pharmacist your medical history, especially of: blood disorders (such as anemia, hemophilia), bleeding problems (such as bleeding of the stomach/intestines, bleeding in the brain), blood vessel disorders (such as aneurysms), recent major injury/surgery, liver disease, alcohol use, mental/mood disorders (including memory problems), frequent falls/injuries. It is important that all your doctors and dentists know that you take warfarin. Before having surgery or any medical/dental procedures, tell your doctor or dentist that you are taking this medication and about all the products you use (including prescription drugs, nonprescription drugs, and herbal products). Avoid getting injections into the muscles. If you must have an injection into a muscle (for example, a flu shot), it should be given in the arm. This way, it will be easier to check for bleeding and/or apply pressure bandages. This medication may cause stomach bleeding. Daily use of alcohol while using this medicine will increase your risk for stomach bleeding and may also affect how this medication works. Limit or avoid alcoholic beverages. If you have not been eating well, if you have an illness or infection that causes fever, vomiting, or diarrhea for more than 2 days, or if you start using any antibiotic medications, contact your doctor or pharmacist immediately because these conditions can affect how warfarin works. This medication can cause heavy bleeding. To lower the chance of getting cut, bruised, or injured, use great caution with sharp objects  like safety razors and nail cutters. Use an electric razor when shaving and a soft toothbrush when brushing your teeth. Avoid activities such as contact sports. If you fall or injure yourself, especially if you hit your head, call your doctor immediately. Your doctor may need to check you. The Food & Drug Administration has stated that generic warfarin products are interchangeable. However, consult your doctor or pharmacist before switching warfarin products. Be careful not to take more than one medication that contains warfarin unless specifically directed by the doctor or health care provider who is monitoring your warfarin treatment. Older adults may be at greater risk for bleeding while using this drug. This medication is not recommended for use during pregnancy because of serious (possibly fatal) harm to an unborn baby. Discuss the use of reliable forms of birth control with your doctor. If you become pregnant or think you may be pregnant, tell your doctor immediately. If you are planning pregnancy, discuss a plan for managing your condition with your doctor before you become pregnant. Your doctor may switch the type of medication you use during pregnancy. Very small amounts of this medication may pass into breast milk but is unlikely to harm a nursing infant. Consult your doctor before breast-feeding.      DRUG INTERACTIONS:  Drug interactions may change how your medications work or increase your risk for serious side effects. This document does not contain all possible drug interactions. Keep a list of all the products you use (including prescription/nonprescription drugs and herbal products) and share it with your doctor and pharmacist. Do not start, stop, or change the dosage of any medicines without your doctor's approval. Warfarin interacts with many prescription, nonprescription, vitamin, and herbal products. This includes medications that are applied to the skin or inside the vagina or rectum. The  "interactions with warfarin usually result in an increase or decrease in the \"blood-thinning\" (anticoagulant) effect. Your doctor or other health care professional should closely monitor you to prevent serious bleeding or clotting problems. While taking warfarin, it is very important to tell your doctor or pharmacist of any changes in medications, vitamins, or herbal products that you are taking. Some products that may interact with this drug include: capecitabine, imatinib, mifepristone. Aspirin, aspirin-like drugs (salicylates), and nonsteroidal anti-inflammatory drugs (NSAIDs such as ibuprofen, naproxen, celecoxib) may have effects similar to warfarin. These drugs may increase the risk of bleeding problems if taken during treatment with warfarin. Carefully check all prescription/nonprescription product labels (including drugs applied to the skin such as pain-relieving creams) since the products may contain NSAIDs or salicylates. Talk to your doctor about using a different medication (such as acetaminophen) to treat pain/fever. Low-dose aspirin and related drugs (such as clopidogrel, ticlopidine) should be continued if prescribed by your doctor for specific medical reasons such as heart attack or stroke prevention. Consult your doctor or pharmacist for more details. Many herbal products interact with warfarin. Tell your doctor before taking any herbal products, especially bromelains, coenzyme Q10, cranberry, danshen, dong quai, fenugreek, garlic, ginkgo biloba, ginseng, and [place] wort, among others. This medication may interfere with a certain laboratory test to measure theophylline levels, possibly causing false test results. Make sure laboratory personnel and all your doctors know you use this drug.      OVERDOSE:  If overdose is suspected, contact a poison control center or emergency room immediately. [place] residents can call the [place] National Poison Hotline at 1-788.999.7786. [place] residents can call a " [facility] poison control center. Symptoms of overdose may include: bloody/black/tarry stools, pink/dark urine, unusual/prolonged bleeding.      NOTES:  Do not share this medication with others. Laboratory and/or medical tests (such as INR, complete blood count) must be performed periodically to monitor your progress or check for side effects. Consult your doctor for more details.      MISSED DOSE:  For the best possible benefit, do not miss any doses. If you do miss a dose and remember on the same day, take it as soon as you remember. If you remember on the next day, skip the missed dose and resume your usual dosing schedule. Do not double the dose to catch up because this could increase your risk for bleeding. Keep a record of missed doses to give to your doctor or pharmacist. Contact your doctor or pharmacist if you miss 2 or more doses in a row.      STORAGE:  Store at room temperature away from light and moisture. Do not store in the bathroom. Keep all medications away from children and pets. Do not flush medications down the toilet or pour them into a drain unless instructed to do so. Properly discard this product when it is  or no longer needed. Consult your pharmacist or local waste disposal company for more details about how to safely discard your product.      MEDICAL ALERT:  Your condition and medication can cause complications in a medical emergency. For information about enrolling in MedicAlert, call 1-283.968.4387 ([place]) or 1-875.998.2681 ([place]).      Information last revised 2010 Copyright(c) 2010 [employer]     · Is patient Post Blood Transfusion?  No    Heart Failure  Heart failure is a condition in which the heart has trouble pumping blood. This means your heart does not pump blood efficiently for your body to work well. In some cases of heart failure, fluid may back up into your lungs or you may have swelling (edema) in your lower legs. Heart failure is usually a long-term  (chronic) condition. It is important for you to take good care of yourself and follow your health care provider's treatment plan.  CAUSES   Some health conditions can cause heart failure. Those health conditions include:  · High blood pressure (hypertension). Hypertension causes the heart muscle to work harder than normal. When pressure in the blood vessels is high, the heart needs to pump (contract) with more force in order to circulate blood throughout the body. High blood pressure eventually causes the heart to become stiff and weak.  · Coronary artery disease (CAD). CAD is the buildup of cholesterol and fat (plaque) in the arteries of the heart. The blockage in the arteries deprives the heart muscle of oxygen and blood. This can cause chest pain and may lead to a heart attack. High blood pressure can also contribute to CAD.  · Heart attack (myocardial infarction). A heart attack occurs when one or more arteries in the heart become blocked. The loss of oxygen damages the muscle tissue of the heart. When this happens, part of the heart muscle dies. The injured tissue does not contract as well and weakens the heart's ability to pump blood.  · Abnormal heart valves. When the heart valves do not open and close properly, it can cause heart failure. This makes the heart muscle pump harder to keep the blood flowing.  · Heart muscle disease (cardiomyopathy or myocarditis). Heart muscle disease is damage to the heart muscle from a variety of causes. These can include drug or alcohol abuse, infections, or unknown reasons. These can increase the risk of heart failure.  · Lung disease. Lung disease makes the heart work harder because the lungs do not work properly. This can cause a strain on the heart, leading it to fail.  · Diabetes. Diabetes increases the risk of heart failure. High blood sugar contributes to high fat (lipid) levels in the blood. Diabetes can also cause slow damage to tiny blood vessels that carry  important nutrients to the heart muscle. When the heart does not get enough oxygen and food, it can cause the heart to become weak and stiff. This leads to a heart that does not contract efficiently.  · Other conditions can contribute to heart failure. These include abnormal heart rhythms, thyroid problems, and low blood counts (anemia).  Certain unhealthy behaviors can increase the risk of heart failure, including:  · Being overweight.  · Smoking or chewing tobacco.  · Eating foods high in fat and cholesterol.  · Abusing illicit drugs or alcohol.  · Lacking physical activity.  SYMPTOMS   Heart failure symptoms may vary and can be hard to detect. Symptoms may include:  · Shortness of breath with activity, such as climbing stairs.  · Persistent cough.  · Swelling of the feet, ankles, legs, or abdomen.  · Unexplained weight gain.  · Difficulty breathing when lying flat (orthopnea).  · Waking from sleep because of the need to sit up and get more air.  · Rapid heartbeat.  · Fatigue and loss of energy.  · Feeling light-headed, dizzy, or close to fainting.  · Loss of appetite.  · Nausea.  · Increased urination during the night (nocturia).  DIAGNOSIS   A diagnosis of heart failure is based on your history, symptoms, physical examination, and diagnostic tests. Diagnostic tests for heart failure may include:  · Echocardiography.  · Electrocardiography.  · Chest X-ray.  · Blood tests.  · Exercise stress test.  · Cardiac angiography.  · Radionuclide scans.  TREATMENT   Treatment is aimed at managing the symptoms of heart failure. Medicines, behavioral changes, or surgical intervention may be necessary to treat heart failure.  · Medicines to help treat heart failure may include:  ¨ Angiotensin-converting enzyme (ACE) inhibitors. This type of medicine blocks the effects of a blood protein called angiotensin-converting enzyme. ACE inhibitors relax (dilate) the blood vessels and help lower blood pressure.  ¨ Angiotensin receptor  blockers (ARBs). This type of medicine blocks the actions of a blood protein called angiotensin. Angiotensin receptor blockers dilate the blood vessels and help lower blood pressure.  ¨ Water pills (diuretics). Diuretics cause the kidneys to remove salt and water from the blood. The extra fluid is removed through urination. This loss of extra fluid lowers the volume of blood the heart pumps.  ¨ Beta blockers. These prevent the heart from beating too fast and improve heart muscle strength.  ¨ Digitalis. This increases the force of the heartbeat.  · Healthy behavior changes include:  ¨ Obtaining and maintaining a healthy weight.  ¨ Stopping smoking or chewing tobacco.  ¨ Eating heart-healthy foods.  ¨ Limiting or avoiding alcohol.  ¨ Stopping illicit drug use.  ¨ Physical activity as directed by your health care provider.  · Surgical treatment for heart failure may include:  ¨ A procedure to open blocked arteries, repair damaged heart valves, or remove damaged heart muscle tissue.  ¨ A pacemaker to improve heart muscle function and control certain abnormal heart rhythms.  ¨ An internal cardioverter defibrillator to treat certain serious abnormal heart rhythms.  ¨ A left ventricular assist device (LVAD) to assist the pumping ability of the heart.  HOME CARE INSTRUCTIONS   · Take medicines only as directed by your health care provider. Medicines are important in reducing the workload of your heart, slowing the progression of heart failure, and improving your symptoms.  ¨ Do not stop taking your medicine unless directed by your health care provider.  ¨ Do not skip any dose of medicine.  ¨ Refill your prescriptions before you run out of medicine. Your medicines are needed every day.  · Engage in moderate physical activity if directed by your health care provider. Moderate physical activity can benefit some people. The elderly and people with severe heart failure should consult with a health care provider for physical  activity recommendations.  · Eat heart-healthy foods. Food choices should be free of trans fat and low in saturated fat, cholesterol, and salt (sodium). Healthy choices include fresh or frozen fruits and vegetables, fish, lean meats, legumes, fat-free or low-fat dairy products, and whole grain or high fiber foods. Talk to a dietitian to learn more about heart-healthy foods.  · Limit sodium if directed by your health care provider. Sodium restriction may reduce symptoms of heart failure in some people. Talk to a dietitian to learn more about heart-healthy seasonings.  · Use healthy cooking methods. Healthy cooking methods include roasting, grilling, broiling, baking, poaching, steaming, or stir-frying. Talk to a dietitian to learn more about healthy cooking methods.  · Limit fluids if directed by your health care provider. Fluid restriction may reduce symptoms of heart failure in some people.  · Weigh yourself every day. Daily weights are important in the early recognition of excess fluid. You should weigh yourself every morning after you urinate and before you eat breakfast. Wear the same amount of clothing each time you weigh yourself. Record your daily weight. Provide your health care provider with your weight record.  · Monitor and record your blood pressure if directed by your health care provider.  · Check your pulse if directed by your health care provider.  · Lose weight if directed by your health care provider. Weight loss may reduce symptoms of heart failure in some people.  · Stop smoking or chewing tobacco. Nicotine makes your heart work harder by causing your blood vessels to constrict. Do not use nicotine gum or patches before talking to your health care provider.  · Keep all follow-up visits as directed by your health care provider. This is important.  · Limit alcohol intake to no more than 1 drink per day for nonpregnant women and 2 drinks per day for men. One drink equals 12 ounces of beer, 5 ounces  of wine, or 1½ ounces of hard liquor. Drinking more than that is harmful to your heart. Tell your health care provider if you drink alcohol several times a week. Talk with your health care provider about whether alcohol is safe for you. If your heart has already been damaged by alcohol or you have severe heart failure, drinking alcohol should be stopped completely.  · Stop illicit drug use.  · Stay up-to-date with immunizations. It is especially important to prevent respiratory infections through current pneumococcal and influenza immunizations.  · Manage other health conditions such as hypertension, diabetes, thyroid disease, or abnormal heart rhythms as directed by your health care provider.  · Learn to manage stress.  · Plan rest periods when fatigued.  · Learn strategies to manage high temperatures. If the weather is extremely hot:  ¨ Avoid vigorous physical activity.  ¨ Use air conditioning or fans or seek a cooler location.  ¨ Avoid caffeine and alcohol.  ¨ Wear loose-fitting, lightweight, and light-colored clothing.  · Learn strategies to manage cold temperatures. If the weather is extremely cold:  ¨ Avoid vigorous physical activity.  ¨ Layer clothes.  ¨ Wear mittens or gloves, a hat, and a scarf when going outside.  ¨ Avoid alcohol.  · Obtain ongoing education and support as needed.  · Participate in or seek rehabilitation as needed to maintain or improve independence and quality of life.  SEEK MEDICAL CARE IF:   · You have a rapid weight gain.  · You have increasing shortness of breath that is unusual for you.  · You are unable to participate in your usual physical activities.  · You tire easily.  · You cough more than normal, especially with physical activity.  · You have any or more swelling in areas such as your hands, feet, ankles, or abdomen.  · You are unable to sleep because it is hard to breathe.  · You feel like your heart is beating fast (palpitations).  · You become dizzy or light-headed upon  standing up.  SEEK IMMEDIATE MEDICAL CARE IF:   · You have difficulty breathing.  · There is a change in mental status such as decreased alertness or difficulty with concentration.  · You have a pain or discomfort in your chest.  · You have an episode of fainting (syncope).  MAKE SURE YOU:   · Understand these instructions.  · Will watch your condition.  · Will get help right away if you are not doing well or get worse.     This information is not intended to replace advice given to you by your health care provider. Make sure you discuss any questions you have with your health care provider.     Document Released: 12/18/2006 Document Revised: 05/03/2016 Document Reviewed: 01/17/2014  [employer] Interactive Patient Education ©2016 [employer] Inc.      Depression / Suicide Risk    As you are discharged from this [facility] facility, it is important to learn how to keep safe from harming yourself.    Recognize the warning signs:  · Abrupt changes in personality, positive or negative- including increase in energy   · Giving away possessions  · Change in eating patterns- significant weight changes-  positive or negative  · Change in sleeping patterns- unable to sleep or sleeping all the time   · Unwillingness or inability to communicate  · Depression  · Unusual sadness, discouragement and loneliness  · Talk of wanting to die  · Neglect of personal appearance   · Rebelliousness- reckless behavior  · Withdrawal from people/activities they love  · Confusion- inability to concentrate     If you or a loved one observes any of these behaviors or has concerns about self-harm, here's what you can do:  · Talk about it- your feelings and reasons for harming yourself  · Remove any means that you might use to hurt yourself (examples: pills, rope, extension cords, firearm)  · Get professional help from the community (Mental Health, Substance Abuse, psychological counseling)  · Do not be alone:Call your Safe Contact- someone whom you  trust who will be there for you.  · Call your local CRISIS HOTLINE 356-3668 or 187-038-0793  · Call your local Children's Mobile Crisis Response Team [place] (177) 537-0478 or www.[employer]  · Call the toll free National Suicide Prevention Hotlines   · National Suicide Prevention Lifeline 227-160-OJWK (6076)  · National [employer] Line Network 800-SUICIDE (682-6096)

## 2017-02-17 ENCOUNTER — ANTICOAGULATION MONITORING (OUTPATIENT)
Dept: VASCULAR LAB | Facility: MEDICAL CENTER | Age: 62
End: 2017-02-17

## 2017-02-17 ENCOUNTER — HOME CARE VISIT (OUTPATIENT)
Dept: HOME HEALTH SERVICES | Facility: HOME HEALTHCARE | Age: 62
End: 2017-02-17
Payer: COMMERCIAL

## 2017-02-17 VITALS
HEIGHT: 71 IN | SYSTOLIC BLOOD PRESSURE: 126 MMHG | RESPIRATION RATE: 22 BRPM | HEART RATE: 58 BPM | TEMPERATURE: 99.4 F | BODY MASS INDEX: 31.02 KG/M2 | WEIGHT: 221.6 LBS | DIASTOLIC BLOOD PRESSURE: 85 MMHG

## 2017-02-17 DIAGNOSIS — Z95.2 HEART VALVE REPLACED: ICD-10-CM

## 2017-02-17 LAB — INR PPP: 2.1 (ref 2–3.5)

## 2017-02-17 PROCEDURE — 665001 SOC-HOME HEALTH

## 2017-02-17 PROCEDURE — G0162 HHC RN E&M PLAN SVS, 15 MIN: HCPCS

## 2017-02-17 SDOH — ECONOMIC STABILITY: HOUSING INSECURITY: UNSAFE COOKING RANGE AREA: 0

## 2017-02-17 SDOH — ECONOMIC STABILITY: HOUSING INSECURITY: UNSAFE APPLIANCES: 0

## 2017-02-17 ASSESSMENT — PATIENT HEALTH QUESTIONNAIRE - PHQ9
2. FEELING DOWN, DEPRESSED, IRRITABLE, OR HOPELESS: 00
1. LITTLE INTEREST OR PLEASURE IN DOING THINGS: 00

## 2017-02-17 ASSESSMENT — ACTIVITIES OF DAILY LIVING (ADL)
HOME_HEALTH_OASIS: 01
OASIS_M1830: 03

## 2017-02-17 NOTE — DISCHARGE SUMMARY
ADMITTING DIAGNOSES:  1.  Severe aortic stenosis.  2.  History of ventricular septal defect repair in 1961.  3.  Hypertension.  4.  Complete heart block.  5.  Cardiomyopathy.  6.  Status post automatic implantable cardioverter-defibrillator.  7.  Hernia repair.     PROCEDURES PERFORMED THIS ADMISSION:  On 02/08/2017, was an aortic valve   replacement with 23 mm Perimount Magna Moy valve with intraoperative   transesophageal echocardiogram.    HISTORY OF PRESENT ILLNESS:  The patient is a very pleasant 61-year-old   gentleman with increasing shortness of breath and fatigue.  He had severe   nonischemic cardiomyopathy with an ejection fraction of 20% and a BiV-ICD.    His echocardiogram showed severe calcific aortic stenosis and bicuspid aortic   valve.  Cardiac catheterization did not show any hemodynamically significant   coronary artery disease and he was set up for elective aortic valve   replacement.    HOSPITAL COURSE:  Postoperative day 1, he was extubate.  He was   hemodynamically stable.  His pacemaker was interrogated.  He was transferred   to telemetry status.  On postoperative day 2, he was stable, fully paced.  He   was hypotensive.  He was unable to be diuresed.  Postoperative day 3,  he was   hemodynamically stable, his pressure had improved.  He was given Lasix that   made him more hypotensive.  He was started on low-dose Levophed drip.    Postoperative day 4, he was weaned off the Levophed.  Postoperative day 5, he   was hemodynamically stable, off all drips.  A chest x-ray was performed that   showed a trace to small left pleural effusion.  Postoperative day 6, he was   hemodynamically stable, off all drips.  He was started on low-dose Lasix.  He   diuresed well.  Postoperative day 7, he was hemodynamically stable.  His Lasix   was increased.  He was ambulating with no assistance.  He was on room air   oxygen.  Postoperative day 8, he was hemodynamically stable.  He was in sinus   rhythm.  He was  ambulating with no assistance.  He was on room air.  He was   diuresing well with the increased dose of Lasix.  He was still 12 pounds above   his baseline admit weight.  His chest x-ray showed a trace to small left   pleural effusion and it is felt that he was ready for discharge home with home   health services.    DISCHARGE INSTRUCTIONS:  He will be followed by [facility] 2-3 days a week.    He will be on Coumadin for 3 months for his tissue valve.  He will be followed   by the [facility].  He will have his next INR draw by [facility]   on 02/17/2017 with results to the Coumadin Clinic.  He is instructed to call   with any increased shortness of breath, increased weight gain greater than 2   pounds in one day, any redness, swelling or drainage of his incisions.  He is   to follow up with [facility] on 02/21/2017 at 10:20 a.m. and follow up   with Dr. Llanos on 03/20/2017 11:50 a.m.  He will follow up with Dr. Marcus   on 03/01/2017 at 10:15 a.m. to follow his aortic coarctation.    DISCHARGE MEDICATIONS:  He will discontinue his home dose of ibuprofen and   Entresto.  He will continue his home dose of aspirin 81 mg p.o. daily, Coreg   3.125 mg p.o. b.i.d., Inspra 25 mg p.o. daily, multivitamin 1 tablet daily.    NEW MEDICATIONS:  Lasix 40 mg p.o. b.i.d., potassium 20 mEq p.o. b.i.d. for 7   days, then 1 tablet daily, Norco 5/325 one tab q. 4-6 hours p.r.n. pain,   Coumadin 7.5 mg p.o. daily to be titrated to an INR of 2-3.       ____________________________________     JOHNNY KAISER / DHAVAL    DD:  02/16/2017 09:26:50  DT:  02/17/2017 02:44:19    D#:  [id number]  Job#:  [id number]

## 2017-02-18 ENCOUNTER — HOME CARE VISIT (OUTPATIENT)
Dept: HOME HEALTH SERVICES | Facility: HOME HEALTHCARE | Age: 62
End: 2017-02-18
Payer: COMMERCIAL

## 2017-02-18 VITALS
TEMPERATURE: 97.2 F | BODY MASS INDEX: 30.71 KG/M2 | HEART RATE: 65 BPM | SYSTOLIC BLOOD PRESSURE: 123 MMHG | RESPIRATION RATE: 18 BRPM | DIASTOLIC BLOOD PRESSURE: 69 MMHG | WEIGHT: 220.1 LBS

## 2017-02-18 PROCEDURE — G0162 HHC RN E&M PLAN SVS, 15 MIN: HCPCS

## 2017-02-18 SDOH — ECONOMIC STABILITY: HOUSING INSECURITY: UNSAFE COOKING RANGE AREA: 0

## 2017-02-18 SDOH — ECONOMIC STABILITY: HOUSING INSECURITY: UNSAFE APPLIANCES: 0

## 2017-02-18 NOTE — PROGRESS NOTES
Anticoagulation Summary as of 2/17/2017     INR goal 2.0-3.0   Selected INR 2.1 (2/17/2017)   Maintenance plan 7.5 mg (5 mg x 1.5) every day   Weekly total 52.5 mg   Plan last modified Mora Kwon PHARMD (2/17/2017)   Next INR check 2/21/2017   Target end date 4/14/2017    Indications   Heart valve replaced [Z95.2]         Anticoagulation Episode Summary     INR check location     Preferred lab     Send INR reminders to     Comments [facility]      Anticoagulation Care Providers     Provider Role Specialty Phone number    Grayson Rios M.D. Referring Cardiology [phone]    Mora Kwon PHARMD Responsible          Anticoagulation Patient Findings    Pt is new to warfarin and new to [facility].  Discussed indication for warfarin therapy and INR goal range. Explained our services, hours of operation, warfarin therapy, potential SE, potential DI.. Discussed diet at length, with an emphasis on foods rich in vitamin K.  Discussed monitoring parameters, such as blood in urine, blood in stool, discussed what to do if a dose is missed, or suspected as missed.  Emphasized importance of compliance.  Discussed lifestyle choices of ETOH & smoking and its impact on therapy.       Pt denies any unusual s/s of bleeding, bruising, clotting or any changes to diet or medications.    CHADSVASC = 1    Pt recently had a bioprosthethtic valve placed in the aortic position on 2-8-2017 by Viet.  PT is a patient of Dr. Rios. PCP is Zafar. Pt is therapeutic today at 2.1 on 7.5mg warfarin daily. Pt is to continue with current warfarin dosing regimen.   Follow up in 3 days    Mora Kwon PHARMD

## 2017-02-20 ENCOUNTER — HOME CARE VISIT (OUTPATIENT)
Dept: HOME HEALTH SERVICES | Facility: HOME HEALTHCARE | Age: 62
End: 2017-02-20
Payer: COMMERCIAL

## 2017-02-20 ENCOUNTER — APPOINTMENT (OUTPATIENT)
Dept: RADIOLOGY | Facility: MEDICAL CENTER | Age: 62
DRG: 189 | End: 2017-02-20
Attending: EMERGENCY MEDICINE
Payer: COMMERCIAL

## 2017-02-20 ENCOUNTER — RESOLUTE PROFESSIONAL BILLING HOSPITAL PROF FEE (OUTPATIENT)
Dept: HOSPITALIST | Facility: MEDICAL CENTER | Age: 62
End: 2017-02-20
Payer: OTHER MISCELLANEOUS

## 2017-02-20 ENCOUNTER — HOSPITAL ENCOUNTER (INPATIENT)
Facility: MEDICAL CENTER | Age: 62
LOS: 4 days | DRG: 189 | End: 2017-02-24
Attending: EMERGENCY MEDICINE | Admitting: HOSPITALIST
Payer: COMMERCIAL

## 2017-02-20 VITALS
SYSTOLIC BLOOD PRESSURE: 110 MMHG | WEIGHT: 221.4 LBS | TEMPERATURE: 98.1 F | RESPIRATION RATE: 16 BRPM | DIASTOLIC BLOOD PRESSURE: 58 MMHG | BODY MASS INDEX: 30.89 KG/M2

## 2017-02-20 DIAGNOSIS — I31.39 PERICARDIAL EFFUSION: ICD-10-CM

## 2017-02-20 DIAGNOSIS — I50.23 ACUTE ON CHRONIC SYSTOLIC CONGESTIVE HEART FAILURE (HCC): ICD-10-CM

## 2017-02-20 DIAGNOSIS — J90 PLEURAL EFFUSION: ICD-10-CM

## 2017-02-20 DIAGNOSIS — R10.11 RUQ ABDOMINAL PAIN: ICD-10-CM

## 2017-02-20 PROBLEM — I50.9 CHF EXACERBATION (HCC): Status: ACTIVE | Noted: 2017-02-20

## 2017-02-20 LAB
ALBUMIN SERPL BCP-MCNC: 3.8 G/DL (ref 3.2–4.9)
ALBUMIN/GLOB SERPL: 1 G/DL
ALP SERPL-CCNC: 103 U/L (ref 30–99)
ALT SERPL-CCNC: 41 U/L (ref 2–50)
ANION GAP SERPL CALC-SCNC: 9 MMOL/L (ref 0–11.9)
AST SERPL-CCNC: 30 U/L (ref 12–45)
BASOPHILS # BLD AUTO: 0.4 % (ref 0–1.8)
BASOPHILS # BLD: 0.03 K/UL (ref 0–0.12)
BILIRUB SERPL-MCNC: 0.6 MG/DL (ref 0.1–1.5)
BNP SERPL-MCNC: 953 PG/ML (ref 0–100)
BUN SERPL-MCNC: 26 MG/DL (ref 8–22)
CALCIUM SERPL-MCNC: 9.7 MG/DL (ref 8.5–10.5)
CHLORIDE SERPL-SCNC: 97 MMOL/L (ref 96–112)
CK MB SERPL-MCNC: 1 NG/ML (ref 0–5)
CO2 SERPL-SCNC: 27 MMOL/L (ref 20–33)
CREAT SERPL-MCNC: 1.23 MG/DL (ref 0.5–1.4)
EOSINOPHIL # BLD AUTO: 0.09 K/UL (ref 0–0.51)
EOSINOPHIL NFR BLD: 1.2 % (ref 0–6.9)
ERYTHROCYTE [DISTWIDTH] IN BLOOD BY AUTOMATED COUNT: 46 FL (ref 35.9–50)
GFR SERPL CREATININE-BSD FRML MDRD: >60 ML/MIN/1.73 M 2
GLOBULIN SER CALC-MCNC: 3.7 G/DL (ref 1.9–3.5)
GLUCOSE SERPL-MCNC: 103 MG/DL (ref 65–99)
HCT VFR BLD AUTO: 34.5 % (ref 42–52)
HGB BLD-MCNC: 11.4 G/DL (ref 14–18)
IMM GRANULOCYTES # BLD AUTO: 0.04 K/UL (ref 0–0.11)
IMM GRANULOCYTES NFR BLD AUTO: 0.5 % (ref 0–0.9)
LIPASE SERPL-CCNC: 78 U/L (ref 11–82)
LYMPHOCYTES # BLD AUTO: 1.23 K/UL (ref 1–4.8)
LYMPHOCYTES NFR BLD: 16.7 % (ref 22–41)
MCH RBC QN AUTO: 32.1 PG (ref 27–33)
MCHC RBC AUTO-ENTMCNC: 33 G/DL (ref 33.7–35.3)
MCV RBC AUTO: 97.2 FL (ref 81.4–97.8)
MONOCYTES # BLD AUTO: 0.4 K/UL (ref 0–0.85)
MONOCYTES NFR BLD AUTO: 5.4 % (ref 0–13.4)
NEUTROPHILS # BLD AUTO: 5.58 K/UL (ref 1.82–7.42)
NEUTROPHILS NFR BLD: 75.8 % (ref 44–72)
NRBC # BLD AUTO: 0 K/UL
NRBC BLD AUTO-RTO: 0 /100 WBC
PLATELET # BLD AUTO: 299 K/UL (ref 164–446)
PMV BLD AUTO: 10 FL (ref 9–12.9)
POTASSIUM SERPL-SCNC: 3.8 MMOL/L (ref 3.6–5.5)
PROT SERPL-MCNC: 7.5 G/DL (ref 6–8.2)
RBC # BLD AUTO: 3.55 M/UL (ref 4.7–6.1)
SODIUM SERPL-SCNC: 133 MMOL/L (ref 135–145)
TROPONIN I SERPL-MCNC: 0.05 NG/ML (ref 0–0.04)
WBC # BLD AUTO: 7.4 K/UL (ref 4.8–10.8)

## 2017-02-20 PROCEDURE — 304561 HCHG STAT O2

## 2017-02-20 PROCEDURE — 84484 ASSAY OF TROPONIN QUANT: CPT

## 2017-02-20 PROCEDURE — 36415 COLL VENOUS BLD VENIPUNCTURE: CPT

## 2017-02-20 PROCEDURE — 83690 ASSAY OF LIPASE: CPT

## 2017-02-20 PROCEDURE — 83735 ASSAY OF MAGNESIUM: CPT

## 2017-02-20 PROCEDURE — 99285 EMERGENCY DEPT VISIT HI MDM: CPT

## 2017-02-20 PROCEDURE — 76705 ECHO EXAM OF ABDOMEN: CPT

## 2017-02-20 PROCEDURE — 85025 COMPLETE CBC W/AUTO DIFF WBC: CPT

## 2017-02-20 PROCEDURE — 700111 HCHG RX REV CODE 636 W/ 250 OVERRIDE (IP): Performed by: EMERGENCY MEDICINE

## 2017-02-20 PROCEDURE — G0151 HHCP-SERV OF PT,EA 15 MIN: HCPCS

## 2017-02-20 PROCEDURE — 96374 THER/PROPH/DIAG INJ IV PUSH: CPT

## 2017-02-20 PROCEDURE — 304562 HCHG STAT O2 MASK/CANNULA

## 2017-02-20 PROCEDURE — 71010 DX-CHEST-PORTABLE (1 VIEW): CPT

## 2017-02-20 PROCEDURE — 71250 CT THORAX DX C-: CPT

## 2017-02-20 PROCEDURE — G0299 HHS/HOSPICE OF RN EA 15 MIN: HCPCS

## 2017-02-20 PROCEDURE — 80053 COMPREHEN METABOLIC PANEL: CPT

## 2017-02-20 PROCEDURE — 99223 1ST HOSP IP/OBS HIGH 75: CPT | Performed by: HOSPITALIST

## 2017-02-20 PROCEDURE — 94760 N-INVAS EAR/PLS OXIMETRY 1: CPT

## 2017-02-20 PROCEDURE — 93306 TTE W/DOPPLER COMPLETE: CPT | Mod: 26 | Performed by: INTERNAL MEDICINE

## 2017-02-20 PROCEDURE — 83880 ASSAY OF NATRIURETIC PEPTIDE: CPT

## 2017-02-20 PROCEDURE — 770020 HCHG ROOM/CARE - TELE (206)

## 2017-02-20 PROCEDURE — 93005 ELECTROCARDIOGRAM TRACING: CPT | Performed by: EMERGENCY MEDICINE

## 2017-02-20 PROCEDURE — 82553 CREATINE MB FRACTION: CPT

## 2017-02-20 RX ORDER — CARVEDILOL 3.12 MG/1
3.12 TABLET ORAL 2 TIMES DAILY WITH MEALS
COMMUNITY
End: 2017-03-13 | Stop reason: SDUPTHER

## 2017-02-20 RX ORDER — M-VIT,TX,IRON,MINS/CALC/FOLIC 27MG-0.4MG
1 TABLET ORAL DAILY
Status: DISCONTINUED | OUTPATIENT
Start: 2017-02-21 | End: 2017-02-24 | Stop reason: HOSPADM

## 2017-02-20 RX ORDER — AMOXICILLIN 250 MG
1 CAPSULE ORAL
Status: DISCONTINUED | OUTPATIENT
Start: 2017-02-20 | End: 2017-02-24 | Stop reason: HOSPADM

## 2017-02-20 RX ORDER — ENEMA 19; 7 G/133ML; G/133ML
1 ENEMA RECTAL
Status: DISCONTINUED | OUTPATIENT
Start: 2017-02-20 | End: 2017-02-24 | Stop reason: HOSPADM

## 2017-02-20 RX ORDER — TEMAZEPAM 15 MG/1
15 CAPSULE ORAL
Status: DISCONTINUED | OUTPATIENT
Start: 2017-02-20 | End: 2017-02-24 | Stop reason: HOSPADM

## 2017-02-20 RX ORDER — ONDANSETRON 4 MG/1
4 TABLET, ORALLY DISINTEGRATING ORAL EVERY 4 HOURS PRN
Status: DISCONTINUED | OUTPATIENT
Start: 2017-02-20 | End: 2017-02-24 | Stop reason: HOSPADM

## 2017-02-20 RX ORDER — DOCUSATE SODIUM 100 MG/1
100 CAPSULE, LIQUID FILLED ORAL 2 TIMES DAILY
Status: DISCONTINUED | OUTPATIENT
Start: 2017-02-21 | End: 2017-02-24 | Stop reason: HOSPADM

## 2017-02-20 RX ORDER — FUROSEMIDE 40 MG/1
40 TABLET ORAL DAILY
COMMUNITY
End: 2017-05-31

## 2017-02-20 RX ORDER — ONDANSETRON 2 MG/ML
4 INJECTION INTRAMUSCULAR; INTRAVENOUS EVERY 4 HOURS PRN
Status: DISCONTINUED | OUTPATIENT
Start: 2017-02-20 | End: 2017-02-24 | Stop reason: HOSPADM

## 2017-02-20 RX ORDER — FUROSEMIDE 10 MG/ML
40 INJECTION INTRAMUSCULAR; INTRAVENOUS ONCE
Status: COMPLETED | OUTPATIENT
Start: 2017-02-20 | End: 2017-02-20

## 2017-02-20 RX ORDER — ACETAMINOPHEN 325 MG/1
650 TABLET ORAL EVERY 6 HOURS PRN
Status: DISCONTINUED | OUTPATIENT
Start: 2017-02-20 | End: 2017-02-24 | Stop reason: HOSPADM

## 2017-02-20 RX ORDER — CARVEDILOL 3.12 MG/1
3.12 TABLET ORAL 2 TIMES DAILY WITH MEALS
Status: DISCONTINUED | OUTPATIENT
Start: 2017-02-21 | End: 2017-02-24 | Stop reason: HOSPADM

## 2017-02-20 RX ORDER — FUROSEMIDE 10 MG/ML
40 INJECTION INTRAMUSCULAR; INTRAVENOUS
Status: DISCONTINUED | OUTPATIENT
Start: 2017-02-21 | End: 2017-02-24 | Stop reason: HOSPADM

## 2017-02-20 RX ORDER — POTASSIUM CHLORIDE 20 MEQ/1
20 TABLET, EXTENDED RELEASE ORAL DAILY
Status: DISCONTINUED | OUTPATIENT
Start: 2017-02-21 | End: 2017-02-24 | Stop reason: HOSPADM

## 2017-02-20 RX ORDER — EPLERENONE 25 MG/1
25 TABLET, FILM COATED ORAL DAILY
Status: DISCONTINUED | OUTPATIENT
Start: 2017-02-21 | End: 2017-02-24 | Stop reason: HOSPADM

## 2017-02-20 RX ORDER — BISACODYL 10 MG
10 SUPPOSITORY, RECTAL RECTAL
Status: DISCONTINUED | OUTPATIENT
Start: 2017-02-20 | End: 2017-02-24 | Stop reason: HOSPADM

## 2017-02-20 RX ORDER — PROMETHAZINE HYDROCHLORIDE 25 MG/1
12.5-25 SUPPOSITORY RECTAL EVERY 4 HOURS PRN
Status: DISCONTINUED | OUTPATIENT
Start: 2017-02-20 | End: 2017-02-24 | Stop reason: HOSPADM

## 2017-02-20 RX ORDER — HYDROCODONE BITARTRATE AND ACETAMINOPHEN 5; 325 MG/1; MG/1
1 TABLET ORAL EVERY 8 HOURS PRN
COMMUNITY
End: 2017-05-31

## 2017-02-20 RX ORDER — AMOXICILLIN 250 MG
1 CAPSULE ORAL NIGHTLY
Status: DISCONTINUED | OUTPATIENT
Start: 2017-02-21 | End: 2017-02-24 | Stop reason: HOSPADM

## 2017-02-20 RX ORDER — PROMETHAZINE HYDROCHLORIDE 25 MG/1
12.5-25 TABLET ORAL EVERY 4 HOURS PRN
Status: DISCONTINUED | OUTPATIENT
Start: 2017-02-20 | End: 2017-02-24 | Stop reason: HOSPADM

## 2017-02-20 RX ORDER — POTASSIUM CHLORIDE 20 MEQ/1
20 TABLET, EXTENDED RELEASE ORAL DAILY
COMMUNITY
End: 2017-05-31

## 2017-02-20 RX ORDER — LACTULOSE 20 G/30ML
30 SOLUTION ORAL
Status: DISCONTINUED | OUTPATIENT
Start: 2017-02-20 | End: 2017-02-24 | Stop reason: HOSPADM

## 2017-02-20 RX ORDER — HYDROCODONE BITARTRATE AND ACETAMINOPHEN 5; 325 MG/1; MG/1
1-2 TABLET ORAL EVERY 4 HOURS PRN
Status: DISCONTINUED | OUTPATIENT
Start: 2017-02-20 | End: 2017-02-24 | Stop reason: HOSPADM

## 2017-02-20 RX ADMIN — FUROSEMIDE 40 MG: 10 INJECTION, SOLUTION INTRAVENOUS at 21:32

## 2017-02-20 SDOH — ECONOMIC STABILITY: HOUSING INSECURITY: UNSAFE APPLIANCES: 0

## 2017-02-20 SDOH — ECONOMIC STABILITY: HOUSING INSECURITY: UNSAFE COOKING RANGE AREA: 0

## 2017-02-20 SDOH — ECONOMIC STABILITY: HOUSING INSECURITY: HOME SAFETY: O2 NOT IN HOME YET, BUT ORDERED PER PT.   PT REPORTS ONE STEP IN GARAGE IS NO PROBLEM FOR HIM

## 2017-02-20 ASSESSMENT — ACTIVITIES OF DAILY LIVING (ADL)
BATHING_ASSISTANCE: 2
DRESSING_LB_ASSISTANCE: 4
ORAL_CARE_ASSISTANCE: 0
TOILETING_ASSISTANCE: 0
EATING_ASSISTANCE: 0

## 2017-02-20 ASSESSMENT — ENCOUNTER SYMPTOMS
COUGH: 0
WEIGHT LOSS: 0
CHILLS: 0
SHORTNESS OF BREATH: 1
NAUSEA: 0
FEVER: 0
ABDOMINAL PAIN: 1

## 2017-02-20 ASSESSMENT — LIFESTYLE VARIABLES: DO YOU DRINK ALCOHOL: NO

## 2017-02-20 ASSESSMENT — PAIN SCALES - GENERAL: PAINLEVEL_OUTOF10: 0

## 2017-02-20 NOTE — IP AVS SNAPSHOT
" <p align=\"LEFT\"><IMG SRC=\"//[email or web address]$/[email or web address]\" alt=\"Image\" WIDTH=\"50%\" HEIGHT=\"200\" BORDER=\"\"></p>                   Name:Alberto Fam  Medical Record Number:[id number]  CSN: [id number]    YOB: 1955   Age: 61 y.o.  Sex: male  HT:1.803 m (5' 11\") WT: 96.6 kg (212 lb 15.4 oz)          Admit Date: 2/20/2017     Discharge Date:   Today's Date: 2/24/2017  Attending Doctor:  Jake Mariscal M.D.                  Allergies:  Sulfa drugs          Your appointments     Feb 27, 2017  8:45 AM   Heart Failure New with Gustavo Webster M.D.   [facility]    [address], [address]  [facility] [address] [phone]            May 30, 2017  9:40 AM   PACER CHECK ONLY with KEIKO Rizvi   [facility] (--)    [address]  [facility] [address] [phone]              Follow-up Information     1. Follow up with Trevor Stephen D.O. In 1 week.    Specialty:  Family Medicine    Contact information    [address] [facility] [address] [phone]           Medication List      Take these Medications        Instructions    aspirin EC 81 MG Tbec   Commonly known as:  ECOTRIN    Take 81 mg by mouth every day.   Dose:  81 mg       atorvastatin 40 MG Tabs   Commonly known as:  LIPITOR    Take 1 Tab by mouth every bedtime.   Dose:  40 mg       carvedilol 3.125 MG Tabs   Commonly known as:  COREG    Take 3.125 mg by mouth 2 times a day, with meals.   Dose:  3.125 mg       docusate calcium 240 MG Caps   Commonly known as:  SURFAK    Take 240 mg by mouth every day.   Dose:  240 mg       enoxaparin 100 MG/ML Soln inj   Commonly known as:  LOVENOX    Inject 100 mg as instructed every 12 hours for 3 days.   Dose:  100 mg       eplerenone 25 MG Tabs   Commonly known as:  INSPRA    Take 1 Tab by mouth every day.   Dose:  25 mg       furosemide 40 MG Tabs   Commonly known as:  LASIX    Take 40 mg " by mouth every day.   Dose:  40 mg       hydrocodone-acetaminophen 5-325 MG Tabs per tablet   Commonly known as:  NORCO    Take 1 Tab by mouth every 8 hours as needed. Indications: Moderate to Moderately Severe Pain   Dose:  1 Tab       potassium chloride SA 20 MEQ Tbcr   Commonly known as:  Kdur    Take 20 mEq by mouth every day.   Dose:  20 mEq       therapeutic multivitamin-minerals Tabs    Take 1 Tab by mouth every day.   Dose:  1 Tab       warfarin 5 MG Tabs   Commonly known as:  COUMADIN    Take 1.5 Tabs by mouth COUMADIN-DAILY.   Dose:  7.5 mg

## 2017-02-20 NOTE — IP AVS SNAPSHOT
[employer] Access Code: Activation code not generated  Current [employer] Status: Active    [employer]  A secure, online tool to manage your health information     [employer]® is a secure, online tool that connects you to your personalized health information from the privacy of your home -- day or night - making it very easy for you to manage your healthcare. Once the activation process is completed, you can even access your medical information using the [employer] saran, which is available for free in the [employer] store or Google Play store.     [employer] provides the following levels of access (as shown below):   My Chart Features   [facility] Primary Care Doctor [facility]  Specialists [facility]  Urgent  Care Non-[facility]  Primary Care  Doctor   Email your healthcare team securely and privately 24/7 X X X X   Manage appointments: schedule your next appointment; view details of past/upcoming appointments X      Request prescription refills. X      View recent personal medical records, including lab and immunizations X X X X   View health record, including health history, allergies, medications X X X X   Read reports about your outpatient visits, procedures, consult and ER notes X X X X   See your discharge summary, which is a recap of your hospital and/or ER visit that includes your diagnosis, lab results, and care plan. X X       How to register for [employer]:  1. Go to  [email or web address].  2. Click on the Sign Up Now box, which takes you to the New Member Sign Up page. You will need to provide the following information:  a. Enter your [employer] Access Code exactly as it appears at the top of this page. (You will not need to use this code after you’ve completed the sign-up process. If you do not sign up before the expiration date, you must request a new code.)   b. Enter your date of birth.   c. Enter your home email address.   d. Click Submit, and follow the next screen’s instructions.  3. Create a [employer] ID. This will  be your [employer] login ID and cannot be changed, so think of one that is secure and easy to remember.  4. Create a [employer] password. You can change your password at any time.  5. Enter your Password Reset Question and Answer. This can be used at a later time if you forget your password.   6. Enter your e-mail address. This allows you to receive e-mail notifications when new information is available in [employer].  7. Click Sign Up. You can now view your health information.    For assistance activating your [employer] account, call [phone]

## 2017-02-20 NOTE — IP AVS SNAPSHOT
" Home Care Instructions                                                                                                                  Name:Alberto Fam  Medical Record Number:[id number]  CSN: [id number]    YOB: 1955   Age: 61 y.o.  Sex: male  HT:1.803 m (5' 11\") WT: 96.6 kg (212 lb 15.4 oz)          Admit Date: 2/20/2017     Discharge Date:   Today's Date: 2/24/2017  Attending Doctor:  Jake Mariscal M.D.                  Allergies:  Sulfa drugs            Discharge Instructions       Discharge Instructions    Discharged to home by car with relative. Discharged via wheelchair, hospital escort: Refused.  Special equipment needed: Oxygen    Be sure to schedule a follow-up appointment with your primary care doctor or any specialists as instructed.     Discharge Plan:   Diet Plan: Discussed  Activity Level: Discussed  Confirmed Follow up Appointment: Appointment Scheduled  Confirmed Symptoms Management: Discussed  Medication Reconciliation Updated: Yes  Pneumococcal Vaccine Given - only chart on this line when given: Given (See MAR)  Influenza Vaccine Indication: Not indicated: Previously immunized this influenza season and > 8 years of age    I understand that a diet low in cholesterol, fat, and sodium is recommended for good health. Unless I have been given specific instructions below for another diet, I accept this instruction as my diet prescription.   Other diet: Heart healthy diet    Special Instructions:   HF Patient Discharge Instructions  · Monitor your weight daily, and maintain a weight chart, to track your weight changes.   · Activity as tolerated, unless your Doctor has ordered otherwise. Other activity order:None.  · Follow a low fat, low cholesterol, low salt diet unless instructed otherwise by your Doctor. Read the labels on the back of food products and track your intake of fat, cholesterol and salt.   · Fluid Restriction No. If a Fluid Restriction has been ordered by your Doctor, " measure fluids with a measuring cup to ensure that you are not exceeding the restriction.   · No smoking.  · Oxygen Yes. If your Doctor has ordered that you wear Oxygen at home, it is important to wear it as ordered.  · Did you receive an explanation from staff on the importance of taking each of your medications and why it is necessary to keep taking them unless your doctor says to stop? Yes  · Were all of your questions answered about how to manage your heart failure and what to do if you have increased signs and symptoms after you go home? Yes  · Do you feel like your heart failure care team involved you in the care treatment plan and allowed you to make decisions regarding your care while in the hospital and addressed any discharge needs you might have? Yes    See the educational handout provided at discharge for more information on monitoring your daily weight, activity and diet. This also explains more about Heart Failure, symptoms of a flare-up and some of the tests that you have undergone.     Warning Signs of a Flare-Up include:  · Swelling in the ankles or lower legs.  · Shortness of breath, while at rest, or while doing normal activities.   · Shortness of breath at night when in bed, or coughing in bed.   · Requiring more pillows to sleep at night, or needing to sit up at night to sleep.  · Feeling weak, dizzy or fatigued.     When to call your Doctor:  · [facility] about questions regarding the discharge instructions you were given [phone].  · Call your Primary Care Physician or Cardiologist if:   ¨ You experience any pain radiating to your jaw or neck.  ¨ You have any difficulty breathing.  ¨ You experience weight gain of 3 lbs in a day or 5 lbs in a week.   ¨ You feel any palpitations or irregular heartbeats.  ¨ You become dizzy or lose consciousness.   If you have had an angiogram or had a pacemaker or AICD placed, and experience:  · Bleeding, drainage or swelling at the  surgical / puncture site.  · Fever greater than 100.0 F  · Shock from internal defibrillator.  · Cool and / or numb extremities.      · Is patient discharged on Warfarin / Coumadin?   Yes    You are receiving the drug warfarin. Please understand the importance of monitoring warfarin with scheduled PT/INR blood draws.  Follow-up with a call to your personal Doctor's office in 3 days to schedule a PT/INR. .    IMPORTANT: HOW TO USE THIS INFORMATION:  This is a summary and does NOT have all possible information about this product. This information does not assure that this product is safe, effective, or appropriate for you. This information is not individual medical advice and does not substitute for the advice of your health care professional. Always ask your health care professional for complete information about this product and your specific health needs.      WARFARIN - ORAL (WARF-uh-rin)      COMMON BRAND NAME(S): Coumadin      WARNING:  Warfarin can cause very serious (possibly fatal) bleeding. This is more likely to occur when you first start taking this medication or if you take too much warfarin. To decrease your risk for bleeding, your doctor or other health care provider will monitor you closely and check your lab results (INR test) to make sure you are not taking too much warfarin. Keep all medical and laboratory appointments. Tell your doctor right away if you notice any signs of serious bleeding. See also Side Effects section.      USES:  This medication is used to treat blood clots (such as in deep vein thrombosis-DVT or pulmonary embolus-PE) and/or to prevent new clots from forming in your body. Preventing harmful blood clots helps to reduce the risk of a stroke or heart attack. Conditions that increase your risk of developing blood clots include a certain type of irregular heart rhythm (atrial fibrillation), heart valve replacement, recent heart attack, and certain surgeries (such as hip/knee  "replacement). Warfarin is commonly called a \"blood thinner,\" but the more correct term is \"anticoagulant.\" It helps to keep blood flowing smoothly in your body by decreasing the amount of certain substances (clotting proteins) in your blood.      HOW TO USE:  Read the Medication Guide provided by your pharmacist before you start taking warfarin and each time you get a refill. If you have any questions, ask your doctor or pharmacist. Take this medication by mouth with or without food as directed by your doctor or other health care professional, usually once a day. It is very important to take it exactly as directed. Do not increase the dose, take it more frequently, or stop using it unless directed by your doctor. Dosage is based on your medical condition, laboratory tests (such as INR), and response to treatment. Your doctor or other health care provider will monitor you closely while you are taking this medication to determine the right dose for you. Use this medication regularly to get the most benefit from it. To help you remember, take it at the same time each day. It is important to eat a balanced, consistent diet while taking warfarin. Some foods can affect how warfarin works in your body and may affect your treatment and dose. Avoid sudden large increases or decreases in your intake of foods high in vitamin K (such as broccoli, cauliflower, cabbage, brussels sprouts, kale, spinach, and other green leafy vegetables, liver, green tea, certain vitamin supplements). If you are trying to lose weight, check with your doctor before you try to go on a diet. Cranberry products may also affect how your warfarin works. Limit the amount of cranberry juice (16 ounces/480 milliliters a day) or other cranberry products you may drink or eat.      SIDE EFFECTS:  Nausea, loss of appetite, or stomach/abdominal pain may occur. If any of these effects persist or worsen, tell your doctor or pharmacist promptly. Remember that your " doctor has prescribed this medication because he or she has judged that the benefit to you is greater than the risk of side effects. Many people using this medication do not have serious side effects. This medication can cause serious bleeding if it affects your blood clotting proteins too much (shown by unusually high INR lab results). Even if your doctor stops your medication, this risk of bleeding can continue for up to a week. Tell your doctor right away if you have any signs of serious bleeding, including: unusual pain/swelling/discomfort, unusual/easy bruising, prolonged bleeding from cuts or gums, persistent/frequent nosebleeds, unusually heavy/prolonged menstrual flow, pink/dark urine, coughing up blood, vomit that is bloody or looks like coffee grounds, severe headache, dizziness/fainting, unusual or persistent tiredness/weakness, bloody/black/tarry stools, chest pain, shortness of breath, difficulty swallowing. Tell your doctor right away if any of these unlikely but serious side effects occur: persistent nausea/vomiting, severe stomach/abdominal pain, yellowing eyes/skin. This drug rarely has caused very serious (possibly fatal) problems if its effects lead to small blood clots (usually at the beginning of treatment). This can lead to severe skin/tissue damage that may require surgery or amputation if left untreated. Patients with certain blood conditions (protein C or S deficiency) may be at greater risk. Get medical help right away if any of these rare but serious side effects occur: painful/red/purplish patches on the skin (such as on the toe, breast, abdomen), change in the amount of urine, vision changes, confusion, slurred speech, weakness on one side of the body. A very serious allergic reaction to this drug is rare. However, get medical help right away if you notice any symptoms of a serious allergic reaction, including: rash, itching/swelling (especially of the face/tongue/throat), severe  dizziness, trouble breathing. This is not a complete list of possible side effects. If you notice other effects not listed above, contact your doctor or pharmacist. In the [place] - Call your doctor for medical advice about side effects. You may report side effects to FDA at 5-457-OBY-1108. In [place] - Call your doctor for medical advice about side effects. You may report side effects to [facility] [place] at 1-660.734.2994.      PRECAUTIONS:  Before taking warfarin, tell your doctor or pharmacist if you are allergic to it; or if you have any other allergies. This product may contain inactive ingredients, which can cause allergic reactions or other problems. Talk to your pharmacist for more details. Before using this medication, tell your doctor or pharmacist your medical history, especially of: blood disorders (such as anemia, hemophilia), bleeding problems (such as bleeding of the stomach/intestines, bleeding in the brain), blood vessel disorders (such as aneurysms), recent major injury/surgery, liver disease, alcohol use, mental/mood disorders (including memory problems), frequent falls/injuries. It is important that all your doctors and dentists know that you take warfarin. Before having surgery or any medical/dental procedures, tell your doctor or dentist that you are taking this medication and about all the products you use (including prescription drugs, nonprescription drugs, and herbal products). Avoid getting injections into the muscles. If you must have an injection into a muscle (for example, a flu shot), it should be given in the arm. This way, it will be easier to check for bleeding and/or apply pressure bandages. This medication may cause stomach bleeding. Daily use of alcohol while using this medicine will increase your risk for stomach bleeding and may also affect how this medication works. Limit or avoid alcoholic beverages. If you have not been eating well, if you have an illness or infection that causes  fever, vomiting, or diarrhea for more than 2 days, or if you start using any antibiotic medications, contact your doctor or pharmacist immediately because these conditions can affect how warfarin works. This medication can cause heavy bleeding. To lower the chance of getting cut, bruised, or injured, use great caution with sharp objects like safety razors and nail cutters. Use an electric razor when shaving and a soft toothbrush when brushing your teeth. Avoid activities such as contact sports. If you fall or injure yourself, especially if you hit your head, call your doctor immediately. Your doctor may need to check you. The Food & Drug Administration has stated that generic warfarin products are interchangeable. However, consult your doctor or pharmacist before switching warfarin products. Be careful not to take more than one medication that contains warfarin unless specifically directed by the doctor or health care provider who is monitoring your warfarin treatment. Older adults may be at greater risk for bleeding while using this drug. This medication is not recommended for use during pregnancy because of serious (possibly fatal) harm to an unborn baby. Discuss the use of reliable forms of birth control with your doctor. If you become pregnant or think you may be pregnant, tell your doctor immediately. If you are planning pregnancy, discuss a plan for managing your condition with your doctor before you become pregnant. Your doctor may switch the type of medication you use during pregnancy. Very small amounts of this medication may pass into breast milk but is unlikely to harm a nursing infant. Consult your doctor before breast-feeding.      DRUG INTERACTIONS:  Drug interactions may change how your medications work or increase your risk for serious side effects. This document does not contain all possible drug interactions. Keep a list of all the products you use (including prescription/nonprescription drugs and  "herbal products) and share it with your doctor and pharmacist. Do not start, stop, or change the dosage of any medicines without your doctor's approval. Warfarin interacts with many prescription, nonprescription, vitamin, and herbal products. This includes medications that are applied to the skin or inside the vagina or rectum. The interactions with warfarin usually result in an increase or decrease in the \"blood-thinning\" (anticoagulant) effect. Your doctor or other health care professional should closely monitor you to prevent serious bleeding or clotting problems. While taking warfarin, it is very important to tell your doctor or pharmacist of any changes in medications, vitamins, or herbal products that you are taking. Some products that may interact with this drug include: capecitabine, imatinib, mifepristone. Aspirin, aspirin-like drugs (salicylates), and nonsteroidal anti-inflammatory drugs (NSAIDs such as ibuprofen, naproxen, celecoxib) may have effects similar to warfarin. These drugs may increase the risk of bleeding problems if taken during treatment with warfarin. Carefully check all prescription/nonprescription product labels (including drugs applied to the skin such as pain-relieving creams) since the products may contain NSAIDs or salicylates. Talk to your doctor about using a different medication (such as acetaminophen) to treat pain/fever. Low-dose aspirin and related drugs (such as clopidogrel, ticlopidine) should be continued if prescribed by your doctor for specific medical reasons such as heart attack or stroke prevention. Consult your doctor or pharmacist for more details. Many herbal products interact with warfarin. Tell your doctor before taking any herbal products, especially bromelains, coenzyme Q10, cranberry, danshen, dong quai, fenugreek, garlic, ginkgo biloba, ginseng, and [place]'s wort, among others. This medication may interfere with a certain laboratory test to measure theophylline " levels, possibly causing false test results. Make sure laboratory personnel and all your doctors know you use this drug.      OVERDOSE:  If overdose is suspected, contact a poison control center or emergency room immediately. [place] residents can call the [place] National Poison Hotline at 1-514.997.6824. [place] residents can call a provincial poison control center. Symptoms of overdose may include: bloody/black/tarry stools, pink/dark urine, unusual/prolonged bleeding.      NOTES:  Do not share this medication with others. Laboratory and/or medical tests (such as INR, complete blood count) must be performed periodically to monitor your progress or check for side effects. Consult your doctor for more details.      MISSED DOSE:  For the best possible benefit, do not miss any doses. If you do miss a dose and remember on the same day, take it as soon as you remember. If you remember on the next day, skip the missed dose and resume your usual dosing schedule. Do not double the dose to catch up because this could increase your risk for bleeding. Keep a record of missed doses to give to your doctor or pharmacist. Contact your doctor or pharmacist if you miss 2 or more doses in a row.      STORAGE:  Store at room temperature away from light and moisture. Do not store in the bathroom. Keep all medications away from children and pets. Do not flush medications down the toilet or pour them into a drain unless instructed to do so. Properly discard this product when it is  or no longer needed. Consult your pharmacist or local waste disposal company for more details about how to safely discard your product.      MEDICAL ALERT:  Your condition and medication can cause complications in a medical emergency. For information about enrolling in MedicAlert, call 1-892.565.3809 ([place]) or 1-528.867.4486 ([place]).      Information last revised 2010 Copyright(c) 2010 [employer]             · Is patient Post Blood  Transfusion?  No    Depression / Suicide Risk    As you are discharged from this [facility] facility, it is important to learn how to keep safe from harming yourself.    Recognize the warning signs:  · Abrupt changes in personality, positive or negative- including increase in energy   · Giving away possessions  · Change in eating patterns- significant weight changes-  positive or negative  · Change in sleeping patterns- unable to sleep or sleeping all the time   · Unwillingness or inability to communicate  · Depression  · Unusual sadness, discouragement and loneliness  · Talk of wanting to die  · Neglect of personal appearance   · Rebelliousness- reckless behavior  · Withdrawal from people/activities they love  · Confusion- inability to concentrate     If you or a loved one observes any of these behaviors or has concerns about self-harm, here's what you can do:  · Talk about it- your feelings and reasons for harming yourself  · Remove any means that you might use to hurt yourself (examples: pills, rope, extension cords, firearm)  · Get professional help from the community (Mental Health, Substance Abuse, psychological counseling)  · Do not be alone:Call your Safe Contact- someone whom you trust who will be there for you.  · Call your local CRISIS HOTLINE 008-7311 or 034-488-7748  · Call your local Children's Mobile Crisis Response Team [place] (050) 145-4543 or www.DoubleRecall  · Call the toll free National Suicide Prevention Hotlines   · National Suicide Prevention Lifeline 944-027-TENO (0700)  · National Hope Line Network 800-SUICIDE (450-4484)    Heart Failure  Heart failure is a condition in which the heart has trouble pumping blood. This means your heart does not pump blood efficiently for your body to work well. In some cases of heart failure, fluid may back up into your lungs or you may have swelling (edema) in your lower legs. Heart failure is usually a long-term (chronic) condition. It is  important for you to take good care of yourself and follow your health care provider's treatment plan.  CAUSES   Some health conditions can cause heart failure. Those health conditions include:  · High blood pressure (hypertension). Hypertension causes the heart muscle to work harder than normal. When pressure in the blood vessels is high, the heart needs to pump (contract) with more force in order to circulate blood throughout the body. High blood pressure eventually causes the heart to become stiff and weak.  · Coronary artery disease (CAD). CAD is the buildup of cholesterol and fat (plaque) in the arteries of the heart. The blockage in the arteries deprives the heart muscle of oxygen and blood. This can cause chest pain and may lead to a heart attack. High blood pressure can also contribute to CAD.  · Heart attack (myocardial infarction). A heart attack occurs when one or more arteries in the heart become blocked. The loss of oxygen damages the muscle tissue of the heart. When this happens, part of the heart muscle dies. The injured tissue does not contract as well and weakens the heart's ability to pump blood.  · Abnormal heart valves. When the heart valves do not open and close properly, it can cause heart failure. This makes the heart muscle pump harder to keep the blood flowing.  · Heart muscle disease (cardiomyopathy or myocarditis). Heart muscle disease is damage to the heart muscle from a variety of causes. These can include drug or alcohol abuse, infections, or unknown reasons. These can increase the risk of heart failure.  · Lung disease. Lung disease makes the heart work harder because the lungs do not work properly. This can cause a strain on the heart, leading it to fail.  · Diabetes. Diabetes increases the risk of heart failure. High blood sugar contributes to high fat (lipid) levels in the blood. Diabetes can also cause slow damage to tiny blood vessels that carry important nutrients to the heart  muscle. When the heart does not get enough oxygen and food, it can cause the heart to become weak and stiff. This leads to a heart that does not contract efficiently.  · Other conditions can contribute to heart failure. These include abnormal heart rhythms, thyroid problems, and low blood counts (anemia).  Certain unhealthy behaviors can increase the risk of heart failure, including:  · Being overweight.  · Smoking or chewing tobacco.  · Eating foods high in fat and cholesterol.  · Abusing illicit drugs or alcohol.  · Lacking physical activity.  SYMPTOMS   Heart failure symptoms may vary and can be hard to detect. Symptoms may include:  · Shortness of breath with activity, such as climbing stairs.  · Persistent cough.  · Swelling of the feet, ankles, legs, or abdomen.  · Unexplained weight gain.  · Difficulty breathing when lying flat (orthopnea).  · Waking from sleep because of the need to sit up and get more air.  · Rapid heartbeat.  · Fatigue and loss of energy.  · Feeling light-headed, dizzy, or close to fainting.  · Loss of appetite.  · Nausea.  · Increased urination during the night (nocturia).  DIAGNOSIS   A diagnosis of heart failure is based on your history, symptoms, physical examination, and diagnostic tests. Diagnostic tests for heart failure may include:  · Echocardiography.  · Electrocardiography.  · Chest X-ray.  · Blood tests.  · Exercise stress test.  · Cardiac angiography.  · Radionuclide scans.  TREATMENT   Treatment is aimed at managing the symptoms of heart failure. Medicines, behavioral changes, or surgical intervention may be necessary to treat heart failure.  · Medicines to help treat heart failure may include:  · Angiotensin-converting enzyme (ACE) inhibitors. This type of medicine blocks the effects of a blood protein called angiotensin-converting enzyme. ACE inhibitors relax (dilate) the blood vessels and help lower blood pressure.  · Angiotensin receptor blockers (ARBs). This type of  medicine blocks the actions of a blood protein called angiotensin. Angiotensin receptor blockers dilate the blood vessels and help lower blood pressure.  · Water pills (diuretics). Diuretics cause the kidneys to remove salt and water from the blood. The extra fluid is removed through urination. This loss of extra fluid lowers the volume of blood the heart pumps.  · Beta blockers. These prevent the heart from beating too fast and improve heart muscle strength.  · Digitalis. This increases the force of the heartbeat.  · Healthy behavior changes include:  · Obtaining and maintaining a healthy weight.  · Stopping smoking or chewing tobacco.  · Eating heart-healthy foods.  · Limiting or avoiding alcohol.  · Stopping illicit drug use.  · Physical activity as directed by your health care provider.  · Surgical treatment for heart failure may include:  · A procedure to open blocked arteries, repair damaged heart valves, or remove damaged heart muscle tissue.  · A pacemaker to improve heart muscle function and control certain abnormal heart rhythms.  · An internal cardioverter defibrillator to treat certain serious abnormal heart rhythms.  · A left ventricular assist device (LVAD) to assist the pumping ability of the heart.  HOME CARE INSTRUCTIONS   · Take medicines only as directed by your health care provider. Medicines are important in reducing the workload of your heart, slowing the progression of heart failure, and improving your symptoms.  · Do not stop taking your medicine unless directed by your health care provider.  · Do not skip any dose of medicine.  · Refill your prescriptions before you run out of medicine. Your medicines are needed every day.  · Engage in moderate physical activity if directed by your health care provider. Moderate physical activity can benefit some people. The elderly and people with severe heart failure should consult with a health care provider for physical activity recommendations.  · Eat  heart-healthy foods. Food choices should be free of trans fat and low in saturated fat, cholesterol, and salt (sodium). Healthy choices include fresh or frozen fruits and vegetables, fish, lean meats, legumes, fat-free or low-fat dairy products, and whole grain or high fiber foods. Talk to a dietitian to learn more about heart-healthy foods.  · Limit sodium if directed by your health care provider. Sodium restriction may reduce symptoms of heart failure in some people. Talk to a dietitian to learn more about heart-healthy seasonings.  · Use healthy cooking methods. Healthy cooking methods include roasting, grilling, broiling, baking, poaching, steaming, or stir-frying. Talk to a dietitian to learn more about healthy cooking methods.  · Limit fluids if directed by your health care provider. Fluid restriction may reduce symptoms of heart failure in some people.  · Weigh yourself every day. Daily weights are important in the early recognition of excess fluid. You should weigh yourself every morning after you urinate and before you eat breakfast. Wear the same amount of clothing each time you weigh yourself. Record your daily weight. Provide your health care provider with your weight record.  · Monitor and record your blood pressure if directed by your health care provider.  · Check your pulse if directed by your health care provider.  · Lose weight if directed by your health care provider. Weight loss may reduce symptoms of heart failure in some people.  · Stop smoking or chewing tobacco. Nicotine makes your heart work harder by causing your blood vessels to constrict. Do not use nicotine gum or patches before talking to your health care provider.  · Keep all follow-up visits as directed by your health care provider. This is important.  · Limit alcohol intake to no more than 1 drink per day for nonpregnant women and 2 drinks per day for men. One drink equals 12 ounces of beer, 5 ounces of wine, or 1½ ounces of hard  liquor. Drinking more than that is harmful to your heart. Tell your health care provider if you drink alcohol several times a week. Talk with your health care provider about whether alcohol is safe for you. If your heart has already been damaged by alcohol or you have severe heart failure, drinking alcohol should be stopped completely.  · Stop illicit drug use.  · Stay up-to-date with immunizations. It is especially important to prevent respiratory infections through current pneumococcal and influenza immunizations.  · Manage other health conditions such as hypertension, diabetes, thyroid disease, or abnormal heart rhythms as directed by your health care provider.  · Learn to manage stress.  · Plan rest periods when fatigued.  · Learn strategies to manage high temperatures. If the weather is extremely hot:  · Avoid vigorous physical activity.  · Use air conditioning or fans or seek a cooler location.  · Avoid caffeine and alcohol.  · Wear loose-fitting, lightweight, and light-colored clothing.  · Learn strategies to manage cold temperatures. If the weather is extremely cold:  · Avoid vigorous physical activity.  · Layer clothes.  · Wear mittens or gloves, a hat, and a scarf when going outside.  · Avoid alcohol.  · Obtain ongoing education and support as needed.  · Participate in or seek rehabilitation as needed to maintain or improve independence and quality of life.  SEEK MEDICAL CARE IF:   · You have a rapid weight gain.  · You have increasing shortness of breath that is unusual for you.  · You are unable to participate in your usual physical activities.  · You tire easily.  · You cough more than normal, especially with physical activity.  · You have any or more swelling in areas such as your hands, feet, ankles, or abdomen.  · You are unable to sleep because it is hard to breathe.  · You feel like your heart is beating fast (palpitations).  · You become dizzy or light-headed upon standing up.  SEEK IMMEDIATE  MEDICAL CARE IF:   · You have difficulty breathing.  · There is a change in mental status such as decreased alertness or difficulty with concentration.  · You have a pain or discomfort in your chest.  · You have an episode of fainting (syncope).  MAKE SURE YOU:   · Understand these instructions.  · Will watch your condition.  · Will get help right away if you are not doing well or get worse.     This information is not intended to replace advice given to you by your health care provider. Make sure you discuss any questions you have with your health care provider.     Document Released: 12/18/2006 Document Revised: 05/03/2016 Document Reviewed: 01/17/2014  [employer] Interactive Patient Education ©2016 [employer]  Thoracentesis  A thoracentesis is a procedure to remove fluid that has built up in the space between the linings of the chest wall and the lungs (pleural space). It is normal to have a small amount of fluid in the pleural space. Some medical conditions, such as heart failure, pneumonia, kidney problems, or cancer, can create too much fluid. This extra fluid is removed using a needle that is inserted through the skin and tissue and into the pleural space.  A thoracentesis may be done to:  · Understand why there is extra fluid in the pleural space and create a treatment plan that is right for you.  · Help to get rid of shortness of breath, discomfort, or pain that is caused by the extra fluid.  LET YOUR HEALTH CARE PROVIDER KNOW ABOUT:  · Any allergies you have.  · All medicines you are taking, including vitamins, herbs, eye drops, creams, and over-the-counter medicines. This includes any use of steroids, either by mouth or in a cream.    · Previous problems you or members of your family have had with the use of anesthetics.  · Any blood disorders you have, including any history of blood clots.  · Previous surgeries you have had.  · Medical conditions you have, including:  · The possibility of pregnancy, if  this applies.  · Have a frequent cough or coughing episodes.  RISKS AND COMPLICATIONS  Generally, this is a safe procedure. However, problems may occur, including:  · Infection.  · Injury to the lung.  · Lung collapse.  · Bleeding.  BEFORE THE PROCEDURE  · You may have a chest X-ray or another imaging test, such as a CT scan or ultrasound, to determine the location and amount of fluid in your pleural space.  · Ask your health care provider about:  ¨ Changing or stopping your regular medicines. This is especially important if you are taking diabetes medicines or blood thinners.  ¨ Taking medicines such as aspirin and ibuprofen. These medicines can thin your blood. Do not take these medicines before your procedure if your health care provider instructs you not to.  ¨ Taking a cough suppressant if you have a frequent cough or coughing episodes.  · Plan to have someone take you home after the procedure.  PROCEDURE  · You will be asked to sit upright and lean slightly forward for the procedure.  · An area of your back will be cleaned with a germ-killing solution (antiseptic).  · You will be given a medicine that numbs the area (local anesthetic).  · A needle will be inserted between your ribs and into the pleural space. You may feel pressure or slight pain as the needle is positioned into the pleural space.  · Fluid will be removed from the pleural space through the needle. You may feel pressure as the fluid is removed.  · The needle will be taken out after the excess fluid has been removed. A sample of the fluid may be sent to be examined.  · The needle insertion site (puncture site) will be covered with a bandage (dressing).  The procedure may vary among health care providers and hospitals.  AFTER THE PROCEDURE  · A chest X-ray may be done to check the amount of fluid that remains in your pleural space.  · Your blood pressure, heart rate, breathing rate, and blood oxygen level will be monitored often until the  medicines you were given have worn off.  · It is your responsibility to obtain your test results. Ask the lab or department performing the test when and how you will get your results. Talk with your health care provider if you have any questions about your results.     This information is not intended to replace advice given to you by your health care provider. Make sure you discuss any questions you have with your health care provider.     Document Released: 07/03/2006 Document Revised: 01/08/2016 Document Reviewed: 09/29/2015  [employer] Interactive Patient Education ©2016 [employer]    Pleural Effusion  A pleural effusion is an abnormal buildup of fluid in the layers of tissue between your lungs and the inside of your chest (pleural space). These two layers of tissue that line both your lungs and the inside of your chest are called pleura. Usually, there is no air in the space between the pleura, only a thin layer of fluid. If left untreated, a large amount of fluid can build up and cause the lung to collapse. A pleural effusion is usually caused by another disease that requires treatment.  The two main types of pleural effusion are:  · Transudative pleural effusion. This happens when fluid leaks into the pleural space because of a low protein count in your blood or high blood pressure in your vessels. Heart failure often causes this.  · Exudative infusion. This occurs when fluid collects in the pleural space from blocked blood vessels or lymph vessels. Some lung diseases, injuries, and cancers can cause this type of effusion.  CAUSES  Pleural effusion can be caused by:  · Heart failure.  · A blood clot in the lung (pulmonary embolism).  · Pneumonia.  · Cancer.  · Liver failure (cirrhosis).  · Kidney disease.  · Complications from surgery, such as from open heart surgery.  SIGNS AND SYMPTOMS  In some cases, pleural effusion may cause no symptoms. Symptoms can include:  · Shortness of breath, especially when  lying down.  · Chest pain, often worse when taking a deep breath.  · Fever.  · Dry cough that is lasting (chronic).  · Hiccups.  · Rapid breathing.  An underlying condition that is causing the pleural effusion (such as heart failure, pneumonia, blood clots, tuberculosis, or cancer) may also cause additional symptoms.  DIAGNOSIS  Your health care provider may suspect pleural effusion based on your symptoms and medical history. Your health care provider will also do a physical exam and a chest X-ray. If the X-ray shows there is fluid in your chest, you may need to have this fluid removed using a needle (thoracentesis) so it can be tested.  You may also have:  · Imaging studies of the chest, such as:  ¨ Ultrasound.  ¨ CT scan.  · Blood tests for kidney and liver function.  TREATMENT  Treatment depends on the cause of the pleural effusion. Treatment may include:  · Taking antibiotic medicines to clear up an infection that is causing the pleural effusion.  · Placing a tube in the chest to drain the effusion (tube thoracostomy). This procedure is often used when there is an infection in the fluid.  · Surgery to remove the fibrous outer layer of tissue from the pleural space (decortication).  · Thoracentesis, which can improve cough and shortness of breath.  · A procedure to put medicine into the chest cavity to seal the pleural space to prevent fluid buildup (pleurodesis).  · Chemotherapy and radiation therapy. These may be required in the case of cancerous (malignant) pleural effusion.  HOME CARE INSTRUCTIONS  · Take medicines only as directed by your health care provider.  · Keep track of how long you can gently exercise before you get short of breath. Try simply walking at first.  · Do not use any tobacco products, including cigarettes, chewing tobacco, or electronic cigarettes. If you need help quitting, ask your health care provider.  · Keep all follow-up visits as directed by your health care provider. This is  important.  SEEK MEDICAL CARE IF:  · The amount of time that you are able to exercise decreases or does not improve with time.  · You have pain or signs of infection at the puncture site if you had thoracentesis. Watch for:  ¨ Drainage.  ¨ Redness.  ¨ Swelling.  · You have a fever.  SEEK IMMEDIATE MEDICAL CARE IF:  · You are short of breath.  · You develop chest pain.  · You develop a new cough.  MAKE SURE YOU:  · Understand these instructions.  · Will watch your condition.  · Will get help right away if you are not doing well or get worse.     This information is not intended to replace advice given to you by your health care provider. Make sure you discuss any questions you have with your health care provider.     Document Released: 12/18/2006 Document Revised: 01/08/2016 Document Reviewed: 05/13/2015  [employer] Interactive Patient Education ©2016 [employer]  Heart-Healthy Eating Plan  Many factors influence your heart health, including eating and exercise habits. Heart (coronary) risk increases with abnormal blood fat (lipid) levels. Heart-healthy meal planning includes limiting unhealthy fats, increasing healthy fats, and making other small dietary changes. This includes maintaining a healthy body weight to help keep lipid levels within a normal range.  WHAT IS MY PLAN?   Your health care provider recommends that you:  · Get no more than _________% of the total calories in your daily diet from fat.  · Limit your intake of saturated fat to less than _________% of your total calories each day.  · Limit the amount of cholesterol in your diet to less than _________ mg per day.  WHAT TYPES OF FAT SHOULD I CHOOSE?  · Choose healthy fats more often. Choose monounsaturated and polyunsaturated fats, such as olive oil and canola oil, flaxseeds, walnuts, almonds, and seeds.  · Eat more omega-3 fats. Good choices include salmon, mackerel, sardines, tuna, flaxseed oil, and ground flaxseeds. Aim to eat fish at least two  "times each week.  · Limit saturated fats. Saturated fats are primarily found in animal products, such as meats, butter, and cream. Plant sources of saturated fats include palm oil, palm kernel oil, and coconut oil.  · Avoid foods with partially hydrogenated oils in them. These contain trans fats. Examples of foods that contain trans fats are stick margarine, some tub margarines, cookies, crackers, and other baked goods.  WHAT GENERAL GUIDELINES DO I NEED TO FOLLOW?  · Check food labels carefully to identify foods with trans fats or high amounts of saturated fat.  · Fill one half of your plate with vegetables and green salads. Eat 4-5 servings of vegetables per day. A serving of vegetables equals 1 cup of raw leafy vegetables, ½ cup of raw or cooked cut-up vegetables, or ½ cup of vegetable juice.  · Fill one fourth of your plate with whole grains. Look for the word \"whole\" as the first word in the ingredient list.  · Fill one fourth of your plate with lean protein foods.  · Eat 4-5 servings of fruit per day. A serving of fruit equals one medium whole fruit, ¼ cup of dried fruit, ½ cup of fresh, frozen, or canned fruit, or ½ cup of 100% fruit juice.  · Eat more foods that contain soluble fiber. Examples of foods that contain this type of fiber are apples, broccoli, carrots, beans, peas, and barley. Aim to get 20-30 g of fiber per day.  · Eat more home-cooked food and less restaurant, buffet, and fast food.  · Limit or avoid alcohol.  · Limit foods that are high in starch and sugar.  · Avoid fried foods.  · Cook foods by using methods other than frying. Baking, boiling, grilling, and broiling are all great options. Other fat-reducing suggestions include:  ¨ Removing the skin from poultry.  ¨ Removing all visible fats from meats.  ¨ Skimming the fat off of stews, soups, and gravies before serving them.  ¨ Steaming vegetables in water or broth.  · Lose weight if you are overweight. Losing just 5-10% of your initial body " weight can help your overall health and prevent diseases such as diabetes and heart disease.  · Increase your consumption of nuts, legumes, and seeds to 4-5 servings per week. One serving of dried beans or legumes equals ½ cup after being cooked, one serving of nuts equals 1½ ounces, and one serving of seeds equals ½ ounce or 1 tablespoon.  · You may need to monitor your salt (sodium) intake, especially if you have high blood pressure. Talk with your health care provider or dietitian to get more information about reducing sodium.  WHAT FOODS CAN I EAT?  Grains  Breads, including Amharic, white, izzy, wheat, raisin, rye, oatmeal, and Italian. Tortillas that are neither fried nor made with lard or trans fat. Low-fat rolls, including hotdog and hamburger buns and English muffins. Biscuits. Muffins. Waffles. Pancakes. Light popcorn. Whole-grain cereals. Flatbread. Stephanie toast. Pretzels. Breadsticks. Rusks. Low-fat snacks and crackers, including oyster, saltine, matzo, romana, animal, and rye. Rice and pasta, including brown rice and those that are made with whole wheat.  Vegetables  All vegetables.  Fruits  All fruits, but limit coconut.  Meats and Other Protein Sources  Lean, well-trimmed beef, veal, pork, and lamb. Chicken and turkey without skin. All fish and shellfish. Wild duck, rabbit, pheasant, and venison. Egg whites or low-cholesterol egg substitutes. Dried beans, peas, lentils, and tofu. Seeds and most nuts.  Dairy  Low-fat or nonfat cheeses, including ricotta, string, and mozzarella. Skim or 1% milk that is liquid, powdered, or evaporated. Buttermilk that is made with low-fat milk. Nonfat or low-fat yogurt.  Beverages  Mineral water. Diet carbonated beverages.  Sweets and Desserts  Sherbets and fruit ices. Honey, jam, marmalade, jelly, and syrups. Meringues and gelatins. Pure sugar candy, such as hard candy, jelly beans, gumdrops, mints, marshmallows, and small amounts of dark chocolate. [place] food cake.  Eat  all sweets and desserts in moderation.  Fats and Oils  Nonhydrogenated (trans-free) margarines. Vegetable oils, including soybean, sesame, sunflower, olive, peanut, safflower, corn, canola, and cottonseed. Salad dressings or mayonnaise that are made with a vegetable oil. Limit added fats and oils that you use for cooking, baking, salads, and as spreads.  Other  Cocoa powder. Coffee and tea. All seasonings and condiments.  The items listed above may not be a complete list of recommended foods or beverages. Contact your dietitian for more options.  WHAT FOODS ARE NOT RECOMMENDED?  Grains  Breads that are made with saturated or trans fats, oils, or whole milk. Croissants. Butter rolls. Cheese breads. Sweet rolls. Donuts. Buttered popcorn. Chow mein noodles. High-fat crackers, such as cheese or butter crackers.  Meats and Other Protein Sources  Fatty meats, such as hotdogs, short ribs, sausage, spareribs, carlson, ribeye roast or steak, and mutton. High-fat deli meats, such as salami and bologna. Caviar. Domestic duck and goose. Organ meats, such as kidney, liver, sweetbreads, brains, gizzard, chitterlings, and heart.  Dairy  Cream, sour cream, cream cheese, and creamed cottage cheese. Whole milk cheeses, including blue (freya), [place], Brie, Armando, American, Havarti, Swiss, cheddar, Camembert, and [place].  Whole or 2% milk that is liquid, evaporated, or condensed. Whole buttermilk. Cream sauce or high-fat cheese sauce. Yogurt that is made from whole milk.  Beverages  Regular sodas and drinks with added sugar.  Sweets and Desserts  Frosting. Pudding. Cookies. Cakes other than delmer food cake. Candy that has milk chocolate or white chocolate, hydrogenated fat, butter, coconut, or unknown ingredients. Buttered syrups. Full-fat ice cream or ice cream drinks.  Fats and Oils  Gravy that has suet, meat fat, or shortening. Cocoa butter, hydrogenated oils, palm oil, coconut oil, palm kernel oil. These can often be found  in baked products, candy, fried foods, nondairy creamers, and whipped toppings. Solid fats and shortenings, including carlson fat, salt pork, lard, and butter. Nondairy cream substitutes, such as coffee creamers and sour cream substitutes. Salad dressings that are made of unknown oils, cheese, or sour cream.  The items listed above may not be a complete list of foods and beverages to avoid. Contact your dietitian for more information.     This information is not intended to replace advice given to you by your health care provider. Make sure you discuss any questions you have with your health care provider.     Document Released: 09/26/2009 Document Revised: 01/08/2016 Document Reviewed: 06/11/2015  [employer] Interactive Patient Education ©2016 [employer]      Your appointments     Feb 27, 2017  8:45 AM   Heart Failure New with Gustavo Webster M.D.   [facility]    [address]  [facility] [id number] [phone]            May 30, 2017  9:40 AM   PACER CHECK ONLY with KEIKO Rizvi   [facility] (--)    [address]  [facility] [address] [phone]              Follow-up Information     1. Follow up with Trevor Stephen D.O. In 1 week.    Specialty:  Family Medicine    Contact information    [address] [facility] [address] [phone]           Discharge Medication Instructions:    Below are the medications your physician expects you to take upon discharge:    Review all your home medications and newly ordered medications with your doctor and/or pharmacist. Follow medication instructions as directed by your doctor and/or pharmacist.    Please keep your medication list with you and share with your physician.               Medication List      START taking these medications        Instructions    atorvastatin 40 MG Tabs   Commonly known as:  LIPITOR   Next Dose Due:  Take  tonight 2/24/17.    Take 1 Tab by mouth every bedtime.   Dose:  40 mg       enoxaparin 100 MG/ML Soln inj   Last time this was given:  100 mg on 2/24/2017 12:27 PM   Commonly known as:  LOVENOX   Next Dose Due:  Administer Lovenox 2/24/17 at night    Inject 100 mg as instructed every 12 hours for 3 days.   Dose:  100 mg         CONTINUE taking these medications        Instructions    aspirin EC 81 MG Tbec   Last time this was given:  81 mg on 2/22/2017  9:50 AM   Commonly known as:  ECOTRIN   Next Dose Due:  Tomorrow 2/25    Take 81 mg by mouth every day.   Dose:  81 mg       carvedilol 3.125 MG Tabs   Last time this was given:  3.125 mg on 2/24/2017  9:15 AM   Commonly known as:  COREG   Next Dose Due:  Tonight with dinner 2/24/17      Take 3.125 mg by mouth 2 times a day, with meals.   Dose:  3.125 mg       docusate calcium 240 MG Caps   Commonly known as:  SURFAK   Next Dose Due:  Tomorrow 2/25    Take 240 mg by mouth every day.   Dose:  240 mg       eplerenone 25 MG Tabs   Last time this was given:  25 mg on 2/24/2017  9:15 AM   Commonly known as:  INSPRA   Next Dose Due:  Tomorrow 2/25    Take 1 Tab by mouth every day.   Dose:  25 mg       furosemide 40 MG Tabs   Commonly known as:  LASIX   Next Dose Due:  Tomorrow 2/25    Take 40 mg by mouth every day.   Dose:  40 mg       hydrocodone-acetaminophen 5-325 MG Tabs per tablet   Last time this was given:  1 Tab on 2/23/2017  9:01 PM   Commonly known as:  NORCO   Next Dose Due:  As needed    Take 1 Tab by mouth every 8 hours as needed. Indications: Moderate to Moderately Severe Pain   Dose:  1 Tab       potassium chloride SA 20 MEQ Tbcr   Last time this was given:  20 mEq on 2/24/2017  9:15 AM   Commonly known as:  Kdur   Next Dose Due:  Tomorrow 2/25    Take 20 mEq by mouth every day.   Dose:  20 mEq       therapeutic multivitamin-minerals Tabs   Last time this was given:  1 Tab on 2/24/2017  9:15 AM   Next Dose Due:  Tomorrow 2/25    Take 1 Tab by mouth every  day.   Dose:  1 Tab       warfarin 5 MG Tabs   Last time this was given:  10 mg on 2/23/2017  6:40 PM   Commonly known as:  COUMADIN   Next Dose Due:  Tonight 2/24/17    Take 1.5 Tabs by mouth COUMADIN-DAILY.   Dose:  7.5 mg               Instructions           Diet / Nutrition:    Follow any diet instructions given to you by your doctor or the dietician, including how much salt (sodium) you are allowed each day.    If you are overweight, talk to your doctor about a weight reduction plan.    Activity:    Remain physically active following your doctor's instructions about exercise and activity.    Rest often.     Any time you become even a little tired or short of breath, SIT DOWN and rest.    Worsening Symptoms:    Report any of the following signs and symptoms to the doctor's office immediately:    *Pain of jaw, arm, or neck  *Chest pain not relieved by medication                               *Dizziness or loss of consciousness  *Difficulty breathing even when at rest   *More tired than usual                                       *Bleeding drainage or swelling of surgical site  *Swelling of feet, ankles, legs or stomach                 *Fever (>100ºF)  *Pink or blood tinged sputum  *Weight gain (3lbs/day or 5lbs /week)           *Shock from internal defibrillator (if applicable)  *Palpitations or irregular heartbeats                *Cool and/or numb extremities    Stroke Awareness    Common Risk Factors for Stroke include:    Age  Atrial Fibrillation  Carotid Artery Stenosis  Diabetes Mellitus  Excessive alcohol consumption  High blood pressure  Overweight   Physical inactivity  Smoking    Warning signs and symptoms of a stroke include:    *Sudden numbness or weakness of the face, arm or leg (especially on one side of the body).  *Sudden confusion, trouble speaking or understanding.  *Sudden trouble seeing in one or both eyes.  *Sudden trouble walking, dizziness, loss of balance or coordination.Sudden severe  headache with no known cause.    It is very important to get treatment quickly when a stroke occurs. If you experience any of the above warning signs, call 911 immediately.                   Disclaimer         Quit Smoking / Tobacco Use:    I understand the use of any tobacco products increases my chance of suffering from future heart disease or stroke and could cause other illnesses which may shorten my life. Quitting the use of tobacco products is the single most important thing I can do to improve my health. For further information on smoking / tobacco cessation call a Toll Free Quit Line at 1-310.794.8635 (*[employer] [place]) or 1-638.692.3643 (American [employer] Association) or you can access the web based program at www.lungusa.org.    [place] Tobacco Users Help Line:  (482) 690-1739       Toll Free: 1-128.628.3394  Quit Tobacco Program [facility] (067)528-1743    Crisis Hotline:    [place] Crisis Hotline:  5-251-IMOSFSG or 1-779.858.2389    [place] Crisis Hotline:    1-492.709.4750 or 531-577-0521    Discharge Survey:   Thank you for choosing [facility]. We hope we did everything we could to make your hospital stay a pleasant one. You may be receiving a phone survey and we would appreciate your time and participation in answering the questions. Your input is very valuable to us in our efforts to improve our service to our patients and their families.        My signature on this form indicates that:    1. I have reviewed and understand the above information.  2. My questions regarding this information have been answered to my satisfaction.  3. I have formulated a plan with my discharge nurse to obtain my prescribed medications for home.                  Disclaimer         __________________________________                     __________       ________                       Patient Signature                                                 Date                    Time

## 2017-02-20 NOTE — IP AVS SNAPSHOT
2/24/2017          Alberto Fam  [address] [place] [address]    Dear Alberto:    [facility] wants to ensure your discharge home is safe and you or your loved ones have had all your questions answered regarding your care after you leave the hospital.    You may receive a telephone call within two days of your discharge.  This call is to make certain you understand your discharge instructions as well as ensure we provided you with the best care possible during your stay with us.     The call will only last approximately 3-5 minutes and will be done by a nurse.    Once again, we want to ensure your discharge home is safe and that you have a clear understanding of any next steps in your care.  If you have any questions or concerns, please do not hesitate to contact us, we are here for you.  Thank you for choosing [facility] for your healthcare needs.    Sincerely,    Daniel Adam    [facility]

## 2017-02-21 ENCOUNTER — APPOINTMENT (OUTPATIENT)
Dept: RADIOLOGY | Facility: MEDICAL CENTER | Age: 62
DRG: 189 | End: 2017-02-21
Attending: HOSPITALIST
Payer: COMMERCIAL

## 2017-02-21 ENCOUNTER — HOME CARE VISIT (OUTPATIENT)
Dept: HOME HEALTH SERVICES | Facility: HOME HEALTHCARE | Age: 62
End: 2017-02-21
Payer: COMMERCIAL

## 2017-02-21 ENCOUNTER — APPOINTMENT (OUTPATIENT)
Dept: RADIOLOGY | Facility: MEDICAL CENTER | Age: 62
DRG: 189 | End: 2017-02-21
Attending: NURSE PRACTITIONER
Payer: COMMERCIAL

## 2017-02-21 VITALS
WEIGHT: 221.25 LBS | HEIGHT: 71 IN | BODY MASS INDEX: 30.98 KG/M2 | RESPIRATION RATE: 20 BRPM | TEMPERATURE: 99 F | SYSTOLIC BLOOD PRESSURE: 108 MMHG | DIASTOLIC BLOOD PRESSURE: 70 MMHG | HEART RATE: 60 BPM

## 2017-02-21 PROBLEM — D68.318 HEMORRHAGIC DISORDER DUE TO CIRCULATING ANTICOAGULANTS (HCC): Status: ACTIVE | Noted: 2017-02-21

## 2017-02-21 PROBLEM — E87.6 HYPOKALEMIA: Status: ACTIVE | Noted: 2017-02-21

## 2017-02-21 PROBLEM — Z95.2 S/P AVR (AORTIC VALVE REPLACEMENT): Status: ACTIVE | Noted: 2017-02-21

## 2017-02-21 LAB
ANION GAP SERPL CALC-SCNC: 12 MMOL/L (ref 0–11.9)
ANION GAP SERPL CALC-SCNC: 8 MMOL/L (ref 0–11.9)
BUN SERPL-MCNC: 25 MG/DL (ref 8–22)
BUN SERPL-MCNC: 26 MG/DL (ref 8–22)
CALCIUM SERPL-MCNC: 9 MG/DL (ref 8.5–10.5)
CALCIUM SERPL-MCNC: 9 MG/DL (ref 8.5–10.5)
CHLORIDE SERPL-SCNC: 100 MMOL/L (ref 96–112)
CHLORIDE SERPL-SCNC: 100 MMOL/L (ref 96–112)
CO2 SERPL-SCNC: 26 MMOL/L (ref 20–33)
CO2 SERPL-SCNC: 28 MMOL/L (ref 20–33)
CREAT SERPL-MCNC: 1.35 MG/DL (ref 0.5–1.4)
CREAT SERPL-MCNC: 1.37 MG/DL (ref 0.5–1.4)
EKG IMPRESSION: NORMAL
GFR SERPL CREATININE-BSD FRML MDRD: 53 ML/MIN/1.73 M 2
GFR SERPL CREATININE-BSD FRML MDRD: 54 ML/MIN/1.73 M 2
GLUCOSE SERPL-MCNC: 122 MG/DL (ref 65–99)
GLUCOSE SERPL-MCNC: 126 MG/DL (ref 65–99)
INR PPP: 2 (ref 0.87–1.13)
LV EJECT FRACT  99904: 20
LV EJECT FRACT MOD 2C 99903: 26.19
LV EJECT FRACT MOD 4C 99902: 21.45
LV EJECT FRACT MOD BP 99901: 21.26
MAGNESIUM SERPL-MCNC: 2.2 MG/DL (ref 1.5–2.5)
POTASSIUM SERPL-SCNC: 3.5 MMOL/L (ref 3.6–5.5)
POTASSIUM SERPL-SCNC: 3.5 MMOL/L (ref 3.6–5.5)
PROTHROMBIN TIME: 23.3 SEC (ref 12–14.6)
SODIUM SERPL-SCNC: 136 MMOL/L (ref 135–145)
SODIUM SERPL-SCNC: 138 MMOL/L (ref 135–145)

## 2017-02-21 PROCEDURE — 32555 ASPIRATE PLEURA W/ IMAGING: CPT | Mod: LT

## 2017-02-21 PROCEDURE — 700111 HCHG RX REV CODE 636 W/ 250 OVERRIDE (IP): Performed by: HOSPITALIST

## 2017-02-21 PROCEDURE — 36415 COLL VENOUS BLD VENIPUNCTURE: CPT

## 2017-02-21 PROCEDURE — A9270 NON-COVERED ITEM OR SERVICE: HCPCS | Performed by: HOSPITALIST

## 2017-02-21 PROCEDURE — 700112 HCHG RX REV CODE 229: Performed by: HOSPITALIST

## 2017-02-21 PROCEDURE — 700111 HCHG RX REV CODE 636 W/ 250 OVERRIDE (IP): Performed by: PHARMACIST

## 2017-02-21 PROCEDURE — 0W9B3ZZ DRAINAGE OF LEFT PLEURAL CAVITY, PERCUTANEOUS APPROACH: ICD-10-PCS | Performed by: RADIOLOGY

## 2017-02-21 PROCEDURE — 93306 TTE W/DOPPLER COMPLETE: CPT

## 2017-02-21 PROCEDURE — 700102 HCHG RX REV CODE 250 W/ 637 OVERRIDE(OP): Performed by: HOSPITALIST

## 2017-02-21 PROCEDURE — 80048 BASIC METABOLIC PNL TOTAL CA: CPT

## 2017-02-21 PROCEDURE — 770020 HCHG ROOM/CARE - TELE (206)

## 2017-02-21 PROCEDURE — 90471 IMMUNIZATION ADMIN: CPT

## 2017-02-21 PROCEDURE — 71010 DX-CHEST-PORTABLE (1 VIEW): CPT

## 2017-02-21 PROCEDURE — 99233 SBSQ HOSP IP/OBS HIGH 50: CPT | Performed by: HOSPITALIST

## 2017-02-21 PROCEDURE — 78227 HEPATOBIL SYST IMAGE W/DRUG: CPT

## 2017-02-21 PROCEDURE — 3E0234Z INTRODUCTION OF SERUM, TOXOID AND VACCINE INTO MUSCLE, PERCUTANEOUS APPROACH: ICD-10-PCS | Performed by: HOSPITALIST

## 2017-02-21 PROCEDURE — 307059 PAD,EAR PROTECTOR: Performed by: HOSPITALIST

## 2017-02-21 PROCEDURE — 85610 PROTHROMBIN TIME: CPT

## 2017-02-21 PROCEDURE — 90732 PPSV23 VACC 2 YRS+ SUBQ/IM: CPT | Performed by: HOSPITALIST

## 2017-02-21 RX ORDER — POTASSIUM CHLORIDE 20 MEQ/1
40 TABLET, EXTENDED RELEASE ORAL ONCE
Status: DISPENSED | OUTPATIENT
Start: 2017-02-21 | End: 2017-02-22

## 2017-02-21 RX ORDER — HEPARIN SODIUM 1000 [USP'U]/ML
7000 INJECTION, SOLUTION INTRAVENOUS; SUBCUTANEOUS ONCE
Status: COMPLETED | OUTPATIENT
Start: 2017-02-21 | End: 2017-02-21

## 2017-02-21 RX ORDER — HEPARIN SODIUM 1000 [USP'U]/ML
3800 INJECTION, SOLUTION INTRAVENOUS; SUBCUTANEOUS PRN
Status: DISCONTINUED | OUTPATIENT
Start: 2017-02-21 | End: 2017-02-22

## 2017-02-21 RX ADMIN — HYDROCODONE BITARTRATE AND ACETAMINOPHEN 2 TABLET: 5; 325 TABLET ORAL at 01:36

## 2017-02-21 RX ADMIN — HYDROCODONE BITARTRATE AND ACETAMINOPHEN 2 TABLET: 5; 325 TABLET ORAL at 18:16

## 2017-02-21 RX ADMIN — FUROSEMIDE 40 MG: 10 INJECTION, SOLUTION INTRAVENOUS at 18:11

## 2017-02-21 RX ADMIN — HEPARIN SODIUM 7000 UNITS: 1000 INJECTION, SOLUTION INTRAVENOUS; SUBCUTANEOUS at 18:12

## 2017-02-21 RX ADMIN — STANDARDIZED SENNA CONCENTRATE AND DOCUSATE SODIUM 1 TABLET: 8.6; 5 TABLET, FILM COATED ORAL at 02:03

## 2017-02-21 RX ADMIN — MULTIPLE VITAMINS W/ MINERALS TAB 1 TABLET: TAB at 08:41

## 2017-02-21 RX ADMIN — HEPARIN SODIUM 1450 UNITS/HR: 5000 INJECTION, SOLUTION INTRAVENOUS at 18:11

## 2017-02-21 RX ADMIN — STANDARDIZED SENNA CONCENTRATE AND DOCUSATE SODIUM 1 TABLET: 8.6; 5 TABLET, FILM COATED ORAL at 21:18

## 2017-02-21 RX ADMIN — CARVEDILOL 3.12 MG: 3.12 TABLET, FILM COATED ORAL at 08:42

## 2017-02-21 RX ADMIN — POTASSIUM CHLORIDE 20 MEQ: 1500 TABLET, EXTENDED RELEASE ORAL at 08:42

## 2017-02-21 RX ADMIN — DOCUSATE SODIUM 100 MG: 100 CAPSULE ORAL at 21:18

## 2017-02-21 RX ADMIN — EPLERENONE 25 MG: 25 TABLET, FILM COATED ORAL at 08:42

## 2017-02-21 RX ADMIN — CARVEDILOL 3.12 MG: 3.12 TABLET, FILM COATED ORAL at 21:23

## 2017-02-21 RX ADMIN — PNEUMOCOCCAL VACCINE POLYVALENT 25 MCG
25; 25; 25; 25; 25; 25; 25; 25; 25; 25; 25; 25; 25; 25; 25; 25; 25; 25; 25; 25; 25; 25; 25 INJECTION, SOLUTION INTRAMUSCULAR; SUBCUTANEOUS at 05:32

## 2017-02-21 RX ADMIN — FUROSEMIDE 40 MG: 10 INJECTION, SOLUTION INTRAVENOUS at 05:34

## 2017-02-21 RX ADMIN — HYDROCODONE BITARTRATE AND ACETAMINOPHEN 1 TABLET: 5; 325 TABLET ORAL at 07:57

## 2017-02-21 RX ADMIN — ASPIRIN 81 MG: 81 TABLET ORAL at 08:42

## 2017-02-21 ASSESSMENT — PAIN SCALES - GENERAL
PAINLEVEL_OUTOF10: 5
PAINLEVEL_OUTOF10: 5
PAINLEVEL_OUTOF10: 3
PAINLEVEL_OUTOF10: 2
PAINLEVEL_OUTOF10: 4

## 2017-02-21 ASSESSMENT — ENCOUNTER SYMPTOMS
HEADACHES: 0
VOMITING: 0
INSOMNIA: 0
BACK PAIN: 0
EYE PAIN: 0
VOMITING: NO
NAUSEA: NO
SHORTNESS OF BREATH: 1
NECK PAIN: 0
NAUSEA: 0
FEVER: 0
PALPITATIONS: 0
DEPRESSION: 0
BLURRED VISION: 0
DIZZINESS: 0
COUGH: 0
TINGLING: 0
CHILLS: 0
SORE THROAT: 0
ABDOMINAL PAIN: 0

## 2017-02-21 ASSESSMENT — LIFESTYLE VARIABLES
TOTAL SCORE: 1
EVER_SMOKED: NEVER
TOTAL SCORE: 1
HAVE YOU EVER FELT YOU SHOULD CUT DOWN ON YOUR DRINKING: YES
ALCOHOL_USE: YES
TOTAL SCORE: 1
HAVE PEOPLE ANNOYED YOU BY CRITICIZING YOUR DRINKING: NO
HOW MANY TIMES IN THE PAST YEAR HAVE YOU HAD 5 OR MORE DRINKS IN A DAY: 2
EVER FELT BAD OR GUILTY ABOUT YOUR DRINKING: NO
EVER HAD A DRINK FIRST THING IN THE MORNING TO STEADY YOUR NERVES TO GET RID OF A HANGOVER: NO
AVERAGE NUMBER OF DAYS PER WEEK YOU HAVE A DRINK CONTAINING ALCOHOL: 3
CONSUMPTION TOTAL: POSITIVE
ON A TYPICAL DAY WHEN YOU DRINK ALCOHOL HOW MANY DRINKS DO YOU HAVE: 1

## 2017-02-21 ASSESSMENT — COPD QUESTIONNAIRES
COPD SCREENING SCORE: 2
DO YOU EVER COUGH UP ANY MUCUS OR PHLEGM?: NO/ONLY WITH OCCASIONAL COLDS OR INFECTIONS
HAVE YOU SMOKED AT LEAST 100 CIGARETTES IN YOUR ENTIRE LIFE: NO/DON'T KNOW
DURING THE PAST 4 WEEKS HOW MUCH DID YOU FEEL SHORT OF BREATH: SOME OF THE TIME

## 2017-02-21 NOTE — PROGRESS NOTES
2 RN skin check done with Ken CRUZ. Ears are red and blanching. Ear pad protectors ordered. Healing sternal surgical incision from open heart from previous admission, well approximated. BLE are swollen and dry. No other skin breakdown at this time.

## 2017-02-21 NOTE — ED PROVIDER NOTES
"ED Provider Note    Scribed for Dr. Christopher Catalan M.D. by Jesus Feldman. 2/20/2017, 7:40 PM.    Primary care provider: Trevor Stephen D.O.  Means of arrival: Walk-in  History obtained from: Patient  History limited by: None    CHIEF COMPLAINT  Chief Complaint   Patient presents with   • Shortness of Breath   • Abdominal Swelling       HPI  Alberto Fam is a 61 y.o. male with a history of CHF who presents to the Emergency Department due to progressively worsening shortness of breath and abdominal distension onset 2 days ago. His shortness of breath worsens with exertion. The patient also reports intermittent right upper quadrant pain, which he describes as \"burning\" in nature, for the past 2 days. He does not report any aggravating or alleviating factors. The patient was discharged from the hospital on 2/16/17 after having an aortic valve replacement on 2/8/17.Per nurse's notes, he was given IV lasix while in the hospital and he has been taking lasix 40 mg PO twice per day at home. The patient feels as if he is not urinating as much as he should be. Denies weight loss after being discharged. Denies associated symptoms of chest pain, fever, chills, coughing, nausea, or new leg swelling. Denies liver problems. His cardiac surgeon is Dr. Llanos and his cardiologist is Dr. Elder.    REVIEW OF SYSTEMS  Review of Systems   Constitutional: Negative for fever, chills and weight loss.   Respiratory: Positive for shortness of breath. Negative for cough.    Cardiovascular: Negative for chest pain and leg swelling (New).   Gastrointestinal: Positive for abdominal pain (Right upper quadrant). Negative for nausea.        Positive for distended abdomen.   Genitourinary:        Positive for urinary retention.    All other systems reviewed and are negative.   C    PAST MEDICAL HISTORY   has a past medical history of Congestive heart failure (CMS-HCC); S/P AV servando ablation ( ); Dilated cardiomyopathy " "(September 2013); Male erectile disorder ( ); Ventricular tachycardia (CMS-HCC) ( ); SSS (sick sinus syndrome) ( ); AV block, 3rd degree ( ); Aortic stenosis (December 2016); CHF (congestive heart failure) (CMS-HCC); Bowel habit changes; Dental disorder; Arthritis; Hypertension; Pneumonia (12/2014); Pain; Breath shortness; and AICD (automatic cardioverter/defibrillator) present.    SURGICAL HISTORY   has past surgical history that includes recovery (8/29/2011); aicd implant (August 2011); ventral septal defect repair (1961); pacemaker insertion (2006); aicd battery change (November 2016); hernia repair (1966); sternotomy (2/8/2017); aortic valve replacement (2/8/2017); and precious (2/8/2017).    SOCIAL HISTORY  Social History   Substance Use Topics   • Smoking status: Never Smoker    • Smokeless tobacco: Never Used      Comment: quit 35 years ago    • Alcohol Use: 1.2 oz/week     2 Standard drinks or equivalent per week      Comment: 1-2 drinks per day       History   Drug Use No       FAMILY HISTORY  No family history noted    CURRENT MEDICATIONS  No current facility-administered medications on file prior to encounter.     Current Outpatient Prescriptions on File Prior to Encounter   Medication Sig Dispense Refill   • docusate calcium (SURFAK) 240 MG Cap Take 240 mg by mouth every day.     • warfarin (COUMADIN) 5 MG Tab Take 1.5 Tabs by mouth COUMADIN-DAILY. 45 Tab 3   • eplerenone (INSPRA) 25 MG Tab Take 1 Tab by mouth every day. 90 Tab 3   • therapeutic multivitamin-minerals (THERAGRAN-M) Tab Take 1 Tab by mouth every day.         ALLERGIES  Allergies   Allergen Reactions   • Sulfa Drugs Rash and Vomiting     RXN=20 years ago       PHYSICAL EXAM  VITAL SIGNS: /80 mmHg  Pulse 77  Temp(Src) 36.7 °C (98.1 °F) (Temporal)  Resp 16  Ht 1.803 m (5' 11\")  Wt 101.3 kg (223 lb 5.2 oz)  BMI 31.16 kg/m2  SpO2 94%    Constitutional: Well developed, Well nourished, Mild distress.   HENT: Normocephalic, " Atraumatic  Eyes: Conjunctiva normal, No discharge.   Cardiovascular: Normal heart rate, Normal rhythm, No murmurs, equal pulses.   Pulmonary: Diminished breath sounds, No respiratory distress, No wheezing, No rales, No rhonchi.  Chest: No chest wall tenderness or deformity.   Abdomen:2 epigastric incisions, well-healing. Slightly distended, Soft, No tenderness, No masses, no rebound, no guarding.   Back: No CVA tenderness.   Musculoskeletal: No major deformities noted, No tenderness. 3+ pitting edema up to bilateral thighs  Skin: Large midline central chest incision, well-healing, no erythema, no warmth, no discharge, 2 epigastric incisions, well-healing.   Neurologic: Alert & oriented x 3, Normal motor function,  No focal deficits noted.   Psychiatric: Affect normal, Judgment normal, Mood normal.     LABS  Results for orders placed or performed during the hospital encounter of 02/20/17   CBC WITH DIFFERENTIAL   Result Value Ref Range    WBC 7.4 4.8 - 10.8 K/uL    RBC 3.55 (L) 4.70 - 6.10 M/uL    Hemoglobin 11.4 (L) 14.0 - 18.0 g/dL    Hematocrit 34.5 (L) 42.0 - 52.0 %    MCV 97.2 81.4 - 97.8 fL    MCH 32.1 27.0 - 33.0 pg    MCHC 33.0 (L) 33.7 - 35.3 g/dL    RDW 46.0 35.9 - 50.0 fL    Platelet Count 299 164 - 446 K/uL    MPV 10.0 9.0 - 12.9 fL    Neutrophils-Polys 75.80 (H) 44.00 - 72.00 %    Lymphocytes 16.70 (L) 22.00 - 41.00 %    Monocytes 5.40 0.00 - 13.40 %    Eosinophils 1.20 0.00 - 6.90 %    Basophils 0.40 0.00 - 1.80 %    Immature Granulocytes 0.50 0.00 - 0.90 %    Nucleated RBC 0.00 /100 WBC    Neutrophils (Absolute) 5.58 1.82 - 7.42 K/uL    Lymphs (Absolute) 1.23 1.00 - 4.80 K/uL    Monos (Absolute) 0.40 0.00 - 0.85 K/uL    Eos (Absolute) 0.09 0.00 - 0.51 K/uL    Baso (Absolute) 0.03 0.00 - 0.12 K/uL    Immature Granulocytes (abs) 0.04 0.00 - 0.11 K/uL    NRBC (Absolute) 0.00 K/uL   COMP METABOLIC PANEL   Result Value Ref Range    Sodium 133 (L) 135 - 145 mmol/L    Potassium 3.8 3.6 - 5.5 mmol/L     Chloride 97 96 - 112 mmol/L    Co2 27 20 - 33 mmol/L    Anion Gap 9.0 0.0 - 11.9    Glucose 103 (H) 65 - 99 mg/dL    Bun 26 (H) 8 - 22 mg/dL    Creatinine 1.23 0.50 - 1.40 mg/dL    Calcium 9.7 8.5 - 10.5 mg/dL    AST(SGOT) 30 12 - 45 U/L    ALT(SGPT) 41 2 - 50 U/L    Alkaline Phosphatase 103 (H) 30 - 99 U/L    Total Bilirubin 0.6 0.1 - 1.5 mg/dL    Albumin 3.8 3.2 - 4.9 g/dL    Total Protein 7.5 6.0 - 8.2 g/dL    Globulin 3.7 (H) 1.9 - 3.5 g/dL    A-G Ratio 1.0 g/dL   BTYPE NATRIURETIC PEPTIDE   Result Value Ref Range    B Natriuretic Peptide 953 (H) 0 - 100 pg/mL   TROPONIN   Result Value Ref Range    Troponin I 0.05 (H) 0.00 - 0.04 ng/mL   CKMB   Result Value Ref Range    CK-Mb 1.0 0.0 - 5.0 ng/mL   LIPASE   Result Value Ref Range    Lipase 78 11 - 82 U/L   ESTIMATED GFR   Result Value Ref Range    GFR If African American >60 >60 mL/min/1.73 m 2    GFR If Non African American >60 >60 mL/min/1.73 m 2   MAGNESIUM   Result Value Ref Range    Magnesium 2.2 1.5 - 2.5 mg/dL   EKG (NOW)   Result Value Ref Range    Report       [facility] Emergency Dept.    Test Date:  2017  Pt Name:    EDUARDO ALONSO               Department: ER  MRN:        [id number]                      Room:        02  Gender:     M                            Technician: [id number]  :        [id number]                   Requested By:REAL FITZPATRICK  Order #:    [id number]                    Reading MD:    Measurements  Intervals                                Axis  Rate:       59                           P:  MN:                                      QRS:        0  QRSD:       170                          T:          -18  QT:         520  QTc:        516    Interpretive Statements  AFIB/FLUT AND V-PACED COMPLEXES  NO FURTHER ANALYSIS ATTEMPTED DUE TO PACED RHYTHM  Compared to ECG 2017 04:46:19  Atrial-sensed ventricular-paced complex(es) or rhythm no longer present       All labs reviewed by me.    EKG  12 Lead EKG  interpreted by me shows atrial fibrillation with paced rhythm. Appears to have capture. Rate of 59. No other interpretation secondary to being paced. Old EKG from 2/9/17 shows no significant changes.    RADIOLOGY  ECHOCARDIOGRAM COMP W/O CONT   Preliminary Result      CT-CHEST (THORAX) W/O   Final Result         1.  Moderate pericardial effusion, new since prior. Appearance raises concern for component of cardiac tamponade. Intermediate low density of pericardial effusion could represent hemorrhagic or proteinaceous component. Further evaluation with    echocardiogram for further characterization.   2.  Moderate sized bilateral pleural effusions with dependent consolidations favoring atelectasis.   3.  Atherosclerosis      These findings were discussed with the patient's clinician, Christopher Catalan, on 2/20/2017 11:07 PM.         US-GALLBLADDER   Final Result         1.  Gallbladder distention with marked gallbladder wall thickening and gallbladder sludge. Appearance concerning for acalculous cholecystitis. Could be further evaluated with HIDA scan.   2.  Large right pleural effusion   3.  Trace perihepatic ascites         DX-CHEST-PORTABLE (1 VIEW)   Final Result         1.  Pulmonary edema and/or infiltrates, similar to prior study.   2.  Trace right and small left pleural effusion.   3.  Cardiomegaly   4.  Atherosclerosis   5.  Prominence of the aortic knob, corresponds with tortuosity of the proximal descending thoracic aorta on recent CT January 9, 2017.      NM-BILIARY (HIDA) SCAN WITH CCK    (Results Pending)     The radiologist's interpretation of all radiological studies have been reviewed by me.    COURSE & MEDICAL DECISION MAKING  Pertinent Labs & Imaging studies reviewed. (See chart for details)    7:40 PM - Patient seen and examined at bedside. Ordered US gallbladder, chest x-ray, lipase, CBC with differential, CMP, BNP, troponin, CKMB, estimated GFR, and EKG to evaluate his symptoms. The  differential diagnoses include but are not limited to: pleural effusion, pneumonia, and CHF     9:19 PM- Patient will be treated with lasix 40 mg IV.     9:56 PM- Ordered CT chest to further evaluate symptoms.     10:10 PM- Review of ultrasound shows gallbladder distension, large right pleural effusion, and trace perihepatic ascites. Review of chest x-ray shows trace right and small left pleural effusion.     10:19 PM - I discussed the patient's case and the above findings with Dr. Lane (Hospitalist) who will see the patient.     10:20 PM Recheck: Patient re-evaluated at [facility]. I explained that he has has pleural effusion and we will get a CT chest to further evaluate symptoms. I discussed plans for admission. Patient understands and agrees.     11:02 PM- Reviewed CT chest, which showed moderate pericardial effusion.     11:08 PM- Paged  Dr. Llanos (Cardiac Surgery).     11:11 PM I discussed the patient's case and the above findings with Dr. Llanos (Cardiac Surgery) who will consult. He wants the patient to have an echocardiogram.      11:26 PM- Recheck: Patient re-evaluated at [facility]. Discussed CT chest findings and plan for echocardiogram. Patient understands and agrees.     Medical Decision Making: At this point time I think the patient's shortness of breath likely secondary to the pericardial effusion as well as pleural effusion. Patient did have some pain in the right upper quadrant with some gallbladder wall thickening at this point time there is no stone seen at this point time I'm not convinced of his acalculous cholecystitis patient is now a fever or white count I think the patient can be admitting get a hida scan       DISPOSITION:  Patient will be admitted to  Dr. Lane (Hospitalist) in guarded condition.     FINAL IMPRESSION  1. Acute on chronic systolic congestive heart failure (CMS-HCC)    2. Pleural effusion    3. RUQ abdominal pain    4. Pericardial effusion          I, Jesus Feldman  (Scribe), am scribing for, and in the presence of, Christopher Catalan M.D.    Electronically signed by: Jesus Feldman (Hayley), 2/20/2017    IChristopher M.D. personally performed the services described in this documentation, as scribed by Jesus Feldman in my presence, and it is both accurate and complete.    The note accurately reflects work and decisions made by me.  Christopher Catalan  2/21/2017  3:33 AM

## 2017-02-21 NOTE — PROGRESS NOTES
Patient CT scan, echo, and CXR reviewed. Small pericardial effusion noted with no hemodynamic compromise. Has moderate left effusion will order left thoracentesis. Recommend continued diuresis and restarting coumadin after thoracentesis. Will follow.

## 2017-02-21 NOTE — PROGRESS NOTES
Hospital Medicine Progress Note, Adult, Complex               Author: Jake Mariscal Date & Time created: 2/21/2017  8:39 AM     61yom with recent avr here with sob, plueral effusion,     Interval History:  Feeling improved. Less sob when in bed.     Review of Systems:  Review of Systems   Constitutional: Negative for fever and chills.   HENT: Negative for sore throat.    Eyes: Negative for blurred vision and pain.   Respiratory: Positive for shortness of breath. Negative for cough.    Cardiovascular: Negative for chest pain and palpitations.   Gastrointestinal: Negative for nausea, vomiting and abdominal pain.   Genitourinary: Negative for dysuria and urgency.   Musculoskeletal: Negative for back pain and neck pain.   Skin: Negative for itching and rash.   Neurological: Negative for dizziness, tingling and headaches.   Psychiatric/Behavioral: Negative for depression. The patient does not have insomnia.    All other systems reviewed and are negative.      Physical Exam:  Physical Exam   Constitutional: He is oriented to person, place, and time. He appears well-developed and well-nourished. No distress.   HENT:   Right Ear: External ear normal.   Left Ear: External ear normal.   Nose: Nose normal.   Eyes: Right eye exhibits no discharge. Left eye exhibits no discharge. No scleral icterus.   Neck: No JVD present. No tracheal deviation present.   Cardiovascular: Normal rate, normal heart sounds and intact distal pulses.    No murmur heard.  Pulmonary/Chest: Effort normal. No respiratory distress. He has decreased breath sounds. He has no wheezes. He has no rales.   Abdominal: Soft. Bowel sounds are normal. He exhibits no distension. There is no tenderness. There is no guarding.   Musculoskeletal: He exhibits no edema or tenderness.   Neurological: He is alert and oriented to person, place, and time.   Skin: Skin is warm and dry. He is not diaphoretic. No erythema.   Psychiatric: He has a normal mood and affect. His  behavior is normal.   Nursing note and vitals reviewed.      Labs:        Invalid input(s): [id number]  Recent Labs      17   CKMB  1.0   TROPONINI  0.05*   BNPBTYPENAT  953*     Recent Labs      17   0302   SODIUM  133*  138   POTASSIUM  3.8  3.5*   CHLORIDE  97  100   CO2  27  26   BUN  26*  26*   CREATININE  1.23  1.37   MAGNESIUM  2.2   --    CALCIUM  9.7  9.0     Recent Labs      17   030   ALTSGPT  41   --    ASTSGOT  30   --    ALKPHOSPHAT  103*   --    TBILIRUBIN  0.6   --    LIPASE  78   --    GLUCOSE  103*  122*     Recent Labs      17   RBC  3.55*   HEMOGLOBIN  11.4*   HEMATOCRIT  34.5*   PLATELETCT  299     Recent Labs      17   WBC  7.4   NEUTSPOLYS  75.80*   LYMPHOCYTES  16.70*   MONOCYTES  5.40   EOSINOPHILS  1.20   BASOPHILS  0.40   ASTSGOT  30   ALTSGPT  41   ALKPHOSPHAT  103*   TBILIRUBIN  0.6           Hemodynamics:  Temp (24hrs), Av.5 °C (97.7 °F), Min:36.4 °C (97.5 °F), Max:36.7 °C (98.1 °F)  Temperature: 36.4 °C (97.6 °F)  Pulse  Av.6  Min: 44  Max: 77Heart Rate (Monitored): 61  Blood Pressure: 105/67 mmHg, NIBP: 112/78 mmHg     Respiratory:    Respiration: 18, Pulse Oximetry: 93 %     Work Of Breathing / Effort: Increased Work of Breathing  RUL Breath Sounds: Clear, RML Breath Sounds: Clear, RLL Breath Sounds: Fine Crackles, CYNDI Breath Sounds: Clear, LLL Breath Sounds: Fine Crackles  Fluids:    Intake/Output Summary (Last 24 hours) at 17 0839  Last data filed at 17 0200   Gross per 24 hour   Intake    120 ml   Output    100 ml   Net     20 ml     Weight: 101.9 kg (224 lb 10.4 oz)  GI/Nutrition:  Orders Placed This Encounter   Procedures   • Diet Order     Standing Status: Standing      Number of Occurrences: 1      Standing Expiration Date:      Order Specific Question:  Diet:     Answer:  Cardiac [6]     Medical Decision Making, by Problem:  Active Hospital Problems    Diagnosis   •  Presence of biventricular AICD [Z95.810]   • Dilated cardiomyopathy (CMS-[facility]) [I42.0]- repeat echo pending. Coreg. Not on ace. On asa. Not on statin. Was on coumadin. Consider stopping asa.    • S/P AV servando ablation [Z98.890]   • Hypertension [I10]   • Hemorrhagic disorder due to circulating anticoagulants (CMS-[facility]) [D68.318]- coumadin on hold for thoracentesis. Likely heparin gtt after procedure.    • S/P AVR (aortic valve replacement) [Z95.2]- allow inr to trend down for procedure then heparin bridge.    • Hypokalemia [E87.6]- replace   • Pericardial effusion [I31.3]- ct surgery following. No hemodynamic compromise.    • Pleural effusion [J90]- tap. Pending. ?blood?   • CHF exacerbation (CMS-[facility]) [I50.9]- really no flare. Chronic severe systolic chf.    • CAD (coronary artery disease) [I25.10]- as above per CM.    • Chronic systolic congestive heart failure, NYHA class 3 (CMS-[facility]) [I50.22]   Acute zulma- pain/tender in RUQ. LFT are ok. hida pending.     Discussed plan with RN  dispo- suspect home.     Echocardiography Laboratory    CONCLUSIONS  Left ventricular ejection fraction is visually estimated to be 20%.  Small-moderate sized (<1 cm in end diastole) pericardial effusion   without evidence of hemodynamic compromise.  Known bioprosthetic aortic valve, mean gradient 18 mmHg - with severely   depressed cariac output, flow impairment may be underestimated.  Large left pleural effusion.  Right ventricular systolic pressure is estimated to be at least 35   mmHg.  Compared with the intraoperative JUSTINO 2/8 - the effusions are new.  1.  Moderate pericardial effusion, new since prior. Appearance raises concern for component of cardiac tamponade. Intermediate low density of pericardial effusion could represent hemorrhagic or proteinaceous component. Further evaluation with   echocardiogram for further characterization.  2.  Moderate sized bilateral pleural effusions with dependent consolidations favoring  atelectasis.  3.  Atherosclerosis  Core Measures

## 2017-02-21 NOTE — DISCHARGE PLANNING
[facility] Please write home care orders upon discharge to home for continuum of care.[facility] at  /[phone] if there are any questions.

## 2017-02-21 NOTE — ED NOTES
Pt to R2 via wheelchair, wfie at bedside.  Agree with triage assessment.  Pt changed into gown.  Chart up for ERP.

## 2017-02-21 NOTE — CARE PLAN
Problem: Safety  Goal: Will remain free from injury  Outcome: PROGRESSING AS EXPECTED  Fall precautions in place, with exception of bed alarm, pt calling appropriately    Problem: Knowledge Deficit  Goal: Knowledge of disease process/condition, treatment plan, diagnostic tests, and medications will improve  Outcome: PROGRESSING AS EXPECTED  Plan of care for day discussed at bedside, all questions answered

## 2017-02-21 NOTE — PROGRESS NOTES
Pt educated about use of bed alarm and fall risk while in the hospital. Pt would rather not have the bed alarm and feels steady on his feet. Pt educated to notify staff if he feels dizzy, lightheaded, or SOB and to not get out of bed until help comes. Pt verbalized understanding. Pt ambulated to bathroom with RN in room with steady gait and no difficulty at this time.

## 2017-02-21 NOTE — ED NOTES
Pt requested food/drink, per admitting MD, patient may have ice chips, pt tolerating ice chips well.

## 2017-02-21 NOTE — ED NOTES
62 y/o male ambulatory to triage with c/o increasing sob and abd distension/swelling x 2 days. Pt was discharged from the hospital on 2/16 after having an aortic valve replacement on 2/8. He was being given IV lasix while in the hospital and is now on PO lasix at home but feels that he is not urinating as much as he should be.

## 2017-02-21 NOTE — CARE PLAN
Problem: Knowledge Deficit  Goal: Knowledge of disease process/condition, treatment plan, diagnostic tests, and medications will improve  Outcome: PROGRESSING AS EXPECTED  Discussed POC with pt and wife. Answered all questions appropriately. White board updated. All medications and interventions explained before performed. Pt verbalized understanding.         Problem: Respiratory:  Goal: Respiratory status will improve  Outcome: PROGRESSING AS EXPECTED  Pt on 2L NC O2. Pt denies any increased SOB or discomfort at this time. Pt encouraged to adjust HOB for comfort and notify staff with any changes in status. Pt verbalized understanding. Pt up to the bathroom with steady gait.

## 2017-02-21 NOTE — H&P
CHIEF COMPLAINT:  Shortness of breath.    HISTORY OF PRESENT ILLNESS:  This is a 61-year-old gentleman who was recently   discharged from our facility after having a bioprosthetic aortic valve   replacement by Dr. Alfonso Llanos on February 8th.  He states that when he   went home initially he was feeling fairly well; however, he started to develop   some shortness of breath shortly after getting home, and this has gotten   particularly worse in the last 48 hours.  He was seen today by his home health   RN who was concerned and asked him to come to the hospital.  He also notes   increasing swelling in his belly and a burning sensation in his right upper   quadrant.  The edema in his legs is increased and the edema in his scrotum is   substantially increased.  He has had a little bit of a cough, this is   productive of a clear sputum.    REVIEW OF SYSTEMS:  Positive as noted above, otherwise all systems are   reviewed and negative.    PREVIOUS MEDICAL HISTORY:  1.  Critical aortic stenosis, status post bioprosthetic aortic valve   replacement as noted.  2.  History of congestive heart failure.  3.  Dilated cardiomyopathy.  4.  Sick sinus syndrome.  5.  History of nonsustained ventricular tachycardia, status post AICD   implantation.  6.  Hypertension.  7.  Chronic anticoagulation for paroxysmal atrial fibrillation.  8.  Paroxysmal atrial fibrillation.    MEDICATIONS:  1.  Aspirin 81 mg p.o. daily.  2.  Coreg 3.125 p.o. b.i.d.  3.  Inspra 25 mg every day.  4.  Lasix 40 mg p.o. daily.  5.  [place] 5/325 one q. 4 hours p.r.n. pain.  6.  K-Dur 20 mEq p.o. daily.  7.  Multivitamin.  8.  Coumadin 5 mg p.o. daily.    ALLERGIES:  SULFA.    SOCIAL HISTORY:  The patient is a lifelong nonsmoker.  He drinks alcohol once   to twice per week, usually just 1 drink.  He is  and presents with his   wife tonight.    SURGICAL HISTORY:  1.  Aortic valve replacement as noted.  2.  AICD implantation as noted.  3.  Repair of ventral  septal defect in 1961.  4.  Hernia repair.    FAMILY HISTORY:  Reviewed but not relevant to presentation.    PHYSICAL EXAMINATION:  VITAL SIGNS:  Temperature 36.7, heart rate 60, respiratory rate 17, /67   sating 99% on 2 L nasal cannula.  GENERAL:  The patient is awake and alert.  He is in no acute distress.  HEENT:  Head is normocephalic and atraumatic.  Mucous membranes are moist.    Sclerae and conjunctivae are benign.  NECK:  There is no JVD.  Trachea is in the midline.  Neck is supple.  RESPIRATORY:  Crackles are appreciated in the mid lung fields, there is   diminished breath sounds in lower lung fields but symmetric bilaterally.  CARDIAC:  There is a harsh holosystolic murmur appreciated greatest at the   right upper sternal border.  ABDOMEN:  Soft.  No guarding, rebound, hepatosplenomegaly, or masses,   nontender to palpation including in the right upper quadrant.  Negative Isaac   sign.  EXTREMITIES:  A 1+ to 2+ pitting edema.  SKIN:  Warm and dry.  No rashes appreciated.  NEUROLOGIC:  Alert and oriented.  Speech clear, content logical.  PSYCHIATRIC:  Mood and affect are appropriate.  Hygiene, neat and clean.    LABORATORY DATA:  White count 7.4, hemoglobin 11.4, platelet count 299.    Sodium 133, potassium 3.8, BUN 26, creatinine 1.23.  LFTs are unremarkable.    Lipase is 78.  Troponin I 0.05.  .    IMAGING STUDIES:  1.  CT of the chest, moderate pericardial effusion, new since prior study.    Appearance raises concern for component of cardiac tamponade.  Intermediate   low density of pericardial effusion could represent hemorrhagic or   proteinaceous compound.  Further evaluation with echo.  Moderate sized   bilateral pleural effusions with dependent consolidation favoring atelectasis.  2.  Right upper quadrant ultrasound, gallbladder distention marked with   gallbladder wall thickening and gallbladder sludge.  Appearance concerning for   acalculous cholecystitis.  Consider  HIDA.    ASSESSMENT AND PLAN:  This is a 61-year-old gentleman with problem list as   follows:  1.  Shortness of breath:  The patient presents with acute hypoxic respiratory   failure, likely combination of pleural effusions with some pulmonary edema.    We will treat the patient initially with diuresis and consider draining his   pleural effusions if he does not respond to the initial round of Lasix.    Question if the pericardial effusion is significant.  Dr. Alfonso Llanos has   been consulted and a stat echo is pending.  The patient is therapeutically   anticoagulated, so I think workup for pulmonary embolism is unlikely to be   profitable for the patient.  2.  Bilateral pleural effusions:  We will most likely need these tapped.  We   will stop his Coumadin and plan tapping in 48 hours if he does not respond to   Lasix.  3.  Pericardial effusion:  Dr. Llanos is aware, stat echo pending.  4.  Question of acalculous cholecystitis.  The patient does have some vague   right upper quadrant symptoms.  His LFTs are unremarkable.  We will check a   HIDA scan, and consider surgical consultation after this is back.  5.  History of bioprosthetic aortic valve replacement:  Check echo to confirm   it is functioning appropriately.  _____ a CT surgery consult.  6.  Dilated cardiomyopathy:  Treated as noted.  7.  History of sick sinus syndrome and sustained ventricular tachycardia:  The   patient has an automatic implantable cardioverter defibrillator in place.  8.  Hypertension:  Continue current medications, monitor and titrate.  9.  Coumadin, for paroxysmal atrial fibrillation:  We will hold his Coumadin   for the moment in deference to possible invasive studies.       ____________________________________     DO ИВАН Jeffrey / NTS    DD:  02/21/2017 01:08:06  DT:  02/21/2017 01:45:00    D#:  [id number]  Job#:  [id number]    cc: MARIELLE GALLARDO DO

## 2017-02-21 NOTE — PROGRESS NOTES
Assumed pt care at 0700. Received bedside report from ANTHONY Cain. AM assessment completed. AAOx4. Pt denies SOB; c/o pain of 6 on 0 to 10 pain scale (Will administer medication PRN - see MAR). Provided pt with RN and CNA extension numbers on white board and encouraged pt to call when needed. Discussed plan of care for the day, pt verbalizes understanding. VSS. Denies any additional needs at this time. Call light, belongings, and phone within reach. Hourly rounding in effect. Will continue to monitor.

## 2017-02-21 NOTE — PROGRESS NOTES
Pt up to floor with transport via gurney. Wife at bedside.  Assumed care report received. Assessment completed. AOx4. Pt resting in bed complains of pain 5/10, medicated in ED. No other complaints at this time. Plan of care discussed, verbalized understanding. Fall precautions in place. Call light within reach. Tele monitor in place. Monitor room notified. White board updated. Bed alarm in use.

## 2017-02-21 NOTE — ED NOTES
Med rec updated and complete  Allergies reviewed.  Pt currently in CT.  Dicussed medications with family at bedside.

## 2017-02-22 ENCOUNTER — APPOINTMENT (OUTPATIENT)
Dept: RADIOLOGY | Facility: MEDICAL CENTER | Age: 62
DRG: 189 | End: 2017-02-22
Attending: HOSPITALIST
Payer: COMMERCIAL

## 2017-02-22 LAB
ALBUMIN SERPL BCP-MCNC: 3.3 G/DL (ref 3.2–4.9)
ALBUMIN/GLOB SERPL: 1.2 G/DL
ALP SERPL-CCNC: 93 U/L (ref 30–99)
ALT SERPL-CCNC: 29 U/L (ref 2–50)
ANION GAP SERPL CALC-SCNC: 6 MMOL/L (ref 0–11.9)
ANION GAP SERPL CALC-SCNC: 9 MMOL/L (ref 0–11.9)
APTT PPP: 104 SEC (ref 24.7–36)
APTT PPP: 136.3 SEC (ref 24.7–36)
AST SERPL-CCNC: 22 U/L (ref 12–45)
BASOPHILS # BLD AUTO: 0.5 % (ref 0–1.8)
BASOPHILS # BLD: 0.03 K/UL (ref 0–0.12)
BILIRUB SERPL-MCNC: 0.5 MG/DL (ref 0.1–1.5)
BUN SERPL-MCNC: 24 MG/DL (ref 8–22)
BUN SERPL-MCNC: 27 MG/DL (ref 8–22)
CALCIUM SERPL-MCNC: 8.9 MG/DL (ref 8.5–10.5)
CALCIUM SERPL-MCNC: 9.3 MG/DL (ref 8.5–10.5)
CHLORIDE SERPL-SCNC: 98 MMOL/L (ref 96–112)
CHLORIDE SERPL-SCNC: 98 MMOL/L (ref 96–112)
CO2 SERPL-SCNC: 29 MMOL/L (ref 20–33)
CO2 SERPL-SCNC: 30 MMOL/L (ref 20–33)
CREAT SERPL-MCNC: 1.31 MG/DL (ref 0.5–1.4)
CREAT SERPL-MCNC: 1.37 MG/DL (ref 0.5–1.4)
EOSINOPHIL # BLD AUTO: 0.15 K/UL (ref 0–0.51)
EOSINOPHIL NFR BLD: 2.5 % (ref 0–6.9)
ERYTHROCYTE [DISTWIDTH] IN BLOOD BY AUTOMATED COUNT: 47.7 FL (ref 35.9–50)
GFR SERPL CREATININE-BSD FRML MDRD: 53 ML/MIN/1.73 M 2
GFR SERPL CREATININE-BSD FRML MDRD: 56 ML/MIN/1.73 M 2
GLOBULIN SER CALC-MCNC: 2.7 G/DL (ref 1.9–3.5)
GLUCOSE SERPL-MCNC: 101 MG/DL (ref 65–99)
GLUCOSE SERPL-MCNC: 98 MG/DL (ref 65–99)
HCT VFR BLD AUTO: 30.1 % (ref 42–52)
HGB BLD-MCNC: 9.9 G/DL (ref 14–18)
IMM GRANULOCYTES # BLD AUTO: 0.03 K/UL (ref 0–0.11)
IMM GRANULOCYTES NFR BLD AUTO: 0.5 % (ref 0–0.9)
INR PPP: 1.8 (ref 0.87–1.13)
INR PPP: 2.05 (ref 0.87–1.13)
LYMPHOCYTES # BLD AUTO: 1.47 K/UL (ref 1–4.8)
LYMPHOCYTES NFR BLD: 24 % (ref 22–41)
MCH RBC QN AUTO: 32.1 PG (ref 27–33)
MCHC RBC AUTO-ENTMCNC: 32.9 G/DL (ref 33.7–35.3)
MCV RBC AUTO: 97.7 FL (ref 81.4–97.8)
MONOCYTES # BLD AUTO: 0.41 K/UL (ref 0–0.85)
MONOCYTES NFR BLD AUTO: 6.7 % (ref 0–13.4)
NEUTROPHILS # BLD AUTO: 4.03 K/UL (ref 1.82–7.42)
NEUTROPHILS NFR BLD: 65.8 % (ref 44–72)
NRBC # BLD AUTO: 0 K/UL
NRBC BLD AUTO-RTO: 0 /100 WBC
PLATELET # BLD AUTO: 252 K/UL (ref 164–446)
PMV BLD AUTO: 9.4 FL (ref 9–12.9)
POTASSIUM SERPL-SCNC: 3.4 MMOL/L (ref 3.6–5.5)
POTASSIUM SERPL-SCNC: 3.6 MMOL/L (ref 3.6–5.5)
PROT SERPL-MCNC: 6 G/DL (ref 6–8.2)
PROTHROMBIN TIME: 21.4 SEC (ref 12–14.6)
PROTHROMBIN TIME: 23.8 SEC (ref 12–14.6)
RBC # BLD AUTO: 3.08 M/UL (ref 4.7–6.1)
SODIUM SERPL-SCNC: 134 MMOL/L (ref 135–145)
SODIUM SERPL-SCNC: 136 MMOL/L (ref 135–145)
WBC # BLD AUTO: 6.1 K/UL (ref 4.8–10.8)

## 2017-02-22 PROCEDURE — 36415 COLL VENOUS BLD VENIPUNCTURE: CPT

## 2017-02-22 PROCEDURE — 80053 COMPREHEN METABOLIC PANEL: CPT

## 2017-02-22 PROCEDURE — 99233 SBSQ HOSP IP/OBS HIGH 50: CPT | Performed by: HOSPITALIST

## 2017-02-22 PROCEDURE — 80048 BASIC METABOLIC PNL TOTAL CA: CPT

## 2017-02-22 PROCEDURE — 85610 PROTHROMBIN TIME: CPT

## 2017-02-22 PROCEDURE — 700112 HCHG RX REV CODE 229: Performed by: HOSPITALIST

## 2017-02-22 PROCEDURE — 700111 HCHG RX REV CODE 636 W/ 250 OVERRIDE (IP): Performed by: HOSPITALIST

## 2017-02-22 PROCEDURE — A9270 NON-COVERED ITEM OR SERVICE: HCPCS | Performed by: HOSPITALIST

## 2017-02-22 PROCEDURE — 700102 HCHG RX REV CODE 250 W/ 637 OVERRIDE(OP): Performed by: HOSPITALIST

## 2017-02-22 PROCEDURE — 770020 HCHG ROOM/CARE - TELE (206)

## 2017-02-22 PROCEDURE — 85730 THROMBOPLASTIN TIME PARTIAL: CPT

## 2017-02-22 PROCEDURE — 85025 COMPLETE CBC W/AUTO DIFF WBC: CPT

## 2017-02-22 RX ORDER — POTASSIUM CHLORIDE 20 MEQ/1
40 TABLET, EXTENDED RELEASE ORAL ONCE
Status: COMPLETED | OUTPATIENT
Start: 2017-02-22 | End: 2017-02-22

## 2017-02-22 RX ORDER — WARFARIN SODIUM 10 MG/1
10 TABLET ORAL
Status: DISCONTINUED | OUTPATIENT
Start: 2017-02-22 | End: 2017-02-22

## 2017-02-22 RX ORDER — ATORVASTATIN CALCIUM 40 MG/1
40 TABLET, FILM COATED ORAL
Status: DISCONTINUED | OUTPATIENT
Start: 2017-02-22 | End: 2017-02-24 | Stop reason: HOSPADM

## 2017-02-22 RX ORDER — LISINOPRIL 5 MG/1
2.5 TABLET ORAL
Status: DISCONTINUED | OUTPATIENT
Start: 2017-02-22 | End: 2017-02-23

## 2017-02-22 RX ORDER — WARFARIN SODIUM 7.5 MG/1
7.5 TABLET ORAL
Status: DISCONTINUED | OUTPATIENT
Start: 2017-02-23 | End: 2017-02-22

## 2017-02-22 RX ADMIN — FUROSEMIDE 40 MG: 10 INJECTION, SOLUTION INTRAVENOUS at 15:59

## 2017-02-22 RX ADMIN — STANDARDIZED SENNA CONCENTRATE AND DOCUSATE SODIUM 1 TABLET: 8.6; 5 TABLET, FILM COATED ORAL at 20:20

## 2017-02-22 RX ADMIN — POTASSIUM CHLORIDE 40 MEQ: 1500 TABLET, EXTENDED RELEASE ORAL at 09:52

## 2017-02-22 RX ADMIN — HYDROCODONE BITARTRATE AND ACETAMINOPHEN 1 TABLET: 5; 325 TABLET ORAL at 20:21

## 2017-02-22 RX ADMIN — MULTIPLE VITAMINS W/ MINERALS TAB 1 TABLET: TAB at 09:51

## 2017-02-22 RX ADMIN — FUROSEMIDE 40 MG: 10 INJECTION, SOLUTION INTRAVENOUS at 05:50

## 2017-02-22 RX ADMIN — POTASSIUM CHLORIDE 20 MEQ: 1500 TABLET, EXTENDED RELEASE ORAL at 09:51

## 2017-02-22 RX ADMIN — LISINOPRIL 2.5 MG: 5 TABLET ORAL at 17:51

## 2017-02-22 RX ADMIN — HYDROCODONE BITARTRATE AND ACETAMINOPHEN 1 TABLET: 5; 325 TABLET ORAL at 09:51

## 2017-02-22 RX ADMIN — DOCUSATE SODIUM 100 MG: 100 CAPSULE ORAL at 09:50

## 2017-02-22 RX ADMIN — CARVEDILOL 3.12 MG: 3.12 TABLET, FILM COATED ORAL at 09:56

## 2017-02-22 RX ADMIN — EPLERENONE 25 MG: 25 TABLET, FILM COATED ORAL at 09:56

## 2017-02-22 RX ADMIN — CARVEDILOL 3.12 MG: 3.12 TABLET, FILM COATED ORAL at 20:21

## 2017-02-22 RX ADMIN — ASPIRIN 81 MG: 81 TABLET ORAL at 09:50

## 2017-02-22 RX ADMIN — DOCUSATE SODIUM 100 MG: 100 CAPSULE ORAL at 20:21

## 2017-02-22 RX ADMIN — HYDROCODONE BITARTRATE AND ACETAMINOPHEN 1 TABLET: 5; 325 TABLET ORAL at 15:59

## 2017-02-22 ASSESSMENT — ENCOUNTER SYMPTOMS
HEADACHES: 0
SHORTNESS OF BREATH: 1
COUGH: 0
DIZZINESS: 0
INSOMNIA: 0
EYE PAIN: 0
SORE THROAT: 0
NAUSEA: 0
CHILLS: 0
NECK PAIN: 0
ABDOMINAL PAIN: 0
FEVER: 0
BACK PAIN: 0
DEPRESSION: 0
TINGLING: 0
VOMITING: 0
PALPITATIONS: 0
BLURRED VISION: 0

## 2017-02-22 ASSESSMENT — PAIN SCALES - GENERAL
PAINLEVEL_OUTOF10: 3
PAINLEVEL_OUTOF10: 5

## 2017-02-22 NOTE — PROGRESS NOTES
Hospital Medicine Progress Note, Adult, Complex               Author: Jake Mariscal Date & Time created: 2/22/2017  8:24 AM     61yom with recent avr here with sob, plueral effusion,     Interval History:  No events. Markedly better with thoracentesis yesterday.     Review of Systems:  Review of Systems   Constitutional: Negative for fever and chills.   HENT: Negative for sore throat.    Eyes: Negative for blurred vision and pain.   Respiratory: Positive for shortness of breath. Negative for cough.    Cardiovascular: Negative for chest pain and palpitations.   Gastrointestinal: Negative for nausea, vomiting and abdominal pain.   Genitourinary: Negative for dysuria and urgency.   Musculoskeletal: Negative for back pain and neck pain.   Skin: Negative for itching and rash.   Neurological: Negative for dizziness, tingling and headaches.   Psychiatric/Behavioral: Negative for depression. The patient does not have insomnia.    All other systems reviewed and are negative.      Physical Exam:  Physical Exam   Constitutional: He is oriented to person, place, and time. He appears well-developed and well-nourished. No distress.   HENT:   Right Ear: External ear normal.   Left Ear: External ear normal.   Nose: Nose normal.   Eyes: Right eye exhibits no discharge. Left eye exhibits no discharge. No scleral icterus.   Neck: No JVD present. No tracheal deviation present.   Cardiovascular: Normal rate, normal heart sounds and intact distal pulses.    No murmur heard.  Pulmonary/Chest: Effort normal. No respiratory distress. He has decreased breath sounds. He has no wheezes. He has no rales.   Abdominal: Soft. Bowel sounds are normal. He exhibits no distension. There is no tenderness. There is no guarding.   Musculoskeletal: He exhibits no edema or tenderness.   Neurological: He is alert and oriented to person, place, and time.   Skin: Skin is warm and dry. He is not diaphoretic. No erythema.   Psychiatric: He has a normal mood  and affect. His behavior is normal.   Nursing note and vitals reviewed.      Labs:        Invalid input(s): [id number]  Recent Labs      17   CKMB  1.0   TROPONINI  0.05*   BNPBTYPENAT  953*     Recent Labs      17   0906  17   0026  17   0620   SODIUM  133*   < >  136  134*  136   POTASSIUM  3.8   < >  3.5*  3.6  3.4*   CHLORIDE  97   < >  100  98  98   CO2  27   < >  28  30  29   BUN  26*   < >  25*  27*  24*   CREATININE  1.23   < >  1.35  1.37  1.31   MAGNESIUM  2.2   --    --    --    --    CALCIUM  9.7   < >  9.0  8.9  9.3    < > = values in this interval not displayed.     Recent Labs      17   06   ALTSGPT  41   --    --   29   --    ASTSGOT  30   --    --   22   --    ALKPHOSPHAT  103*   --    --   93   --    TBILIRUBIN  0.6   --    --   0.5   --    LIPASE  78   --    --    --    --    GLUCOSE  103*   < >  126*  98  101*    < > = values in this interval not displayed.     Recent Labs      17   12217   RBC  3.55*   --   3.08*   --    --    HEMOGLOBIN  11.4*   --   9.9*   --    --    HEMATOCRIT  34.5*   --   30.1*   --    --    PLATELETCT  299   --   252   --    --    PROTHROMBTM   --   23.3*   --   23.8*   --    APTT   --    --    --   136.3*  104.0*   INR   --   2.00*   --   2.05*   --      Recent Labs      17   WBC  7.4  6.1   NEUTSPOLYS  75.80*  65.80   LYMPHOCYTES  16.70*  24.00   MONOCYTES  5.40  6.70   EOSINOPHILS  1.20  2.50   BASOPHILS  0.40  0.50   ASTSGOT  30  22   ALTSGPT  41  29   ALKPHOSPHAT  103*  93   TBILIRUBIN  0.6  0.5           Hemodynamics:  Temp (24hrs), Av.4 °C (97.6 °F), Min:36.2 °C (97.2 °F), Max:36.8 °C (98.2 °F)  Temperature: 36.6 °C (97.8 °F)  Pulse  Av.2  Min: 44  Max: 77   Blood Pressure: 108/74 mmHg     Respiratory:    Respiration: 19, Pulse Oximetry: 96 %         RUL Breath Sounds: Clear, RML Breath Sounds: Clear, RLL Breath Sounds: Diminished, CYNDI Breath Sounds: Clear, LLL Breath Sounds: Diminished  Fluids:    Intake/Output Summary (Last 24 hours) at 02/22/17 0824  Last data filed at 02/21/17 2100   Gross per 24 hour   Intake    360 ml   Output   1700 ml   Net  -1340 ml     Weight: 100.4 kg (221 lb 5.5 oz)  GI/Nutrition:  Orders Placed This Encounter   Procedures   • Diet Order     Standing Status: Standing      Number of Occurrences: 1      Standing Expiration Date:      Order Specific Question:  Diet:     Answer:  Cardiac [6]     Medical Decision Making, by Problem:  Active Hospital Problems    Diagnosis   • Presence of biventricular AICD [Z95.810]   • Dilated cardiomyopathy (CMS-[facility]) [I42.0]- repeat echo is below. Coreg. Not on ace. On asa. Not on statin. Was on coumadin. Stop asa. Add statin and ace. .    • S/P AV servando ablation [Z98.890]   • Hypertension [I10]   • Hemorrhagic disorder due to circulating anticoagulants (CMS-[facility]) [D68.318]- coumadin on hold for thoracentesis. Restart after thora tomorrow. .    • S/P AVR (aortic valve replacement) [Z95.2]- allow inr to trend down for procedure then heparin bridge.    • Hypokalemia [E87.6]- replace   • Pericardial effusion [I31.3]- ct surgery following. Discussed with them today. Small and No hemodynamic compromise.    • Pleural effusion [J90]- ta on left done yesterday. It was bloody. Right tomorrow. INR 2 and if he can tolerate should be on coumadin.    • CHF- no flare. Chronic severe systolic chf. Add meds as above.    • CAD (coronary artery disease) [I25.10]- as above per CM.    • Chronic systolic congestive heart failure, NYHA class 3 (CMS-[facility]) [I50.22]   Acute zulma- pain/tender in RUQ. LFT are ok. hida normal. Still a little tender ruq . Will see if this improves with thora tomrrow. .     Discussed plan with RN  dispo- suspect home.     Echocardiography Laboratory    CONCLUSIONS  Left ventricular ejection  fraction is visually estimated to be 20%.  Small-moderate sized (<1 cm in end diastole) pericardial effusion   without evidence of hemodynamic compromise.  Known bioprosthetic aortic valve, mean gradient 18 mmHg - with severely   depressed cariac output, flow impairment may be underestimated.  Large left pleural effusion.  Right ventricular systolic pressure is estimated to be at least 35   mmHg.  Compared with the intraoperative JUSTINO 2/8 - the effusions are new.  1.  Moderate pericardial effusion, new since prior. Appearance raises concern for component of cardiac tamponade. Intermediate low density of pericardial effusion could represent hemorrhagic or proteinaceous component. Further evaluation with   echocardiogram for further characterization.  2.  Moderate sized bilateral pleural effusions with dependent consolidations favoring atelectasis.  3.  Atherosclerosis      Normal hepatobiliary scan. No evidence of acute cholecystitis.  Normal gallbladder ejection fraction.    Normal gallbladder ejection fraction is 38% or greater.    EKG reviewed, Labs reviewed, Medications reviewed and Radiology images reviewed  Hewitt catheter: No Hewitt      DVT Prophylaxis: Warfarin (Coumadin)  DVT prophylaxis - mechanical: SCDs      Assessed for rehab: Patient was assess for and/or received rehabilitation services during this hospitalization

## 2017-02-22 NOTE — PROGRESS NOTES
Jacinda from Lab called with critical result of aptt 104.0 at 0710. Critical lab result read back to Jacinda.   Heparin drip adjusted with second RN per protocol.

## 2017-02-22 NOTE — PROGRESS NOTES
from Lab called with critical result of ptt 136.3 at 0027. Critical lab result read back to .   Heparin drip did not need rate adjustment per protocol. This was first aptt draw since starting drip.

## 2017-02-22 NOTE — FACE TO FACE
Face to Face Supporting Documentation - Home Health    The encounter with this patient was in whole or in part the primary reason for home health admission.    Date of encounter:   Patient:                    MRN:                       YOB: 2015  Alberto Fam  [id number]  1955     Home health to see patient for:  Skilled Nursing care for assessment, interventions & education    Skilled need for:  Exacerbation of Chronic Disease State chf    Skilled nursing interventions to include:  Comment: chf    Homebound status evidenced by:  Need the aid of supportive devices such as crutches, canes, wheelchairs or walkers. Leaving home requires a considerable and taxing effort. There is a normal inability to leave the home.    Community Physician to provide follow up care: Trevor Stephen D.O.     Optional Interventions? No      I certify the face to face encounter for this home health care referral meets the CMS requirements and the encounter/clinical assessment with the patient was, in whole, or in part, for the medical condition(s) listed above, which is the primary reason for home health care. Based on my clinical findings: the service(s) are medically necessary, support the need for home health care, and the homebound criteria are met.  I certify that this patient has had a face to face encounter by myself.  Jake Mariscal M.D. - NPI: [id number]

## 2017-02-22 NOTE — OR SURGEON
Immediate Post- Operative Note        PostOp Diagnosis: pleural effusion      Procedure(s): left thoracentesis      Estimated Blood Loss: Less than 5 ml        Complications: None            2/21/2017     4:41 PM     Amanda Jain

## 2017-02-22 NOTE — PROGRESS NOTES
Patient seen. Left thoracentesis yesterday 700 cc removed. Right thoracentesis scheduled today. He will most likely be discharged home tomorrow with home health and home O2. Long discussion with patient and wife about reasons for effusions and chances of reoccurrence. All follow-ups are arranged.

## 2017-02-22 NOTE — PROGRESS NOTES
Inpatient Anticoagulation Service Note    Date: 2/22/2017  Reason for Anticoagulation: Bioprosthetic Valve Replacement        Hemoglobin Value: 9.9  Hematocrit Value: 30.1  Lab Platelet Value: 252  Target INR: 2.0 to 3.0    INR from last 7 days     Date/Time INR Value    02/22/17 0620 (!)1.8    02/22/17 0027 (!)2.05    02/21/17 1221 (!)2        Dose from last 7 days     Date/Time Dose (mg)    02/22/17 1300 0        Average Dose (mg):  (warfarin 7.5mg po qday per [facility])  Significant Interactions: Aspirin (MVI)  Bridge Therapy: No (If less than 5 days and overlap therapy discontinued -- document reason (i.e. Bleed Risk))    (If still on overlap therapy, if No -- document reason (i.e. Bleed Risk))    Comments: This is a 61-year-old gentleman who was recently  discharged from our facility after having a bioprosthetic aortic valve  replacement by Dr. Alfonso Llanos on February 8th. Pt came to ED with increased difficulty breathing and found to have large left pleural effusion and mild to moderate pericardial effusion.  Patient was taken to IR yesterday for thoracentesis.  Warfarin was held upon admission in anticipation of this procedure.  Patient to go to IR again tomorrow for thoracentesis of right side. Warfarin will resume tomorrow.  Discussed bridge therapy with Dr. Mariscal, he said to hold for now.    Plan:  Hold warfarin tonight, resume once thoracentesis of right side done.   Education Material Provided?: No (chronic warfarin patient)  Pharmacist suggested discharge dosing: Patient can likely resume warfarin 7.5mg po qday with follow up to [facility] clinic within in 2 to 3 days of discharge.      Tiana Morrow Pharm.D.

## 2017-02-22 NOTE — PROGRESS NOTES
Assumed care report received. Assessment completed. AOx4. Pt resting in bed complains of pain 2/10, rest and repositioned. No other complaints at this time. Plan of care discussed, verbalized understanding. Fall precautions in place. Call light within reach. Tele monitor in place. White board updated.

## 2017-02-22 NOTE — DISCHARGE PLANNING
Received choice form from CURT Brownlee at [phone]. Notified [facility] via voicemail that we will need a HH order before referral can be sent.

## 2017-02-22 NOTE — DISCHARGE PLANNING
Transitional Care Navigator:    Met with patient regarding choice for home health.  Pt had been on service with [facility] prior to admission and wishes to remain in their care.  Choice form signed and faxed; Msg to [facility].

## 2017-02-22 NOTE — CARE PLAN
"Problem: Venous Thromboembolism (VTW)/Deep Vein Thrombosis (DVT) Prevention:  Goal: Patient will participate in Venous Thrombosis (VTE)/Deep Vein Thrombosis (DVT)Prevention Measures  Outcome: PROGRESSING AS EXPECTED  Pt running heparin gtt. Pt educated regarding medication indication. Pt verbalized understanding. Rate changed per protocol. Aptt drawn per protocol.     Problem: Respiratory:  Goal: Respiratory status will improve  Outcome: PROGRESSING AS EXPECTED  Pt encouraged to cough and deep breathe. Pt wearing 2L NC. Pt educated to notify staff with any dizziness, lightheadedness, or SOB. Pt verbalized understanding. Pt had thoracentesis done today on L side, states he \"feels much better\".        "

## 2017-02-23 ENCOUNTER — APPOINTMENT (OUTPATIENT)
Dept: RADIOLOGY | Facility: MEDICAL CENTER | Age: 62
DRG: 189 | End: 2017-02-23
Attending: RADIOLOGY
Payer: COMMERCIAL

## 2017-02-23 ENCOUNTER — APPOINTMENT (OUTPATIENT)
Dept: RADIOLOGY | Facility: MEDICAL CENTER | Age: 62
DRG: 189 | End: 2017-02-23
Attending: HOSPITALIST
Payer: COMMERCIAL

## 2017-02-23 LAB
ANION GAP SERPL CALC-SCNC: 8 MMOL/L (ref 0–11.9)
APTT PPP: 31.9 SEC (ref 24.7–36)
APTT PPP: 61.8 SEC (ref 24.7–36)
BASOPHILS # BLD AUTO: 0.4 % (ref 0–1.8)
BASOPHILS # BLD: 0.02 K/UL (ref 0–0.12)
BUN SERPL-MCNC: 23 MG/DL (ref 8–22)
CALCIUM SERPL-MCNC: 9.3 MG/DL (ref 8.5–10.5)
CHLORIDE SERPL-SCNC: 101 MMOL/L (ref 96–112)
CO2 SERPL-SCNC: 27 MMOL/L (ref 20–33)
CREAT SERPL-MCNC: 1.14 MG/DL (ref 0.5–1.4)
EOSINOPHIL # BLD AUTO: 0.19 K/UL (ref 0–0.51)
EOSINOPHIL NFR BLD: 3.9 % (ref 0–6.9)
ERYTHROCYTE [DISTWIDTH] IN BLOOD BY AUTOMATED COUNT: 47.3 FL (ref 35.9–50)
GFR SERPL CREATININE-BSD FRML MDRD: >60 ML/MIN/1.73 M 2
GLUCOSE SERPL-MCNC: 89 MG/DL (ref 65–99)
HCT VFR BLD AUTO: 32.4 % (ref 42–52)
HGB BLD-MCNC: 10.2 G/DL (ref 14–18)
IMM GRANULOCYTES # BLD AUTO: 0.03 K/UL (ref 0–0.11)
IMM GRANULOCYTES NFR BLD AUTO: 0.6 % (ref 0–0.9)
INR PPP: 1.54 (ref 0.87–1.13)
LYMPHOCYTES # BLD AUTO: 1.33 K/UL (ref 1–4.8)
LYMPHOCYTES NFR BLD: 27.4 % (ref 22–41)
MCH RBC QN AUTO: 31.1 PG (ref 27–33)
MCHC RBC AUTO-ENTMCNC: 31.5 G/DL (ref 33.7–35.3)
MCV RBC AUTO: 98.8 FL (ref 81.4–97.8)
MONOCYTES # BLD AUTO: 0.3 K/UL (ref 0–0.85)
MONOCYTES NFR BLD AUTO: 6.2 % (ref 0–13.4)
NEUTROPHILS # BLD AUTO: 2.98 K/UL (ref 1.82–7.42)
NEUTROPHILS NFR BLD: 61.5 % (ref 44–72)
NRBC # BLD AUTO: 0 K/UL
NRBC BLD AUTO-RTO: 0 /100 WBC
PLATELET # BLD AUTO: 256 K/UL (ref 164–446)
PMV BLD AUTO: 9.6 FL (ref 9–12.9)
POTASSIUM SERPL-SCNC: 3.9 MMOL/L (ref 3.6–5.5)
PROTHROMBIN TIME: 19 SEC (ref 12–14.6)
RBC # BLD AUTO: 3.28 M/UL (ref 4.7–6.1)
SODIUM SERPL-SCNC: 136 MMOL/L (ref 135–145)
WBC # BLD AUTO: 4.9 K/UL (ref 4.8–10.8)

## 2017-02-23 PROCEDURE — 700102 HCHG RX REV CODE 250 W/ 637 OVERRIDE(OP)

## 2017-02-23 PROCEDURE — 700112 HCHG RX REV CODE 229: Performed by: HOSPITALIST

## 2017-02-23 PROCEDURE — 85730 THROMBOPLASTIN TIME PARTIAL: CPT | Mod: 91

## 2017-02-23 PROCEDURE — A9270 NON-COVERED ITEM OR SERVICE: HCPCS

## 2017-02-23 PROCEDURE — 700102 HCHG RX REV CODE 250 W/ 637 OVERRIDE(OP): Performed by: HOSPITALIST

## 2017-02-23 PROCEDURE — 700111 HCHG RX REV CODE 636 W/ 250 OVERRIDE (IP)

## 2017-02-23 PROCEDURE — 36415 COLL VENOUS BLD VENIPUNCTURE: CPT

## 2017-02-23 PROCEDURE — 0W993ZZ DRAINAGE OF RIGHT PLEURAL CAVITY, PERCUTANEOUS APPROACH: ICD-10-PCS | Performed by: RADIOLOGY

## 2017-02-23 PROCEDURE — 71010 DX-CHEST-PORTABLE (1 VIEW): CPT

## 2017-02-23 PROCEDURE — A9270 NON-COVERED ITEM OR SERVICE: HCPCS | Performed by: HOSPITALIST

## 2017-02-23 PROCEDURE — 99233 SBSQ HOSP IP/OBS HIGH 50: CPT | Performed by: HOSPITALIST

## 2017-02-23 PROCEDURE — 80048 BASIC METABOLIC PNL TOTAL CA: CPT

## 2017-02-23 PROCEDURE — 85610 PROTHROMBIN TIME: CPT

## 2017-02-23 PROCEDURE — 32555 ASPIRATE PLEURA W/ IMAGING: CPT | Mod: RT

## 2017-02-23 PROCEDURE — 700111 HCHG RX REV CODE 636 W/ 250 OVERRIDE (IP): Performed by: HOSPITALIST

## 2017-02-23 PROCEDURE — 770020 HCHG ROOM/CARE - TELE (206)

## 2017-02-23 PROCEDURE — 85025 COMPLETE CBC W/AUTO DIFF WBC: CPT

## 2017-02-23 RX ORDER — HEPARIN SODIUM 1000 [USP'U]/ML
6000 INJECTION, SOLUTION INTRAVENOUS; SUBCUTANEOUS ONCE
Status: COMPLETED | OUTPATIENT
Start: 2017-02-23 | End: 2017-02-23

## 2017-02-23 RX ORDER — WARFARIN SODIUM 10 MG/1
10 TABLET ORAL
Status: COMPLETED | OUTPATIENT
Start: 2017-02-23 | End: 2017-02-23

## 2017-02-23 RX ORDER — HEPARIN SODIUM 1000 [USP'U]/ML
3200 INJECTION, SOLUTION INTRAVENOUS; SUBCUTANEOUS PRN
Status: DISCONTINUED | OUTPATIENT
Start: 2017-02-23 | End: 2017-02-24

## 2017-02-23 RX ORDER — WARFARIN SODIUM 7.5 MG/1
7.5 TABLET ORAL
Status: DISCONTINUED | OUTPATIENT
Start: 2017-02-24 | End: 2017-02-24 | Stop reason: HOSPADM

## 2017-02-23 RX ADMIN — DOCUSATE SODIUM 100 MG: 100 CAPSULE ORAL at 09:06

## 2017-02-23 RX ADMIN — EPLERENONE 25 MG: 25 TABLET, FILM COATED ORAL at 15:15

## 2017-02-23 RX ADMIN — HYDROCODONE BITARTRATE AND ACETAMINOPHEN 1 TABLET: 5; 325 TABLET ORAL at 15:16

## 2017-02-23 RX ADMIN — CARVEDILOL 3.12 MG: 3.12 TABLET, FILM COATED ORAL at 09:06

## 2017-02-23 RX ADMIN — FUROSEMIDE 40 MG: 10 INJECTION, SOLUTION INTRAVENOUS at 15:15

## 2017-02-23 RX ADMIN — STANDARDIZED SENNA CONCENTRATE AND DOCUSATE SODIUM 1 TABLET: 8.6; 5 TABLET, FILM COATED ORAL at 20:58

## 2017-02-23 RX ADMIN — POTASSIUM CHLORIDE 20 MEQ: 1500 TABLET, EXTENDED RELEASE ORAL at 09:06

## 2017-02-23 RX ADMIN — MULTIPLE VITAMINS W/ MINERALS TAB 1 TABLET: TAB at 09:06

## 2017-02-23 RX ADMIN — HYDROCODONE BITARTRATE AND ACETAMINOPHEN 1 TABLET: 5; 325 TABLET ORAL at 21:01

## 2017-02-23 RX ADMIN — CARVEDILOL 3.12 MG: 3.12 TABLET, FILM COATED ORAL at 18:40

## 2017-02-23 RX ADMIN — HEPARIN SODIUM 1200 UNITS/HR: 5000 INJECTION, SOLUTION INTRAVENOUS at 14:59

## 2017-02-23 RX ADMIN — HEPARIN SODIUM 6000 UNITS: 1000 INJECTION, SOLUTION INTRAVENOUS; SUBCUTANEOUS at 14:59

## 2017-02-23 RX ADMIN — FUROSEMIDE 40 MG: 10 INJECTION, SOLUTION INTRAVENOUS at 06:19

## 2017-02-23 RX ADMIN — WARFARIN SODIUM 10 MG: 10 TABLET ORAL at 18:40

## 2017-02-23 RX ADMIN — DOCUSATE SODIUM 100 MG: 100 CAPSULE ORAL at 20:58

## 2017-02-23 RX ADMIN — HYDROCODONE BITARTRATE AND ACETAMINOPHEN 1 TABLET: 5; 325 TABLET ORAL at 09:06

## 2017-02-23 ASSESSMENT — ENCOUNTER SYMPTOMS
INSOMNIA: 0
ABDOMINAL PAIN: 0
SORE THROAT: 0
EYE PAIN: 0
NECK PAIN: 0
BLURRED VISION: 0
CHILLS: 0
PALPITATIONS: 0
HEADACHES: 0
VOMITING: 0
SHORTNESS OF BREATH: 0
BACK PAIN: 0
DEPRESSION: 0
COUGH: 0
DIZZINESS: 0
TINGLING: 0
NAUSEA: 0
FEVER: 0

## 2017-02-23 ASSESSMENT — PAIN SCALES - GENERAL
PAINLEVEL_OUTOF10: 0
PAINLEVEL_OUTOF10: 3

## 2017-02-23 NOTE — OR SURGEON
Immediate Post- Operative Note        PostOp Diagnosis: pleural effusion      Procedure(s): right thoracentesis      Estimated Blood Loss: Less than 5 ml        Complications: None            2/23/2017     2:19 PM     Amanda Jain

## 2017-02-23 NOTE — PROGRESS NOTES
Monitor room notified RN that pt has kemi down to 49, non-sustaining. Pt is asymptomatic. Will continue to monitor pt.

## 2017-02-23 NOTE — PROGRESS NOTES
RIGHT PLEURAL EFFUSION, THERAPEUTIC RIGHT THORACENTESIS REQUESTED    RADIOLOGIST:   SONOGRAPHER: TITA    US FINDINGS:  LARGE RIGHT PLEURAL EFFUSION IDENTIFIED. SAFE SITE MARKED FOR PROCEDURE ENTRY SITE USING ULTRASOUND GUIDANCE (RIGHT POSTERIOR/LATERAL CHEST). -800 CC LIGHTLY BLOODY RIGHT PLEURAL FLUID, ALL FLUID DISCARDED. PT TOLERATED PROCEDURE WELL. VITALS MONITORED AND RECORDED IN EPIC. SMALL BAND-AID PLACED OVER THE PROCEDURE SITE. XRAY DONE IN US ROOM BEFORE RETURNING TO FLOOR, RESULTS IN EPIC SOON. PT RETURNING TO FLOOR IN STABLE CONDITION VIA TRANSPORT.

## 2017-02-23 NOTE — CARE PLAN
Problem: Knowledge Deficit  Goal: Knowledge of disease process/condition, treatment plan, diagnostic tests, and medications will improve  Outcome: PROGRESSING AS EXPECTED  Discussed POC with pt. Answered all questions appropriately. White board updated. All medications and interventions explained before performed. Pt verbalized understanding. Pt scheduled to have R thoracentesis tmrw. Pt needs home health and home O2 upon discharge.    Problem: Respiratory:  Goal: Respiratory status will improve  Outcome: PROGRESSING AS EXPECTED  Pt is on 2L NC. Pt denies any SOB or discomfort at this time. Pt scheduled to have R thoracentesis tmrw. Pt educated to notify staff of any changes in breathing. Pt verbalized understanding.

## 2017-02-23 NOTE — CARE PLAN
Problem: Safety  Goal: Will remain free from injury  Outcome: PROGRESSING AS EXPECTED  Ambulates safely in room independently    Problem: Knowledge Deficit  Goal: Knowledge of the prescribed therapeutic regimen will improve  Outcome: PROGRESSING AS EXPECTED  Provided with teaching verbal and handout on newly ordered Lisinopril    Problem: Respiratory:  Goal: Respiratory status will improve  Outcome: PROGRESSING AS EXPECTED  Subjectively improved breathing. Remains on 2L NC and awaiting 2nd thoracentesis    Problem: Pain Management  Goal: Pain level will decrease to patient’s comfort goal  Outcome: PROGRESSING AS EXPECTED  Pain controlled with minimal analgesia. Given 1 tab of Norco two times this shift.

## 2017-02-23 NOTE — PROGRESS NOTES
Monitor Summary:    Paced 60-72    Had 2 1.8 second pause, asymptomatic    Touched down to 49 non-sustaining, asymptomatic

## 2017-02-23 NOTE — PROGRESS NOTES
Hospital Medicine Progress Note, Adult, Complex               Author: Jake Mariscal Date & Time created: 2/23/2017  8:19 AM     61yom with recent avr here with sob, plueral effusion,     Interval History:  No events. Had right  thoracentesis today. serosang fluid 800 cc removed. Hgb stable. .     Review of Systems:  Review of Systems   Constitutional: Negative for fever and chills.   HENT: Negative for sore throat.    Eyes: Negative for blurred vision and pain.   Respiratory: Negative for cough and shortness of breath.    Cardiovascular: Negative for chest pain and palpitations.   Gastrointestinal: Negative for nausea, vomiting and abdominal pain.   Genitourinary: Negative for dysuria and urgency.   Musculoskeletal: Negative for back pain and neck pain.   Skin: Negative for itching and rash.   Neurological: Negative for dizziness, tingling and headaches.   Psychiatric/Behavioral: Negative for depression. The patient does not have insomnia.    All other systems reviewed and are negative.      Physical Exam:  Physical Exam   Constitutional: He is oriented to person, place, and time. He appears well-developed and well-nourished. No distress.   HENT:   Right Ear: External ear normal.   Left Ear: External ear normal.   Nose: Nose normal.   Eyes: Right eye exhibits no discharge. Left eye exhibits no discharge. No scleral icterus.   Neck: No JVD present. No tracheal deviation present.   Cardiovascular: Normal rate, normal heart sounds and intact distal pulses.    No murmur heard.  Pulmonary/Chest: Effort normal. No respiratory distress. He has decreased breath sounds. He has no wheezes. He has no rales.   Abdominal: Soft. Bowel sounds are normal. He exhibits no distension. There is no tenderness. There is no guarding.   Musculoskeletal: He exhibits no edema or tenderness.   Neurological: He is alert and oriented to person, place, and time.   Skin: Skin is warm and dry. He is not diaphoretic. No erythema.   Psychiatric:  He has a normal mood and affect. His behavior is normal.   Nursing note and vitals reviewed.      Labs:        Invalid input(s): [id number]  Recent Labs      17   CKMB  1.0   TROPONINI  0.05*   BNPBTYPENAT  953*     Recent Labs      17   SODIUM  133*   < >  134*  136  136   POTASSIUM  3.8   < >  3.6  3.4*  3.9   CHLORIDE  97   < >  98  98  101   CO2  27   < >  30  29  27   BUN  26*   < >  27*  24*  23*   CREATININE  1.23   < >  1.37  1.31  1.14   MAGNESIUM  2.2   --    --    --    --    CALCIUM  9.7   < >  8.9  9.3  9.3    < > = values in this interval not displayed.     Recent Labs      17   ALTSGPT  41   --   29   --    --    ASTSGOT  30   --   22   --    --    ALKPHOSPHAT  103*   --   93   --    --    TBILIRUBIN  0.6   --   0.5   --    --    LIPASE  78   --    --    --    --    GLUCOSE  103*   < >  98  101*  89    < > = values in this interval not displayed.     Recent Labs      17   RBC  3.55*   --   3.08*   --    --   3.28*   HEMOGLOBIN  11.4*   --   9.9*   --    --   10.2*   HEMATOCRIT  34.5*   --   30.1*   --    --   32.4*   PLATELETCT  299   --   252   --    --   256   PROTHROMBTM   --    < >   --   23.8*  21.4*  19.0*   APTT   --    --    --   136.3*  104.0*  31.9   INR   --    < >   --   2.05*  1.80*  1.54*    < > = values in this interval not displayed.     Recent Labs      17   WBC  7.4  6.1  4.9   NEUTSPOLYS  75.80*  65.80  61.50   LYMPHOCYTES  16.70*  24.00  27.40   MONOCYTES  5.40  6.70  6.20   EOSINOPHILS  1.20  2.50  3.90   BASOPHILS  0.40  0.50  0.40   ASTSGOT  30  22   --    ALTSGPT  41  29   --    ALKPHOSPHAT  103*  93   --    TBILIRUBIN  0.6  0.5   --            Hemodynamics:  Temp (24hrs), Av.2 °C (97.1 °F), Min:36.1 °C  (96.9 °F), Max:36.4 °C (97.5 °F)  Temperature: 36.1 °C (96.9 °F)  Pulse  Av.1  Min: 44  Max: 77   Blood Pressure: 120/82 mmHg     Respiratory:    Respiration: 17, Pulse Oximetry: 100 %     Work Of Breathing / Effort: Mild  RUL Breath Sounds: Clear, RML Breath Sounds: Clear, RLL Breath Sounds: Crackles, CYNDI Breath Sounds: Clear, LLL Breath Sounds: Crackles  Fluids:    Intake/Output Summary (Last 24 hours) at 17 0819  Last data filed at 17 0700   Gross per 24 hour   Intake    120 ml   Output   3800 ml   Net  -3680 ml     Weight: 98.9 kg (218 lb 0.6 oz)  GI/Nutrition:  Orders Placed This Encounter   Procedures   • Diet Order     Standing Status: Standing      Number of Occurrences: 1      Standing Expiration Date:      Order Specific Question:  Diet:     Answer:  Cardiac [6]     Medical Decision Making, by Problem:  Active Hospital Problems    Diagnosis   • Presence of biventricular AICD [Z95.810]   • Dilated cardiomyopathy (CMS-HCC) [I42.0]- repeat echo is below. No hemodynamic compromise. However severe. Coreg. Not on ace. On asa. Not on statin. Was on coumadin. Stop asa. Add statin and ace. .    • S/P AV servando ablation [Z98.890]   • Hypertension [I10]   • Hemorrhagic disorder due to circulating anticoagulants (CMS-HCC) [D68.318]- coumadin on hold for thoracentesis. Restart after thora tomorrow. .    • S/P AVR (aortic valve replacement) [Z95.2]- allow inr to trend down for procedure then heparin bridge.    • Hypokalemia [E87.6]- replace   • Pericardial effusion [I31.3]- ct surgery following.  . Small and No hemodynamic compromise.    • Pleural effusion [J90]- thoracentesis bilateral done . It was bloody serosang fluid. Likely just postop fluid and chf related. Restart heparin bridge and coumadin.     • CHF- no flare. Chronic severe systolic chf. Add meds as above.    • CAD (coronary artery disease) [I25.10]- as above per CM.    • Chronic systolic congestive heart failure, NYHA class 3 (CMS-HCC)  [I50.22]   Acute zulma- pain/tender in RUQ. LFT are ok. hida normal. Still a little tender ruq . Will see if this improves with thora tomrrow. .     Discussed plan with RN  dispo- suspect home.     Echocardiography Laboratory    CONCLUSIONS  Left ventricular ejection fraction is visually estimated to be 20%.  Small-moderate sized (<1 cm in end diastole) pericardial effusion   without evidence of hemodynamic compromise.  Known bioprosthetic aortic valve, mean gradient 18 mmHg - with severely   depressed cariac output, flow impairment may be underestimated.  Large left pleural effusion.  Right ventricular systolic pressure is estimated to be at least 35   mmHg.  Compared with the intraoperative JUSTINO 2/8 - the effusions are new.  1.  Moderate pericardial effusion, new since prior. Appearance raises concern for component of cardiac tamponade. Intermediate low density of pericardial effusion could represent hemorrhagic or proteinaceous component. Further evaluation with   echocardiogram for further characterization.  2.  Moderate sized bilateral pleural effusions with dependent consolidations favoring atelectasis.  3.  Atherosclerosis      Normal hepatobiliary scan. No evidence of acute cholecystitis.  Normal gallbladder ejection fraction.    Normal gallbladder ejection fraction is 38% or greater.    EKG reviewed, Labs reviewed, Medications reviewed and Radiology images reviewed  Hewitt catheter: No Hewitt      DVT Prophylaxis: Warfarin (Coumadin) and Heparin  DVT prophylaxis - mechanical: SCDs      Assessed for rehab: Patient was assess for and/or received rehabilitation services during this hospitalization

## 2017-02-23 NOTE — PROGRESS NOTES
Assumed care report received. Assessment completed. AOx4. Pt resting in bed complains of pain 3/10, medicated see MAR. No other complaints at this time. Plan of care discussed, verbalized understanding. Fall precautions in place. Call light within reach. Tele monitor in place. White board updated.

## 2017-02-23 NOTE — PROGRESS NOTES
Inpatient Anticoagulation Service Note    Date: 2/23/2017  Reason for Anticoagulation: Bioprosthetic Valve Replacement        Hemoglobin Value: 10.2  Hematocrit Value: 32.4  Lab Platelet Value: 256  Target INR: 2.0 to 3.0    INR from last 7 days     Date/Time INR Value    02/23/17 0349 (!)1.54    02/22/17 0620 (!)1.8    02/22/17 0027 (!)2.05    02/21/17 1221 (!)2        Dose from last 7 days     Date/Time Dose (mg)    02/23/17 1400 10    02/22/17 1300 0        Average Dose (mg):  (warfarin 7.5mg po qday per [facility])  Significant Interactions: Aspirin (MVI)  Bridge Therapy: Yes   Comments: Subtherapeutic INR with downward trend after warfarin being held the past 3 days for thoracentesis of left and right side.  Pt had a bioprosthetic aortic valve  replacement by Dr. Alfonso Llanos on February 8th.  Give warfarin 10mg po tonight then resume home dose of warfarin 7.5mg po qday  Heparin weight based-drip has been ordered to resume at this time. Repeat INR in AM.   Continue bridge therapy until INR greater than or equal to 2.     Plan:  Give warfarin 10mg po tonight then resume home dose of warfarin 7.5mg po qday  Heparin weight based-drip has been ordered to resume at this time. Repeat INR in AM.   Continue bridge therapy until INR greater than or equal to 2.     Education Material Provided?: No (chronic warfarin patient)  Pharmacist suggested discharge dosing: Resume warfarin 7.5mg po qday with follow up to [facility] clinic within 2 to 3 days of discharge.  Pt to continue bridge therapy until INR  > or equal to 2.      Tiana Lindsey.D.

## 2017-02-24 ENCOUNTER — APPOINTMENT (OUTPATIENT)
Dept: RADIOLOGY | Facility: MEDICAL CENTER | Age: 62
DRG: 189 | End: 2017-02-24
Attending: HOSPITALIST
Payer: COMMERCIAL

## 2017-02-24 VITALS
OXYGEN SATURATION: 98 % | TEMPERATURE: 97.9 F | WEIGHT: 212.96 LBS | HEART RATE: 61 BPM | SYSTOLIC BLOOD PRESSURE: 105 MMHG | HEIGHT: 71 IN | RESPIRATION RATE: 17 BRPM | BODY MASS INDEX: 29.81 KG/M2 | DIASTOLIC BLOOD PRESSURE: 77 MMHG

## 2017-02-24 LAB
APTT PPP: 41.1 SEC (ref 24.7–36)
INR PPP: 1.41 (ref 0.87–1.13)
PROTHROMBIN TIME: 17.7 SEC (ref 12–14.6)

## 2017-02-24 PROCEDURE — 85610 PROTHROMBIN TIME: CPT

## 2017-02-24 PROCEDURE — A9270 NON-COVERED ITEM OR SERVICE: HCPCS | Performed by: HOSPITALIST

## 2017-02-24 PROCEDURE — 700111 HCHG RX REV CODE 636 W/ 250 OVERRIDE (IP)

## 2017-02-24 PROCEDURE — 71010 DX-CHEST-PORTABLE (1 VIEW): CPT

## 2017-02-24 PROCEDURE — 36415 COLL VENOUS BLD VENIPUNCTURE: CPT

## 2017-02-24 PROCEDURE — 700102 HCHG RX REV CODE 250 W/ 637 OVERRIDE(OP): Performed by: HOSPITALIST

## 2017-02-24 PROCEDURE — 700111 HCHG RX REV CODE 636 W/ 250 OVERRIDE (IP): Performed by: HOSPITALIST

## 2017-02-24 PROCEDURE — 85730 THROMBOPLASTIN TIME PARTIAL: CPT

## 2017-02-24 PROCEDURE — 99239 HOSP IP/OBS DSCHRG MGMT >30: CPT | Performed by: HOSPITALIST

## 2017-02-24 PROCEDURE — 700112 HCHG RX REV CODE 229: Performed by: HOSPITALIST

## 2017-02-24 RX ORDER — ATORVASTATIN CALCIUM 40 MG/1
40 TABLET, FILM COATED ORAL
Qty: 30 TAB | Refills: 1 | Status: SHIPPED | OUTPATIENT
Start: 2017-02-24 | End: 2017-03-13 | Stop reason: SDUPTHER

## 2017-02-24 RX ADMIN — CARVEDILOL 3.12 MG: 3.12 TABLET, FILM COATED ORAL at 09:15

## 2017-02-24 RX ADMIN — ENOXAPARIN SODIUM 100 MG: 100 INJECTION SUBCUTANEOUS at 12:27

## 2017-02-24 RX ADMIN — HEPARIN SODIUM 3200 UNITS: 1000 INJECTION, SOLUTION INTRAVENOUS; SUBCUTANEOUS at 04:20

## 2017-02-24 RX ADMIN — MULTIPLE VITAMINS W/ MINERALS TAB 1 TABLET: TAB at 09:15

## 2017-02-24 RX ADMIN — EPLERENONE 25 MG: 25 TABLET, FILM COATED ORAL at 09:15

## 2017-02-24 RX ADMIN — DOCUSATE SODIUM 100 MG: 100 CAPSULE ORAL at 09:15

## 2017-02-24 RX ADMIN — FUROSEMIDE 40 MG: 10 INJECTION, SOLUTION INTRAVENOUS at 05:18

## 2017-02-24 RX ADMIN — POTASSIUM CHLORIDE 20 MEQ: 1500 TABLET, EXTENDED RELEASE ORAL at 09:15

## 2017-02-24 ASSESSMENT — ACTIVITIES OF DAILY LIVING (ADL): HOME_HEALTH_OASIS: 02

## 2017-02-24 ASSESSMENT — PAIN SCALES - GENERAL
PAINLEVEL_OUTOF10: 0
PAINLEVEL_OUTOF10: 0

## 2017-02-24 ASSESSMENT — LIFESTYLE VARIABLES: EVER_SMOKED: NEVER

## 2017-02-24 NOTE — CARE PLAN
Problem: Knowledge Deficit  Goal: Knowledge of disease process/condition, treatment plan, diagnostic tests, and medications will improve  Outcome: PROGRESSING AS EXPECTED  POC discussed with patient at bedside. Education provided on care and medications. White board updated. Patient verbalizes understanding and has no immediate concerns.         Problem: Pain Management  Goal: Pain level will decrease to patient’s comfort goal  Outcome: PROGRESSING AS EXPECTED  Patient educated on pain scale. Pain management interventions in place. (See Mar) rest, repositioning also offered.

## 2017-02-24 NOTE — DISCHARGE INSTRUCTIONS
Discharge Instructions    Discharged to home by car with relative. Discharged via wheelchair, hospital escort: Refused.  Special equipment needed: Oxygen    Be sure to schedule a follow-up appointment with your primary care doctor or any specialists as instructed.     Discharge Plan:   Diet Plan: Discussed  Activity Level: Discussed  Confirmed Follow up Appointment: Appointment Scheduled  Confirmed Symptoms Management: Discussed  Medication Reconciliation Updated: Yes  Pneumococcal Vaccine Given - only chart on this line when given: Given (See MAR)  Influenza Vaccine Indication: Not indicated: Previously immunized this influenza season and > 8 years of age    I understand that a diet low in cholesterol, fat, and sodium is recommended for good health. Unless I have been given specific instructions below for another diet, I accept this instruction as my diet prescription.   Other diet: Heart healthy diet    Special Instructions:   HF Patient Discharge Instructions  · Monitor your weight daily, and maintain a weight chart, to track your weight changes.   · Activity as tolerated, unless your Doctor has ordered otherwise. Other activity order:None.  · Follow a low fat, low cholesterol, low salt diet unless instructed otherwise by your Doctor. Read the labels on the back of food products and track your intake of fat, cholesterol and salt.   · Fluid Restriction No. If a Fluid Restriction has been ordered by your Doctor, measure fluids with a measuring cup to ensure that you are not exceeding the restriction.   · No smoking.  · Oxygen Yes. If your Doctor has ordered that you wear Oxygen at home, it is important to wear it as ordered.  · Did you receive an explanation from staff on the importance of taking each of your medications and why it is necessary to keep taking them unless your doctor says to stop? Yes  · Were all of your questions answered about how to manage your heart failure and what to do if you have increased  signs and symptoms after you go home? Yes  · Do you feel like your heart failure care team involved you in the care treatment plan and allowed you to make decisions regarding your care while in the hospital and addressed any discharge needs you might have? Yes    See the educational handout provided at discharge for more information on monitoring your daily weight, activity and diet. This also explains more about Heart Failure, symptoms of a flare-up and some of the tests that you have undergone.     Warning Signs of a Flare-Up include:  · Swelling in the ankles or lower legs.  · Shortness of breath, while at rest, or while doing normal activities.   · Shortness of breath at night when in bed, or coughing in bed.   · Requiring more pillows to sleep at night, or needing to sit up at night to sleep.  · Feeling weak, dizzy or fatigued.     When to call your Doctor:  · Call [facility] Tele[facility] 7th floor about questions regarding the discharge instructions you were given [phone].  · Call your Primary Care Physician or Cardiologist if:   ¨ You experience any pain radiating to your jaw or neck.  ¨ You have any difficulty breathing.  ¨ You experience weight gain of 3 lbs in a day or 5 lbs in a week.   ¨ You feel any palpitations or irregular heartbeats.  ¨ You become dizzy or lose consciousness.   If you have had an angiogram or had a pacemaker or AICD placed, and experience:  · Bleeding, drainage or swelling at the surgical / puncture site.  · Fever greater than 100.0 F  · Shock from internal defibrillator.  · Cool and / or numb extremities.      · Is patient discharged on Warfarin / Coumadin?   Yes    You are receiving the drug warfarin. Please understand the importance of monitoring warfarin with scheduled PT/INR blood draws.  Follow-up with a call to your personal Doctor's office in 3 days to schedule a PT/INR. .    IMPORTANT: HOW TO USE THIS INFORMATION:  This is a summary and does NOT have all possible information  "about this product. This information does not assure that this product is safe, effective, or appropriate for you. This information is not individual medical advice and does not substitute for the advice of your health care professional. Always ask your health care professional for complete information about this product and your specific health needs.      WARFARIN - ORAL (WARF-uh-rin)      COMMON BRAND NAME(S): Coumadin      WARNING:  Warfarin can cause very serious (possibly fatal) bleeding. This is more likely to occur when you first start taking this medication or if you take too much warfarin. To decrease your risk for bleeding, your doctor or other health care provider will monitor you closely and check your lab results (INR test) to make sure you are not taking too much warfarin. Keep all medical and laboratory appointments. Tell your doctor right away if you notice any signs of serious bleeding. See also Side Effects section.      USES:  This medication is used to treat blood clots (such as in deep vein thrombosis-DVT or pulmonary embolus-PE) and/or to prevent new clots from forming in your body. Preventing harmful blood clots helps to reduce the risk of a stroke or heart attack. Conditions that increase your risk of developing blood clots include a certain type of irregular heart rhythm (atrial fibrillation), heart valve replacement, recent heart attack, and certain surgeries (such as hip/knee replacement). Warfarin is commonly called a \"blood thinner,\" but the more correct term is \"anticoagulant.\" It helps to keep blood flowing smoothly in your body by decreasing the amount of certain substances (clotting proteins) in your blood.      HOW TO USE:  Read the Medication Guide provided by your pharmacist before you start taking warfarin and each time you get a refill. If you have any questions, ask your doctor or pharmacist. Take this medication by mouth with or without food as directed by your doctor or " other health care professional, usually once a day. It is very important to take it exactly as directed. Do not increase the dose, take it more frequently, or stop using it unless directed by your doctor. Dosage is based on your medical condition, laboratory tests (such as INR), and response to treatment. Your doctor or other health care provider will monitor you closely while you are taking this medication to determine the right dose for you. Use this medication regularly to get the most benefit from it. To help you remember, take it at the same time each day. It is important to eat a balanced, consistent diet while taking warfarin. Some foods can affect how warfarin works in your body and may affect your treatment and dose. Avoid sudden large increases or decreases in your intake of foods high in vitamin K (such as broccoli, cauliflower, cabbage, brussels sprouts, kale, spinach, and other green leafy vegetables, liver, green tea, certain vitamin supplements). If you are trying to lose weight, check with your doctor before you try to go on a diet. Cranberry products may also affect how your warfarin works. Limit the amount of cranberry juice (16 ounces/480 milliliters a day) or other cranberry products you may drink or eat.      SIDE EFFECTS:  Nausea, loss of appetite, or stomach/abdominal pain may occur. If any of these effects persist or worsen, tell your doctor or pharmacist promptly. Remember that your doctor has prescribed this medication because he or she has judged that the benefit to you is greater than the risk of side effects. Many people using this medication do not have serious side effects. This medication can cause serious bleeding if it affects your blood clotting proteins too much (shown by unusually high INR lab results). Even if your doctor stops your medication, this risk of bleeding can continue for up to a week. Tell your doctor right away if you have any signs of serious bleeding, including:  unusual pain/swelling/discomfort, unusual/easy bruising, prolonged bleeding from cuts or gums, persistent/frequent nosebleeds, unusually heavy/prolonged menstrual flow, pink/dark urine, coughing up blood, vomit that is bloody or looks like coffee grounds, severe headache, dizziness/fainting, unusual or persistent tiredness/weakness, bloody/black/tarry stools, chest pain, shortness of breath, difficulty swallowing. Tell your doctor right away if any of these unlikely but serious side effects occur: persistent nausea/vomiting, severe stomach/abdominal pain, yellowing eyes/skin. This drug rarely has caused very serious (possibly fatal) problems if its effects lead to small blood clots (usually at the beginning of treatment). This can lead to severe skin/tissue damage that may require surgery or amputation if left untreated. Patients with certain blood conditions (protein C or S deficiency) may be at greater risk. Get medical help right away if any of these rare but serious side effects occur: painful/red/purplish patches on the skin (such as on the toe, breast, abdomen), change in the amount of urine, vision changes, confusion, slurred speech, weakness on one side of the body. A very serious allergic reaction to this drug is rare. However, get medical help right away if you notice any symptoms of a serious allergic reaction, including: rash, itching/swelling (especially of the face/tongue/throat), severe dizziness, trouble breathing. This is not a complete list of possible side effects. If you notice other effects not listed above, contact your doctor or pharmacist. In the [place] - Call your doctor for medical advice about side effects. You may report side effects to FDA at 9-265-JGQ-4611. In [place] - Call your doctor for medical advice about side effects. You may report side effects to [facility] [place] at 1-926.551.7876.      PRECAUTIONS:  Before taking warfarin, tell your doctor or pharmacist if you are allergic to it; or  if you have any other allergies. This product may contain inactive ingredients, which can cause allergic reactions or other problems. Talk to your pharmacist for more details. Before using this medication, tell your doctor or pharmacist your medical history, especially of: blood disorders (such as anemia, hemophilia), bleeding problems (such as bleeding of the stomach/intestines, bleeding in the brain), blood vessel disorders (such as aneurysms), recent major injury/surgery, liver disease, alcohol use, mental/mood disorders (including memory problems), frequent falls/injuries. It is important that all your doctors and dentists know that you take warfarin. Before having surgery or any medical/dental procedures, tell your doctor or dentist that you are taking this medication and about all the products you use (including prescription drugs, nonprescription drugs, and herbal products). Avoid getting injections into the muscles. If you must have an injection into a muscle (for example, a flu shot), it should be given in the arm. This way, it will be easier to check for bleeding and/or apply pressure bandages. This medication may cause stomach bleeding. Daily use of alcohol while using this medicine will increase your risk for stomach bleeding and may also affect how this medication works. Limit or avoid alcoholic beverages. If you have not been eating well, if you have an illness or infection that causes fever, vomiting, or diarrhea for more than 2 days, or if you start using any antibiotic medications, contact your doctor or pharmacist immediately because these conditions can affect how warfarin works. This medication can cause heavy bleeding. To lower the chance of getting cut, bruised, or injured, use great caution with sharp objects like safety razors and nail cutters. Use an electric razor when shaving and a soft toothbrush when brushing your teeth. Avoid activities such as contact sports. If you fall or injure  "yourself, especially if you hit your head, call your doctor immediately. Your doctor may need to check you. The Food & Drug Administration has stated that generic warfarin products are interchangeable. However, consult your doctor or pharmacist before switching warfarin products. Be careful not to take more than one medication that contains warfarin unless specifically directed by the doctor or health care provider who is monitoring your warfarin treatment. Older adults may be at greater risk for bleeding while using this drug. This medication is not recommended for use during pregnancy because of serious (possibly fatal) harm to an unborn baby. Discuss the use of reliable forms of birth control with your doctor. If you become pregnant or think you may be pregnant, tell your doctor immediately. If you are planning pregnancy, discuss a plan for managing your condition with your doctor before you become pregnant. Your doctor may switch the type of medication you use during pregnancy. Very small amounts of this medication may pass into breast milk but is unlikely to harm a nursing infant. Consult your doctor before breast-feeding.      DRUG INTERACTIONS:  Drug interactions may change how your medications work or increase your risk for serious side effects. This document does not contain all possible drug interactions. Keep a list of all the products you use (including prescription/nonprescription drugs and herbal products) and share it with your doctor and pharmacist. Do not start, stop, or change the dosage of any medicines without your doctor's approval. Warfarin interacts with many prescription, nonprescription, vitamin, and herbal products. This includes medications that are applied to the skin or inside the vagina or rectum. The interactions with warfarin usually result in an increase or decrease in the \"blood-thinning\" (anticoagulant) effect. Your doctor or other health care professional should closely monitor " you to prevent serious bleeding or clotting problems. While taking warfarin, it is very important to tell your doctor or pharmacist of any changes in medications, vitamins, or herbal products that you are taking. Some products that may interact with this drug include: capecitabine, imatinib, mifepristone. Aspirin, aspirin-like drugs (salicylates), and nonsteroidal anti-inflammatory drugs (NSAIDs such as ibuprofen, naproxen, celecoxib) may have effects similar to warfarin. These drugs may increase the risk of bleeding problems if taken during treatment with warfarin. Carefully check all prescription/nonprescription product labels (including drugs applied to the skin such as pain-relieving creams) since the products may contain NSAIDs or salicylates. Talk to your doctor about using a different medication (such as acetaminophen) to treat pain/fever. Low-dose aspirin and related drugs (such as clopidogrel, ticlopidine) should be continued if prescribed by your doctor for specific medical reasons such as heart attack or stroke prevention. Consult your doctor or pharmacist for more details. Many herbal products interact with warfarin. Tell your doctor before taking any herbal products, especially bromelains, coenzyme Q10, cranberry, danshen, dong quai, fenugreek, garlic, ginkgo biloba, ginseng, and Clarence's wort, among others. This medication may interfere with a certain laboratory test to measure theophylline levels, possibly causing false test results. Make sure laboratory personnel and all your doctors know you use this drug.      OVERDOSE:  If overdose is suspected, contact a poison control center or emergency room immediately. [place] residents can call the [place] National Poison Hotline at 1-193.982.6039. [place] residents can call a provincial poison control center. Symptoms of overdose may include: bloody/black/tarry stools, pink/dark urine, unusual/prolonged bleeding.      NOTES:  Do not share this medication with  others. Laboratory and/or medical tests (such as INR, complete blood count) must be performed periodically to monitor your progress or check for side effects. Consult your doctor for more details.      MISSED DOSE:  For the best possible benefit, do not miss any doses. If you do miss a dose and remember on the same day, take it as soon as you remember. If you remember on the next day, skip the missed dose and resume your usual dosing schedule. Do not double the dose to catch up because this could increase your risk for bleeding. Keep a record of missed doses to give to your doctor or pharmacist. Contact your doctor or pharmacist if you miss 2 or more doses in a row.      STORAGE:  Store at room temperature away from light and moisture. Do not store in the bathroom. Keep all medications away from children and pets. Do not flush medications down the toilet or pour them into a drain unless instructed to do so. Properly discard this product when it is  or no longer needed. Consult your pharmacist or local waste disposal company for more details about how to safely discard your product.      MEDICAL ALERT:  Your condition and medication can cause complications in a medical emergency. For information about enrolling in MedicAlert, call 1-808.729.6557 ([place]) or 1-203.580.6440 ([place]).      Information last revised 2010 Copyright(c)  [employer]             · Is patient Post Blood Transfusion?  No    Depression / Suicide Risk    As you are discharged [facility], it is important to learn how to keep safe from harming yourself.    Recognize the warning signs:  · Abrupt changes in personality, positive or negative- including increase in energy   · Giving away possessions  · Change in eating patterns- significant weight changes-  positive or negative  · Change in sleeping patterns- unable to sleep or sleeping all the time   · Unwillingness or inability to  communicate  · Depression  · Unusual sadness, discouragement and loneliness  · Talk of wanting to die  · Neglect of personal appearance   · Rebelliousness- reckless behavior  · Withdrawal from people/activities they love  · Confusion- inability to concentrate     If you or a loved one observes any of these behaviors or has concerns about self-harm, here's what you can do:  · Talk about it- your feelings and reasons for harming yourself  · Remove any means that you might use to hurt yourself (examples: pills, rope, extension cords, firearm)  · Get professional help from the community (Mental Health, Substance Abuse, psychological counseling)  · Do not be alone:Call your Safe Contact- someone whom you trust who will be there for you.  · Call your local CRISIS HOTLINE 767-5979 or 196-193-7627  · Call your local Children's Mobile Crisis Response Team [place] (107) 963-8874 or www.Vers[employer]  · Call the toll free National Suicide Prevention Hotlines   · National Suicide Prevention Lifeline 436-891-XLBM (4419)  · National Hope Line Network 800-SUICIDE (631-9353)    Heart Failure  Heart failure is a condition in which the heart has trouble pumping blood. This means your heart does not pump blood efficiently for your body to work well. In some cases of heart failure, fluid may back up into your lungs or you may have swelling (edema) in your lower legs. Heart failure is usually a long-term (chronic) condition. It is important for you to take good care of yourself and follow your health care provider's treatment plan.  CAUSES   Some health conditions can cause heart failure. Those health conditions include:  · High blood pressure (hypertension). Hypertension causes the heart muscle to work harder than normal. When pressure in the blood vessels is high, the heart needs to pump (contract) with more force in order to circulate blood throughout the body. High blood pressure eventually causes the heart to become stiff  and weak.  · Coronary artery disease (CAD). CAD is the buildup of cholesterol and fat (plaque) in the arteries of the heart. The blockage in the arteries deprives the heart muscle of oxygen and blood. This can cause chest pain and may lead to a heart attack. High blood pressure can also contribute to CAD.  · Heart attack (myocardial infarction). A heart attack occurs when one or more arteries in the heart become blocked. The loss of oxygen damages the muscle tissue of the heart. When this happens, part of the heart muscle dies. The injured tissue does not contract as well and weakens the heart's ability to pump blood.  · Abnormal heart valves. When the heart valves do not open and close properly, it can cause heart failure. This makes the heart muscle pump harder to keep the blood flowing.  · Heart muscle disease (cardiomyopathy or myocarditis). Heart muscle disease is damage to the heart muscle from a variety of causes. These can include drug or alcohol abuse, infections, or unknown reasons. These can increase the risk of heart failure.  · Lung disease. Lung disease makes the heart work harder because the lungs do not work properly. This can cause a strain on the heart, leading it to fail.  · Diabetes. Diabetes increases the risk of heart failure. High blood sugar contributes to high fat (lipid) levels in the blood. Diabetes can also cause slow damage to tiny blood vessels that carry important nutrients to the heart muscle. When the heart does not get enough oxygen and food, it can cause the heart to become weak and stiff. This leads to a heart that does not contract efficiently.  · Other conditions can contribute to heart failure. These include abnormal heart rhythms, thyroid problems, and low blood counts (anemia).  Certain unhealthy behaviors can increase the risk of heart failure, including:  · Being overweight.  · Smoking or chewing tobacco.  · Eating foods high in fat and cholesterol.  · Abusing illicit drugs  or alcohol.  · Lacking physical activity.  SYMPTOMS   Heart failure symptoms may vary and can be hard to detect. Symptoms may include:  · Shortness of breath with activity, such as climbing stairs.  · Persistent cough.  · Swelling of the feet, ankles, legs, or abdomen.  · Unexplained weight gain.  · Difficulty breathing when lying flat (orthopnea).  · Waking from sleep because of the need to sit up and get more air.  · Rapid heartbeat.  · Fatigue and loss of energy.  · Feeling light-headed, dizzy, or close to fainting.  · Loss of appetite.  · Nausea.  · Increased urination during the night (nocturia).  DIAGNOSIS   A diagnosis of heart failure is based on your history, symptoms, physical examination, and diagnostic tests. Diagnostic tests for heart failure may include:  · Echocardiography.  · Electrocardiography.  · Chest X-ray.  · Blood tests.  · Exercise stress test.  · Cardiac angiography.  · Radionuclide scans.  TREATMENT   Treatment is aimed at managing the symptoms of heart failure. Medicines, behavioral changes, or surgical intervention may be necessary to treat heart failure.  · Medicines to help treat heart failure may include:  · Angiotensin-converting enzyme (ACE) inhibitors. This type of medicine blocks the effects of a blood protein called angiotensin-converting enzyme. ACE inhibitors relax (dilate) the blood vessels and help lower blood pressure.  · Angiotensin receptor blockers (ARBs). This type of medicine blocks the actions of a blood protein called angiotensin. Angiotensin receptor blockers dilate the blood vessels and help lower blood pressure.  · Water pills (diuretics). Diuretics cause the kidneys to remove salt and water from the blood. The extra fluid is removed through urination. This loss of extra fluid lowers the volume of blood the heart pumps.  · Beta blockers. These prevent the heart from beating too fast and improve heart muscle strength.  · Digitalis. This increases the force of the  heartbeat.  · Healthy behavior changes include:  · Obtaining and maintaining a healthy weight.  · Stopping smoking or chewing tobacco.  · Eating heart-healthy foods.  · Limiting or avoiding alcohol.  · Stopping illicit drug use.  · Physical activity as directed by your health care provider.  · Surgical treatment for heart failure may include:  · A procedure to open blocked arteries, repair damaged heart valves, or remove damaged heart muscle tissue.  · A pacemaker to improve heart muscle function and control certain abnormal heart rhythms.  · An internal cardioverter defibrillator to treat certain serious abnormal heart rhythms.  · A left ventricular assist device (LVAD) to assist the pumping ability of the heart.  HOME CARE INSTRUCTIONS   · Take medicines only as directed by your health care provider. Medicines are important in reducing the workload of your heart, slowing the progression of heart failure, and improving your symptoms.  · Do not stop taking your medicine unless directed by your health care provider.  · Do not skip any dose of medicine.  · Refill your prescriptions before you run out of medicine. Your medicines are needed every day.  · Engage in moderate physical activity if directed by your health care provider. Moderate physical activity can benefit some people. The elderly and people with severe heart failure should consult with a health care provider for physical activity recommendations.  · Eat heart-healthy foods. Food choices should be free of trans fat and low in saturated fat, cholesterol, and salt (sodium). Healthy choices include fresh or frozen fruits and vegetables, fish, lean meats, legumes, fat-free or low-fat dairy products, and whole grain or high fiber foods. Talk to a dietitian to learn more about heart-healthy foods.  · Limit sodium if directed by your health care provider. Sodium restriction may reduce symptoms of heart failure in some people. Talk to a dietitian to learn more  about heart-healthy seasonings.  · Use healthy cooking methods. Healthy cooking methods include roasting, grilling, broiling, baking, poaching, steaming, or stir-frying. Talk to a dietitian to learn more about healthy cooking methods.  · Limit fluids if directed by your health care provider. Fluid restriction may reduce symptoms of heart failure in some people.  · Weigh yourself every day. Daily weights are important in the early recognition of excess fluid. You should weigh yourself every morning after you urinate and before you eat breakfast. Wear the same amount of clothing each time you weigh yourself. Record your daily weight. Provide your health care provider with your weight record.  · Monitor and record your blood pressure if directed by your health care provider.  · Check your pulse if directed by your health care provider.  · Lose weight if directed by your health care provider. Weight loss may reduce symptoms of heart failure in some people.  · Stop smoking or chewing tobacco. Nicotine makes your heart work harder by causing your blood vessels to constrict. Do not use nicotine gum or patches before talking to your health care provider.  · Keep all follow-up visits as directed by your health care provider. This is important.  · Limit alcohol intake to no more than 1 drink per day for nonpregnant women and 2 drinks per day for men. One drink equals 12 ounces of beer, 5 ounces of wine, or 1½ ounces of hard liquor. Drinking more than that is harmful to your heart. Tell your health care provider if you drink alcohol several times a week. Talk with your health care provider about whether alcohol is safe for you. If your heart has already been damaged by alcohol or you have severe heart failure, drinking alcohol should be stopped completely.  · Stop illicit drug use.  · Stay up-to-date with immunizations. It is especially important to prevent respiratory infections through current pneumococcal and influenza  immunizations.  · Manage other health conditions such as hypertension, diabetes, thyroid disease, or abnormal heart rhythms as directed by your health care provider.  · Learn to manage stress.  · Plan rest periods when fatigued.  · Learn strategies to manage high temperatures. If the weather is extremely hot:  · Avoid vigorous physical activity.  · Use air conditioning or fans or seek a cooler location.  · Avoid caffeine and alcohol.  · Wear loose-fitting, lightweight, and light-colored clothing.  · Learn strategies to manage cold temperatures. If the weather is extremely cold:  · Avoid vigorous physical activity.  · Layer clothes.  · Wear mittens or gloves, a hat, and a scarf when going outside.  · Avoid alcohol.  · Obtain ongoing education and support as needed.  · Participate in or seek rehabilitation as needed to maintain or improve independence and quality of life.  SEEK MEDICAL CARE IF:   · You have a rapid weight gain.  · You have increasing shortness of breath that is unusual for you.  · You are unable to participate in your usual physical activities.  · You tire easily.  · You cough more than normal, especially with physical activity.  · You have any or more swelling in areas such as your hands, feet, ankles, or abdomen.  · You are unable to sleep because it is hard to breathe.  · You feel like your heart is beating fast (palpitations).  · You become dizzy or light-headed upon standing up.  SEEK IMMEDIATE MEDICAL CARE IF:   · You have difficulty breathing.  · There is a change in mental status such as decreased alertness or difficulty with concentration.  · You have a pain or discomfort in your chest.  · You have an episode of fainting (syncope).  MAKE SURE YOU:   · Understand these instructions.  · Will watch your condition.  · Will get help right away if you are not doing well or get worse.     This information is not intended to replace advice given to you by your health care provider. Make sure you  discuss any questions you have with your health care provider.     Document Released: 12/18/2006 Document Revised: 05/03/2016 Document Reviewed: 01/17/2014  [employer] Interactive Patient Education ©2016 [employer]  Thoracentesis  A thoracentesis is a procedure to remove fluid that has built up in the space between the linings of the chest wall and the lungs (pleural space). It is normal to have a small amount of fluid in the pleural space. Some medical conditions, such as heart failure, pneumonia, kidney problems, or cancer, can create too much fluid. This extra fluid is removed using a needle that is inserted through the skin and tissue and into the pleural space.  A thoracentesis may be done to:  · Understand why there is extra fluid in the pleural space and create a treatment plan that is right for you.  · Help to get rid of shortness of breath, discomfort, or pain that is caused by the extra fluid.  LET YOUR HEALTH CARE PROVIDER KNOW ABOUT:  · Any allergies you have.  · All medicines you are taking, including vitamins, herbs, eye drops, creams, and over-the-counter medicines. This includes any use of steroids, either by mouth or in a cream.    · Previous problems you or members of your family have had with the use of anesthetics.  · Any blood disorders you have, including any history of blood clots.  · Previous surgeries you have had.  · Medical conditions you have, including:  · The possibility of pregnancy, if this applies.  · Have a frequent cough or coughing episodes.  RISKS AND COMPLICATIONS  Generally, this is a safe procedure. However, problems may occur, including:  · Infection.  · Injury to the lung.  · Lung collapse.  · Bleeding.  BEFORE THE PROCEDURE  · You may have a chest X-ray or another imaging test, such as a CT scan or ultrasound, to determine the location and amount of fluid in your pleural space.  · Ask your health care provider about:  ¨ Changing or stopping your regular medicines. This is  especially important if you are taking diabetes medicines or blood thinners.  ¨ Taking medicines such as aspirin and ibuprofen. These medicines can thin your blood. Do not take these medicines before your procedure if your health care provider instructs you not to.  ¨ Taking a cough suppressant if you have a frequent cough or coughing episodes.  · Plan to have someone take you home after the procedure.  PROCEDURE  · You will be asked to sit upright and lean slightly forward for the procedure.  · An area of your back will be cleaned with a germ-killing solution (antiseptic).  · You will be given a medicine that numbs the area (local anesthetic).  · A needle will be inserted between your ribs and into the pleural space. You may feel pressure or slight pain as the needle is positioned into the pleural space.  · Fluid will be removed from the pleural space through the needle. You may feel pressure as the fluid is removed.  · The needle will be taken out after the excess fluid has been removed. A sample of the fluid may be sent to be examined.  · The needle insertion site (puncture site) will be covered with a bandage (dressing).  The procedure may vary among health care providers and hospitals.  AFTER THE PROCEDURE  · A chest X-ray may be done to check the amount of fluid that remains in your pleural space.  · Your blood pressure, heart rate, breathing rate, and blood oxygen level will be monitored often until the medicines you were given have worn off.  · It is your responsibility to obtain your test results. Ask the lab or department performing the test when and how you will get your results. Talk with your health care provider if you have any questions about your results.     This information is not intended to replace advice given to you by your health care provider. Make sure you discuss any questions you have with your health care provider.     Document Released: 07/03/2006 Document Revised: 01/08/2016 Document  Reviewed: 09/29/2015  [employer] Interactive Patient Education ©2016 [employer]    Pleural Effusion  A pleural effusion is an abnormal buildup of fluid in the layers of tissue between your lungs and the inside of your chest (pleural space). These two layers of tissue that line both your lungs and the inside of your chest are called pleura. Usually, there is no air in the space between the pleura, only a thin layer of fluid. If left untreated, a large amount of fluid can build up and cause the lung to collapse. A pleural effusion is usually caused by another disease that requires treatment.  The two main types of pleural effusion are:  · Transudative pleural effusion. This happens when fluid leaks into the pleural space because of a low protein count in your blood or high blood pressure in your vessels. Heart failure often causes this.  · Exudative infusion. This occurs when fluid collects in the pleural space from blocked blood vessels or lymph vessels. Some lung diseases, injuries, and cancers can cause this type of effusion.  CAUSES  Pleural effusion can be caused by:  · Heart failure.  · A blood clot in the lung (pulmonary embolism).  · Pneumonia.  · Cancer.  · Liver failure (cirrhosis).  · Kidney disease.  · Complications from surgery, such as from open heart surgery.  SIGNS AND SYMPTOMS  In some cases, pleural effusion may cause no symptoms. Symptoms can include:  · Shortness of breath, especially when lying down.  · Chest pain, often worse when taking a deep breath.  · Fever.  · Dry cough that is lasting (chronic).  · Hiccups.  · Rapid breathing.  An underlying condition that is causing the pleural effusion (such as heart failure, pneumonia, blood clots, tuberculosis, or cancer) may also cause additional symptoms.  DIAGNOSIS  Your health care provider may suspect pleural effusion based on your symptoms and medical history. Your health care provider will also do a physical exam and a chest X-ray. If the X-ray  shows there is fluid in your chest, you may need to have this fluid removed using a needle (thoracentesis) so it can be tested.  You may also have:  · Imaging studies of the chest, such as:  ¨ Ultrasound.  ¨ CT scan.  · Blood tests for kidney and liver function.  TREATMENT  Treatment depends on the cause of the pleural effusion. Treatment may include:  · Taking antibiotic medicines to clear up an infection that is causing the pleural effusion.  · Placing a tube in the chest to drain the effusion (tube thoracostomy). This procedure is often used when there is an infection in the fluid.  · Surgery to remove the fibrous outer layer of tissue from the pleural space (decortication).  · Thoracentesis, which can improve cough and shortness of breath.  · A procedure to put medicine into the chest cavity to seal the pleural space to prevent fluid buildup (pleurodesis).  · Chemotherapy and radiation therapy. These may be required in the case of cancerous (malignant) pleural effusion.  HOME CARE INSTRUCTIONS  · Take medicines only as directed by your health care provider.  · Keep track of how long you can gently exercise before you get short of breath. Try simply walking at first.  · Do not use any tobacco products, including cigarettes, chewing tobacco, or electronic cigarettes. If you need help quitting, ask your health care provider.  · Keep all follow-up visits as directed by your health care provider. This is important.  SEEK MEDICAL CARE IF:  · The amount of time that you are able to exercise decreases or does not improve with time.  · You have pain or signs of infection at the puncture site if you had thoracentesis. Watch for:  ¨ Drainage.  ¨ Redness.  ¨ Swelling.  · You have a fever.  SEEK IMMEDIATE MEDICAL CARE IF:  · You are short of breath.  · You develop chest pain.  · You develop a new cough.  MAKE SURE YOU:  · Understand these instructions.  · Will watch your condition.  · Will get help right away if you are not  doing well or get worse.     This information is not intended to replace advice given to you by your health care provider. Make sure you discuss any questions you have with your health care provider.     Document Released: 12/18/2006 Document Revised: 01/08/2016 Document Reviewed: 05/13/2015  [employer] Interactive Patient Education ©2016 [employer]  Heart-Healthy Eating Plan  Many factors influence your heart health, including eating and exercise habits. Heart (coronary) risk increases with abnormal blood fat (lipid) levels. Heart-healthy meal planning includes limiting unhealthy fats, increasing healthy fats, and making other small dietary changes. This includes maintaining a healthy body weight to help keep lipid levels within a normal range.  WHAT IS MY PLAN?   Your health care provider recommends that you:  · Get no more than _________% of the total calories in your daily diet from fat.  · Limit your intake of saturated fat to less than _________% of your total calories each day.  · Limit the amount of cholesterol in your diet to less than _________ mg per day.  WHAT TYPES OF FAT SHOULD I CHOOSE?  · Choose healthy fats more often. Choose monounsaturated and polyunsaturated fats, such as olive oil and canola oil, flaxseeds, walnuts, almonds, and seeds.  · Eat more omega-3 fats. Good choices include salmon, mackerel, sardines, tuna, flaxseed oil, and ground flaxseeds. Aim to eat fish at least two times each week.  · Limit saturated fats. Saturated fats are primarily found in animal products, such as meats, butter, and cream. Plant sources of saturated fats include palm oil, palm kernel oil, and coconut oil.  · Avoid foods with partially hydrogenated oils in them. These contain trans fats. Examples of foods that contain trans fats are stick margarine, some tub margarines, cookies, crackers, and other baked goods.  WHAT GENERAL GUIDELINES DO I NEED TO FOLLOW?  · Check food labels carefully to identify foods with  "trans fats or high amounts of saturated fat.  · Fill one half of your plate with vegetables and green salads. Eat 4-5 servings of vegetables per day. A serving of vegetables equals 1 cup of raw leafy vegetables, ½ cup of raw or cooked cut-up vegetables, or ½ cup of vegetable juice.  · Fill one fourth of your plate with whole grains. Look for the word \"whole\" as the first word in the ingredient list.  · Fill one fourth of your plate with lean protein foods.  · Eat 4-5 servings of fruit per day. A serving of fruit equals one medium whole fruit, ¼ cup of dried fruit, ½ cup of fresh, frozen, or canned fruit, or ½ cup of 100% fruit juice.  · Eat more foods that contain soluble fiber. Examples of foods that contain this type of fiber are apples, broccoli, carrots, beans, peas, and barley. Aim to get 20-30 g of fiber per day.  · Eat more home-cooked food and less restaurant, buffet, and fast food.  · Limit or avoid alcohol.  · Limit foods that are high in starch and sugar.  · Avoid fried foods.  · Cook foods by using methods other than frying. Baking, boiling, grilling, and broiling are all great options. Other fat-reducing suggestions include:  ¨ Removing the skin from poultry.  ¨ Removing all visible fats from meats.  ¨ Skimming the fat off of stews, soups, and gravies before serving them.  ¨ Steaming vegetables in water or broth.  · Lose weight if you are overweight. Losing just 5-10% of your initial body weight can help your overall health and prevent diseases such as diabetes and heart disease.  · Increase your consumption of nuts, legumes, and seeds to 4-5 servings per week. One serving of dried beans or legumes equals ½ cup after being cooked, one serving of nuts equals 1½ ounces, and one serving of seeds equals ½ ounce or 1 tablespoon.  · You may need to monitor your salt (sodium) intake, especially if you have high blood pressure. Talk with your health care provider or dietitian to get more information about " reducing sodium.  WHAT FOODS CAN I EAT?  Grains  Breads, including Ukrainian, white, izzy, wheat, raisin, rye, oatmeal, and Italian. Tortillas that are neither fried nor made with lard or trans fat. Low-fat rolls, including hotdog and hamburger buns and English muffins. Biscuits. Muffins. Waffles. Pancakes. Light popcorn. Whole-grain cereals. Flatbread. Reader toast. Pretzels. Breadsticks. Rusks. Low-fat snacks and crackers, including oyster, saltine, matzo, romana, animal, and rye. Rice and pasta, including brown rice and those that are made with whole wheat.  Vegetables  All vegetables.  Fruits  All fruits, but limit coconut.  Meats and Other Protein Sources  Lean, well-trimmed beef, veal, pork, and lamb. Chicken and turkey without skin. All fish and shellfish. Wild duck, rabbit, pheasant, and venison. Egg whites or low-cholesterol egg substitutes. Dried beans, peas, lentils, and tofu. Seeds and most nuts.  Dairy  Low-fat or nonfat cheeses, including ricotta, string, and mozzarella. Skim or 1% milk that is liquid, powdered, or evaporated. Buttermilk that is made with low-fat milk. Nonfat or low-fat yogurt.  Beverages  Mineral water. Diet carbonated beverages.  Sweets and Desserts  Sherbets and fruit ices. Honey, jam, marmalade, jelly, and syrups. Meringues and gelatins. Pure sugar candy, such as hard candy, jelly beans, gumdrops, mints, marshmallows, and small amounts of dark chocolate. [place] food cake.  Eat all sweets and desserts in moderation.  Fats and Oils  Nonhydrogenated (trans-free) margarines. Vegetable oils, including soybean, sesame, sunflower, olive, peanut, safflower, corn, canola, and cottonseed. Salad dressings or mayonnaise that are made with a vegetable oil. Limit added fats and oils that you use for cooking, baking, salads, and as spreads.  Other  Cocoa powder. Coffee and tea. All seasonings and condiments.  The items listed above may not be a complete list of recommended foods or beverages. Contact  your dietitian for more options.  WHAT FOODS ARE NOT RECOMMENDED?  Grains  Breads that are made with saturated or trans fats, oils, or whole milk. Croissants. Butter rolls. Cheese breads. Sweet rolls. Donuts. Buttered popcorn. Chow mein noodles. High-fat crackers, such as cheese or butter crackers.  Meats and Other Protein Sources  Fatty meats, such as hotdogs, short ribs, sausage, spareribs, carlson, ribeye roast or steak, and mutton. High-fat deli meats, such as salami and bologna. Caviar. Domestic duck and goose. Organ meats, such as kidney, liver, sweetbreads, brains, gizzard, chitterlings, and heart.  Dairy  Cream, sour cream, cream cheese, and creamed cottage cheese. Whole milk cheeses, including blue (freya), [place], Brie, [place]ando, American, Hav[place]i, Swiss, cheddar, Camembert, and [place].  Whole or 2% milk that is liquid, evaporated, or condensed. Whole buttermilk. Cream sauce or high-fat cheese sauce. Yogurt that is made from whole milk.  Beverages  Regular sodas and drinks with added sugar.  Sweets and Desserts  Frosting. Pudding. Cookies. Cakes other than delmer food cake. Candy that has milk chocolate or white chocolate, hydrogenated fat, butter, coconut, or unknown ingredients. Buttered syrups. Full-fat ice cream or ice cream drinks.  Fats and Oils  Gravy that has suet, meat fat, or shortening. Cocoa butter, hydrogenated oils, palm oil, coconut oil, palm kernel oil. These can often be found in baked products, candy, fried foods, nondairy creamers, and whipped toppings. Solid fats and shortenings, including carlson fat, salt pork, lard, and butter. Nondairy cream substitutes, such as coffee creamers and sour cream substitutes. Salad dressings that are made of unknown oils, cheese, or sour cream.  The items listed above may not be a complete list of foods and beverages to avoid. Contact your dietitian for more information.     This information is not intended to replace advice given to you by your health  care provider. Make sure you discuss any questions you have with your health care provider.     Document Released: 09/26/2009 Document Revised: 01/08/2016 Document Reviewed: 06/11/2015  [employer] Interactive Patient Education ©2016 [employer].

## 2017-02-24 NOTE — DISCHARGE PLANNING
ALOK call to [facility] in [place] and spoke with Brianna who advised that pt's insurance stated it was not active. ALOK provided information on file and Brianna ran the information again. She stated it must have been run incorrectly before because pt's prescription is ready for him and his co pay is now showing as $10.

## 2017-02-24 NOTE — PROGRESS NOTES
Patient will not be discharged on ace as he was on intresto previously and will discuss this with cardiology

## 2017-02-24 NOTE — PROGRESS NOTES
MD ordered one time dose of lovenox. Heparin gtt d/c'd per MD order. Education provided regarding self administration of subQ lovenox. Pt able to demonstrate and perform self administration of subq lovenox. All questions answered.

## 2017-02-24 NOTE — PROGRESS NOTES
Report received from Days[employer] RN; 12 hr CC completed. POC discussed at bedside, patient verbalizes understanding. No immediate concerns stated at this time. Safety measures in place, call light within reach. Will continue to round hourly.

## 2017-02-24 NOTE — PROGRESS NOTES
Assumed care of patient at 0715, received bedside report from night shift RN. Bed is locked and in lowest position with call light within reach. Treaded socks in place. Patient updated on plan of care, no complaints or pain at this time. White board updated. Assessment completed. Pt A&Ox4. Tele monitor in place and cardiac rhythm being monitored. All needs met at this time.

## 2017-02-24 NOTE — PROGRESS NOTES
Confirmed with Dr. Mariscal patient okay to discharge. All lines and monitor discontinued. Discharge instructions and medications discussed with patient, all questions answered, patient verbalizes understanding. Follow up appointments scheduled. HF appointment 2/27 0845. Pneumonia vaccine up to date and influenza vaccine not indicated. Discharge instructions, prescriptions, and personal belongings with patient. Patient down to car in wheelchair with relative. Tele monitor signed in and returned to [facility].

## 2017-02-24 NOTE — CARE PLAN
Problem: Safety  Goal: Will remain free from injury  Outcome: PROGRESSING AS EXPECTED  Independently ambulates safely in room and hallway    Problem: Venous Thromboembolism (VTW)/Deep Vein Thrombosis (DVT) Prevention:  Goal: Patient will participate in Venous Thrombosis (VTE)/Deep Vein Thrombosis (DVT)Prevention Measures  Outcome: PROGRESSING AS EXPECTED  Restarted on heparin drip this afternoon after procedure    Problem: Knowledge Deficit  Goal: Knowledge of disease process/condition, treatment plan, diagnostic tests, and medications will improve  Outcome: PROGRESSING AS EXPECTED  Discussed plan of care with RN and physician/ Physician also discussed changed to cardiac medications and encouraged pt to follow up cardiologist for same    Problem: Respiratory:  Goal: Respiratory status will improve  Outcome: PROGRESSING AS EXPECTED  Following 2nd thoracentesis has remained off oxygen and is breathing comfortably at rest and with activity

## 2017-02-24 NOTE — DISCHARGE PLANNING
ALOK faxed pt's lovenox prescription to the [facility] in [place] (ph: [phone] fax: [phone]) requesting a return call with pt's co pay and availability.

## 2017-02-25 ENCOUNTER — PATIENT OUTREACH (OUTPATIENT)
Dept: HEALTH INFORMATION MANAGEMENT | Facility: OTHER | Age: 62
End: 2017-02-25

## 2017-02-26 NOTE — DISCHARGE SUMMARY
PRIMARY DIAGNOSES:  1.  Pleural effusion.  2.  Recent bioprosthetic aortic valve replacement.  3.  Dilated cardiomyopathy.  4.  Chronic systolic congestive heart failure with ejection fraction of 20%   without acute exacerbation.  5.  History of atrioventricular servando ablation.  6.  Hypertension, benign.  7.  Presence of biventricular automatic implantable   cardioverter-defibrillator.  8.  Hemorrhagic disorder due to circulating anticoagulants, currently on   Coumadin.  9.  Hypokalemia.  10.  Pericardial effusion without hemodynamic compromise.  11.  Coronary artery disease.    BRIEF HOSPITAL COURSE:  This is a 61-year-old male who presented to the   hospital with shortness of breath and was found to have large pleural   effusions shortly after recent aortic valve replacement.  He has a known   history of congestive heart failure.  He was diuresed, but did not appear to   have any acute congestive heart failure symptoms.  He underwent bilateral   thoracenteses after his Coumadin was held and improved markedly after these   approximately 800 mL was drained bilaterally of serosanguineous fluid.    Patient no evidence for active bleeding.  He was restarted on anticoagulation   and will be bridged for a couple of days after discharge with Lovenox as his   INR is 1.4 at the time of discharge, this should increase rapidly.  He   tolerated the procedures well and will be discharged home with close follow up   with cardiothoracic surgery, his primary care provider and cardiology.    CONSULTATIONS:  None.  The patient was seen by Dr. Llanos' team; however, in   the hospital.    PROCEDURES:  Bilateral thoracentesis on 02/21 and 02/23/2017 with   interventional radiology.    DIET:  Cardiac.    ACTIVITY:  As tolerated.    MEDICATIONS ON DISCHARGE:  1.  Aspirin 81 mg a day.  2.  Lipitor 40 mg a day.  3.  Coreg 3.125 two times a day.  4.  Surfak 240 mg a day.  5.  Lovenox 100 mg every 12 hours for 3 days, subQ.  6.  Inspra 25  mg a day.  7.  Lasix 40 mg a day.  8.  Norco 10/325 one q. 8 hours as needed.  9.  Potassium chloride 20 mEq a day.  10.  Multivitamin 1 per day.  11.  Coumadin 7.5 mg a day.    Time spent in this patient's discharge is 34 minutes.       ____________________________________     MD THOMAS PALACIO / DHAVAL    DD:  02/25/2017 08:02:14  DT:  02/25/2017 16:07:07    D#:  [id number]  Job#:  [id number]    cc: HUNTER SCHNEIDER MD, MARIELLE GALLARDO DO

## 2017-02-27 ENCOUNTER — HOME CARE VISIT (OUTPATIENT)
Dept: HOME HEALTH SERVICES | Facility: HOME HEALTHCARE | Age: 62
End: 2017-02-27
Payer: COMMERCIAL

## 2017-03-01 ENCOUNTER — ANTICOAGULATION VISIT (OUTPATIENT)
Dept: VASCULAR LAB | Facility: MEDICAL CENTER | Age: 62
End: 2017-03-01
Attending: INTERNAL MEDICINE
Payer: OTHER MISCELLANEOUS

## 2017-03-01 DIAGNOSIS — Z95.2 HEART VALVE REPLACED: ICD-10-CM

## 2017-03-01 LAB
INR BLD: 2.6 (ref 0.9–1.2)
INR PPP: 2.6 (ref 2–3.5)

## 2017-03-01 PROCEDURE — 85610 PROTHROMBIN TIME: CPT

## 2017-03-01 PROCEDURE — 99212 OFFICE O/P EST SF 10 MIN: CPT | Performed by: NURSE PRACTITIONER

## 2017-03-01 NOTE — PROGRESS NOTES
Anticoagulation Summary as of 3/1/2017     INR goal 2.0-3.0   Selected INR 2.6 (3/1/2017)   Maintenance plan 7.5 mg (5 mg x 1.5) every day   Weekly total 52.5 mg   Plan last modified GINGER CollinsD (2/17/2017)   Next INR check 3/6/2017   Target end date 4/14/2017    Indications   Heart valve replaced [Z95.2]         Anticoagulation Episode Summary     INR check location     Preferred lab     Send INR reminders to     [facility]       Anticoagulation Care Providers     Provider Role Specialty Phone number    Grayson Rios M.D. Referring Cardiology [phone]    GINGER CollinsD Responsible      [facility] Responsible  [phone]        Patient is therapeutic today. Recent bioprosthetic valve replacement. Rehospitalized last week for thoracentesis. Discharged home on 3 days of Lovenox. Now taking atovastatin. Denies any medication or diet changes. No current symptoms of bleeding or thrombosis reported. Continue current regimen. Follow up on Friday with [facility] . Order entered in Epic.    Next Appointment: Friday , March 3, 2017 with Kera .     Tomeka ALTAMIRANO

## 2017-03-01 NOTE — MR AVS SNAPSHOT
Alberto Turner Erikmalena   3/1/2017 11:00 AM   Anticoagulation Visit   MRN: [id number]    Department:  Vascular Medicine   Dept Phone:  [phone]    Description:  Male : 1955   Provider:  [facility] EXAM 5           Allergies as of 3/1/2017     Allergen Noted Reactions    Sulfa Drugs 2010   Rash, Vomiting    RXN=20 years ago      You were diagnosed with     Heart valve replaced   [[id number]]         Vital Signs     Smoking Status                   Never Smoker            Basic Information     Date Of Birth Sex Race Ethnicity Preferred Language    1955 Male White Non- English      Your appointments     Mar 02, 2017 11:15 AM   FOLLOW UP with Abbie Elder M.D.   [facility] (--)    [address]  [place] [address] [phone]            May 30, 2017  9:40 AM   PACER CHECK ONLY with KEIKO Rizvi   [facility] (--)    [address]  [place] [id number] [phone]              Problem List              ICD-10-CM Priority Class Noted - Resolved    Hypertension I10 High  Unknown - Present    S/P AV servando ablation (Chronic) Z98.890 High  3/21/2012 - Present    Dilated cardiomyopathy (CMS-HCC) (Chronic) I42.0 High  3/21/2012 - Present    Male erectile disorder (Chronic) N52.9 Medium  3/21/2012 - Present    Ventricular tachycardia (CMS-HCC) (Chronic) I47.2 High  3/21/2012 - Present    Atypical chest pain (Chronic) R07.89 Medium  3/21/2012 - Present    AV block, 3rd degree (CMS-HCC) (Chronic) I44.2 High  3/21/2012 - Present    SSS (sick sinus syndrome) (CMS-HCC) (Chronic) I49.5 High  2012 - Present    Presence of biventricular AICD Z95.810 High  2013 - Present    Arthritis M19.90 Low  2013 - Present    Chronic systolic congestive heart failure, NYHA class 3 (CMS-HCC) I50.22   10/13/2015 - Present    Homograft cardiac valve stenosis I38   2016  - Present    Severe aortic stenosis I35.0   1/13/2017 - Present    CAD (coronary artery disease) I25.10   1/13/2017 - Present    Heart valve replaced Z95.2   2/17/2017 - Present    Pericardial effusion I31.3   2/20/2017 - Present    Pleural effusion J90   2/20/2017 - Present    CHF exacerbation (CMS-HCC) I50.9   2/20/2017 - Present    Hemorrhagic disorder due to circulating anticoagulants (CMS-HCC) D68.318   2/21/2017 - Present    S/P AVR (aortic valve replacement) Z95.2   2/21/2017 - Present    Hypokalemia E87.6   2/21/2017 - Present      Health Maintenance        Date Due Completion Dates    IMM DTaP/Tdap/Td Vaccine (1 - Tdap) 4/5/1976 ---    COLONOSCOPY 4/5/2007 ---            Results     POCT Protime      Component    INR    2.6                        Current Immunizations     Influenza TIV (IM) 10/30/2009    Influenza Vaccine Adult HD 11/2/2016    Pneumococcal polysaccharide vaccine (PPSV-23) 2/21/2017  5:32 AM      Below and/or attached are the medications your provider expects you to take. Review all of your home medications and newly ordered medications with your provider and/or pharmacist. Follow medication instructions as directed by your provider and/or pharmacist. Please keep your medication list with you and share with your provider. Update the information when medications are discontinued, doses are changed, or new medications (including over-the-counter products) are added; and carry medication information at all times in the event of emergency situations     Allergies:  SULFA DRUGS - Rash,Vomiting               Medications  Valid as of: March 01, 2017 - 11:39 AM    Generic Name Brand Name Tablet Size Instructions for use    Aspirin (Tablet Delayed Response) ECOTRIN 81 MG Take 81 mg by mouth every day.        Atorvastatin Calcium (Tab) LIPITOR 40 MG Take 1 Tab by mouth every bedtime.        Carvedilol (Tab) COREG 3.125 MG Take 3.125 mg by mouth 2 times a day, with meals.        Docusate Calcium (Cap)  SURFAK 240 MG Take 240 mg by mouth every day.        Eplerenone (Tab) INSPRA 25 MG Take 1 Tab by mouth every day.        Furosemide (Tab) LASIX 40 MG Take 40 mg by mouth every day.        Hydrocodone-Acetaminophen (Tab) NORCO 5-325 MG Take 1 Tab by mouth every 8 hours as needed. Indications: Moderate to Moderately Severe Pain        Multiple Vitamins-Minerals (Tab) THERAGRAN-M  Take 1 Tab by mouth every day.        Potassium Chloride Shanell CR (Tab CR) Kdur 20 MEQ Take 20 mEq by mouth every day.        Warfarin Sodium (Tab) COUMADIN 5 MG Take 1.5 Tabs by mouth COUMADIN-DAILY.        .                 Medicines prescribed today were sent to:     [facility] #[id number] - [place] - [address]    [address] [place] [address]    Phone: [phone] Fax: [phone]    Open 24 Hours?: No    [facility] - [place], [place] - [address]    [address] [place] [address]    Phone: [phone] Fax: [phone]    Open 24 Hours?: No    [facility] - [place], [place] - [address]    [address] [facility] [address]    Phone: [phone] Fax: [phone]    Open 24 Hours?: No      Medication refill instructions:       If your prescription bottle indicates you have medication refills left, it is not necessary to call your provider’s office. Please contact your pharmacy and they will refill your medication.    If your prescription bottle indicates you do not have any refills left, you may request refills at any time through one of the following ways: The online [employer] system (except Urgent Care), by calling your provider’s office, or by asking your pharmacy to contact your provider’s office with a refill request. Medication refills are processed only during regular business hours and may not be available until the next business day. Your provider may request additional information or to have a follow-up visit with you prior to refilling your medication.   *Please Note:  Medication refills are assigned a new Rx number when refilled electronically. Your pharmacy may indicate that no refills were authorized even though a new prescription for the same medication is available at the pharmacy. Please request the medicine by name with the pharmacy before contacting your provider for a refill.        Your To Do List     Future Labs/Procedures Complete By Expires    PROTHROMBIN TIME  As directed 3/1/2018    Comments:    Home Health nurse see patient to draw INR on 3/3/17 with results to [facility]. If unable to obtain point of care specimen, draw venipuncture sample [facility] at [phone] with INR results.      Warfarin Dosing Calendar   March 2017 Details    Sun Mon Tue Wed Thu Fri Sat        1   2.6   7.5 mg   See details      2      7.5 mg         3      7.5 mg         4      7.5 mg           5      7.5 mg         6      7.5 mg         7               8               9               10               11                 12               13               14               15               16               17               18                 19               20               21               22               23               24               25                 26               27               28               29               30               31                 Date Details   03/01 This INR check   INR: 2.6       Date of next INR:  3/6/2017         How to take your warfarin dose     To take:  7.5 mg Take 1.5 of the 5 mg tablets.              Bauzaar Access Code: Activation code not generated  Current Bauzaar Status: Active

## 2017-03-02 ENCOUNTER — OFFICE VISIT (OUTPATIENT)
Dept: CARDIOLOGY | Facility: MEDICAL CENTER | Age: 62
End: 2017-03-02
Payer: OTHER MISCELLANEOUS

## 2017-03-02 VITALS
SYSTOLIC BLOOD PRESSURE: 124 MMHG | DIASTOLIC BLOOD PRESSURE: 82 MMHG | WEIGHT: 206 LBS | HEIGHT: 72 IN | HEART RATE: 84 BPM | BODY MASS INDEX: 27.9 KG/M2 | OXYGEN SATURATION: 95 %

## 2017-03-02 DIAGNOSIS — I49.5 SSS (SICK SINUS SYNDROME) (HCC): Chronic | ICD-10-CM

## 2017-03-02 DIAGNOSIS — I42.8 OTHER PRIMARY CARDIOMYOPATHIES: ICD-10-CM

## 2017-03-02 DIAGNOSIS — Z95.2 H/O AORTIC VALVE REPLACEMENT: ICD-10-CM

## 2017-03-02 DIAGNOSIS — I10 ESSENTIAL HYPERTENSION: ICD-10-CM

## 2017-03-02 DIAGNOSIS — I42.8 NON-ISCHEMIC CARDIOMYOPATHY (HCC): ICD-10-CM

## 2017-03-02 PROCEDURE — 99214 OFFICE O/P EST MOD 30 MIN: CPT | Performed by: INTERNAL MEDICINE

## 2017-03-02 RX ORDER — RAMIPRIL 5 MG/1
5 CAPSULE ORAL DAILY
Qty: 30 CAP | Refills: 11 | Status: SHIPPED | OUTPATIENT
Start: 2017-03-02 | End: 2017-05-31

## 2017-03-02 ASSESSMENT — ENCOUNTER SYMPTOMS
NERVOUS/ANXIOUS: 0
DIZZINESS: 0
HEADACHES: 0
PALPITATIONS: 0
BLURRED VISION: 0
MYALGIAS: 0
ROS GI COMMENTS: ABDOMINAL BLOATING
SHORTNESS OF BREATH: 0
CLAUDICATION: 0
COUGH: 0
ORTHOPNEA: 0
ABDOMINAL PAIN: 0
FEVER: 0
BLOOD IN STOOL: 0
DEPRESSION: 0
PND: 0

## 2017-03-02 NOTE — PROGRESS NOTES
Subjective:   Alberto Fam is a 61 y.o. male with known history of VSD status post repair at age 4 1/2 , NICMP angiogram from 2011 no significant CAD, type II Mobitz block s/p PPM 11/2006, later upgraded to BIV ICD in 8/2011, LVEF 35-40% in 2013. EF in 2015 showed drop in LVEF to 25%, severe aortic stenosis on dobutamine stress echo s/p AVR with 23mm Moy Perimount Magna pericardial valve on 2/17 presenting today for follow-up evaluation of valvular heart disease.    Patient was readmitted after valve replacement due to volume overload and pleural effusion. He is currently on Lasix dyspnea has significantly improved after pleural tap. Mild chest discomfort at the site of scar but no chest pain pressure or tightness. Dyspnea has improved. No complaints of dizziness, lightheadedness or LOC. No complaints of lower extremity edema or claudication.    Past Medical History   Diagnosis Date   • Congestive heart failure (CMS-[facility])    • S/P AV servando ablation     • Dilated cardiomyopathy September 2013     Echocardiogram with dilated LV, moderate concentric LVH, LVEF 35-40%.   • Male erectile disorder     • Ventricular tachycardia (CMS-[facility])     • SSS (sick sinus syndrome)     • AV block, 3rd degree     • Aortic stenosis December 2016     Dobutamine stress echo with severe AS (peak 89mmHg, mean 54mmHg, ACE 0.6cm2).   • CHF (congestive heart failure) (CMS-HCC)    • Bowel habit changes      Constipation   • Dental disorder      Upper   • Arthritis      Low back   • Hypertension      Pt states well controlled on meds   • Pneumonia 12/2014   • Pain      lower back pain    • Breath shortness      d/t heart condition   • AICD (automatic cardioverter/defibrillator) present      Past Surgical History   Procedure Laterality Date   • Recovery  8/29/2011     Performed by SURGERY, CATH-RECOVERY at SURGERY SAME DAY [facility]   • Aicd implant  August 2011     Medtronic Concerto II CRT-D [id number]   • Ventral septal defect repair   1961   • Pacemaker insertion  2006   • Aicd battery change  November 2016     Generator change with Medtronic Viva S CRT-D [id number] implanted by Dr. Kwong.   • Hernia repair  1966   • Sternotomy  2/8/2017     Procedure: STERNOTOMY - REDO;  Surgeon: Alfonso Llanos M.D.;  Location: SURGERY [facility];  Service:    • Aortic valve replacement  2/8/2017     Procedure: AORTIC VALVE REPLACEMENT;  Surgeon: Alfonso Llanos M.D.;  Location: SURGERY [facility];  Service:    • Marques  2/8/2017     Procedure: MARQUES;  Surgeon: Alfonso Llanos M.D.;  Location: SURGERY [facility];  Service:      No family history on file.  History   Smoking status   • Never Smoker    Smokeless tobacco   • Never Used     Comment: quit 35 years ago      Allergies   Allergen Reactions   • Sulfa Drugs Rash and Vomiting     RXN=20 years ago     Outpatient Encounter Prescriptions as of 3/2/2017   Medication Sig Dispense Refill   • atorvastatin (LIPITOR) 40 MG Tab Take 1 Tab by mouth every bedtime. 30 Tab 1   • aspirin EC (ECOTRIN) 81 MG Tablet Delayed Response Take 81 mg by mouth every day.     • carvedilol (COREG) 3.125 MG Tab Take 3.125 mg by mouth 2 times a day, with meals.     • furosemide (LASIX) 40 MG Tab Take 40 mg by mouth every day.     • potassium chloride SA (KDUR) 20 MEQ Tab CR Take 20 mEq by mouth every day.     • hydrocodone-acetaminophen (NORCO) 5-325 MG Tab per tablet Take 1 Tab by mouth every 8 hours as needed. Indications: Moderate to Moderately Severe Pain     • docusate calcium (SURFAK) 240 MG Cap Take 240 mg by mouth every day.     • warfarin (COUMADIN) 5 MG Tab Take 1.5 Tabs by mouth COUMADIN-DAILY. 45 Tab 3   • eplerenone (INSPRA) 25 MG Tab Take 1 Tab by mouth every day. 90 Tab 3   • therapeutic multivitamin-minerals (THERAGRAN-M) Tab Take 1 Tab by mouth every day.       No facility-administered encounter medications on file as of 3/2/2017.     Review of Systems   Constitutional: Negative for fever.   Eyes: Negative for  blurred vision.   Respiratory: Negative for cough and shortness of breath.    Cardiovascular: Positive for chest pain. Negative for palpitations, orthopnea, claudication, leg swelling and PND.   Gastrointestinal: Negative for abdominal pain and blood in stool.        Abdominal bloating   Genitourinary: Negative for dysuria and hematuria.   Musculoskeletal: Positive for joint pain. Negative for myalgias.        He has chronic severe back pain and has been using Celebrex on a regular basis.   Skin: Negative for rash.   Neurological: Negative for dizziness and headaches.   Psychiatric/Behavioral: Negative for depression. The patient is not nervous/anxious.         Objective:   /82 mmHg  Pulse 84  Ht 1.829 m (6')  Wt 93.441 kg (206 lb)  BMI 27.93 kg/m2  SpO2 95%    Physical Exam   Constitutional: He is oriented to person, place, and time. He appears well-developed and well-nourished.   HENT:   Mouth/Throat: Oropharynx is clear and moist.   Eyes: Conjunctivae and EOM are normal.   Neck: No JVD present. No thyroid mass and no thyromegaly present.   There is no JVD.   Cardiovascular: Normal rate, regular rhythm, S1 normal, S2 normal and normal pulses.  PMI is not displaced.  Exam reveals no gallop.    Murmur heard.   Systolic murmur is present with a grade of 4/6   Pulses:       Carotid pulses are 2+ on the right side, and 2+ on the left side.       Radial pulses are 2+ on the right side, and 2+ on the left side.        Dorsalis pedis pulses are 2+ on the right side, and 2+ on the left side.   Late peaking ejection systolic murmur best heard in the aortic area   Pulmonary/Chest: Effort normal and breath sounds normal. No respiratory distress. He has no wheezes. He has no rales.   Abdominal: Soft. Normal appearance. He exhibits no abdominal bruit. There is no hepatosplenomegaly. There is no tenderness.   Musculoskeletal: Normal range of motion. He exhibits no edema.        Thoracic back: He exhibits no tenderness  and no spasm.   Lymphadenopathy:     He has no cervical adenopathy.   Neurological: He is alert and oriented to person, place, and time.   Skin: No rash noted. No cyanosis. Nails show no clubbing.   Psychiatric: He has a normal mood and affect.     TTE: 2/21/17  Left ventricular ejection fraction is visually estimated to be 20%.  Small-moderate sized (<1 cm in end diastole) pericardial effusion without evidence of hemodynamic compromise.  Known bioprosthetic aortic valve, mean gradient 18 mmHg - with severely depressed cariac output, flow impairment may be underestimated.  Large left pleural effusion.  Right ventricular systolic pressure is estimated to be at least 35 mmHg.  Compared with the intraoperative JUSTINO 2/8 - the effusions are new.    Carotid doppler: 1/13/17  Normal bilateral carotid exam.   Flow within both subclavian arteries appears to be within normal limits. Antegrade flow, bilateral vertebral arteries    Angiogram: 12/16/16   1.  Minor plaquing in the left anterior descending artery proximal, otherwise, normal coronary angiography.  2.  Moderate dilatation of the ascending aorta    Dobutamine stress echo: 12/16/17  1.  Minor plaquing in the left anterior descending artery proximal, otherwise, normal coronary angiography.  2.  Moderate dilatation of the ascending aorta.  Results for EDUARDO ALONSO (MRN [id number]) as of 12/27/2016 10:38   7/16/2013 11/21/2016 12/14/2016    Sodium 140 138 138   Potassium 3.5 (L) 4.0 4.0   Chloride 104 102 102   Co2 30 28 28   Anion Gap 6.0 8.0 8.0   Glucose 81 95 78   Bun 18 19 21   Creatinine 0.97 1.01 1.13   GFR If Non  >60 >60 >60   Calcium 9.7 10.4 9.9   AST(SGOT) 19 20    ALT(SGPT) 20 28    Alkaline Phosphatase 44 47    Lactic Acid      Cholesterol,Tot 159     Triglycerides 118     HDL 40     LDL 95       EKG: 10/20/15  EKG personally reviewed by me.  AV paced at 63 BPM.    TTE: 5/5/15  Left ventricle is moderately dilated. Severely reduced left  ventricular systolic function. Global hypokinesis. Left ventricular ejection fraction is 20% to 25%. Grade IV diastolic dysfunction.  Severely increased LA volume.  Mild mitral regurgitation.  No aortic stenosis based on gradients across aortic valve. Peak gradient 29 mmHg. Mean 16 mmHg. Dimensionless index=.25. Ttrace aortic insufficiency. Gradients could be underestimated with low LVEF consider   clinical correlation (based on Dimensionless index of 0.25 severe aortic stenosis).  Mild tricuspid regurgitation. Right ventricular systolic pressure is estimated to be 23. RA pressure 15 mmHg.  Aortic root diameter 3.7 cm.  Compared to the images of the prior study done 9/4/13  there has been drop in LVEF from 35-40% to current echo LVEF 20-25%. Prior echo AV MG 23 mmHg and PG 38 mmHg.    Transthoracic echo: 9/4/13   Aortic stenosis, w low flow and low DI suggests moderate to severe, but does not appeart severeLeft ventricular ejection fraction is 35% to 40%. Grade I-II diastolic dysfunction is present.  Left ventricle is moderately dilated.  Compared w the prior echo, the EF is slightly less    Nuclear stress test: 7/19/11  1. Normal left ventricular perfusion with no fixed or reversible defects.  2. Marked left ventricular dilatation, with profound global hypokinesis, and a low ejection fraction of 17%.    Coronary angiogram: 8/29/2011  1.  Normal coronary arteries.  2.  Moderate to severely reduced systolic function.  3.  Normal end-diastolic pressure.  4.  Pacemaker leads in place.  5. LVEF 25-30%    Coronary angiogram: 10/16/2006  1. Left ventricular dysfunction with left ventricular enlargement and moderate hypokinesis of the mid anterior wall and inferobasal segment.  2. Normal epicardial coronary arteries with atypical left main which is more of a pouch-like structure.  3. Normal resting hemodynamics.    Assessment:     1. NICMP LVEF 25%  2. Aortic stenosis s/p AVR  3. BIV ICD.  4. Dyslipidemia  5. On  anticoagulation  Medical Decision Making:  Today's Assessment / Status / Plan:     1.  Patient appears euvolemic.  Continue eplerenone 25 mg daily.  Continue Lasix 40 mg daily for another 2 weeks after that discontinue Lasix and take it on PRN basis.  Continue potassium chloride.  Start ramipril 5 mg daily  2. Patient will need antibiotics before any dental workup.  Referral to cardiac rehabilitation.  3. Patient will be scheduled to see Helga in May for device check.  4. Continue Lipitor 20 mg  qHs.  Continue Coreg 25 mg BID.  5. No bleeding issues while on anticoagulation.  Will discontinue Coumadin in 3 months.    Follow-up with Helga in May and with me in June  Echo prior to next visit.  Labs in 3 weeks.    Thank you for allowing me to participate in taking care of patient.    Abbie Gore MD.

## 2017-03-02 NOTE — Clinical Note
[facility]   [address]  [place] [address]  Phone: [phone]  Fax: [phone]              Alberto Fam  1955    Encounter Date: 3/2/2017    Abbie Elder M.D.          PROGRESS NOTE:  Subjective:   Alberto Fam is a 61 y.o. male with known history of ASD status post repair at age 6 and half years, NICMP angiogram from 2011 no significant CAD, type II Mobitz block s/p PPM 11/2006, later upgraded to BIV ICD in 8/2011, LVEF 35-40% in 2013. EF in 2015 showed drop in LVEF to 25%, severe aortic stenosis on dobutamine stress echo presenting today for follow-up evaluation of cardiomyopathy and valvular heart disease.    Patient also had angiogram which showed nonsignificant CAD. He is scheduled to see Dr. Casarez on Shane fourth. Angiogram also showed aortic aneurysm. Patient is in NYHA class III. No complaints of exertional chest pain pressure or tightness. Denied any complaints of lower extremity edema or claudication. No complaints of dizziness lightheadedness or loss of consciousness.     Past Medical History   Diagnosis Date   • Congestive heart failure (CMS-[facility])    • S/P AV servando ablation     • Dilated cardiomyopathy September 2013     Echocardiogram with dilated LV, moderate concentric LVH, LVEF 35-40%.   • Male erectile disorder     • Ventricular tachycardia (CMS-[facility])     • SSS (sick sinus syndrome)     • AV block, 3rd degree     • Aortic stenosis December 2016     Dobutamine stress echo with severe AS (peak 89mmHg, mean 54mmHg, ACE 0.6cm2).   • CHF (congestive heart failure) (CMS-[facility])    • Bowel habit changes      Constipation   • Dental disorder      Upper   • Arthritis      Low back   • Hypertension      Pt states well controlled on meds   • Pneumonia 12/2014   • Pain      lower back pain    • Breath shortness      d/t heart condition   • AICD (automatic cardioverter/defibrillator) present      Past Surgical  History   Procedure Laterality Date   • Recovery  8/29/2011     Performed by SURGERY, CATH-RECOVERY at SURGERY SAME DAY [facility] ORS   • Aicd implant  August 2011     Medtronic Concerto II CRT-D [id number]   • Ventral septal defect repair  1961   • Pacemaker insertion  2006   • Aicd battery change  November 2016     Generator change with Medtronic Viva S CRT-D [id number] implanted by Dr. Kwong.   • Hernia repair  1966   • Sternotomy  2/8/2017     Procedure: STERNOTOMY - REDO;  Surgeon: Alfonso Llanos M.D.;  Location: SURGERY [facility];  Service:    • Aortic valve replacement  2/8/2017     Procedure: AORTIC VALVE REPLACEMENT;  Surgeon: Alfonso Llanos M.D.;  Location: SURGERY [facility];  Service:    • Marques  2/8/2017     Procedure: MARQUES;  Surgeon: Alfonso Llanos M.D.;  Location: SURGERY [facility];  Service:      No family history on file.  History   Smoking status   • Never Smoker    Smokeless tobacco   • Never Used     Comment: quit 35 years ago      Allergies   Allergen Reactions   • Sulfa Drugs Rash and Vomiting     RXN=20 years ago     Outpatient Encounter Prescriptions as of 3/2/2017   Medication Sig Dispense Refill   • atorvastatin (LIPITOR) 40 MG Tab Take 1 Tab by mouth every bedtime. 30 Tab 1   • aspirin EC (ECOTRIN) 81 MG Tablet Delayed Response Take 81 mg by mouth every day.     • carvedilol (COREG) 3.125 MG Tab Take 3.125 mg by mouth 2 times a day, with meals.     • furosemide (LASIX) 40 MG Tab Take 40 mg by mouth every day.     • potassium chloride SA (KDUR) 20 MEQ Tab CR Take 20 mEq by mouth every day.     • hydrocodone-acetaminophen (NORCO) 5-325 MG Tab per tablet Take 1 Tab by mouth every 8 hours as needed. Indications: Moderate to Moderately Severe Pain     • docusate calcium (SURFAK) 240 MG Cap Take 240 mg by mouth every day.     • warfarin (COUMADIN) 5 MG Tab Take 1.5 Tabs by mouth COUMADIN-DAILY. 45 Tab 3   • eplerenone (INSPRA) 25 MG Tab Take 1 Tab by mouth every day. 90 Tab 3   •  therapeutic multivitamin-minerals (THERAGRAN-M) Tab Take 1 Tab by mouth every day.       No facility-administered encounter medications on file as of 3/2/2017.     Review of Systems   Constitutional: Negative for fever.   Eyes: Negative for blurred vision.   Respiratory: Positive for shortness of breath. Negative for cough.    Cardiovascular: Positive for orthopnea (one pillow orthopnea). Negative for chest pain, palpitations, claudication, leg swelling and PND.   Gastrointestinal: Negative for abdominal pain and blood in stool.        Abdominal bloating   Genitourinary: Negative for dysuria.   Musculoskeletal: Positive for joint pain. Negative for myalgias.        He has chronic severe back pain and has been using Celebrex on a regular basis.   Skin: Negative for rash.   Neurological: Negative for dizziness and headaches.   Psychiatric/Behavioral: Negative for depression. The patient is not nervous/anxious.         Objective:   /82 mmHg  Pulse 84  Ht 1.829 m (6')  Wt 93.441 kg (206 lb)  BMI 27.93 kg/m2  SpO2 95%    Physical Exam   Constitutional: He is oriented to person, place, and time. He appears well-developed and well-nourished.   HENT:   Mouth/Throat: Oropharynx is clear and moist.   Eyes: Conjunctivae and EOM are normal.   Neck: No JVD present. No thyroid mass and no thyromegaly present.   There is no JVD.   Cardiovascular: Normal rate, regular rhythm, S1 normal, S2 normal and normal pulses.  PMI is not displaced.  Exam reveals no gallop.    Murmur heard.   Systolic murmur is present with a grade of 4/6   Pulses:       Carotid pulses are 2+ on the right side, and 2+ on the left side.       Radial pulses are 2+ on the right side, and 2+ on the left side.        Dorsalis pedis pulses are 2+ on the right side, and 2+ on the left side.   Late peaking ejection systolic murmur best heard in the aortic area   Pulmonary/Chest: Effort normal and breath sounds normal. No respiratory distress. He has no  wheezes. He has no rales.   Abdominal: Soft. Normal appearance. He exhibits no abdominal bruit. There is no hepatosplenomegaly. There is no tenderness.   Musculoskeletal: Normal range of motion. He exhibits no edema.        Thoracic back: He exhibits no tenderness and no spasm.   Lymphadenopathy:     He has no cervical adenopathy.   Neurological: He is alert and oriented to person, place, and time.   Skin: No rash noted. No cyanosis. Nails show no clubbing.   Psychiatric: He has a normal mood and affect.   Carotid doppler: 1/13/17  Normal bilateral carotid exam.   Flow within both subclavian arteries appears to be within normal limits.   Antegrade flow, bilateral vertebral arteries  Dobutamine stress echo: 12/16/17  1.  Minor plaquing in the left anterior descending artery proximal, otherwise, normal coronary angiography.  2.  Moderate dilatation of the ascending aorta.  Results for EDUARDO ALONSO (MRN [id number]) as of 12/27/2016 10:38   7/16/2013 11/21/2016 12/14/2016    Sodium 140 138 138   Potassium 3.5 (L) 4.0 4.0   Chloride 104 102 102   Co2 30 28 28   Anion Gap 6.0 8.0 8.0   Glucose 81 95 78   Bun 18 19 21   Creatinine 0.97 1.01 1.13   GFR If Non  >60 >60 >60   Calcium 9.7 10.4 9.9   AST(SGOT) 19 20    ALT(SGPT) 20 28    Alkaline Phosphatase 44 47    Lactic Acid      Cholesterol,Tot 159     Triglycerides 118     HDL 40     LDL 95       EKG: 10/20/15  EKG personally reviewed by me.  AV paced at 63 BPM.    TTE: 5/5/15  Left ventricle is moderately dilated. Severely reduced left ventricular systolic function. Global hypokinesis. Left ventricular ejection fraction is 20% to 25%. Grade IV diastolic dysfunction.  Severely increased LA volume.  Mild mitral regurgitation.  No aortic stenosis based on gradients across aortic valve. Peak gradient 29 mmHg. Mean 16 mmHg. Dimensionless index=.25. Ttrace aortic insufficiency. Gradients could be underestimated with low LVEF consider   clinical  correlation (based on Dimensionless index of 0.25 severe aortic stenosis).  Mild tricuspid regurgitation. Right ventricular systolic pressure is estimated to be 23. RA pressure 15 mmHg.  Aortic root diameter 3.7 cm.  Compared to the images of the prior study done 9/4/13  there has been drop in LVEF from 35-40% to current echo LVEF 20-25%. Prior echo AV MG 23 mmHg and PG 38 mmHg.    Transthoracic echo: 9/4/13   Aortic stenosis, w low flow and low DI suggests moderate to severe, but does not appeart severeLeft ventricular ejection fraction is 35% to 40%. Grade I-II diastolic dysfunction is present.  Left ventricle is moderately dilated.  Compared w the prior echo, the EF is slightly less    Nuclear stress test: 7/19/11  1. Normal left ventricular perfusion with no fixed or reversible defects.  2. Marked left ventricular dilatation, with profound global hypokinesis, and a low ejection fraction of 17%.    Coronary angiogram: 8/29/2011  1.  Normal coronary arteries.  2.  Moderate to severely reduced systolic function.  3.  Normal end-diastolic pressure.  4.  Pacemaker leads in place.  5. LVEF 25-30%    Coronary angiogram: 10/16/2006  1. Left ventricular dysfunction with left ventricular enlargement and moderate hypokinesis of the mid anterior wall and inferobasal segment.  2. Normal epicardial coronary arteries with atypical left main which is more of a pouch-like structure.  3. Normal resting hemodynamics.    Assessment:     1. NICMP LVEF 25%  2. Aortic stenosis  3. BIV ICD.  4. Hypertension    Medical Decision Making:  Today's Assessment / Status / Plan:     1.  Patient appears euvolemic.  Continue eplerenone 25 mg daily  2. Patient is scheduled to see Dr. Casarez on Jan 4 th.  Angiogram also showed dilated aorta-CT of aorta prior to next visit.  Carotid Doppler-workup prior to replacement  3. Device interrogation on 12/13 showed no mode switching episodes.  Normal sensing and capture of RA, RV and LV leads; stable  impedances. Battery charge time is 12.2 seconds; battery is at RRT.  4. Continue Coreg 25 mg BID.  Continue Entresto (Sacubitril-Valsartan 49-51 MG Tab) BID.  Renal function from 12/14 normal.    Follow-up in 3 months.  Ct aorta  Carotid doppler    Thank you for allowing me to participate in taking care of patient.    Abbie Elder. MD.         Trevor Stephen, MAURICIO.HAKAN.  [address]  [place] [address]  VIA Facsimile: [phone]

## 2017-03-02 NOTE — MR AVS SNAPSHOT
Alberto Fam   3/2/2017 11:15 AM   Office Visit   MRN: [id number]    Department:  Heart [facility]   Dept Phone:  [phone]    Description:  Male : 1955   Provider:  Abbie Elder M.D.           Allergies as of 3/2/2017     Allergen Noted Reactions    Sulfa Drugs 2010   Rash, Vomiting    RXN=20 years ago      You were diagnosed with     Essential hypertension   [7723760]       SSS (sick sinus syndrome) (CMS-HCC)   [344151]       H/O aortic valve replacement   [306417]       Other primary cardiomyopathies (CMS-HCC)   [425.4.ICD-9-CM]       Non-ischemic cardiomyopathy (CMS-HCC)   [398260]         Vital Signs     Blood Pressure Pulse Height Weight Body Mass Index Oxygen Saturation    124/82 mmHg 84 1.829 m (6') 93.441 kg (206 lb) 27.93 kg/m2 95%    Smoking Status                   Never Smoker            Basic Information     Date Of Birth Sex Race Ethnicity Preferred Language    1955 Male White Non- English      Your appointments     May 30, 2017  9:40 AM   PACER CHECK ONLY with KEIKO Rizvi   [facility]    [address]  [facility] [address] [phone]              Problem List              ICD-10-CM Priority Class Noted - Resolved    Hypertension I10 High  Unknown - Present    S/P AV servando ablation (Chronic) Z98.890 High  3/21/2012 - Present    Dilated cardiomyopathy (CMS-HCC) (Chronic) I42.0 High  3/21/2012 - Present    Male erectile disorder (Chronic) N52.9 Medium  3/21/2012 - Present    Ventricular tachycardia (CMS-HCC) (Chronic) I47.2 High  3/21/2012 - Present    Atypical chest pain (Chronic) R07.89 Medium  3/21/2012 - Present    AV block, 3rd degree (CMS-HCC) (Chronic) I44.2 High  3/21/2012 - Present    SSS (sick sinus syndrome) (CMS-HCC) (Chronic) I49.5 High  2012 - Present    Presence of biventricular AICD Z95.810 High  2013 - Present    Arthritis M19.90 Low  2013 -  Present    Chronic systolic congestive heart failure, NYHA class 3 (CMS-HCC) I50.22   10/13/2015 - Present    Homograft cardiac valve stenosis I38   12/16/2016 - Present    Severe aortic stenosis I35.0   1/13/2017 - Present    CAD (coronary artery disease) I25.10   1/13/2017 - Present    Heart valve replaced Z95.2   2/17/2017 - Present    Pericardial effusion I31.3   2/20/2017 - Present    Pleural effusion J90   2/20/2017 - Present    CHF exacerbation (CMS-HCC) I50.9   2/20/2017 - Present    Hemorrhagic disorder due to circulating anticoagulants (CMS-HCC) D68.318   2/21/2017 - Present    S/P AVR (aortic valve replacement) Z95.2   2/21/2017 - Present    Hypokalemia E87.6   2/21/2017 - Present      Health Maintenance        Date Due Completion Dates    IMM DTaP/Tdap/Td Vaccine (1 - Tdap) 4/5/1976 ---    COLONOSCOPY 4/5/2007 ---            Current Immunizations     Influenza TIV (IM) 10/30/2009    Influenza Vaccine Adult HD 11/2/2016    Pneumococcal polysaccharide vaccine (PPSV-23) 2/21/2017  5:32 AM      Below and/or attached are the medications your provider expects you to take. Review all of your home medications and newly ordered medications with your provider and/or pharmacist. Follow medication instructions as directed by your provider and/or pharmacist. Please keep your medication list with you and share with your provider. Update the information when medications are discontinued, doses are changed, or new medications (including over-the-counter products) are added; and carry medication information at all times in the event of emergency situations     Allergies:  SULFA DRUGS - Rash,Vomiting               Medications  Valid as of: March 02, 2017 - 12:21 PM    Generic Name Brand Name Tablet Size Instructions for use    Aspirin (Tablet Delayed Response) ECOTRIN 81 MG Take 81 mg by mouth every day.        Atorvastatin Calcium (Tab) LIPITOR 40 MG Take 1 Tab by mouth every bedtime.        Carvedilol (Tab) COREG 3.125  MG Take 3.125 mg by mouth 2 times a day, with meals.        Docusate Calcium (Cap) SURFAK 240 MG Take 240 mg by mouth every day.        Eplerenone (Tab) INSPRA 25 MG Take 1 Tab by mouth every day.        Furosemide (Tab) LASIX 40 MG Take 40 mg by mouth every day.        Hydrocodone-Acetaminophen (Tab) NORCO 5-325 MG Take 1 Tab by mouth every 8 hours as needed. Indications: Moderate to Moderately Severe Pain        Multiple Vitamins-Minerals (Tab) THERAGRAN-M  Take 1 Tab by mouth every day.        Potassium Chloride Shanell CR (Tab CR) Kdur 20 MEQ Take 20 mEq by mouth every day.        Ramipril (Cap) ALTACE 5 MG Take 1 Cap by mouth every day.        Warfarin Sodium (Tab) COUMADIN 5 MG Take 1.5 Tabs by mouth COUMADIN-DAILY.        .                 Medicines prescribed today were sent to:     \[facility]\"" [facility] #[id number] - [place] - [address]    [address] [place] [address]    Phone: [phone] Fax: [phone]    Open 24 Hours?: No    [facility] - [place], [place] - [address]    [address] [place] [address]    Phone: [phone] Fax: [phone]    Open 24 Hours?: No    [facility] - [place], [place] - [address]    [address] [facility] [address]    Phone: [phone] Fax: [phone]    Open 24 Hours?: No      Medication refill instructions:       If your prescription bottle indicates you have medication refills left, it is not necessary to call your provider’s office. Please contact your pharmacy and they will refill your medication.    If your prescription bottle indicates you do not have any refills left, you may request refills at any time through one of the following ways: The online [employer] system (except Urgent Care), by calling your provider’s office, or by asking your pharmacy to contact your provider’s office with a refill request. Medication refills are processed only during regular business hours and may not be available until the next  business day. Your provider may request additional information or to have a follow-up visit with you prior to refilling your medication.   *Please Note: Medication refills are assigned a new Rx number when refilled electronically. Your pharmacy may indicate that no refills were authorized even though a new prescription for the same medication is available at the pharmacy. Please request the medicine by name with the pharmacy before contacting your provider for a refill.        Your To Do List     Future Labs/Procedures Complete By Expires    BASIC METABOLIC PANEL  As directed 3/2/2018    Echocardiogram Comp w/o Cont  As directed 3/2/2018    HEPATIC FUNCTION PANEL  As directed 3/2/2018    LIPID PROFILE  As directed 3/2/2018      Referral     A referral request has been sent to our patient care coordination department. Please allow 3-5 business days for us to process this request and contact you either by phone or mail. If you do not hear from us by the 5th business day, please call us at [phone].        Instructions    [id number]            [employer] Access Code: Activation code not generated  Current [employer] Status: Active

## 2017-03-03 ENCOUNTER — HOME CARE VISIT (OUTPATIENT)
Dept: HOME HEALTH SERVICES | Facility: HOME HEALTHCARE | Age: 62
End: 2017-03-03
Payer: COMMERCIAL

## 2017-03-03 VITALS
SYSTOLIC BLOOD PRESSURE: 118 MMHG | DIASTOLIC BLOOD PRESSURE: 72 MMHG | BODY MASS INDEX: 26.95 KG/M2 | WEIGHT: 199 LBS | TEMPERATURE: 97.9 F | HEIGHT: 72 IN | RESPIRATION RATE: 18 BRPM | HEART RATE: 60 BPM

## 2017-03-03 DIAGNOSIS — Z95.2 HEART VALVE REPLACED: ICD-10-CM

## 2017-03-03 PROCEDURE — G0162 HHC RN E&M PLAN SVS, 15 MIN: HCPCS

## 2017-03-03 SDOH — ECONOMIC STABILITY: HOUSING INSECURITY
HOME SAFETY: FIRE ESCAPE PLAN DEVELOPED. PATIENT DOES NOT HAVE FLAMMABLE MATERIALS PRESENT IN THE HOME PRESENTING A FIRE HAZARD. NO EVIDENCE FOUND OF SMOKING MATERIALS PRESENT IN THE HOME.

## 2017-03-03 SDOH — ECONOMIC STABILITY: HOUSING INSECURITY
HOME SAFETY: OXYGEN SAFETY RISK ASSESSMENT PERFORMED. PATIENT DOES HAVE A NO SMOKING SIGN POSTED IN THE HOME. PATIENT DOES HAVE A WORKING FIRE EXTINGUISHER PRESENT IN THE HOME. SMOKE ALARMS ARE PRESENT AND FUNCTIONAL ON EACH LEVEL OF THE HOME. PATIENT DOES HAVE A

## 2017-03-03 SDOH — ECONOMIC STABILITY: HOUSING INSECURITY: UNSAFE APPLIANCES: 0

## 2017-03-03 SDOH — ECONOMIC STABILITY: HOUSING INSECURITY: UNSAFE COOKING RANGE AREA: 0

## 2017-03-03 ASSESSMENT — PATIENT HEALTH QUESTIONNAIRE - PHQ9
1. LITTLE INTEREST OR PLEASURE IN DOING THINGS: 00
2. FEELING DOWN, DEPRESSED, IRRITABLE, OR HOPELESS: 00

## 2017-03-03 ASSESSMENT — ACTIVITIES OF DAILY LIVING (ADL)
OASIS_M1830: 03
HOME_HEALTH_OASIS: 01

## 2017-03-06 ENCOUNTER — HOME CARE VISIT (OUTPATIENT)
Dept: HOME HEALTH SERVICES | Facility: HOME HEALTHCARE | Age: 62
End: 2017-03-06
Payer: COMMERCIAL

## 2017-03-06 ENCOUNTER — ANTICOAGULATION MONITORING (OUTPATIENT)
Dept: VASCULAR LAB | Facility: MEDICAL CENTER | Age: 62
End: 2017-03-06

## 2017-03-06 VITALS
HEIGHT: 71 IN | SYSTOLIC BLOOD PRESSURE: 111 MMHG | HEART RATE: 60 BPM | TEMPERATURE: 98 F | DIASTOLIC BLOOD PRESSURE: 76 MMHG | WEIGHT: 194.4 LBS | BODY MASS INDEX: 27.22 KG/M2 | RESPIRATION RATE: 18 BRPM

## 2017-03-06 DIAGNOSIS — Z95.2 HEART VALVE REPLACED: ICD-10-CM

## 2017-03-06 LAB — INR PPP: 2.8 (ref 2–3.5)

## 2017-03-06 PROCEDURE — G0299 HHS/HOSPICE OF RN EA 15 MIN: HCPCS

## 2017-03-06 SDOH — ECONOMIC STABILITY: HOUSING INSECURITY: UNSAFE COOKING RANGE AREA: 0

## 2017-03-06 SDOH — ECONOMIC STABILITY: HOUSING INSECURITY
HOME SAFETY: FIRE ESCAPE PLAN DEVELOPED. PATIENT DOES HAVE FLAMMABLE MATERIALS PRESENT IN THE HOME PRESENTING A FIRE HAZARD. NO EVIDENCE FOUND OF SMOKING MATERIALS PRESENT IN THE HOME.

## 2017-03-06 SDOH — ECONOMIC STABILITY: HOUSING INSECURITY: UNSAFE APPLIANCES: 0

## 2017-03-06 ASSESSMENT — ENCOUNTER SYMPTOMS
VOMITING: DENIES
NAUSEA: DENIES

## 2017-03-06 ASSESSMENT — ACTIVITIES OF DAILY LIVING (ADL)
BATHING_ASSISTANCE: 0
HOUSEKEEPING_ASSISTANCE: 5
LAUNDRY_ASSISTANCE: 4
LAUNDRY_ASSISTANCE: 0
MEAL_PREP_ASSISTANCE: 0
TRANSPORTATION_ASSISTANCE: 0
DRESSING_UB_ASSISTANCE: 0
SHOPPING_ASSISTANCE: 0
DRESSING_LB_ASSISTANCE: 0
GROOMING_ASSISTANCE: 0
ORAL_CARE_ASSISTANCE: 0
TOILETING_ASSISTANCE: 0
EATING_ASSISTANCE: 0
TELEPHONE_ASSISTANCE: 0

## 2017-03-06 NOTE — PROGRESS NOTES
Anticoagulation Summary as of 3/6/2017     INR goal 2.0-3.0   Selected INR 2.8 (3/6/2017)   Maintenance plan 7.5 mg (5 mg x 1.5) every day   Weekly total 52.5 mg   No change documented Ana Urbina   Plan last modified Mora Kwon, PHARMD (2/17/2017)   Next INR check 3/14/2017   Target end date 4/14/2017    Indications   Heart valve replaced [Z95.2]         Anticoagulation Episode Summary     INR check location     Preferred lab     Send INR reminders to     Comments [facility]       Anticoagulation Care Providers     Provider Role Specialty Phone number    Grayson Rios M.D. Referring Cardiology [phone]    GINGER CollinsD Responsible      [facility] Responsible  [phone]        Anticoagulation Patient Findings   Positives Medication Changes    Negatives Missed Doses, Extra Doses, Antibiotic Use, Diet Changes, Dental/Other Procedures, Hospitalization, Bleeding Gums, Nose Bleeds, Blood in Urine, Blood in Stool, Any Bruising, Other Complaints    Comments Started on Ramipril 5mg QD        Spoke with the patient on the phone today, reporting a therapeutic INR of 2.8.  Confirmed the current warfarin dosing regimen and patient compliance.  Patient denies any interval changes to diet and/or medications. Patient denies any signs/symptoms of bleeding or clotting.  Patient will continue with the current warfarin dosing regimen, and will follow up again in 1 week. Orders sent to [facility].     Angelica Urbina PharmD

## 2017-03-07 ENCOUNTER — HOME CARE VISIT (OUTPATIENT)
Dept: HOME HEALTH SERVICES | Facility: HOME HEALTHCARE | Age: 62
End: 2017-03-07
Payer: COMMERCIAL

## 2017-03-08 ENCOUNTER — HOME CARE VISIT (OUTPATIENT)
Dept: HOME HEALTH SERVICES | Facility: HOME HEALTHCARE | Age: 62
End: 2017-03-08
Payer: COMMERCIAL

## 2017-03-08 ENCOUNTER — TELEPHONE (OUTPATIENT)
Dept: CARDIOLOGY | Facility: MEDICAL CENTER | Age: 62
End: 2017-03-08

## 2017-03-08 PROCEDURE — G0299 HHS/HOSPICE OF RN EA 15 MIN: HCPCS

## 2017-03-09 NOTE — TELEPHONE ENCOUNTER
----- Message from Cherri Dumas sent at 3/8/2017  3:26 PM PST -----  AM/Amanda Waggoner, nurse with [facility], at [phone] wants a call back to find out if the patient should continue taking Lasix.

## 2017-03-10 ENCOUNTER — HOME CARE VISIT (OUTPATIENT)
Dept: HOME HEALTH SERVICES | Facility: HOME HEALTHCARE | Age: 62
End: 2017-03-10
Payer: COMMERCIAL

## 2017-03-10 VITALS
HEART RATE: 61 BPM | WEIGHT: 194.2 LBS | DIASTOLIC BLOOD PRESSURE: 62 MMHG | SYSTOLIC BLOOD PRESSURE: 118 MMHG | BODY MASS INDEX: 27.1 KG/M2 | TEMPERATURE: 209.5 F | RESPIRATION RATE: 18 BRPM

## 2017-03-10 SDOH — ECONOMIC STABILITY: HOUSING INSECURITY: UNSAFE COOKING RANGE AREA: 0

## 2017-03-10 SDOH — ECONOMIC STABILITY: HOUSING INSECURITY: UNSAFE APPLIANCES: 0

## 2017-03-10 ASSESSMENT — ENCOUNTER SYMPTOMS
VOMITING: DENIES ANY
RESPIRATORY SYMPTOMS COMMENTS: DENIES ANY SOB
NAUSEA: DENIES ANY

## 2017-03-13 DIAGNOSIS — I42.0 DILATED CARDIOMYOPATHY (HCC): Chronic | ICD-10-CM

## 2017-03-13 DIAGNOSIS — I50.22 CHRONIC SYSTOLIC CONGESTIVE HEART FAILURE, NYHA CLASS 3 (HCC): ICD-10-CM

## 2017-03-13 LAB — INR PPP: 2.8 (ref 2–3.5)

## 2017-03-13 RX ORDER — CARVEDILOL 3.12 MG/1
3.12 TABLET ORAL 2 TIMES DAILY WITH MEALS
Qty: 180 TAB | Refills: 3 | Status: SHIPPED | OUTPATIENT
Start: 2017-03-13 | End: 2017-05-31

## 2017-03-13 RX ORDER — ATORVASTATIN CALCIUM 40 MG/1
40 TABLET, FILM COATED ORAL
Qty: 90 TAB | Refills: 3 | Status: SHIPPED | OUTPATIENT
Start: 2017-03-13 | End: 2017-05-31

## 2017-03-13 RX ORDER — EPLERENONE 25 MG/1
25 TABLET, FILM COATED ORAL DAILY
Qty: 90 TAB | Refills: 3 | Status: SHIPPED | OUTPATIENT
Start: 2017-03-13 | End: 2017-05-31

## 2017-03-14 ENCOUNTER — HOME CARE VISIT (OUTPATIENT)
Dept: HOME HEALTH SERVICES | Facility: HOME HEALTHCARE | Age: 62
End: 2017-03-14
Payer: COMMERCIAL

## 2017-03-14 ENCOUNTER — HOSPITAL ENCOUNTER (OUTPATIENT)
Facility: MEDICAL CENTER | Age: 62
End: 2017-03-14
Attending: INTERNAL MEDICINE
Payer: COMMERCIAL

## 2017-03-14 VITALS
RESPIRATION RATE: 18 BRPM | SYSTOLIC BLOOD PRESSURE: 113 MMHG | BODY MASS INDEX: 26.83 KG/M2 | DIASTOLIC BLOOD PRESSURE: 74 MMHG | WEIGHT: 192.3 LBS | HEART RATE: 63 BPM | TEMPERATURE: 97.2 F

## 2017-03-14 DIAGNOSIS — I10 ESSENTIAL HYPERTENSION: ICD-10-CM

## 2017-03-14 LAB
ALBUMIN SERPL BCP-MCNC: 4.5 G/DL (ref 3.2–4.9)
ALP SERPL-CCNC: 85 U/L (ref 30–99)
ALT SERPL-CCNC: 41 U/L (ref 2–50)
ANION GAP SERPL CALC-SCNC: 8 MMOL/L (ref 0–11.9)
AST SERPL-CCNC: 29 U/L (ref 12–45)
BILIRUB CONJ SERPL-MCNC: 0.1 MG/DL (ref 0.1–0.5)
BILIRUB INDIRECT SERPL-MCNC: 0.6 MG/DL (ref 0–1)
BILIRUB SERPL-MCNC: 0.7 MG/DL (ref 0.1–1.5)
BUN SERPL-MCNC: 20 MG/DL (ref 8–22)
CALCIUM SERPL-MCNC: 9.9 MG/DL (ref 8.5–10.5)
CHLORIDE SERPL-SCNC: 103 MMOL/L (ref 96–112)
CHOLEST SERPL-MCNC: 121 MG/DL (ref 100–199)
CO2 SERPL-SCNC: 26 MMOL/L (ref 20–33)
CREAT SERPL-MCNC: 1.14 MG/DL (ref 0.5–1.4)
GLUCOSE SERPL-MCNC: 95 MG/DL (ref 65–99)
HDLC SERPL-MCNC: 43 MG/DL
LDLC SERPL CALC-MCNC: 54 MG/DL
POTASSIUM SERPL-SCNC: 3.9 MMOL/L (ref 3.6–5.5)
PROT SERPL-MCNC: 7.7 G/DL (ref 6–8.2)
SODIUM SERPL-SCNC: 137 MMOL/L (ref 135–145)
TRIGL SERPL-MCNC: 121 MG/DL (ref 0–149)

## 2017-03-14 PROCEDURE — 80061 LIPID PANEL: CPT

## 2017-03-14 PROCEDURE — 80048 BASIC METABOLIC PNL TOTAL CA: CPT

## 2017-03-14 PROCEDURE — G0162 HHC RN E&M PLAN SVS, 15 MIN: HCPCS

## 2017-03-14 PROCEDURE — 80076 HEPATIC FUNCTION PANEL: CPT

## 2017-03-14 SDOH — ECONOMIC STABILITY: HOUSING INSECURITY: UNSAFE COOKING RANGE AREA: 0

## 2017-03-14 SDOH — ECONOMIC STABILITY: HOUSING INSECURITY: UNSAFE APPLIANCES: 0

## 2017-03-14 ASSESSMENT — ACTIVITIES OF DAILY LIVING (ADL)
HOME_HEALTH_OASIS: 00
OASIS_M1830: 00

## 2017-03-15 ENCOUNTER — ANTICOAGULATION MONITORING (OUTPATIENT)
Dept: VASCULAR LAB | Facility: MEDICAL CENTER | Age: 62
End: 2017-03-15

## 2017-03-15 DIAGNOSIS — Z95.2 HEART VALVE REPLACED: ICD-10-CM

## 2017-03-15 NOTE — PROGRESS NOTES
Results for CELESTE EDUARDO HARRY (MRN [id number]) as of 3/15/2017 15:50   3/14/2017    Sodium 137   Potassium 3.9   Chloride 103   Co2 26   Anion Gap 8.0   Glucose 95   Bun 20   Creatinine 1.14   GFR If Non African American >60   Calcium 9.9   AST(SGOT) 29   ALT(SGPT) 41   Alkaline Phosphatase 85   Cholesterol,Tot 121   Triglycerides 121   HDL 43   LDL 54

## 2017-03-15 NOTE — PROGRESS NOTES
Anticoagulation Summary as of 3/15/2017     INR goal 2.0-3.0   Selected INR 2.8 (3/13/2017)   Maintenance plan 7.5 mg (5 mg x 1.5) every day   Weekly total 52.5 mg   Plan last modified Mora Kwon PHARMD (2/17/2017)   Next INR check 3/27/2017   Target end date 4/14/2017    Indications   Heart valve replaced [Z95.2]         Anticoagulation Episode Summary     INR check location     Preferred lab     Send INR reminders to     [facility]       Anticoagulation Care Providers     Provider Role Specialty Phone number    Grayson Rios M.D. Referring Cardiology [phone]    GINGER CollinsD Responsible      [facility] Responsible  [phone]        Anticoagulation Patient Findings    Pt is therapeutic today. Pt is to continue with current warfarin dosing regimen.  Pt denies any unusual s/s of bleeding, bruising, clotting or any changes to diet or medications.  Follow up in 2 weeks.    GINGER CollinsD.    Pt discharged with .  Will schedule with [place] [facility]-

## 2017-03-16 ENCOUNTER — TELEPHONE (OUTPATIENT)
Dept: CARDIOLOGY | Facility: MEDICAL CENTER | Age: 62
End: 2017-03-16

## 2017-03-16 ENCOUNTER — NON-PROVIDER VISIT (OUTPATIENT)
Dept: CARDIOLOGY | Facility: MEDICAL CENTER | Age: 62
End: 2017-03-16
Payer: OTHER MISCELLANEOUS

## 2017-03-16 DIAGNOSIS — Z98.890 S/P AV NODAL ABLATION: Chronic | ICD-10-CM

## 2017-03-16 DIAGNOSIS — I42.0 DILATED CARDIOMYOPATHY (HCC): Chronic | ICD-10-CM

## 2017-03-16 DIAGNOSIS — Z95.2 S/P AVR (AORTIC VALVE REPLACEMENT): ICD-10-CM

## 2017-03-16 PROCEDURE — G0423 INTENS CARDIAC REHAB NO EXER: HCPCS | Mod: 59 | Performed by: CLINIC/CENTER

## 2017-03-16 PROCEDURE — G0422 INTENS CARDIAC REHAB W/EXERC: HCPCS | Performed by: CLINIC/CENTER

## 2017-03-16 ASSESSMENT — PATIENT HEALTH QUESTIONNAIRE - PHQ9
6. FEELING BAD ABOUT YOURSELF - OR THAT YOU ARE A FAILURE OR HAVE LET YOURSELF OR YOUR FAMILY DOWN: 0
2. FEELING DOWN, DEPRESSED, IRRITABLE, OR HOPELESS: 0
5. POOR APPETITE OR OVEREATING: 0
SUM OF ALL RESPONSES TO PHQ QUESTIONS 1-9: 0
8. MOVING OR SPEAKING SO SLOWLY THAT OTHER PEOPLE COULD HAVE NOTICED. OR THE OPPOSITE, BEING SO FIGETY OR RESTLESS THAT YOU HAVE BEEN MOVING AROUND A LOT MORE THAN USUAL: 0
3. TROUBLE FALLING OR STAYING ASLEEP OR SLEEPING TOO MUCH: 0
1. LITTLE INTEREST OR PLEASURE IN DOING THINGS: 0
7. TROUBLE CONCENTRATING ON THINGS, SUCH AS READING THE NEWSPAPER OR WATCHING TELEVISION: 0
9. THOUGHTS THAT YOU WOULD BE BETTER OFF DEAD, OR OF HURTING YOURSELF: 0
SUM OF ALL RESPONSES TO PHQ9 QUESTIONS 1 AND 2: 0
4. FEELING TIRED OR HAVING LITTLE ENERGY: 0
SUM OF ALL RESPONSES TO PHQ QUESTIONS 1-9: 0

## 2017-03-16 NOTE — PROGRESS NOTES
The patient and his wife watched the [employer] Video.     Cardiac Rehab Individual Treatment Plan Assessment: Initial 2017 Session #     1   MRN: [id number]   Allergies: Sulfa drugs   Patient Name: Alberto Fam : 1955 Risk Stratification: High    Diagnoses:   1. S/P AV servando ablation    2. S/P AVR (aortic valve replacement)    3. Dilated cardiomyopathy (CMS-HCC)     Age: 61 y.o. Physician: Trevor Stephen D.O.    Date of Event: 17 Specialist: Jerrod   Risk Factors:  Hypertension, Hyperlipidemia, Age, Male > 45   Exercise Nutrition Education Psychosocial   Stages of change Stages of change Stages of change Stages of change   Preparation Preparation Preparation Preparation   Fitness Test Lipids Learning Barriers Outcome Survey Tools   DIST: 5014  Available: Yes Learning Barriers: Vision (reading glasses) FP QOL Overall Score: 25.53   Max HR: 63 Date: 17 Family Support PHQ-9: 0   RPE: 12 Total: 121 mg/dL Yes Nutrition Screen: 46 % (using rate your plate svy some changes recommended)   SPO2: 92 % Tri mg/dL Lives: Spouse Intervention   MET: 3.5 HDL: 43 mg/dL Tobacco Use Behavioral Health Consult: Yes (Healthy mind classes at [facility])   EF= 20 LDL: 54 mg/dL History   Smoking status   • Never Smoker    Smokeless tobacco   • Never Used     Comment: quit 35 years ago       Physician Referral: No   Ambulatory Status Diabetes Smoking Intervention Identifies Stressors: Yes   Fall Risk Assessed: Yes Diabetes: No Smoking Cessation Referral: N/A Drug Intervention: No   Exercise Ambulatory Status Assist Devices: None HbA1C: 5.4 % Date: 17 Ind. Education / Counseling: N/A Education   Exercise Prescription Monitors BS at Home: No Tobacco Adjunct: N/A Psychosocial Education: Coping Techniques, Positive Support System, S/S Depression   Mode: Treadmill, NuStep, UBE, Airdyne   Frequency:  (na) Random BS: 95 Education Intervention Target Goal   Frequency:  3 days/week Weight Management  "Healthy Heart Education: Class Schedule Given, Medications Reviewed, Patient Education Binder Provided, Risk Factors Discussed, Videos Viewed per Janice (workshops etc. scheduled) Assess presence or absence of depression using a valid screening: Yes   Duration:  15-25 Minutes Weight: 90.493 kg (199 lb 8 oz) Target Goal Use Stress Management: Yes   Intensity:  11-13 RPE Height: 180.3 cm (5' 10.98\") Complete Tobacco Cessation: Complete Tobacco Cessation: N/A Adverse Events: Yes   METS:  1.0-3.0 BMI (Calculated): 27.84 Educate / Review and have understanding of cardiac disease prevention: Educate / Review and have understanding of cardiac disease prevention: Yes Unexpected Events: No (He has been watching this valve/years. )   Progression:  ^ increments of 1-3 to THR/RPE as tolerated Goal weight: 192lb Medication Compliance: Yes    THR: THR: 15-20 BPM ^Resting HR History   Alcohol Use   • 1.2 oz/week   • 2 Standard drinks or equivalent per week     Comment: 1-2 drinks per day          Angina with Exercise?  Angina with Exercise: No      Resistance Training?  Resistance Training: Yes      Hypertension      Diagnosed with HTN: Yes Intervention      Resting BP: 110/75 mmHg Dietician Consult/Class: Yes      Peak Ex BP: 127/79 mmHg Nurse/Patient Discussion: Yes     Intervention Diabetes Ed Referral: N/A     Home Exercise:  Yes Discuss Maintenance /Wt Loss: Yes     Mode: Walk Attend Cooking School: Yes     Duration: 10 min 2 x day up to 30 min 1 x day Dietary Goal: 24% fat; low sodium;      Frequency: 7 days active Education     Education Nutrition Education: Healthy Eating, Sodium Reduction     Equipment Orientation, Exercise Safety, S/S to Report, RPE Scale, Warm Up / Cool Down, Physically Active Target Goal     Target Goal LDL-C < 100 if trig. > 200:  N/A       Start Individual Exercise Rx Yes LDL-C < 70 for high risk patient:  Yes       BP < 140/90 or < 130/80 if DM or CKD Yes Non HDL-C Should be < 130:  Yes     "   Aerobic activity 30 + min / day 5 days / wk Yes HbA1C < 7%: N/A         BMI < 25: Yes       Notes: He has BIV and ICD; lower back pain  Initial Assessment:  Heart Sounds: [id number]       Lung Sounds: Clear bilaterally       Edema: none       Right Peripheral Pulses:  Carotid: 3+  Radial: 3+  Dorsalis Pedis:  (not assessed)  Posterior Tibialis: 3+   Left Peripheral Pulses:  Carotid: 3+  Radial: 3+  Dorsalis Pedis:  (not assessed)  Posterior Tibialis: 3+    Incision: healing well      Color: pale pink       Frame Size: Medium        Cognitive Assessment: no problem      Fall Assessment:    Gait: steady  Balance: weak  Upper Body: 3/10 pain between shoulder blades. This is constant since surgery  Lower Body: ROM is good. He had 6/10 discomfort doing squat at 35-40degree angle on squat   Recent Falls: none  Current Medications and Vaccinations: Reviewed/Updated  Patient Stated Goals: safe effective exercise program and consistency in it  Other: He has CHF; in feb plueral effusion was drained; History of Sick Sinus with Pacer now.

## 2017-03-16 NOTE — TELEPHONE ENCOUNTER
----- Message from Obdulia Belle L.P.N. sent at 3/16/2017  9:12 AM PDT -----      ----- Message -----     From: Raymundo Elder M.D.     Sent: 3/15/2017   3:51 PM       To: Amanda Hdz R.N.    Labs look good  raymundo  ----- Message -----     From: Tomeka Jorgensen R.N.     Sent: 3/15/2017  11:41 AM       To: Raymundo Elder M.D.    5/30 [facility]as

## 2017-03-20 ENCOUNTER — NON-PROVIDER VISIT (OUTPATIENT)
Dept: CARDIOLOGY | Facility: MEDICAL CENTER | Age: 62
End: 2017-03-20
Payer: OTHER MISCELLANEOUS

## 2017-03-20 VITALS
BODY MASS INDEX: 27.79 KG/M2 | WEIGHT: 198.5 LBS | SYSTOLIC BLOOD PRESSURE: 137 MMHG | DIASTOLIC BLOOD PRESSURE: 64 MMHG | HEART RATE: 69 BPM | HEIGHT: 71 IN

## 2017-03-20 DIAGNOSIS — Z98.890 S/P AV NODAL ABLATION: Chronic | ICD-10-CM

## 2017-03-20 DIAGNOSIS — I25.10 CORONARY ARTERY DISEASE INVOLVING NATIVE CORONARY ARTERY OF NATIVE HEART WITHOUT ANGINA PECTORIS: ICD-10-CM

## 2017-03-20 DIAGNOSIS — Z95.2 S/P AVR (AORTIC VALVE REPLACEMENT): ICD-10-CM

## 2017-03-20 DIAGNOSIS — Z95.2 HEART VALVE REPLACED: ICD-10-CM

## 2017-03-20 DIAGNOSIS — I44.2 AV BLOCK, 3RD DEGREE (HCC): Chronic | ICD-10-CM

## 2017-03-20 PROCEDURE — G0423 INTENS CARDIAC REHAB NO EXER: HCPCS | Mod: 59 | Performed by: CLINIC/CENTER

## 2017-03-20 PROCEDURE — G0422 INTENS CARDIAC REHAB W/EXERC: HCPCS | Performed by: CLINIC/CENTER

## 2017-03-20 ASSESSMENT — PAIN SCALES - GENERAL: PAINLEVEL: 4=SLIGHT-MODERATE PAIN

## 2017-03-22 ENCOUNTER — NON-PROVIDER VISIT (OUTPATIENT)
Dept: CARDIOLOGY | Facility: MEDICAL CENTER | Age: 62
End: 2017-03-22
Payer: OTHER MISCELLANEOUS

## 2017-03-22 VITALS
HEIGHT: 71 IN | SYSTOLIC BLOOD PRESSURE: 116 MMHG | BODY MASS INDEX: 27.93 KG/M2 | DIASTOLIC BLOOD PRESSURE: 68 MMHG | WEIGHT: 199.5 LBS | HEART RATE: 79 BPM

## 2017-03-22 DIAGNOSIS — Z95.2 HEART VALVE REPLACED: ICD-10-CM

## 2017-03-22 DIAGNOSIS — Z95.2 S/P AVR (AORTIC VALVE REPLACEMENT): ICD-10-CM

## 2017-03-22 DIAGNOSIS — I44.2 AV BLOCK, 3RD DEGREE (HCC): Chronic | ICD-10-CM

## 2017-03-22 DIAGNOSIS — I25.10 CORONARY ARTERY DISEASE INVOLVING NATIVE CORONARY ARTERY OF NATIVE HEART WITHOUT ANGINA PECTORIS: ICD-10-CM

## 2017-03-22 DIAGNOSIS — Z98.890 S/P AV NODAL ABLATION: Chronic | ICD-10-CM

## 2017-03-22 DIAGNOSIS — T82.857A: ICD-10-CM

## 2017-03-22 DIAGNOSIS — I35.0 SEVERE AORTIC STENOSIS: ICD-10-CM

## 2017-03-22 DIAGNOSIS — I50.22 CHRONIC SYSTOLIC CONGESTIVE HEART FAILURE, NYHA CLASS 3 (HCC): ICD-10-CM

## 2017-03-22 DIAGNOSIS — I42.0 DILATED CARDIOMYOPATHY (HCC): Chronic | ICD-10-CM

## 2017-03-22 PROCEDURE — G0422 INTENS CARDIAC REHAB W/EXERC: HCPCS | Performed by: CLINIC/CENTER

## 2017-03-22 PROCEDURE — G0423 INTENS CARDIAC REHAB NO EXER: HCPCS | Mod: 59 | Performed by: CLINIC/CENTER

## 2017-03-22 ASSESSMENT — PAIN SCALES - GENERAL: PAINLEVEL: 3=SLIGHT PAIN

## 2017-03-24 ENCOUNTER — TELEPHONE (OUTPATIENT)
Dept: CARDIOLOGY | Facility: MEDICAL CENTER | Age: 62
End: 2017-03-24

## 2017-03-24 ENCOUNTER — NON-PROVIDER VISIT (OUTPATIENT)
Dept: CARDIOLOGY | Facility: MEDICAL CENTER | Age: 62
End: 2017-03-24
Payer: OTHER MISCELLANEOUS

## 2017-03-24 VITALS
BODY MASS INDEX: 27.79 KG/M2 | SYSTOLIC BLOOD PRESSURE: 119 MMHG | HEART RATE: 76 BPM | HEIGHT: 71 IN | WEIGHT: 198.5 LBS | DIASTOLIC BLOOD PRESSURE: 70 MMHG

## 2017-03-24 DIAGNOSIS — Z95.2 HEART VALVE REPLACED: ICD-10-CM

## 2017-03-24 DIAGNOSIS — Z98.890 S/P AV NODAL ABLATION: Chronic | ICD-10-CM

## 2017-03-24 DIAGNOSIS — Z95.2 S/P AVR (AORTIC VALVE REPLACEMENT): ICD-10-CM

## 2017-03-24 PROCEDURE — G0423 INTENS CARDIAC REHAB NO EXER: HCPCS | Mod: 59 | Performed by: CLINIC/CENTER

## 2017-03-24 PROCEDURE — G0422 INTENS CARDIAC REHAB W/EXERC: HCPCS | Performed by: CLINIC/CENTER

## 2017-03-24 ASSESSMENT — PAIN SCALES - GENERAL: PAINLEVEL: 1=MINIMAL PAIN

## 2017-03-24 NOTE — TELEPHONE ENCOUNTER
Dorcas    Per Dr. Ojeda we are ok to work Mr. Fam at a higher target heart rate.  We can increase him to 20-30%.  If you have any other needs or questions please do not hesitate to reach out to me.   Thank you    ANTHONY Stark

## 2017-03-24 NOTE — TELEPHONE ENCOUNTER
Otto Ojeda M.D.  Amanda Hdz R.N.        Caller: Unspecified (Today, 12:07 PM)                     Okay to adjust as they suggested

## 2017-03-24 NOTE — TELEPHONE ENCOUNTER
----- Message from Dorcas Andrews R.N. sent at 3/24/2017 11:57 AM PDT -----  Regarding: Alberto Fam THR  Alberto Fam has a THR of 15-20 above resting due to EF of 20% per protocol.  Patient is exceeding this heart rate during exercise and is tolerating this well and would like to be able to work at a heart rate target greater than 15-20.  Please advise if we should adjust this for him or if we should keep the target at 15-20 above resting HR due to EF.

## 2017-03-27 ENCOUNTER — NON-PROVIDER VISIT (OUTPATIENT)
Dept: CARDIOLOGY | Facility: MEDICAL CENTER | Age: 62
End: 2017-03-27
Payer: OTHER MISCELLANEOUS

## 2017-03-27 ENCOUNTER — ANTICOAGULATION VISIT (OUTPATIENT)
Dept: MEDICAL GROUP | Facility: MEDICAL CENTER | Age: 62
End: 2017-03-27
Payer: OTHER MISCELLANEOUS

## 2017-03-27 VITALS
HEIGHT: 71 IN | WEIGHT: 198.5 LBS | HEART RATE: 64 BPM | SYSTOLIC BLOOD PRESSURE: 113 MMHG | DIASTOLIC BLOOD PRESSURE: 71 MMHG | BODY MASS INDEX: 27.79 KG/M2

## 2017-03-27 DIAGNOSIS — I35.0 SEVERE AORTIC STENOSIS: ICD-10-CM

## 2017-03-27 DIAGNOSIS — Z98.890 S/P AV NODAL ABLATION: Chronic | ICD-10-CM

## 2017-03-27 DIAGNOSIS — Z95.2 S/P AVR (AORTIC VALVE REPLACEMENT): ICD-10-CM

## 2017-03-27 DIAGNOSIS — Z95.2 HEART VALVE REPLACED: ICD-10-CM

## 2017-03-27 DIAGNOSIS — Z79.01 LONG TERM (CURRENT) USE OF ANTICOAGULANTS: ICD-10-CM

## 2017-03-27 DIAGNOSIS — Z95.810 PRESENCE OF BIVENTRICULAR AICD: ICD-10-CM

## 2017-03-27 DIAGNOSIS — I42.0 DILATED CARDIOMYOPATHY (HCC): Chronic | ICD-10-CM

## 2017-03-27 DIAGNOSIS — I44.2 AV BLOCK, 3RD DEGREE (HCC): Chronic | ICD-10-CM

## 2017-03-27 DIAGNOSIS — I49.5 SSS (SICK SINUS SYNDROME) (HCC): Chronic | ICD-10-CM

## 2017-03-27 DIAGNOSIS — I25.10 CORONARY ARTERY DISEASE INVOLVING NATIVE CORONARY ARTERY OF NATIVE HEART WITHOUT ANGINA PECTORIS: ICD-10-CM

## 2017-03-27 LAB — INR PPP: 3.9 (ref 2–3.5)

## 2017-03-27 PROCEDURE — G0422 INTENS CARDIAC REHAB W/EXERC: HCPCS | Performed by: CLINIC/CENTER

## 2017-03-27 PROCEDURE — G0423 INTENS CARDIAC REHAB NO EXER: HCPCS | Mod: 59 | Performed by: CLINIC/CENTER

## 2017-03-27 PROCEDURE — 85610 PROTHROMBIN TIME: CPT | Performed by: PHYSICIAN ASSISTANT

## 2017-03-27 ASSESSMENT — PAIN SCALES - GENERAL: PAINLEVEL: 2=MINIMAL-SLIGHT

## 2017-03-27 NOTE — PROGRESS NOTES
Anticoagulation Summary as of 3/27/2017     INR goal 2.0-3.0   Selected INR 3.9! (3/27/2017)   Maintenance plan 7.5 mg (5 mg x 1.5) every day   Weekly total 52.5 mg   Plan last modified Mora Kwon PHARMD (2/17/2017)   Next INR check 4/10/2017   Target end date 5/8/2017    Indications   Heart valve replaced [Z95.2]         Anticoagulation Episode Summary     INR check location     Preferred lab     Send INR reminders to     Comments       Anticoagulation Care Providers     Provider Role Specialty Phone number    Grayson Rios M.D. Referring Cardiology [phone]    Mora Kwon PHARMD Responsible      [facility] Responsible  [phone]        Anticoagulation Patient Findings    Alberto Johnny Fam seen in clinic today  INR  Supra-therapeutic.    No Bp today. He already had it done.   Denies signs/symptoms of bleeding and/or thrombosis.    Denies changes to diet or medications.   Follow up appointment in 2 week(s).    Hold tonight then continue weekly warfarin dose as noted    Ryder Salgado, GINGERD

## 2017-03-27 NOTE — MR AVS SNAPSHOT
Alberto Fam   3/27/2017 9:15 AM   Anticoagulation Visit   MRN: [id number]    Department:  [facility]   Dept Phone:  [phone]    Description:  Male : 1955   Provider:  [facility]           Allergies as of 3/27/2017     Allergen Noted Reactions    Sulfa Drugs 2010   Rash, Vomiting    RXN=20 years ago      You were diagnosed with     Heart valve replaced   [[id number]]         Vital Signs     Smoking Status                   Never Smoker            Basic Information     Date Of Birth Sex Race Ethnicity Preferred Language    1955 Male White Non- English      Your appointments     Mar 27, 2017 10:30 AM   EXERCISE with ICR RN   [facility] ([facility])    [address]  [address]  [place] [address]   [phone]            Mar 29, 2017  8:00 AM   GROUP EDUCATION with ICR EDUCATION RM   [facility] ([facility])    [address]  [place] [address]   [phone]            Mar 29, 2017  9:00 AM   EXERCISE with ICR RN   [facility] ([facility])    [address]  [address]  [place] [address]   [phone]            Mar 31, 2017  8:00 AM   GROUP EDUCATION with ICR EDUCATION RM   [facility] ([facility])    [address]  [address]  [place] [address]   [phone]            Mar 31, 2017  9:00 AM   EXERCISE with ICR RN   [facility] ([facility])    [address]  [place] [address]   [phone]            2017  8:00 AM   GROUP EDUCATION with ICR EDUCATION RM   [facility] ([facility])    [address]  [place] [address]   [phone]            2017  9:00 AM   EXERCISE with ICR RN   [facility] ([facility])    [address]  [place] [address]   [phone]            2017  8:00 AM   GROUP EDUCATION  with ICR EDUCATION RM   [facility] ([facility])    [address]  [place] [address]   [phone]            Apr 05, 2017  9:00 AM   EXERCISE with ICR RN   [facility] ([facility])    [address]  [place] [address]   [phone]            Apr 07, 2017  3:30 PM   GROUP EDUCATION with ICR EDUCATION RM   [facility] ([facility])    [address]  [place] [address]   [phone]            Apr 07, 2017  4:30 PM   EXERCISE with ICR RN   [facility] ([facility])    [address]  [place] [address]   [phone]            Apr 10, 2017  3:00 PM   Anti-Coag Routine with [facility] PAV PHARMACIST   [facility])    [address]  [place] [address]   [phone]            Apr 10, 2017  3:30 PM   GROUP EDUCATION with ICR EDUCATION RM   [facility] ([facility])    [address]  [place] [address]   [phone]            Apr 10, 2017  4:30 PM   EXERCISE with ICR RN   [facility] ([facility])    [address]  [place] [address]   [phone]            Apr 12, 2017  3:30 PM   GROUP EDUCATION with ICR EDUCATION RM   [facility] ([facility])    [address]  [place] [address]   [phone]            Apr 12, 2017  4:30 PM   EXERCISE with ICR RN   [facility] ([facility])    [address]  [address]  [place] [address]   [phone]            Apr 14, 2017  3:30 PM   GROUP EDUCATION with ICR EDUCATION RM   [facility] ([facility])    [address]  [place] [address]   [phone]            Apr 14, 2017  4:30 PM   EXERCISE with AMADA RN   [facility] ([facility])    [address]  [place]  [address]   [phone]            Apr 17, 2017  3:30 PM   GROUP EDUCATION with ICR EDUCATION RM   [facility] ([facility])    [address]  [place] [address]   [phone]            Apr 17, 2017  4:30 PM   EXERCISE with ICR RN   [facility] ([facility])    [address]  [facility] [address]   [phone]            Apr 19, 2017  3:30 PM   GROUP EDUCATION with ICR EDUCATION RM   [facility] ([facility])    [address]  [place] [address]   [phone]            Apr 19, 2017  4:30 PM   EXERCISE with ICR RN   [facility] ([facility])    [address]  [facility] [address]   [phone]            Apr 21, 2017  3:30 PM   GROUP EDUCATION with ICR EDUCATION RM   [facility] ([facility])    [address]  [facility] [address]   [phone]            Apr 21, 2017  4:30 PM   EXERCISE with ICR RN   [facility] ([facility])    [address]  [facility] [address]   [phone]            Apr 24, 2017  3:30 PM   GROUP EDUCATION with ICR EDUCATION RM   [facility] ([facility])    [address]  [facility] [address]   [phone]            Apr 24, 2017  4:30 PM   EXERCISE with ICR RN   [facility] ([facility])    [address]  [place] [address]   [phone]            Apr 26, 2017  3:30 PM   GROUP EDUCATION with ICR EDUCATION RM   [facility] ([facility])    [address]  [place] [address]   [phone]            Apr 26, 2017  4:30 PM   EXERCISE with ICR RN   [facility] ([facility])    [address]  [facility] [address]   [phone]            Apr 28, 2017  3:30 PM   GROUP EDUCATION with [facility] EDUCATION    [facility]  ([facility])    [address]  [place] [address]   [phone]            Apr 28, 2017  4:30 PM   EXERCISE with ICR RN   [facility] ([facility])    [address]  [place] [address]   [phone]            May 01, 2017  3:30 PM   GROUP EDUCATION with ICR EDUCATION RM   [facility] ([facility])    [address]  [facility] [address]   [phone]            May 01, 2017  4:30 PM   EXERCISE with ICR RN   [facility] ([facility])    [address]  [place] [address]   [phone]            May 03, 2017  3:30 PM   GROUP EDUCATION with ICR EDUCATION RM   [facility] ([facility])    [address]  [facility] [address]   [phone]            May 03, 2017  4:30 PM   EXERCISE with ICR RN   [facility] ([facility])    [address]  [facility] [address]   [phone]            May 05, 2017  3:30 PM   GROUP EDUCATION with ICR EDUCATION RM   [facility] ([facility])    [address]  [facility] [address]   [phone]            May 05, 2017  4:30 PM   EXERCISE with ICR RN   [facility] ([facility])    [address]  [facility] [address]   [phone]            May 08, 2017  3:30 PM   GROUP EDUCATION with ICR EDUCATION RM   [facility] ([facility])    [address]  [place] [address]   [phone]            May 08, 2017  4:30 PM   EXERCISE with ICR RN   [facility] ([facility])    [address]  [place] [address]   [phone]            May 10, 2017  3:30 PM   GROUP EDUCATION with ICR EDUCATION RM   [facility] ([facility])    [address]  [facility] [address]   [phone]            May 10, 2017  4:30 PM   EXERCISE with  ICR RN   [facility] ([facility])    [address]  [place] [address]   [phone]            May 12, 2017  3:30 PM   GROUP EDUCATION with ICR EDUCATION RM   [facility] ([facility])    [address]  [place] [address]   [phone]            May 12, 2017  4:30 PM   EXERCISE with ICR RN   [facility] ([facility])    [address]  [place] [address]   [phone]            May 15, 2017  3:30 PM   GROUP EDUCATION with ICR EDUCATION RM   [facility] ([facility])    [address]  [place] [address]   [phone]            May 15, 2017  4:30 PM   EXERCISE with ICR RN   [facility] ([facility])    [address]  [place] [address]   [phone]            May 17, 2017  3:30 PM   GROUP EDUCATION with ICR EDUCATION    [facility] ([facility])    [address]  [place] [address]   [phone]            May 17, 2017  4:30 PM   EXERCISE with ICR RN   [facility] ([facility])    [address]  [place] [address]   [phone]            May 19, 2017  3:30 PM   GROUP EDUCATION with ICR EDUCATION    [facility] ([facility])    [address]  [place] [address]   [phone]            May 19, 2017  4:30 PM   EXERCISE with ICR RN   [facility] ([facility])    [address]  [place] [address]   [phone]            May 30, 2017  8:15 AM   ECHO with ECHO [facility]   ECHOCARDIOLOGY [facility])    [address]  [place] [address]   [phone]           No prep            May 30, 2017  9:40 AM   PACER CHECK ONLY with [facility] (--)    [address]  [facility] [address]      [phone]              Problem List              ICD-10-CM Priority Class Noted - Resolved    Hypertension I10 High  Unknown - Present    S/P AV servando ablation (Chronic) Z98.890 High  3/21/2012 - Present    Dilated cardiomyopathy (CMS-HCC) (Chronic) I42.0 High  3/21/2012 - Present    Male erectile disorder (Chronic) N52.9 Medium  3/21/2012 - Present    Ventricular tachycardia (CMS-HCC) (Chronic) I47.2 High  3/21/2012 - Present    Atypical chest pain (Chronic) R07.89 Medium  3/21/2012 - Present    AV block, 3rd degree (CMS-HCC) (Chronic) I44.2 High  3/21/2012 - Present    SSS (sick sinus syndrome) (CMS-HCC) (Chronic) I49.5 High  9/28/2012 - Present    Presence of biventricular AICD Z95.810 High  6/6/2013 - Present    Arthritis M19.90 Low  8/20/2013 - Present    Chronic systolic congestive heart failure, NYHA class 3 (CMS-HCC) I50.22   10/13/2015 - Present    Homograft cardiac valve stenosis I38   12/16/2016 - Present    Severe aortic stenosis I35.0   1/13/2017 - Present    CAD (coronary artery disease) I25.10   1/13/2017 - Present    Heart valve replaced Z95.2   2/17/2017 - Present    Pericardial effusion I31.3   2/20/2017 - Present    Pleural effusion J90   2/20/2017 - Present    CHF exacerbation (CMS-HCC) I50.9   2/20/2017 - Present    Hemorrhagic disorder due to circulating anticoagulants (CMS-HCC) D68.318   2/21/2017 - Present    S/P AVR (aortic valve replacement) Z95.2   2/21/2017 - Present    Hypokalemia E87.6   2/21/2017 - Present      Health Maintenance        Date Due Completion Dates    IMM DTaP/Tdap/Td Vaccine (1 - Tdap) 4/5/1976 ---    COLONOSCOPY 4/5/2007 ---            Results     POCT Protime      Component    INR    3.9    Comment:     ic valid [id number] 160 exp 11/2017                        Current Immunizations     Influenza TIV (IM) 10/30/2009    Influenza Vaccine Adult HD 11/2/2016    Pneumococcal polysaccharide vaccine (PPSV-23) 2/21/2017  5:32 AM      Below and/or attached are the  medications your provider expects you to take. Review all of your home medications and newly ordered medications with your provider and/or pharmacist. Follow medication instructions as directed by your provider and/or pharmacist. Please keep your medication list with you and share with your provider. Update the information when medications are discontinued, doses are changed, or new medications (including over-the-counter products) are added; and carry medication information at all times in the event of emergency situations     Allergies:  SULFA DRUGS - Rash,Vomiting               Medications  Valid as of: March 27, 2017 -  9:56 AM    Generic Name Brand Name Tablet Size Instructions for use    Aspirin (Tablet Delayed Response) ECOTRIN 81 MG Take 81 mg by mouth every day.        Atorvastatin Calcium (Tab) LIPITOR 40 MG Take 1 Tab by mouth every bedtime.        Carvedilol (Tab) COREG 3.125 MG Take 1 Tab by mouth 2 times a day, with meals.        Docusate Calcium (Cap) SURFAK 240 MG Take 240 mg by mouth every day.        Eplerenone (Tab) INSPRA 25 MG Take 1 Tab by mouth every day.        Furosemide (Tab) LASIX 40 MG Take 40 mg by mouth every day.        Hydrocodone-Acetaminophen (Tab) NORCO 5-325 MG Take 1 Tab by mouth every 8 hours as needed. Indications: Moderate to Moderately Severe Pain        Multiple Vitamins-Minerals (Tab) THERAGRAN-M  Take 1 Tab by mouth every day.        Multiple Vitamins-Minerals   Spray 1 Tab in mouth/throat every day at 6 PM.        Potassium Chloride Shanell CR (Tab CR) Kdur 20 MEQ Take 20 mEq by mouth every day.        Ramipril (Cap) ALTACE 5 MG Take 1 Cap by mouth every day.        Valerian (Cap) Valerian Root 100 MG Spray 100 mg in mouth/throat every bedtime.        Warfarin Sodium (Tab) COUMADIN 5 MG Take 1.5 Tabs by mouth COUMADIN-DAILY.        .                 Medicines prescribed today were sent to:     [facility] - [place], [place] - [address]    [address]  [facility] [address]    Phone: [phone] Fax: [phone]    Open 24 Hours?: No      Medication refill instructions:       If your prescription bottle indicates you have medication refills left, it is not necessary to call your provider’s office. Please contact your pharmacy and they will refill your medication.    If your prescription bottle indicates you do not have any refills left, you may request refills at any time through one of the following ways: The online [facility] system (except Urgent Care), by calling your provider’s office, or by asking your pharmacy to contact your provider’s office with a refill request. Medication refills are processed only during regular business hours and may not be available until the next business day. Your provider may request additional information or to have a follow-up visit with you prior to refilling your medication.   *Please Note: Medication refills are assigned a new Rx number when refilled electronically. Your pharmacy may indicate that no refills were authorized even though a new prescription for the same medication is available at the pharmacy. Please request the medicine by name with the pharmacy before contacting your provider for a refill.        Warfarin Dosing Calendar   March 2017 Details    Sun Mon Tue Wed Thu Fri Sat        1               2               3               4                 5               6               7               8               9               10               11                 12               13               14               15               16               17               18                 19               20               21               22               23               24               25                 26               27   3.9   Hold   See details      28      7.5 mg         29      7.5 mg         30      7.5 mg         31      7.5 mg           Date Details   03/27 This INR check   INR: 3.9   ic valid [id number] 160 exp  11/2017               How to take your warfarin dose     To take:  7.5 mg Take 1.5 of the 5 mg tablets.    Hold Do not take your warfarin dose. See the Details table to the right for additional instructions.                Warfarin Dosing Calendar   April 2017 Details    Sun Mon Tue Wed Thu Fri Sat           1      7.5 mg           2      7.5 mg         3      7.5 mg         4      7.5 mg         5      7.5 mg         6      7.5 mg         7      7.5 mg         8      7.5 mg           9      7.5 mg         10      7.5 mg         11               12               13               14               15                 16               17               18               19               20               21               22                 23               24               25               26               27               28               29                 30                      Date Details   No additional details    Date of next INR:  4/10/2017         How to take your warfarin dose     To take:  7.5 mg Take 1.5 of the 5 mg tablets.              [employer] Access Code: Activation code not generated  Current Neron[employer] Status: Active

## 2017-03-29 ENCOUNTER — NON-PROVIDER VISIT (OUTPATIENT)
Dept: CARDIOLOGY | Facility: MEDICAL CENTER | Age: 62
End: 2017-03-29
Payer: OTHER MISCELLANEOUS

## 2017-03-29 VITALS
DIASTOLIC BLOOD PRESSURE: 53 MMHG | BODY MASS INDEX: 28 KG/M2 | HEART RATE: 68 BPM | WEIGHT: 200 LBS | SYSTOLIC BLOOD PRESSURE: 128 MMHG | HEIGHT: 71 IN

## 2017-03-29 DIAGNOSIS — Z95.2 HEART VALVE REPLACED: ICD-10-CM

## 2017-03-29 DIAGNOSIS — Z95.2 S/P AVR (AORTIC VALVE REPLACEMENT): ICD-10-CM

## 2017-03-29 PROCEDURE — G0422 INTENS CARDIAC REHAB W/EXERC: HCPCS | Performed by: CLINIC/CENTER

## 2017-03-29 PROCEDURE — G0423 INTENS CARDIAC REHAB NO EXER: HCPCS | Mod: 59 | Performed by: CLINIC/CENTER

## 2017-03-29 ASSESSMENT — PAIN SCALES - GENERAL: PAINLEVEL: 2=MINIMAL-SLIGHT

## 2017-03-31 ENCOUNTER — NON-PROVIDER VISIT (OUTPATIENT)
Dept: CARDIOLOGY | Facility: MEDICAL CENTER | Age: 62
End: 2017-03-31
Payer: OTHER MISCELLANEOUS

## 2017-03-31 VITALS
BODY MASS INDEX: 27.93 KG/M2 | WEIGHT: 199.5 LBS | SYSTOLIC BLOOD PRESSURE: 125 MMHG | HEART RATE: 67 BPM | HEIGHT: 71 IN | DIASTOLIC BLOOD PRESSURE: 73 MMHG

## 2017-03-31 DIAGNOSIS — Z98.890 S/P AV NODAL ABLATION: Chronic | ICD-10-CM

## 2017-03-31 DIAGNOSIS — I35.0 SEVERE AORTIC STENOSIS: ICD-10-CM

## 2017-03-31 DIAGNOSIS — Z95.2 HEART VALVE REPLACED: ICD-10-CM

## 2017-03-31 DIAGNOSIS — Z95.2 S/P AVR (AORTIC VALVE REPLACEMENT): ICD-10-CM

## 2017-03-31 DIAGNOSIS — I49.5 SSS (SICK SINUS SYNDROME) (HCC): Chronic | ICD-10-CM

## 2017-03-31 PROCEDURE — G0422 INTENS CARDIAC REHAB W/EXERC: HCPCS | Performed by: CLINIC/CENTER

## 2017-03-31 PROCEDURE — G0423 INTENS CARDIAC REHAB NO EXER: HCPCS | Mod: 59 | Performed by: CLINIC/CENTER

## 2017-03-31 ASSESSMENT — PAIN SCALES - GENERAL: PAINLEVEL: 3=SLIGHT PAIN

## 2017-03-31 NOTE — PROGRESS NOTES
Alberto attended [facility] cooking school today and was provided printed materials to reinforce the education.

## 2017-04-03 ENCOUNTER — NON-PROVIDER VISIT (OUTPATIENT)
Dept: CARDIOLOGY | Facility: MEDICAL CENTER | Age: 62
End: 2017-04-03
Payer: OTHER MISCELLANEOUS

## 2017-04-03 VITALS
SYSTOLIC BLOOD PRESSURE: 136 MMHG | DIASTOLIC BLOOD PRESSURE: 83 MMHG | BODY MASS INDEX: 27.79 KG/M2 | HEIGHT: 71 IN | HEART RATE: 62 BPM | WEIGHT: 198.5 LBS

## 2017-04-03 DIAGNOSIS — Z95.2 HEART VALVE REPLACED: ICD-10-CM

## 2017-04-03 DIAGNOSIS — T82.857A: ICD-10-CM

## 2017-04-03 DIAGNOSIS — Z95.810 PRESENCE OF BIVENTRICULAR AICD: ICD-10-CM

## 2017-04-03 DIAGNOSIS — I50.22 CHRONIC SYSTOLIC CONGESTIVE HEART FAILURE, NYHA CLASS 3 (HCC): ICD-10-CM

## 2017-04-03 DIAGNOSIS — Z95.2 S/P AVR (AORTIC VALVE REPLACEMENT): ICD-10-CM

## 2017-04-03 DIAGNOSIS — I42.0 DILATED CARDIOMYOPATHY (HCC): Chronic | ICD-10-CM

## 2017-04-03 DIAGNOSIS — Z98.890 S/P AV NODAL ABLATION: Chronic | ICD-10-CM

## 2017-04-03 PROCEDURE — G0423 INTENS CARDIAC REHAB NO EXER: HCPCS | Mod: 59 | Performed by: CLINIC/CENTER

## 2017-04-03 PROCEDURE — G0422 INTENS CARDIAC REHAB W/EXERC: HCPCS | Performed by: CLINIC/CENTER

## 2017-04-03 ASSESSMENT — PAIN SCALES - GENERAL: PAINLEVEL: 2=MINIMAL-SLIGHT

## 2017-04-05 ENCOUNTER — NON-PROVIDER VISIT (OUTPATIENT)
Dept: CARDIOLOGY | Facility: MEDICAL CENTER | Age: 62
End: 2017-04-05
Payer: OTHER MISCELLANEOUS

## 2017-04-05 VITALS
HEIGHT: 71 IN | HEART RATE: 66 BPM | WEIGHT: 200 LBS | SYSTOLIC BLOOD PRESSURE: 123 MMHG | DIASTOLIC BLOOD PRESSURE: 73 MMHG | BODY MASS INDEX: 28 KG/M2

## 2017-04-05 DIAGNOSIS — Z95.2 S/P AVR (AORTIC VALVE REPLACEMENT): ICD-10-CM

## 2017-04-05 PROCEDURE — G0423 INTENS CARDIAC REHAB NO EXER: HCPCS | Mod: 59 | Performed by: CLINIC/CENTER

## 2017-04-05 PROCEDURE — G0422 INTENS CARDIAC REHAB W/EXERC: HCPCS | Performed by: CLINIC/CENTER

## 2017-04-05 ASSESSMENT — PAIN SCALES - GENERAL: PAINLEVEL: 2=MINIMAL-SLIGHT

## 2017-04-07 ENCOUNTER — NON-PROVIDER VISIT (OUTPATIENT)
Dept: CARDIOLOGY | Facility: MEDICAL CENTER | Age: 62
End: 2017-04-07
Payer: OTHER MISCELLANEOUS

## 2017-04-07 VITALS
BODY MASS INDEX: 27.79 KG/M2 | SYSTOLIC BLOOD PRESSURE: 134 MMHG | DIASTOLIC BLOOD PRESSURE: 79 MMHG | WEIGHT: 198.5 LBS | HEIGHT: 71 IN | HEART RATE: 69 BPM

## 2017-04-07 DIAGNOSIS — Z98.890 S/P AV NODAL ABLATION: Chronic | ICD-10-CM

## 2017-04-07 DIAGNOSIS — Z95.2 HEART VALVE REPLACED: ICD-10-CM

## 2017-04-07 DIAGNOSIS — Z95.2 S/P AVR (AORTIC VALVE REPLACEMENT): ICD-10-CM

## 2017-04-07 DIAGNOSIS — I25.10 CORONARY ARTERY DISEASE INVOLVING NATIVE CORONARY ARTERY OF NATIVE HEART WITHOUT ANGINA PECTORIS: ICD-10-CM

## 2017-04-07 PROCEDURE — G0423 INTENS CARDIAC REHAB NO EXER: HCPCS | Mod: 59 | Performed by: INTERNAL MEDICINE

## 2017-04-07 PROCEDURE — G0422 INTENS CARDIAC REHAB W/EXERC: HCPCS | Performed by: CLINIC/CENTER

## 2017-04-07 ASSESSMENT — PAIN SCALES - GENERAL: PAINLEVEL: 2=MINIMAL-SLIGHT

## 2017-04-10 ENCOUNTER — NON-PROVIDER VISIT (OUTPATIENT)
Dept: CARDIOLOGY | Facility: MEDICAL CENTER | Age: 62
End: 2017-04-10
Payer: OTHER MISCELLANEOUS

## 2017-04-10 ENCOUNTER — ANTICOAGULATION VISIT (OUTPATIENT)
Dept: MEDICAL GROUP | Facility: MEDICAL CENTER | Age: 62
End: 2017-04-10
Payer: OTHER MISCELLANEOUS

## 2017-04-10 VITALS
SYSTOLIC BLOOD PRESSURE: 132 MMHG | WEIGHT: 199.5 LBS | HEART RATE: 65 BPM | HEIGHT: 71 IN | DIASTOLIC BLOOD PRESSURE: 82 MMHG | BODY MASS INDEX: 27.93 KG/M2

## 2017-04-10 VITALS — DIASTOLIC BLOOD PRESSURE: 82 MMHG | HEART RATE: 72 BPM | SYSTOLIC BLOOD PRESSURE: 125 MMHG

## 2017-04-10 DIAGNOSIS — Z95.2 HEART VALVE REPLACED: ICD-10-CM

## 2017-04-10 DIAGNOSIS — Z79.01 LONG TERM (CURRENT) USE OF ANTICOAGULANTS: ICD-10-CM

## 2017-04-10 DIAGNOSIS — Z95.2 S/P AVR (AORTIC VALVE REPLACEMENT): ICD-10-CM

## 2017-04-10 LAB — INR PPP: 4.8 (ref 2–3.5)

## 2017-04-10 PROCEDURE — G0422 INTENS CARDIAC REHAB W/EXERC: HCPCS | Performed by: CLINIC/CENTER

## 2017-04-10 PROCEDURE — G0423 INTENS CARDIAC REHAB NO EXER: HCPCS | Mod: 59 | Performed by: CLINIC/CENTER

## 2017-04-10 PROCEDURE — 85610 PROTHROMBIN TIME: CPT | Performed by: PHYSICIAN ASSISTANT

## 2017-04-10 ASSESSMENT — PAIN SCALES - GENERAL: PAINLEVEL: 2=MINIMAL-SLIGHT

## 2017-04-10 NOTE — PROGRESS NOTES
Alberto Fam attended the following workshop:  Fueling a healthy body    Workshop included education on:  Fueling a healthy body - low vs high calorie dense foods, behavioral aspects of eating,  and coping skills    Lecture was attended and patient questions addressed. The patient will continue workshops and nutrition education.   I will follow-up with patient at one-on-one visit.

## 2017-04-10 NOTE — MR AVS SNAPSHOT
Alberto Fam   4/10/2017 3:00 PM   Anticoagulation Visit   MRN: [id number]    Department:  [facility]   Dept Phone:  [phone]    Description:  Male : 1955   Provider:  [facility] PHARMACIST           Allergies as of 4/10/2017     Allergen Noted Reactions    Sulfa Drugs 2010   Rash, Vomiting    RXN=20 years ago      You were diagnosed with     Long term (current) use of anticoagulants   [V58.61.ICD-9-CM]       Heart valve replaced   [878603]         Vital Signs     Blood Pressure Pulse Smoking Status             125/82 mmHg 72 Never Smoker          Basic Information     Date Of Birth Sex Race Ethnicity Preferred Language    1955 Male White Non- English      Your appointments     Apr 10, 2017  3:30 PM   GROUP EDUCATION with ICR EDUCATION    [facility] ([facility])    [address]  [address]  [facility] [address]   [phone]            Apr 10, 2017  4:30 PM   EXERCISE with ICR RN   [facility] ([facility])    [address]  [address]  [place] [address]   [phone]            2017  3:30 PM   GROUP EDUCATION with ICR EDUCATION    [facility] ([facility])    [address]  [address]  [place] [address]   [phone]            2017  4:30 PM   EXERCISE with ICR RN   [facility] ([facility])    [address]  [address]  [facility] [address]   [phone]            2017  3:30 PM   GROUP EDUCATION with ICR EDUCATION    [facility] ([facility])    [address]  [address]  [place] [address]   [phone]            2017  4:30 PM   EXERCISE with ICR RN   [facility] ([facility])    [address]  [address]  [place] [address]   [phone]            2017  3:00 PM   Anti-Coag Routine with [facility] ALEX PHARMACIST   [facility]  ([facility])    [address]  [place] [address]   [phone]            Apr 17, 2017  3:30 PM   GROUP EDUCATION with ICR EDUCATION RM   [facility] ([facility])    [address]  [address]  [place] [address]   [phone]            Apr 17, 2017  4:30 PM   EXERCISE with ICR RN   [facility] ([facility])    [address]  [address]  [place] [address]   [phone]            Apr 19, 2017  3:30 PM   GROUP EDUCATION with ICR EDUCATION RM   [facility] ([facility])    [address]  [place] [address]   [phone]            Apr 19, 2017  4:30 PM   EXERCISE with ICR RN   [facility] ([facility])    [address]  [address]  [place] [address]   [phone]            Apr 21, 2017  3:30 PM   GROUP EDUCATION with ICR EDUCATION RM   [facility] ([facility])    [address]  [place] [address]   [phone]            Apr 21, 2017  4:30 PM   EXERCISE with ICR RN   [facility] ([facility])    [address]  [place] [address]   [phone]            Apr 24, 2017  3:30 PM   GROUP EDUCATION with ICR EDUCATION RM   [facility] ([facility])    [address]  [place] [address]   [phone]            Apr 24, 2017  4:30 PM   EXERCISE with ICR RN   [facility] ([facility])    [address]  [place] [address]   [phone]            Apr 26, 2017  3:30 PM   GROUP EDUCATION with ICR EDUCATION RM   [facility] ([facility])    [address]  [place] [address]   [phone]            Apr 26, 2017  4:30 PM   EXERCISE with ICR RN   [facility] ([facility])    [address]  [address]  [place] [address]   [phone]            Apr 28, 2017  3:30 PM   GROUP EDUCATION  with ICR EDUCATION RM   [facility] ([facility])    [address]  [place] [address]   [phone]            Apr 28, 2017  4:30 PM   EXERCISE with ICR RN   [facility] ([facility])    [address]  [place] [address]   [phone]            May 01, 2017  3:30 PM   GROUP EDUCATION with ICR EDUCATION RM   [facility] ([facility])    [address]  [facility] [address]   [phone]            May 01, 2017  4:30 PM   EXERCISE with ICR RN   [facility] ([facility])    [address]  [facility] [address]   [phone]            May 03, 2017  3:30 PM   GROUP EDUCATION with ICR EDUCATION RM   [facility] ([facility])    [address]  [facility] [address]   [phone]            May 03, 2017  4:30 PM   EXERCISE with ICR RN   [facility] ([facility])    [address]  [facility] [address]   [phone]            May 05, 2017  3:30 PM   GROUP EDUCATION with ICR EDUCATION RM   [facility] ([facility])    [address]  [facility] [address]   [phone]            May 05, 2017  4:30 PM   EXERCISE with ICR RN   [facility] ([facility])    [address]  [facility] [address]   [phone]            May 08, 2017  3:30 PM   GROUP EDUCATION with ICR EDUCATION RM   [facility] ([facility])    [address]  [facility] [address]   [phone]            May 08, 2017  4:30 PM   EXERCISE with ICR RN   [facility] ([facility])    [address]  [facility] [address]   [phone]            May 10, 2017  3:30 PM   GROUP EDUCATION with ICR EDUCATION RM   [facility] ([facility])    [address]  [facility] [address]      [phone]            May 10, 2017  4:30 PM   EXERCISE with ICR RN   [facility] ([facility])    [address]  [place] [address]   [phone]            May 12, 2017  3:30 PM   GROUP EDUCATION with ICR EDUCATION RM   [facility] ([facility])    [address]  [place] [address]   [phone]            May 12, 2017  4:30 PM   EXERCISE with ICR RN   [facility] ([facility])    [address]  [place] [address]   [phone]            May 15, 2017  3:30 PM   GROUP EDUCATION with ICR EDUCATION RM   [facility] ([facility])    [address]  [place] [address]   [phone]            May 15, 2017  4:30 PM   EXERCISE with ICR RN   [facility] ([facility])    [address]  [place] [address]   [phone]            May 17, 2017  3:30 PM   GROUP EDUCATION with ICR EDUCATION RM   [facility] ([facility])    [address]  [place] [address]   [phone]            May 17, 2017  4:30 PM   EXERCISE with ICR RN   [facility] ([facility])    [address]  [place] [address]   [phone]            May 19, 2017  3:30 PM   GROUP EDUCATION with ICR EDUCATION RM   [facility] ([facility])    [address]  [place] [address]   [phone]            May 19, 2017  4:30 PM   EXERCISE with ICR RN   [facility] ([facility])    [address]  [place] [address]   [phone]            May 30, 2017  8:15 AM   ECHO with ECHO [facility]   ECHOCARDIOLOGY [facility])    [address]  [place] [address]   [phone]           No prep            May 30, 2017  9:40 AM   PACER CHECK ONLY with KEIKO Rizvi   [facility] (--)     [address]  [place] [address]   [phone]              Problem List              ICD-10-CM Priority Class Noted - Resolved    Hypertension I10 High  Unknown - Present    S/P AV servando ablation (Chronic) Z98.890 High  3/21/2012 - Present    Dilated cardiomyopathy (CMS-[facility]) (Chronic) I42.0 High  3/21/2012 - Present    Male erectile disorder (Chronic) N52.9 Medium  3/21/2012 - Present    Ventricular tachycardia (CMS-[facility]) (Chronic) I47.2 High  3/21/2012 - Present    Atypical chest pain (Chronic) R07.89 Medium  3/21/2012 - Present    AV block, 3rd degree (CMS-[facility]) (Chronic) I44.2 High  3/21/2012 - Present    SSS (sick sinus syndrome) (CMS-HCC) (Chronic) I49.5 High  9/28/2012 - Present    Presence of biventricular AICD Z95.810 High  6/6/2013 - Present    Arthritis M19.90 Low  8/20/2013 - Present    Chronic systolic congestive heart failure, NYHA class 3 (CMS-HCC) I50.22   10/13/2015 - Present    Homograft cardiac valve stenosis I38   12/16/2016 - Present    Severe aortic stenosis I35.0   1/13/2017 - Present    CAD (coronary artery disease) I25.10   1/13/2017 - Present    Heart valve replaced Z95.2   2/17/2017 - Present    Pericardial effusion I31.3   2/20/2017 - Present    Pleural effusion J90   2/20/2017 - Present    CHF exacerbation (CMS-HCC) I50.9   2/20/2017 - Present    Hemorrhagic disorder due to circulating anticoagulants (CMS-HCC) D68.318   2/21/2017 - Present    S/P AVR (aortic valve replacement) Z95.2   2/21/2017 - Present    Hypokalemia E87.6   2/21/2017 - Present    Long term (current) use of anticoagulants [Z79.01] Z79.01   3/27/2017 - Present      Health Maintenance        Date Due Completion Dates    IMM DTaP/Tdap/Td Vaccine (1 - Tdap) 4/5/1976 ---    COLONOSCOPY 4/5/2007 ---    IMM ZOSTER VACCINE 4/5/2017 ---            Results     POCT Protime      Component    INR    4.8    Comment:     ic valid [id number] 160 exp 02/2018                        Current Immunizations      Influenza TIV (IM) 10/30/2009    Influenza Vaccine Adult HD 11/2/2016    Pneumococcal polysaccharide vaccine (PPSV-23) 2/21/2017  5:32 AM      Below and/or attached are the medications your provider expects you to take. Review all of your home medications and newly ordered medications with your provider and/or pharmacist. Follow medication instructions as directed by your provider and/or pharmacist. Please keep your medication list with you and share with your provider. Update the information when medications are discontinued, doses are changed, or new medications (including over-the-counter products) are added; and carry medication information at all times in the event of emergency situations     Allergies:  SULFA DRUGS - Rash,Vomiting               Medications  Valid as of: April 10, 2017 -  3:07 PM    Generic Name Brand Name Tablet Size Instructions for use    Aspirin (Tablet Delayed Response) ECOTRIN 81 MG Take 81 mg by mouth every day.        Atorvastatin Calcium (Tab) LIPITOR 40 MG Take 1 Tab by mouth every bedtime.        Carvedilol (Tab) COREG 3.125 MG Take 1 Tab by mouth 2 times a day, with meals.        Docusate Calcium (Cap) SURFAK 240 MG Take 240 mg by mouth every day.        Eplerenone (Tab) INSPRA 25 MG Take 1 Tab by mouth every day.        Furosemide (Tab) LASIX 40 MG Take 40 mg by mouth every day.        Hydrocodone-Acetaminophen (Tab) NORCO 5-325 MG Take 1 Tab by mouth every 8 hours as needed. Indications: Moderate to Moderately Severe Pain        Multiple Vitamins-Minerals (Tab) THERAGRAN-M  Take 1 Tab by mouth every day.        Multiple Vitamins-Minerals   Spray 1 Tab in mouth/throat every day at 6 PM.        Potassium Chloride Shanell CR (Tab CR) Kdur 20 MEQ Take 20 mEq by mouth every day.        Ramipril (Cap) ALTACE 5 MG Take 1 Cap by mouth every day.        Valerian (Cap) Valerian Root 100 MG Spray 100 mg in mouth/throat every bedtime.        Warfarin Sodium (Tab) COUMADIN 5 MG Take 1.5 Tabs by  mouth COUMADIN-DAILY.        .                 Medicines prescribed today were sent to:     [facility] - [place], [place] - [address]    [address] [facility] [address]    Phone: [phone] Fax: [phone]    Open 24 Hours?: No      Medication refill instructions:       If your prescription bottle indicates you have medication refills left, it is not necessary to call your provider’s office. Please contact your pharmacy and they will refill your medication.    If your prescription bottle indicates you do not have any refills left, you may request refills at any time through one of the following ways: The online [employer] system (except Urgent Care), by calling your provider’s office, or by asking your pharmacy to contact your provider’s office with a refill request. Medication refills are processed only during regular business hours and may not be available until the next business day. Your provider may request additional information or to have a follow-up visit with you prior to refilling your medication.   *Please Note: Medication refills are assigned a new Rx number when refilled electronically. Your pharmacy may indicate that no refills were authorized even though a new prescription for the same medication is available at the pharmacy. Please request the medicine by name with the pharmacy before contacting your provider for a refill.        Warfarin Dosing Calendar   April 2017 Details    Sun Mon Tue Wed Thu Fri Sat           1                 2               3               4               5               6               7               8                 9               10   4.8   Hold   See details      11      Hold         12      5 mg         13      7.5 mg         14      5 mg         15      7.5 mg           16      5 mg         17      5 mg         18               19               20               21               22                 23               24               25                26               27               28               29                 30                      Date Details   04/10 This INR check   INR: 4.8   ic valid [id number] 160 exp 02/2018       Date of next INR:  4/17/2017         How to take your warfarin dose     To take:  5 mg Take 1 of the 5 mg tablets.    To take:  7.5 mg Take 1.5 of the 5 mg tablets.    Hold Do not take your warfarin dose. See the Details table to the right for additional instructions.                   [employer] Access Code: Activation code not generated  Current [employer] Status: Active

## 2017-04-10 NOTE — PROGRESS NOTES
Anticoagulation Summary as of 4/10/2017     INR goal 2.0-3.0   Selected INR 4.8! (4/10/2017)   Maintenance plan 7.5 mg (5 mg x 1.5) on Tue, Thu, Sat; 5 mg (5 mg x 1) all other days   Weekly total 42.5 mg   Plan last modified Ryder Salgado, PHARMD (4/10/2017)   Next INR check 4/17/2017   Target end date 5/8/2017    Indications   Heart valve replaced [Z95.2]  Long term (current) use of anticoagulants [Z79.01] [Z79.01]         Anticoagulation Episode Summary     INR check location     Preferred lab     Send INR reminders to     Comments       Anticoagulation Care Providers     Provider Role Specialty Phone number    Grayson Rios M.D. Referring Cardiology [phone]    Mora Kwon PHARMD Responsible      [facility] Responsible  [phone]        Anticoagulation Patient Findings      Alberto Mcclellannash seen in clinic today  INR  supra-therapeutic.    Denies signs/symptoms of bleeding and/or thrombosis.    Denies changes to diet or medications.   Follow up appointment in 1 week(s).    Hold two days then decrease weekly warfarin dose as noted    Ryder Salgado, PHARMD

## 2017-04-12 ENCOUNTER — NON-PROVIDER VISIT (OUTPATIENT)
Dept: CARDIOLOGY | Facility: MEDICAL CENTER | Age: 62
End: 2017-04-12
Payer: OTHER MISCELLANEOUS

## 2017-04-12 VITALS
DIASTOLIC BLOOD PRESSURE: 79 MMHG | BODY MASS INDEX: 27.72 KG/M2 | HEART RATE: 74 BPM | SYSTOLIC BLOOD PRESSURE: 117 MMHG | WEIGHT: 198 LBS | HEIGHT: 71 IN

## 2017-04-12 DIAGNOSIS — Z95.2 S/P AVR (AORTIC VALVE REPLACEMENT): ICD-10-CM

## 2017-04-12 PROCEDURE — G0423 INTENS CARDIAC REHAB NO EXER: HCPCS | Mod: 59 | Performed by: CLINIC/CENTER

## 2017-04-12 PROCEDURE — G0422 INTENS CARDIAC REHAB W/EXERC: HCPCS | Performed by: CLINIC/CENTER

## 2017-04-12 ASSESSMENT — PAIN SCALES - GENERAL: PAINLEVEL: NO PAIN

## 2017-04-14 ENCOUNTER — NON-PROVIDER VISIT (OUTPATIENT)
Dept: CARDIOLOGY | Facility: MEDICAL CENTER | Age: 62
End: 2017-04-14
Payer: COMMERCIAL

## 2017-04-14 VITALS
HEART RATE: 73 BPM | BODY MASS INDEX: 27.93 KG/M2 | HEIGHT: 71 IN | DIASTOLIC BLOOD PRESSURE: 77 MMHG | WEIGHT: 199.5 LBS | SYSTOLIC BLOOD PRESSURE: 125 MMHG

## 2017-04-14 DIAGNOSIS — Z95.2 S/P AVR (AORTIC VALVE REPLACEMENT): ICD-10-CM

## 2017-04-14 DIAGNOSIS — Z98.890 S/P AV NODAL ABLATION: Chronic | ICD-10-CM

## 2017-04-14 PROCEDURE — G0422 INTENS CARDIAC REHAB W/EXERC: HCPCS | Performed by: FAMILY MEDICINE

## 2017-04-14 PROCEDURE — G0423 INTENS CARDIAC REHAB NO EXER: HCPCS | Mod: 59 | Performed by: FAMILY MEDICINE

## 2017-04-14 ASSESSMENT — PAIN SCALES - GENERAL: PAINLEVEL: NO PAIN

## 2017-04-14 NOTE — PROGRESS NOTES
Cardiac Rehab Individual Treatment Plan Assessment: 30 day 2017 Session #     13   MRN: [id number]   Allergies: Sulfa drugs   Patient Name: Alberto Fam : 1955 Risk Stratification: High    Diagnoses:   1. S/P AV servando ablation    2. S/P AVR (aortic valve replacement)     Age: 62 y.o. Physician: Trevor Stephen D.O.    Date of Event: 17 Specialist: Jerrod   Risk Factors:  Hypertension, Hyperlipidemia, Age, Male > 45   Exercise Nutrition Education Psychosocial   Stages of change Stages of change Stages of change Stages of change   Preparation Preparation Preparation Preparation   Fitness Test Lipids Learning Barriers Outcome Survey Tools   DIST:  (na)  Available: Yes Learning Barriers: Vision FP QOL Overall Score:  (assessed pre and post program)   Max HR:  (na) Date: 17 Family Support PHQ-9:  (assessed pre and post program)   RPE:  (na) Total: 121 mg/dL Yes Nutrition Screen:  (assessed pre and post program)   SPO2:  (n a) Tri mg/dL Lives: Spouse Intervention   MET:  (na) HDL: 43 mg/dL Tobacco Use Behavioral Health Consult: No   EF= 20 (2017) LDL: 54 mg/dL History   Smoking status   • Never Smoker    Smokeless tobacco   • Never Used     Comment: quit 35 years ago       Physician Referral: No   Ambulatory Status Diabetes Smoking Intervention Identifies Stressors: Yes   Fall Risk Assessed: Yes Diabetes: No Smoking Cessation Referral: N/A Drug Intervention: No   Exercise Ambulatory Status Assist Devices: None HbA1C: 5.4 % Date: 17 Ind. Education / Counseling: N/A Education   Exercise Prescription Monitors BS at Home: No Tobacco Adjunct: N/A Psychosocial Education: Coping Techniques, Positive Support System, S/S Depression   Mode: Treadmill, NuStep, UBE, Airdyne   Frequency: na Random BS: 95 (2017) Education Intervention Target Goal   Frequency:  3 days/week Weight Management Healthy Heart Education: Class Schedule Given, Cooking School Attended, Dietician  "One-on-One Meeting Attended, Medications Reviewed, Patient Education Binder Provided, Risk Factors Discussed, Videos Viewed per Janice, Workshops Attended Assess presence or absence of depression using a valid screening: No (assessed pre and post program)   Duration:  20-30 Weight: 90.493 kg (199 lb 8 oz) Target Goal Use Stress Management: Yes   Intensity:  11-13 RPE Height: 180.3 cm (5' 10.98\") Complete Tobacco Cessation: Complete Tobacco Cessation: N/A Adverse Events: Yes   METS:  1.0-3.0 BMI (Calculated): 27.84 Educate / Review and have understanding of cardiac disease prevention: Educate / Review and have understanding of cardiac disease prevention: Yes Unexpected Events: No (no hospitalizations since last visit. )   Progression:  ^ increments of 1-3 to THR/RPE as tolerated Goal weight: 192 Medication Compliance: Yes    THR: THR: 20-30 BPM ^Resting HR History   Alcohol Use   • 1.2 oz/week   • 2 Standard drinks or equivalent per week     Comment: 1-2 drinks per day          Angina with Exercise?  Angina with Exercise: No      Resistance Training?  Resistance Training: Yes      Hypertension      Diagnosed with HTN: Yes Intervention      Resting BP: 127/77 mmHg Dietician Consult/Class: Yes      Peak Ex BP:  (na) Nurse/Patient Discussion: Yes     Intervention Diabetes Ed Referral: No     Home Exercise:  Yes Discuss Maintenance /Wt Loss: Yes     Mode: Walk Attend Cooking School: Yes     Duration: 30 minutes daily Dietary Goal: low fat 24%, low sodium     Frequency: 7 days active Education     Education Nutrition Education: Healthy Eating, Sodium Reduction     Equipment Orientation, Exercise Safety, S/S to Report, RPE Scale, Warm Up / Cool Down, Physically Active Target Goal     Target Goal LDL-C < 100 if trig. > 200:  N/A       Start Individual Exercise Rx Yes LDL-C < 70 for high risk patient:  Yes       BP < 140/90 or < 130/80 if DM or CKD Yes Non HDL-C Should be < 130:  Yes       Aerobic activity 30 + min / day 5 " days / wk Yes HbA1C < 7%: Yes         BMI < 25: Yes       Notes: Alberto states that he tries to take walks on the days that he is not here for about 20-30 minutes but it depends on the weather.  He says that he did get out and mow the lawn and he has not done that in over a year.  He says that he is eating more fruits and vegetables but is not willing to give up beef.  He says that he eats out at work quite a bit but is more concious of his choices and has cut down on salt and saturated fats.  He says that his bindu helps with stress management and that he overall doesn't feel very stressed.  He says that since starting the program his strength has increased and it really gives him the motivation to exercise.  He said he hadn't exercised for 2 years prior to being here.

## 2017-04-17 ENCOUNTER — NON-PROVIDER VISIT (OUTPATIENT)
Dept: CARDIOLOGY | Facility: MEDICAL CENTER | Age: 62
End: 2017-04-17
Payer: COMMERCIAL

## 2017-04-17 ENCOUNTER — ANTICOAGULATION VISIT (OUTPATIENT)
Dept: MEDICAL GROUP | Facility: MEDICAL CENTER | Age: 62
End: 2017-04-17
Payer: OTHER MISCELLANEOUS

## 2017-04-17 VITALS — DIASTOLIC BLOOD PRESSURE: 88 MMHG | SYSTOLIC BLOOD PRESSURE: 133 MMHG | HEART RATE: 79 BPM

## 2017-04-17 VITALS
HEART RATE: 74 BPM | DIASTOLIC BLOOD PRESSURE: 82 MMHG | WEIGHT: 199.5 LBS | BODY MASS INDEX: 27.93 KG/M2 | HEIGHT: 71 IN | SYSTOLIC BLOOD PRESSURE: 141 MMHG

## 2017-04-17 DIAGNOSIS — Z95.2 S/P AVR (AORTIC VALVE REPLACEMENT): ICD-10-CM

## 2017-04-17 DIAGNOSIS — Z95.2 HEART VALVE REPLACED: ICD-10-CM

## 2017-04-17 DIAGNOSIS — Z79.01 LONG TERM (CURRENT) USE OF ANTICOAGULANTS: ICD-10-CM

## 2017-04-17 DIAGNOSIS — Z98.890 S/P AV NODAL ABLATION: Chronic | ICD-10-CM

## 2017-04-17 LAB — INR PPP: 2.6 (ref 2–3.5)

## 2017-04-17 PROCEDURE — G0423 INTENS CARDIAC REHAB NO EXER: HCPCS | Mod: 59 | Performed by: FAMILY MEDICINE

## 2017-04-17 PROCEDURE — G0422 INTENS CARDIAC REHAB W/EXERC: HCPCS | Performed by: FAMILY MEDICINE

## 2017-04-17 PROCEDURE — 85610 PROTHROMBIN TIME: CPT | Performed by: FAMILY MEDICINE

## 2017-04-17 ASSESSMENT — PAIN SCALES - GENERAL: PAINLEVEL: NO PAIN

## 2017-04-17 NOTE — PROGRESS NOTES
Anticoagulation Summary as of 4/17/2017     INR goal 2.0-3.0   Selected INR 2.6 (4/17/2017)   Maintenance plan 2.5 mg (5 mg x 0.5) on Wed, Fri; 5 mg (5 mg x 1) all other days   Weekly total 30 mg   Plan last modified Ryder Salgado PHARMD (4/17/2017)   Next INR check    Target end date 5/8/2017    Indications   Heart valve replaced [Z95.2]  Long term (current) use of anticoagulants [Z79.01] [Z79.01]         Anticoagulation Episode Summary     INR check location     Preferred lab     Discontinue date 5/8/2017    Discontinue reason Therapy Completed    Send INR reminders to     Comments       Anticoagulation Care Providers     Provider Role Specialty Phone number    Grayson Rios M.D. Referring Cardiology [phone]    GINGER CollinsD Responsible      [facility] Responsible  [phone]        Anticoagulation Patient Findings      Alberto Fam seen in clinic today  INR  therapeutic.  He will be off warfarin on 5/8/2017, will decrease the dose to match the last 7 days.   He did not want to F/u and will stop warfarin around the 8th per cardiology and take ASA once daily.   Denies signs/symptoms of bleeding and/or thrombosis.    Denies changes to diet or medications.     Will d/c from clinic       Ryder Salgado, GINGERD

## 2017-04-17 NOTE — PROGRESS NOTES
Date: 4/17/2017    Initial Individual Treatment Plan review for the [facility] Intensive Cardiac Rehabilitation (ICR) Program.    Alberto Fam, 62 y.o. male, with qualifying diagnosis/diagnoses of S/P Heart Valve Replacement  Co-morbid conditions include Hypertension, Dyslipidemia.    Primary Care Provider: Trevor Stephen D.O.    Heart Specialist (s): Dr. Elder    Patient has successfully completed 1 Exercise Sessions, and an appropriate number of corresponding Education Sessions.  Patient is (continues to be) an excellent candidate for the [facility] Intensive Cardiac Rehabilitation (ICR) Program.    I will review the Individual Treatment Plan again at 30 day post-initiation of ICR.    Rashid Edwards MD, [facility]  , [facility] Intensive Cardiac Rehabilitation (ICR) Program

## 2017-04-17 NOTE — MR AVS SNAPSHOT
Alberto Fam   2017 3:00 PM   Anticoagulation Visit   MRN: [id number]    Department:  [facility]   Dept Phone:  [phone]    Description:  Male : 1955   Provider:  [facility]           Allergies as of 2017     Allergen Noted Reactions    Sulfa Drugs 2010   Rash, Vomiting    RXN=20 years ago      You were diagnosed with     Long term (current) use of anticoagulants   [V58.61.ICD-9-CM]       Heart valve replaced   [143448]         Vital Signs     Blood Pressure Pulse Smoking Status             133/88 mmHg 79 Never Smoker          Basic Information     Date Of Birth Sex Race Ethnicity Preferred Language    1955 Male White Non- English      Your appointments     2017  3:30 PM   GROUP EDUCATION with ICR EDUCATION RM   [facility] ([facility])    [address]  [address]  [place] [address]   [phone]            2017  4:30 PM   EXERCISE with ICR RN   [facility] ([facility])    [address]  [address]  [place] [address]   [phone]            2017  3:30 PM   GROUP EDUCATION with ICR EDUCATION RM   [facility] ([facility])    [address]  [address]  [place] [address]   [phone]            2017  4:30 PM   EXERCISE with ICR RN   [facility] ([facility])    [address]  [address]  [place] [address]   [phone]            2017  3:30 PM   GROUP EDUCATION with ICR EDUCATION RM   [facility] ([facility])    [address]  [address]  [place] [address]   [phone]            2017  4:30 PM   EXERCISE with ICR RN   [facility] ([facility])    [address]  [address]  [place] [address]   [phone]            2017  3:30 PM   GROUP EDUCATION with ICR EDUCATION    [facility] ([facility])    [address]  [place] [address]   [phone]            Apr 24, 2017  4:30 PM   EXERCISE with ICR RN   [facility] ([facility])    [address]  [address]  [place] [address]   [phone]            Apr 26, 2017  3:30 PM   ONE ON ONE with ICR RD   [facility] ([facility])    [address]  [address]  [facility] [address]   [phone]            Apr 26, 2017  4:30 PM   EXERCISE with ICR RN   [facility] ([facility])    [address]  [address] [place] [address]   [phone]            Apr 28, 2017  3:30 PM   GROUP EDUCATION with ICR EDUCATION RM   [facility] ([facility])    [address]  [address] [place] [address]   [phone]            Apr 28, 2017  4:30 PM   EXERCISE with ICR RN   [facility] ([facility])    [address]  [address] [place] [address]   [phone]            May 01, 2017  3:30 PM   GROUP EDUCATION with ICR EDUCATION RM   [facility] ([facility])    [address]  [address] [place] [address]   [phone]            May 01, 2017  4:30 PM   EXERCISE with ICR RN   [facility] ([facility])    [address]  [address] [place] [address]   [phone]            May 03, 2017  3:30 PM   GROUP EDUCATION with ICR EDUCATION RM   [facility] ([facility])    [address]  [address] [place] [address]   [phone]            May 03, 2017  4:30 PM   EXERCISE with ICR RN   [facility] ([facility])    [address]  [address]  [facility] [address]   [phone]            May 05, 2017  3:30 PM   GROUP EDUCATION with ICR EDUCATION RM   [facility] ([facility])    [address]  [address] [place] [address]   [phone]            May 05, 2017  4:30 PM   EXERCISE with ICR RN   [facility]  ([facility])    [address]  [place] [address]   [phone]            May 08, 2017  3:30 PM   GROUP EDUCATION with ICR EDUCATION RM   [facility] ([facility])    [address]  [place] [address]   [phone]            May 08, 2017  4:30 PM   EXERCISE with ICR RN   [facility] ([facility])    [address]  [address]  [facility] [address]   [phone]            May 10, 2017  3:30 PM   GROUP EDUCATION with ICR EDUCATION RM   [facility] ([facility])    [address]  [address]  [facility] [address]   [phone]            May 10, 2017  4:30 PM   EXERCISE with ICR RN   [facility] ([facility])    [address]  [address]  [facility] [address]   [phone]            May 12, 2017  3:30 PM   GROUP EDUCATION with ICR EDUCATION RM   [facility] ([facility])    [address]  [address]  [facility] [address]   [phone]            May 12, 2017  4:30 PM   EXERCISE with ICR RN   [facility] ([facility])    [address]  [address]  [facility] [address]   [phone]            May 15, 2017  3:30 PM   GROUP EDUCATION with ICR EDUCATION RM   [facility] ([facility])    [address]  [facility] [address]   [phone]            May 15, 2017  4:30 PM   EXERCISE with ICR RN   [facility] ([facility])    [address]  [place] [address]   [phone]            May 17, 2017  3:30 PM   GROUP EDUCATION with ICR EDUCATION RM   [facility] ([facility])    [address]  [address]  [facility] [address]   [phone]            May 17, 2017  4:30 PM   EXERCISE with ICR RN   [facility] ([facility])    [address]  [address]  [place] [address]   [phone]            May 19, 2017  3:30 PM   GROUP  EDUCATION with [facility] EDUCATION RM   [facility] ([facility])    [address]  [address]  [facility] [address]   [phone]            May 19, 2017  4:30 PM   EXERCISE with ICR RN   [facility] ([facility])    [address]  [address]  [facility] [address]   [phone]            May 30, 2017  8:15 AM   ECHO with ECHO [facility]   ECHOCARDIOLOGY [facility] ([facility])    [address]  [place] [address]   [phone]           No prep            May 30, 2017  9:40 AM   PACER CHECK ONLY with KEIKO Rizvi   [facility]    [address]  [address]  [facility] [address] [phone]              Problem List              ICD-10-CM Priority Class Noted - Resolved    Hypertension I10 High  Unknown - Present    S/P AV servando ablation (Chronic) Z98.890 High  3/21/2012 - Present    Dilated cardiomyopathy (CMS-HCC) (Chronic) I42.0 High  3/21/2012 - Present    Male erectile disorder (Chronic) N52.9 Medium  3/21/2012 - Present    Ventricular tachycardia (CMS-[facility]) (Chronic) I47.2 High  3/21/2012 - Present    Atypical chest pain (Chronic) R07.89 Medium  3/21/2012 - Present    AV block, 3rd degree (CMS-[facility]) (Chronic) I44.2 High  3/21/2012 - Present    SSS (sick sinus syndrome) (CMS-[facility]) (Chronic) I49.5 High  9/28/2012 - Present    Presence of biventricular AICD Z95.810 High  6/6/2013 - Present    Arthritis M19.90 Low  8/20/2013 - Present    Chronic systolic congestive heart failure, NYHA class 3 (CMS-[facility]) I50.22   10/13/2015 - Present    Homograft cardiac valve stenosis I38   12/16/2016 - Present    Severe aortic stenosis I35.0   1/13/2017 - Present    CAD (coronary artery disease) I25.10   1/13/2017 - Present    Heart valve replaced Z95.2   2/17/2017 - Present    Pericardial effusion I31.3   2/20/2017 - Present    Pleural effusion J90   2/20/2017 - Present    CHF exacerbation (CMS-HCC) I50.9   2/20/2017 -  Present    Hemorrhagic disorder due to circulating anticoagulants (CMS-HCC) D68.318   2/21/2017 - Present    S/P AVR (aortic valve replacement) Z95.2   2/21/2017 - Present    Hypokalemia E87.6   2/21/2017 - Present    Long term (current) use of anticoagulants [Z79.01] Z79.01   3/27/2017 - Present      Health Maintenance        Date Due Completion Dates    IMM DTaP/Tdap/Td Vaccine (1 - Tdap) 4/5/1976 ---    COLONOSCOPY 4/5/2007 ---    IMM ZOSTER VACCINE 4/5/2017 ---            Results     POCT Protime      Component    INR    2.6    Comment:     ic valid [id number] 160 exp 02/2018                        Current Immunizations     Influenza TIV (IM) 10/30/2009    Influenza Vaccine Adult HD 11/2/2016    Pneumococcal polysaccharide vaccine (PPSV-23) 2/21/2017  5:32 AM      Below and/or attached are the medications your provider expects you to take. Review all of your home medications and newly ordered medications with your provider and/or pharmacist. Follow medication instructions as directed by your provider and/or pharmacist. Please keep your medication list with you and share with your provider. Update the information when medications are discontinued, doses are changed, or new medications (including over-the-counter products) are added; and carry medication information at all times in the event of emergency situations     Allergies:  SULFA DRUGS - Rash,Vomiting               Medications  Valid as of: April 17, 2017 -  3:07 PM    Generic Name Brand Name Tablet Size Instructions for use    Aspirin (Tablet Delayed Response) ECOTRIN 81 MG Take 81 mg by mouth every day.        Atorvastatin Calcium (Tab) LIPITOR 40 MG Take 1 Tab by mouth every bedtime.        Carvedilol (Tab) COREG 3.125 MG Take 1 Tab by mouth 2 times a day, with meals.        Docusate Calcium (Cap) SURFAK 240 MG Take 240 mg by mouth every day.        Eplerenone (Tab) INSPRA 25 MG Take 1 Tab by mouth every day.        Furosemide (Tab) LASIX 40 MG Take 40 mg  by mouth every day.        Hydrocodone-Acetaminophen (Tab) NORCO 5-325 MG Take 1 Tab by mouth every 8 hours as needed. Indications: Moderate to Moderately Severe Pain        Multiple Vitamins-Minerals (Tab) THERAGRAN-M  Take 1 Tab by mouth every day.        Multiple Vitamins-Minerals   Spray 1 Tab in mouth/throat every day at 6 PM.        Potassium Chloride Shanell CR (Tab CR) Kdur 20 MEQ Take 20 mEq by mouth every day.        Ramipril (Cap) ALTACE 5 MG Take 1 Cap by mouth every day.        Valerian (Cap) Valerian Root 100 MG Spray 100 mg in mouth/throat every bedtime.        Warfarin Sodium (Tab) COUMADIN 5 MG Take 1.5 Tabs by mouth COUMADIN-DAILY.        .                 Medicines prescribed today were sent to:     [facility] - [facility] [address] [place], [place] [address]    [address] [facility] [address] [facility] [address]    Phone: [phone] Fax: [phone]    Open 24 Hours?: No      Medication refill instructions:       If your prescription bottle indicates you have medication refills left, it is not necessary to call your provider’s office. Please contact your pharmacy and they will refill your medication.    If your prescription bottle indicates you do not have any refills left, you may request refills at any time through one of the following ways: The online [employer] system (except Urgent Care), by calling your provider’s office, or by asking your pharmacy to contact your provider’s office with a refill request. Medication refills are processed only during regular business hours and may not be available until the next business day. Your provider may request additional information or to have a follow-up visit with you prior to refilling your medication.   *Please Note: Medication refills are assigned a new Rx number when refilled electronically. Your pharmacy may indicate that no refills were authorized even though a new prescription for the same medication is available at the pharmacy. Please request the medicine by  name with the pharmacy before contacting your provider for a refill.        Warfarin Dosing Calendar   April 2017 Details    Sun Mon Tue Wed Thu Fri Sat           1                 2               3               4               5               6               7               8                 9               10               11               12               13               14               15                 16               17   2.6   5 mg   See details      18      5 mg         19      2.5 mg         20      5 mg         21      2.5 mg         22      5 mg           23      5 mg         24      5 mg         25      5 mg         26      2.5 mg         27      5 mg         28      2.5 mg         29      5 mg           30      5 mg                Date Details   04/17 This INR check   INR: 2.6   ic valid [id number] 160 exp 02/2018               How to take your warfarin dose     To take:  2.5 mg Take 0.5 of a 5 mg tablet.    To take:  5 mg Take 1 of the 5 mg tablets.           Warfarin Dosing Calendar   May 2017 Details    Sun Mon Tue Wed Thu Fri Sat      1      5 mg         2      5 mg         3      2.5 mg         4      5 mg         5      2.5 mg         6      5 mg           7      5 mg         8               9               10               11               12               13                 14               15               16               17               18               19               20                 21               22               23               24               25               26               27                 28               29               30               31                   Date Details   No additional details   Therapy discontinued on  5/8/2017         How to take your warfarin dose     To take:  2.5 mg Take 0.5 of a 5 mg tablet.    To take:  5 mg Take 1 of the 5 mg tablets.              [employer] Access Code: Activation code not generated  Current [employer] Status: Active

## 2017-04-17 NOTE — PROGRESS NOTES
Date: 4/17/2017    30 day Individual Treatment Plan review for the [facility] Intensive Cardiac Rehabilitation (ICR) Program.    Alberto Fam, 62 y.o. male, with qualifying diagnosis/diagnoses of S/P Heart Valve Replacement  Co-morbid conditions include Hypertension, Dyslipidemia.    Primary Care Provider: Trevor Stephen D.O.    Heart Specialist (s): Dr. Elder    Patient has successfully completed 13 Exercise Sessions, and an appropriate number of corresponding Education Sessions.  Patient is (continues to be) an excellent candidate for the [facility] Intensive Cardiac Rehabilitation (ICR) Program.    I will review the Individual Treatment Plan again at 60 day post-initiation of ICR.    Rashid Edwards MD, FAAFP  , [facility] Intensive Cardiac Rehabilitation (ICR) Program

## 2017-04-19 ENCOUNTER — NON-PROVIDER VISIT (OUTPATIENT)
Dept: CARDIOLOGY | Facility: MEDICAL CENTER | Age: 62
End: 2017-04-19
Payer: OTHER MISCELLANEOUS

## 2017-04-19 ENCOUNTER — NON-PROVIDER VISIT (OUTPATIENT)
Dept: CARDIOLOGY | Facility: MEDICAL CENTER | Age: 62
End: 2017-04-19
Payer: COMMERCIAL

## 2017-04-19 VITALS
HEART RATE: 67 BPM | HEIGHT: 71 IN | BODY MASS INDEX: 27.86 KG/M2 | DIASTOLIC BLOOD PRESSURE: 87 MMHG | WEIGHT: 199 LBS | SYSTOLIC BLOOD PRESSURE: 126 MMHG

## 2017-04-19 DIAGNOSIS — Z95.2 HEART VALVE REPLACED: ICD-10-CM

## 2017-04-19 DIAGNOSIS — Z95.2 S/P AVR (AORTIC VALVE REPLACEMENT): ICD-10-CM

## 2017-04-19 DIAGNOSIS — D68.318 HEMORRHAGIC DISORDER DUE TO CIRCULATING ANTICOAGULANTS (HCC): ICD-10-CM

## 2017-04-19 DIAGNOSIS — Z98.890 S/P AV NODAL ABLATION: Chronic | ICD-10-CM

## 2017-04-19 PROCEDURE — G0422 INTENS CARDIAC REHAB W/EXERC: HCPCS | Performed by: FAMILY MEDICINE

## 2017-04-19 PROCEDURE — G0423 INTENS CARDIAC REHAB NO EXER: HCPCS | Mod: 59 | Performed by: CLINIC/CENTER

## 2017-04-19 ASSESSMENT — PAIN SCALES - GENERAL: PAINLEVEL: 2=MINIMAL-SLIGHT

## 2017-04-21 ENCOUNTER — NON-PROVIDER VISIT (OUTPATIENT)
Dept: CARDIOLOGY | Facility: MEDICAL CENTER | Age: 62
End: 2017-04-21
Payer: OTHER MISCELLANEOUS

## 2017-04-21 DIAGNOSIS — Z95.2 S/P AVR (AORTIC VALVE REPLACEMENT): ICD-10-CM

## 2017-04-21 PROCEDURE — G0423 INTENS CARDIAC REHAB NO EXER: HCPCS | Mod: 59 | Performed by: CLINIC/CENTER

## 2017-04-21 NOTE — PROGRESS NOTES
Alberto Fam attending cooking school.  Today  prepared tomato soup and cucumber salad.    Patient received handouts and nutrition information regarding the specific recipes.

## 2017-04-24 ENCOUNTER — NON-PROVIDER VISIT (OUTPATIENT)
Dept: CARDIOLOGY | Facility: MEDICAL CENTER | Age: 62
End: 2017-04-24
Payer: COMMERCIAL

## 2017-04-24 VITALS
BODY MASS INDEX: 27.51 KG/M2 | SYSTOLIC BLOOD PRESSURE: 127 MMHG | WEIGHT: 196.5 LBS | HEART RATE: 63 BPM | HEIGHT: 71 IN | DIASTOLIC BLOOD PRESSURE: 84 MMHG

## 2017-04-24 DIAGNOSIS — Z95.2 S/P AVR (AORTIC VALVE REPLACEMENT): ICD-10-CM

## 2017-04-24 PROCEDURE — G0423 INTENS CARDIAC REHAB NO EXER: HCPCS | Mod: 59 | Performed by: FAMILY MEDICINE

## 2017-04-24 PROCEDURE — G0422 INTENS CARDIAC REHAB W/EXERC: HCPCS | Performed by: FAMILY MEDICINE

## 2017-04-24 ASSESSMENT — PAIN SCALES - GENERAL: PAINLEVEL: NO PAIN

## 2017-04-26 ENCOUNTER — NON-PROVIDER VISIT (OUTPATIENT)
Dept: CARDIOLOGY | Facility: MEDICAL CENTER | Age: 62
End: 2017-04-26
Payer: COMMERCIAL

## 2017-04-26 VITALS
DIASTOLIC BLOOD PRESSURE: 74 MMHG | HEIGHT: 71 IN | HEART RATE: 70 BPM | SYSTOLIC BLOOD PRESSURE: 115 MMHG | WEIGHT: 198 LBS | BODY MASS INDEX: 27.72 KG/M2

## 2017-04-26 DIAGNOSIS — Z95.2 S/P AVR (AORTIC VALVE REPLACEMENT): ICD-10-CM

## 2017-04-26 DIAGNOSIS — Z95.2 HEART VALVE REPLACED: ICD-10-CM

## 2017-04-26 PROCEDURE — G0423 INTENS CARDIAC REHAB NO EXER: HCPCS | Mod: 59 | Performed by: FAMILY MEDICINE

## 2017-04-26 PROCEDURE — G0422 INTENS CARDIAC REHAB W/EXERC: HCPCS | Performed by: FAMILY MEDICINE

## 2017-04-26 ASSESSMENT — PAIN SCALES - GENERAL: PAINLEVEL: NO PAIN

## 2017-04-28 ENCOUNTER — NON-PROVIDER VISIT (OUTPATIENT)
Dept: CARDIOLOGY | Facility: MEDICAL CENTER | Age: 62
End: 2017-04-28
Payer: COMMERCIAL

## 2017-04-28 VITALS
WEIGHT: 197 LBS | DIASTOLIC BLOOD PRESSURE: 78 MMHG | BODY MASS INDEX: 27.58 KG/M2 | HEART RATE: 72 BPM | HEIGHT: 71 IN | SYSTOLIC BLOOD PRESSURE: 133 MMHG

## 2017-04-28 DIAGNOSIS — Z98.890 S/P AV NODAL ABLATION: Chronic | ICD-10-CM

## 2017-04-28 DIAGNOSIS — Z95.2 HEART VALVE REPLACED: ICD-10-CM

## 2017-04-28 DIAGNOSIS — T82.857A: ICD-10-CM

## 2017-04-28 DIAGNOSIS — Z95.2 S/P AVR (AORTIC VALVE REPLACEMENT): ICD-10-CM

## 2017-04-28 DIAGNOSIS — I25.10 CORONARY ARTERY DISEASE INVOLVING NATIVE CORONARY ARTERY OF NATIVE HEART WITHOUT ANGINA PECTORIS: ICD-10-CM

## 2017-04-28 PROCEDURE — G0423 INTENS CARDIAC REHAB NO EXER: HCPCS | Mod: 59 | Performed by: FAMILY MEDICINE

## 2017-04-28 PROCEDURE — G0422 INTENS CARDIAC REHAB W/EXERC: HCPCS | Performed by: FAMILY MEDICINE

## 2017-04-28 ASSESSMENT — PAIN SCALES - GENERAL: PAINLEVEL: NO PAIN

## 2017-04-28 NOTE — PROGRESS NOTES
Alberto attending cooking school.  Today  prepared garbanzo beansoup.    Patient received handouts and nutrition information regarding the specific recipes.

## 2017-05-01 ENCOUNTER — NON-PROVIDER VISIT (OUTPATIENT)
Dept: CARDIOLOGY | Facility: MEDICAL CENTER | Age: 62
End: 2017-05-01
Payer: COMMERCIAL

## 2017-05-01 VITALS
HEART RATE: 63 BPM | HEIGHT: 71 IN | SYSTOLIC BLOOD PRESSURE: 131 MMHG | DIASTOLIC BLOOD PRESSURE: 77 MMHG | BODY MASS INDEX: 27.58 KG/M2 | WEIGHT: 197 LBS

## 2017-05-01 DIAGNOSIS — Z95.2 S/P AVR (AORTIC VALVE REPLACEMENT): ICD-10-CM

## 2017-05-01 PROCEDURE — G0423 INTENS CARDIAC REHAB NO EXER: HCPCS | Mod: 59 | Performed by: FAMILY MEDICINE

## 2017-05-01 PROCEDURE — G0422 INTENS CARDIAC REHAB W/EXERC: HCPCS | Performed by: FAMILY MEDICINE

## 2017-05-01 ASSESSMENT — PAIN SCALES - GENERAL: PAINLEVEL: NO PAIN

## 2017-05-01 NOTE — PROGRESS NOTES
Alberto Fam attended the following workshop:  Label reading    Workshop included education on:  Label reading - identifying sodium, calories from fat, identifying whole grain sources and sugar in the first 3-5 ingredients    Lecture was attended and patient questions addressed. The patient will continue workshops and nutrition education.   I will follow-up with patient at one-on-one visit.

## 2017-05-03 ENCOUNTER — NON-PROVIDER VISIT (OUTPATIENT)
Dept: CARDIOLOGY | Facility: MEDICAL CENTER | Age: 62
End: 2017-05-03
Payer: COMMERCIAL

## 2017-05-03 VITALS
WEIGHT: 198.5 LBS | HEIGHT: 71 IN | HEART RATE: 66 BPM | DIASTOLIC BLOOD PRESSURE: 82 MMHG | BODY MASS INDEX: 27.79 KG/M2 | SYSTOLIC BLOOD PRESSURE: 122 MMHG

## 2017-05-03 DIAGNOSIS — Z95.2 S/P AVR (AORTIC VALVE REPLACEMENT): ICD-10-CM

## 2017-05-03 DIAGNOSIS — Z95.2 HEART VALVE REPLACED: ICD-10-CM

## 2017-05-03 PROCEDURE — G0422 INTENS CARDIAC REHAB W/EXERC: HCPCS | Performed by: FAMILY MEDICINE

## 2017-05-03 PROCEDURE — G0423 INTENS CARDIAC REHAB NO EXER: HCPCS | Mod: 59 | Performed by: FAMILY MEDICINE

## 2017-05-03 ASSESSMENT — PAIN SCALES - GENERAL: PAINLEVEL: 3=SLIGHT PAIN

## 2017-05-03 NOTE — PROGRESS NOTES
Nutrition Consult Note:    Bethany Strickland  Date & Time note created:    5/3/2017   4:26 PM     Referring MD:   Abbie Elder M.D.      Patient ID:   Name:             Alberto Fam   YOB: 1955  Age:                 62 y.o.  male   MRN:               [id number]        Alberto Fam is here today for Intensive Cardiac Rehab.  This program includes Nutrition Education and Counseling following the Pritikin guidelines which includes at least 5 servings of vegetables, 4 servings of fruit, 2 or less servings of dairy, at least 5 servings of whole grains, animal fat from fish only and no added salt (goal 1200-1500mg sodium per day).        Current Diet:  Pt currently dines out a lot for work.  He has made some changes to his diet such as decreasing vázquez, cheese and oils.                                Current Living SItuation:  Pt lives with his wife who recently retired and is open to cooking more at home.  She was present for the appointment.    Comprehensive Nutrition Education Instruction or training leading to in-depth nutrition related knowledge about:   Portion control  Discussed grocery shopping tips and label reading  Meal planning  Culinary instruction, including cooking without salt or added fat  The effect of sodium and saturated fats on heart disease risk factors   Identified ways to self monitor diet - food journaling  Reviewed benefits of exercise and helped pt set exercise goals      VIDEOS VIEWED:  Pritikin Solution, Overview of the Pritikin Eating Plan, Move It, Calorie Density, Label Reading Part 1, Healthy Minds/Bodies/Heart and Dining Out Part 1    Past Medical History:   Past Medical History   Diagnosis Date   • Congestive heart failure (CMS-HCC)    • S/P AV servando ablation     • Dilated cardiomyopathy September 2013     Echocardiogram with dilated LV, moderate concentric LVH, LVEF 35-40%.   • Male erectile disorder     • Ventricular tachycardia (CMS-HCC)     • SSS  (sick sinus syndrome)     • AV block, 3rd degree     • Aortic stenosis December 2016     Dobutamine stress echo with severe AS (peak 89mmHg, mean 54mmHg, ACE 0.6cm2).   • CHF (congestive heart failure) (CMS-HCC)    • Bowel habit changes      Constipation   • Dental disorder      Upper   • Arthritis      Low back   • Hypertension      Pt states well controlled on meds   • Pneumonia 12/2014   • Pain      lower back pain    • Breath shortness      d/t heart condition   • AICD (automatic cardioverter/defibrillator) present        Past Surgical History:  Past Surgical History   Procedure Laterality Date   • Recovery  8/29/2011     Performed by SURGERY, CATH-RECOVERY at SURGERY SAME DAY [facility] ORS   • Aicd implant  August 2011     Medtronic Concerto II CRT-D [id number]   • Ventral septal defect repair  1961   • Pacemaker insertion  2006   • Aicd battery change  November 2016     Generator change with Medtronic Viva S CRT-D [id number] implanted by Dr. Kwong.   • Hernia repair  1966   • Sternotomy  2/8/2017     Procedure: STERNOTOMY - REDO;  Surgeon: Alfonso Llanos M.D.;  Location: SURGERY [facility];  Service:    • Aortic valve replacement  2/8/2017     Procedure: AORTIC VALVE REPLACEMENT;  Surgeon: Alfonso Llanos M.D.;  Location: SURGERY [facility];  Service:    • Marques  2/8/2017     Procedure: MARQUES;  Surgeon: Alfonso Llanos M.D.;  Location: SURGERY [facility];  Service:        Current Outpatient Medications:  Current Outpatient Prescriptions   Medication Sig Dispense Refill   • atorvastatin (LIPITOR) 40 MG Tab Take 1 Tab by mouth every bedtime. 90 Tab 3   • eplerenone (INSPRA) 25 MG Tab Take 1 Tab by mouth every day. 90 Tab 3   • carvedilol (COREG) 3.125 MG Tab Take 1 Tab by mouth 2 times a day, with meals. 180 Tab 3   • Multiple Vitamins-Minerals (MULTIVITAMIN ADULT PO) Spray 1 Tab in mouth/throat every day at 6 PM.     • Valerian Root 100 MG Cap Spray 100 mg in mouth/throat every bedtime.     • ramipril  (ALTACE) 5 MG Cap Take 1 Cap by mouth every day. 30 Cap 11   • aspirin EC (ECOTRIN) 81 MG Tablet Delayed Response Take 81 mg by mouth every day.     • furosemide (LASIX) 40 MG Tab Take 40 mg by mouth every day.     • potassium chloride SA (KDUR) 20 MEQ Tab CR Take 20 mEq by mouth every day.     • hydrocodone-acetaminophen (NORCO) 5-325 MG Tab per tablet Take 1 Tab by mouth every 8 hours as needed. Indications: Moderate to Moderately Severe Pain     • docusate calcium (SURFAK) 240 MG Cap Take 240 mg by mouth every day.     • warfarin (COUMADIN) 5 MG Tab Take 1.5 Tabs by mouth COUMADIN-DAILY. 45 Tab 3   • therapeutic multivitamin-minerals (THERAGRAN-M) Tab Take 1 Tab by mouth every day.       No current facility-administered medications for this visit.         Allergies:  Allergies   Allergen Reactions   • Sulfa Drugs Rash and Vomiting     RXN=20 years ago       Family History:  No family history on file.    Social History:  Social History     Social History   • Marital Status:      Spouse Name: N/A   • Number of Children: N/A   • Years of Education: N/A     Occupational History   • Not on file.     Social History Main Topics   • Smoking status: Never Smoker    • Smokeless tobacco: Never Used      Comment: quit 35 years ago    • Alcohol Use: 1.2 oz/week     2 Standard drinks or equivalent per week      Comment: 1-2 drinks per day    • Drug Use: No   • Sexual Activity: Not on file     Other Topics Concern   • Not on file     Social History Narrative           Goals set at this visit:  1.  Add a fruit and/or vegetable to each eating time.  2.  Choose to reduce one saturated fat source when dining out (example - cheese or vázquez but not both.)

## 2017-05-05 ENCOUNTER — NON-PROVIDER VISIT (OUTPATIENT)
Dept: CARDIOLOGY | Facility: MEDICAL CENTER | Age: 62
End: 2017-05-05
Payer: COMMERCIAL

## 2017-05-05 VITALS
HEIGHT: 71 IN | HEART RATE: 71 BPM | SYSTOLIC BLOOD PRESSURE: 127 MMHG | WEIGHT: 198.5 LBS | DIASTOLIC BLOOD PRESSURE: 77 MMHG | BODY MASS INDEX: 27.79 KG/M2

## 2017-05-05 DIAGNOSIS — Z95.2 S/P AVR (AORTIC VALVE REPLACEMENT): ICD-10-CM

## 2017-05-05 DIAGNOSIS — Z98.890 S/P AV NODAL ABLATION: Chronic | ICD-10-CM

## 2017-05-05 PROCEDURE — G0423 INTENS CARDIAC REHAB NO EXER: HCPCS | Mod: 59 | Performed by: FAMILY MEDICINE

## 2017-05-05 PROCEDURE — G0422 INTENS CARDIAC REHAB W/EXERC: HCPCS | Performed by: FAMILY MEDICINE

## 2017-05-05 ASSESSMENT — PAIN SCALES - GENERAL: PAINLEVEL: NO PAIN

## 2017-05-05 NOTE — PROGRESS NOTES
Alberto Fam attending cooking school.  Today  prepared black bean and soy tacos.    Patient received handouts and nutrition information regarding the specific recipes.

## 2017-05-08 ENCOUNTER — NON-PROVIDER VISIT (OUTPATIENT)
Dept: CARDIOLOGY | Facility: MEDICAL CENTER | Age: 62
End: 2017-05-08
Payer: COMMERCIAL

## 2017-05-08 VITALS
HEIGHT: 71 IN | HEART RATE: 72 BPM | SYSTOLIC BLOOD PRESSURE: 114 MMHG | DIASTOLIC BLOOD PRESSURE: 72 MMHG | WEIGHT: 196.5 LBS | BODY MASS INDEX: 27.51 KG/M2

## 2017-05-08 DIAGNOSIS — Z98.890 S/P AV NODAL ABLATION: Chronic | ICD-10-CM

## 2017-05-08 DIAGNOSIS — Z95.2 S/P AVR (AORTIC VALVE REPLACEMENT): ICD-10-CM

## 2017-05-08 PROCEDURE — G0423 INTENS CARDIAC REHAB NO EXER: HCPCS | Mod: 59 | Performed by: FAMILY MEDICINE

## 2017-05-08 PROCEDURE — G0422 INTENS CARDIAC REHAB W/EXERC: HCPCS | Performed by: FAMILY MEDICINE

## 2017-05-10 ENCOUNTER — NON-PROVIDER VISIT (OUTPATIENT)
Dept: CARDIOLOGY | Facility: MEDICAL CENTER | Age: 62
End: 2017-05-10
Payer: COMMERCIAL

## 2017-05-10 VITALS
HEIGHT: 71 IN | WEIGHT: 196 LBS | BODY MASS INDEX: 27.44 KG/M2 | SYSTOLIC BLOOD PRESSURE: 115 MMHG | HEART RATE: 71 BPM | DIASTOLIC BLOOD PRESSURE: 70 MMHG

## 2017-05-10 DIAGNOSIS — Z95.2 S/P AVR (AORTIC VALVE REPLACEMENT): ICD-10-CM

## 2017-05-10 DIAGNOSIS — I42.0 DILATED CARDIOMYOPATHY (HCC): Chronic | ICD-10-CM

## 2017-05-10 DIAGNOSIS — Z98.890 S/P AV NODAL ABLATION: Chronic | ICD-10-CM

## 2017-05-10 DIAGNOSIS — Z95.2 HEART VALVE REPLACED: ICD-10-CM

## 2017-05-10 PROCEDURE — G0423 INTENS CARDIAC REHAB NO EXER: HCPCS | Mod: 59 | Performed by: FAMILY MEDICINE

## 2017-05-10 PROCEDURE — G0422 INTENS CARDIAC REHAB W/EXERC: HCPCS | Performed by: FAMILY MEDICINE

## 2017-05-10 ASSESSMENT — PAIN SCALES - GENERAL: PAINLEVEL: NO PAIN

## 2017-05-11 NOTE — PROGRESS NOTES
Cardiac Rehab Individual Treatment Plan Assessment: 62 day 05/10/2017 Session #     24   MRN: [id number]   Allergies: Sulfa drugs   Patient Name: Alberto Fam : 1955 Risk Stratification: High    Diagnoses:   1. Dilated cardiomyopathy (CMS-HCC)    2. Heart valve replaced    3. S/P AVR (aortic valve replacement)     Age: 62 y.o. Physician: Trevor Stephen D.O.    Date of Event: 17 Specialist: Jerrod   Risk Factors:  Hypertension, Hyperlipidemia, Age, Male > 45   Exercise Nutrition Education Psychosocial   Stages of change Stages of change Stages of change Stages of change   Preparation Preparation Preparation Preparation   Fitness Test Lipids Learning Barriers Outcome Survey Tools   DIST:  (na)  Available: Yes Learning Barriers: Vision FP QOL Overall Score:  (assessed pre and post program                              )   Max HR:  (na) Date: 17 Family Support PHQ-9:  (assessed pre and post program                              )   RPE:  (na) Total: 121 mg/dL Yes Nutrition Screen:  (assessed pre and post program                              )   SPO2:  (na) Tri mg/dL Lives: Spouse Intervention   MET:  (na) HDL: 43 mg/dL Tobacco Use Behavioral Health Consult: N/A   EF= 20 (2017) LDL: 54 mg/dL History   Smoking status   • Never Smoker    Smokeless tobacco   • Never Used     Comment: quit 35 years ago       Physician Referral: No   Ambulatory Status Diabetes Smoking Intervention Identifies Stressors: Yes   Fall Risk Assessed: No Diabetes: No Smoking Cessation Referral: N/A Drug Intervention: Yes   Exercise Ambulatory Status Assist Devices: None HbA1C: 5.4 % Date: 17 Ind. Education / Counseling: N/A Education   Exercise Prescription Monitors BS at Home: No Tobacco Adjunct: N/A Psychosocial Education: Coping Techniques, Positive Support System, S/S Depression   Mode: Treadmill, NuStep, UBE, Airdyne   Frequency: na Random BS: 95 (2017) Education Intervention Target Goal  "  Frequency:  3 days/week Weight Management Healthy Heart Education: Class Schedule Given, Cooking School Attended, Dietician One-on-One Meeting Attended, Medications Reviewed, Patient Education Binder Provided, Risk Factors Discussed, Videos Viewed per Janice, Workshops Attended Assess presence or absence of depression using a valid screening: N/A (assessed pre and post program                              )   Duration:  30-45 min Weight: 88.905 kg (196 lb) Target Goal Use Stress Management: Yes   Intensity:  11-13 RPE Height: 180.3 cm (5' 10.98\") Complete Tobacco Cessation: Complete Tobacco Cessation: N/A Adverse Events: No   METS:  1.0-3.0 BMI (Calculated): 27.35 Educate / Review and have understanding of cardiac disease prevention: Educate / Review and have understanding of cardiac disease prevention: Yes Unexpected Events: No   Progression:  ^ increments of 1-3 to THR/RPE as tolerated Goal weight: 192 Medication Compliance: Yes    THR: THR: 20-30 BPM ^Resting HR History   Alcohol Use   • 1.2 oz/week   • 2 Standard drinks or equivalent per week     Comment: 1-2 drinks per day          Angina with Exercise?  Angina with Exercise: No      Resistance Training?  Resistance Training: Yes      Hypertension      Diagnosed with HTN: Yes Intervention      Resting BP: 113/80 mmHg Dietician Consult/Class: Yes      Peak Ex BP:  (na) Nurse/Patient Discussion: Yes     Intervention Diabetes Ed Referral: N/A     Home Exercise:  Yes Discuss Maintenance /Wt Loss: Yes     Mode: Walk, Other (yard work, house work) Attend Cooking School: Yes     Duration: 30 min Dietary Goal: low fat 24%, low sodium     Frequency: 7 days active  Education     Education Nutrition Education: Healthy Eating, Sodium Reduction     Equipment Orientation, Exercise Safety, S/S to Report, RPE Scale, Warm Up / Cool Down, Physically Active Target Goal     Target Goal LDL-C < 100 if trig. > 200:  N/A       Start Individual Exercise Rx Yes LDL-C < 70 for high " risk patient:  Yes       BP < 140/90 or < 130/80 if DM or CKD Yes Non HDL-C Should be < 130:  Yes       Aerobic activity 30 + min / day 5 days / wk Yes HbA1C < 7%: Yes         BMI < 25: Yes       Notes: Alberto says that with the better weather he is doing a lot of yard and house work.  He is painting his house.  He says that along with being here he has been more active in the past 30 days.  He says that he is following the [email or web address] program for the most part, he eats lean meants, fish and chicken, he has increased his vegetable consumption, he watches his salt and overall is more concsious of the choices he is making.  he says that he looks at life different and overall feels that he is managing stress better than before his cardiac event.  He adds that while he doesn't feel the workshops are really helpful he is enjoying the videos and getting a lot of good information from them.  He says that overall he is reaching his goals and wants to continue to get stronger.

## 2017-05-17 ENCOUNTER — NON-PROVIDER VISIT (OUTPATIENT)
Dept: CARDIOLOGY | Facility: MEDICAL CENTER | Age: 62
End: 2017-05-17
Payer: COMMERCIAL

## 2017-05-17 VITALS
DIASTOLIC BLOOD PRESSURE: 73 MMHG | HEIGHT: 71 IN | BODY MASS INDEX: 27.37 KG/M2 | HEART RATE: 70 BPM | SYSTOLIC BLOOD PRESSURE: 120 MMHG | WEIGHT: 195.5 LBS

## 2017-05-17 DIAGNOSIS — Z95.2 S/P AVR (AORTIC VALVE REPLACEMENT): ICD-10-CM

## 2017-05-17 PROCEDURE — G0422 INTENS CARDIAC REHAB W/EXERC: HCPCS | Performed by: FAMILY MEDICINE

## 2017-05-17 PROCEDURE — G0423 INTENS CARDIAC REHAB NO EXER: HCPCS | Mod: 59 | Performed by: FAMILY MEDICINE

## 2017-05-17 ASSESSMENT — PAIN SCALES - GENERAL: PAINLEVEL: NO PAIN

## 2017-05-19 ENCOUNTER — NON-PROVIDER VISIT (OUTPATIENT)
Dept: CARDIOLOGY | Facility: MEDICAL CENTER | Age: 62
End: 2017-05-19
Payer: COMMERCIAL

## 2017-05-19 VITALS
WEIGHT: 196 LBS | HEIGHT: 71 IN | HEART RATE: 78 BPM | SYSTOLIC BLOOD PRESSURE: 128 MMHG | DIASTOLIC BLOOD PRESSURE: 81 MMHG | BODY MASS INDEX: 27.44 KG/M2

## 2017-05-19 DIAGNOSIS — Z95.2 S/P AVR (AORTIC VALVE REPLACEMENT): ICD-10-CM

## 2017-05-19 PROCEDURE — G0423 INTENS CARDIAC REHAB NO EXER: HCPCS | Mod: 59 | Performed by: FAMILY MEDICINE

## 2017-05-19 PROCEDURE — G0422 INTENS CARDIAC REHAB W/EXERC: HCPCS | Performed by: FAMILY MEDICINE

## 2017-05-19 NOTE — PROGRESS NOTES
Date: 5/18/2017    62 day Individual Treatment Plan review for the [facility] Intensive Cardiac Rehabilitation (ICR) Program.    Alberto Fam, 62 y.o. male, with qualifying diagnosis/diagnoses of S/P Heart Valve Replacement  Co-morbid conditions include Hypertension, Dyslipidemia.    Primary Care Provider: Trevor Stephen D.O.    Heart Specialist (s): Dr. Elder    Patient has successfully completed 24 Exercise Sessions, and an appropriate number of corresponding Education Sessions.  Patient is (continues to be) an excellent candidate for the [facility] Intensive Cardiac Rehabilitation (ICR) Program.    I will review the Individual Treatment Plan again at 90 day post-initiation of ICR.    Rashid Edwards MD, FAAFP  , [facility] Intensive Cardiac Rehabilitation (ICR) Program

## 2017-05-19 NOTE — PROGRESS NOTES
Alberto Fam attending cooking school.  Today  prepared Adam broth and zucchini noodles.    Patient received handouts and nutrition information regarding the specific recipes.

## 2017-05-22 ENCOUNTER — NON-PROVIDER VISIT (OUTPATIENT)
Dept: CARDIOLOGY | Facility: MEDICAL CENTER | Age: 62
End: 2017-05-22
Payer: COMMERCIAL

## 2017-05-22 VITALS
DIASTOLIC BLOOD PRESSURE: 83 MMHG | SYSTOLIC BLOOD PRESSURE: 129 MMHG | WEIGHT: 195.5 LBS | HEIGHT: 71 IN | BODY MASS INDEX: 27.37 KG/M2 | HEART RATE: 74 BPM

## 2017-05-22 DIAGNOSIS — Z95.2 HEART VALVE REPLACED: ICD-10-CM

## 2017-05-22 DIAGNOSIS — Z95.2 S/P AVR (AORTIC VALVE REPLACEMENT): ICD-10-CM

## 2017-05-22 PROCEDURE — G0422 INTENS CARDIAC REHAB W/EXERC: HCPCS | Performed by: FAMILY MEDICINE

## 2017-05-22 PROCEDURE — G0423 INTENS CARDIAC REHAB NO EXER: HCPCS | Mod: 59 | Performed by: FAMILY MEDICINE

## 2017-05-22 ASSESSMENT — PAIN SCALES - GENERAL: PAINLEVEL: NO PAIN

## 2017-05-22 NOTE — PROGRESS NOTES
Alberto Fam attended the following workshop:  Nutrition priorities    Workshop included education on:  Nutrition priorties - Educating the patient to [employer] guidelines based on the patient's specific needs    Lecture was attended and patient questions addressed. The patient will continue workshops and nutrition education.   I will follow-up with patient at one-on-one visit.            ,

## 2017-05-24 ENCOUNTER — NON-PROVIDER VISIT (OUTPATIENT)
Dept: CARDIOLOGY | Facility: MEDICAL CENTER | Age: 62
End: 2017-05-24
Payer: COMMERCIAL

## 2017-05-24 VITALS
BODY MASS INDEX: 27.16 KG/M2 | SYSTOLIC BLOOD PRESSURE: 118 MMHG | WEIGHT: 194 LBS | DIASTOLIC BLOOD PRESSURE: 74 MMHG | HEART RATE: 76 BPM | HEIGHT: 71 IN

## 2017-05-24 DIAGNOSIS — Z98.890 S/P AV NODAL ABLATION: Chronic | ICD-10-CM

## 2017-05-24 DIAGNOSIS — I25.10 CORONARY ARTERY DISEASE INVOLVING NATIVE CORONARY ARTERY OF NATIVE HEART WITHOUT ANGINA PECTORIS: ICD-10-CM

## 2017-05-24 DIAGNOSIS — Z95.2 HEART VALVE REPLACED: ICD-10-CM

## 2017-05-24 DIAGNOSIS — I35.0 SEVERE AORTIC STENOSIS: ICD-10-CM

## 2017-05-24 DIAGNOSIS — I44.2 AV BLOCK, 3RD DEGREE (HCC): Chronic | ICD-10-CM

## 2017-05-24 DIAGNOSIS — I50.22 CHRONIC SYSTOLIC CONGESTIVE HEART FAILURE, NYHA CLASS 3 (HCC): ICD-10-CM

## 2017-05-24 DIAGNOSIS — I42.0 DILATED CARDIOMYOPATHY (HCC): Chronic | ICD-10-CM

## 2017-05-24 DIAGNOSIS — Z95.2 S/P AVR (AORTIC VALVE REPLACEMENT): ICD-10-CM

## 2017-05-24 PROCEDURE — G0423 INTENS CARDIAC REHAB NO EXER: HCPCS | Mod: 59 | Performed by: FAMILY MEDICINE

## 2017-05-24 PROCEDURE — G0422 INTENS CARDIAC REHAB W/EXERC: HCPCS | Performed by: FAMILY MEDICINE

## 2017-05-24 ASSESSMENT — PAIN SCALES - GENERAL: PAINLEVEL: NO PAIN

## 2017-05-26 ENCOUNTER — NON-PROVIDER VISIT (OUTPATIENT)
Dept: CARDIOLOGY | Facility: MEDICAL CENTER | Age: 62
End: 2017-05-26
Payer: COMMERCIAL

## 2017-05-26 VITALS
HEART RATE: 78 BPM | DIASTOLIC BLOOD PRESSURE: 78 MMHG | WEIGHT: 195 LBS | SYSTOLIC BLOOD PRESSURE: 119 MMHG | HEIGHT: 71 IN | BODY MASS INDEX: 27.3 KG/M2

## 2017-05-26 DIAGNOSIS — Z95.2 S/P AVR (AORTIC VALVE REPLACEMENT): ICD-10-CM

## 2017-05-26 DIAGNOSIS — Z95.2 S/P AVR (AORTIC VALVE REPLACEMENT): Chronic | ICD-10-CM

## 2017-05-26 DIAGNOSIS — Z95.2 HEART VALVE REPLACED: Chronic | ICD-10-CM

## 2017-05-26 PROCEDURE — G0423 INTENS CARDIAC REHAB NO EXER: HCPCS | Mod: 59 | Performed by: FAMILY MEDICINE

## 2017-05-26 PROCEDURE — G0422 INTENS CARDIAC REHAB W/EXERC: HCPCS | Performed by: FAMILY MEDICINE

## 2017-05-26 ASSESSMENT — PAIN SCALES - GENERAL: PAINLEVEL: NO PAIN

## 2017-05-26 NOTE — PROGRESS NOTES
Alberto Fam attending cooking school.  Today  prepared chocolate mousse and lemon hummus.    Patient received handouts and nutrition information regarding the specific recipes.

## 2017-05-30 ENCOUNTER — HOSPITAL ENCOUNTER (OUTPATIENT)
Dept: CARDIOLOGY | Facility: MEDICAL CENTER | Age: 62
End: 2017-05-30
Attending: INTERNAL MEDICINE
Payer: OTHER MISCELLANEOUS

## 2017-05-30 DIAGNOSIS — Z95.2 H/O AORTIC VALVE REPLACEMENT: ICD-10-CM

## 2017-05-30 DIAGNOSIS — I42.8 NON-ISCHEMIC CARDIOMYOPATHY (HCC): ICD-10-CM

## 2017-05-30 PROCEDURE — 93306 TTE W/DOPPLER COMPLETE: CPT

## 2017-05-31 ENCOUNTER — NON-PROVIDER VISIT (OUTPATIENT)
Dept: CARDIOLOGY | Facility: MEDICAL CENTER | Age: 62
End: 2017-05-31
Payer: COMMERCIAL

## 2017-05-31 ENCOUNTER — OFFICE VISIT (OUTPATIENT)
Dept: CARDIOLOGY | Facility: MEDICAL CENTER | Age: 62
End: 2017-05-31
Payer: COMMERCIAL

## 2017-05-31 VITALS
DIASTOLIC BLOOD PRESSURE: 74 MMHG | WEIGHT: 189 LBS | OXYGEN SATURATION: 95 % | BODY MASS INDEX: 26.37 KG/M2 | SYSTOLIC BLOOD PRESSURE: 112 MMHG | HEART RATE: 63 BPM

## 2017-05-31 VITALS
HEART RATE: 75 BPM | HEIGHT: 71 IN | DIASTOLIC BLOOD PRESSURE: 72 MMHG | WEIGHT: 193.5 LBS | BODY MASS INDEX: 27.09 KG/M2 | SYSTOLIC BLOOD PRESSURE: 112 MMHG

## 2017-05-31 DIAGNOSIS — Z95.2 HEART VALVE REPLACED: Chronic | ICD-10-CM

## 2017-05-31 DIAGNOSIS — I44.2 AV BLOCK, 3RD DEGREE (HCC): ICD-10-CM

## 2017-05-31 DIAGNOSIS — E78.5 DYSLIPIDEMIA: ICD-10-CM

## 2017-05-31 DIAGNOSIS — I48.19 PERSISTENT ATRIAL FIBRILLATION (HCC): ICD-10-CM

## 2017-05-31 DIAGNOSIS — I10 ESSENTIAL HYPERTENSION: ICD-10-CM

## 2017-05-31 DIAGNOSIS — I35.0 SEVERE AORTIC STENOSIS: ICD-10-CM

## 2017-05-31 DIAGNOSIS — Z95.2 S/P AVR (AORTIC VALVE REPLACEMENT): Chronic | ICD-10-CM

## 2017-05-31 DIAGNOSIS — Z95.810 PRESENCE OF BIVENTRICULAR AICD: ICD-10-CM

## 2017-05-31 DIAGNOSIS — I49.5 SSS (SICK SINUS SYNDROME) (HCC): ICD-10-CM

## 2017-05-31 DIAGNOSIS — I42.0 DILATED CARDIOMYOPATHY (HCC): Chronic | ICD-10-CM

## 2017-05-31 PROBLEM — I48.91 ATRIAL FIBRILLATION (HCC): Status: ACTIVE | Noted: 2017-05-31

## 2017-05-31 LAB
LV EJECT FRACT  99904: 45
LV EJECT FRACT MOD 2C 99903: 40.59
LV EJECT FRACT MOD 4C 99902: 46.15
LV EJECT FRACT MOD BP 99901: 43.61

## 2017-05-31 PROCEDURE — G0423 INTENS CARDIAC REHAB NO EXER: HCPCS | Mod: 59 | Performed by: FAMILY MEDICINE

## 2017-05-31 PROCEDURE — 99214 OFFICE O/P EST MOD 30 MIN: CPT | Performed by: INTERNAL MEDICINE

## 2017-05-31 PROCEDURE — G0422 INTENS CARDIAC REHAB W/EXERC: HCPCS | Performed by: FAMILY MEDICINE

## 2017-05-31 PROCEDURE — 93289 INTERROG DEVICE EVAL HEART: CPT | Performed by: NURSE PRACTITIONER

## 2017-05-31 RX ORDER — WARFARIN SODIUM 5 MG/1
5 TABLET ORAL DAILY
Qty: 30 TAB | Refills: 3 | Status: SHIPPED | OUTPATIENT
Start: 2017-05-31 | End: 2017-07-31

## 2017-05-31 RX ORDER — EPLERENONE 25 MG/1
25 TABLET, FILM COATED ORAL DAILY
Qty: 90 TAB | Refills: 3 | Status: SHIPPED | OUTPATIENT
Start: 2017-05-31

## 2017-05-31 RX ORDER — RAMIPRIL 5 MG/1
5 CAPSULE ORAL DAILY
Qty: 90 CAP | Refills: 3 | Status: SHIPPED | OUTPATIENT
Start: 2017-05-31 | End: 2017-08-15 | Stop reason: SDUPTHER

## 2017-05-31 RX ORDER — ATORVASTATIN CALCIUM 40 MG/1
40 TABLET, FILM COATED ORAL
Qty: 90 TAB | Refills: 3 | Status: SHIPPED | OUTPATIENT
Start: 2017-05-31

## 2017-05-31 RX ORDER — CARVEDILOL 3.12 MG/1
3.12 TABLET ORAL 2 TIMES DAILY WITH MEALS
Qty: 180 TAB | Refills: 3 | Status: SHIPPED | OUTPATIENT
Start: 2017-05-31 | End: 2017-10-17 | Stop reason: SDUPTHER

## 2017-05-31 ASSESSMENT — ENCOUNTER SYMPTOMS
BLOOD IN STOOL: 0
CLAUDICATION: 0
MYALGIAS: 0
FEVER: 0
ORTHOPNEA: 0
SHORTNESS OF BREATH: 0
BLURRED VISION: 0
PND: 0
DEPRESSION: 0
NERVOUS/ANXIOUS: 0
HEADACHES: 0
PALPITATIONS: 0
ROS GI COMMENTS: ABDOMINAL BLOATING
COUGH: 0
ABDOMINAL PAIN: 0
DIZZINESS: 0

## 2017-05-31 ASSESSMENT — PAIN SCALES - GENERAL: PAINLEVEL: NO PAIN

## 2017-05-31 NOTE — PROGRESS NOTES
Subjective:   Alberto Fam is a 62 y.o. male with known history of VSD status post repair at age 4 1/2 yr , NICMP angiogram from 2011 no significant CAD, type II Mobitz block s/p PPM 11/2006, later upgraded to BIV ICD in 8/2011, LVEF 35-40% in 2013. EF in 2015 showed drop in LVEF to 25%, severe aortic stenosis on dobutamine stress echo s/p AVR with 23mm Moy Perimount Magna pericardial valve on 2/17 presenting today for follow-up evaluation of valvular heart disease.    Since valve replacement patient functional status has significantly improved. Patient denied any complaints of exertional chest pain, pressure or tightness. No complaints of dizziness, lightheadedness or LOC. Currently not on Lasix or potassium supplements. Denied any complaints of orthopnea or PND.    Past Medical History   Diagnosis Date   • Congestive heart failure (CMS-[facility])    • S/P AV servando ablation     • Dilated cardiomyopathy September 2013     Echocardiogram with dilated LV, moderate concentric LVH, LVEF 35-40%.   • Male erectile disorder     • Ventricular tachycardia (CMS-[facility])     • SSS (sick sinus syndrome)     • AV block, 3rd degree     • Aortic stenosis December 2016     Dobutamine stress echo with severe AS (peak 89mmHg, mean 54mmHg, ACE 0.6cm2).   • CHF (congestive heart failure) (CMS-[facility])    • Bowel habit changes      Constipation   • Dental disorder      Upper   • Arthritis      Low back   • Hypertension      Pt states well controlled on meds   • Pneumonia 12/2014   • Pain      lower back pain    • Breath shortness      d/t heart condition   • AICD (automatic cardioverter/defibrillator) present      Past Surgical History   Procedure Laterality Date   • Recovery  8/29/2011     Performed by SURGERY, CATH-RECOVERY at SURGERY SAME DAY [facility] ORS   • Aicd implant  August 2011     Medtronic Concerto II CRT-D [id number]   • Ventral septal defect repair  1961   • Pacemaker insertion  2006   • Aicd battery change  November 2016      Generator change with Playtox Viva S CRT-D [id number] implanted by Dr. Kwong.   • Hernia repair  1966   • Sternotomy  2/8/2017     Procedure: STERNOTOMY - REDO;  Surgeon: Alfonso Llanos M.D.;  Location: SURGERY [facility];  Service:    • Aortic valve replacement  2/8/2017     Procedure: AORTIC VALVE REPLACEMENT;  Surgeon: Alfonso Llanos M.D.;  Location: SURGERY [facility];  Service:    • Marques  2/8/2017     Procedure: MARQUES;  Surgeon: Alfonso Llanos M.D.;  Location: SURGERY [facility];  Service:      History reviewed. No pertinent family history.  History   Smoking status   • Never Smoker    Smokeless tobacco   • Never Used     Comment: quit 35 years ago      Allergies   Allergen Reactions   • Sulfa Drugs Rash and Vomiting     RXN=20 years ago     Outpatient Encounter Prescriptions as of 5/31/2017   Medication Sig Dispense Refill   • atorvastatin (LIPITOR) 40 MG Tab Take 1 Tab by mouth every bedtime. 90 Tab 3   • eplerenone (INSPRA) 25 MG Tab Take 1 Tab by mouth every day. 90 Tab 3   • carvedilol (COREG) 3.125 MG Tab Take 1 Tab by mouth 2 times a day, with meals. 180 Tab 3   • Multiple Vitamins-Minerals (MULTIVITAMIN ADULT PO) Spray 1 Tab in mouth/throat every day at 6 PM.     • ramipril (ALTACE) 5 MG Cap Take 1 Cap by mouth every day. 30 Cap 11   • aspirin EC (ECOTRIN) 81 MG Tablet Delayed Response Take 81 mg by mouth every day.     • therapeutic multivitamin-minerals (THERAGRAN-M) Tab Take 1 Tab by mouth every day.     • Valerian Root 100 MG Cap Spray 100 mg in mouth/throat every bedtime.     • furosemide (LASIX) 40 MG Tab Take 40 mg by mouth every day.     • potassium chloride SA (KDUR) 20 MEQ Tab CR Take 20 mEq by mouth every day.     • hydrocodone-acetaminophen (NORCO) 5-325 MG Tab per tablet Take 1 Tab by mouth every 8 hours as needed. Indications: Moderate to Moderately Severe Pain     • docusate calcium (SURFAK) 240 MG Cap Take 240 mg by mouth every day.     • warfarin (COUMADIN) 5 MG Tab Take  1.5 Tabs by mouth COUMADIN-DAILY. 45 Tab 3     No facility-administered encounter medications on file as of 5/31/2017.     Review of Systems   Constitutional: Negative for fever.   Eyes: Negative for blurred vision.   Respiratory: Negative for cough and shortness of breath.    Cardiovascular: Negative for chest pain, palpitations, orthopnea, claudication, leg swelling and PND.   Gastrointestinal: Negative for abdominal pain and blood in stool.        Abdominal bloating   Genitourinary: Negative for dysuria and hematuria.   Musculoskeletal: Positive for joint pain. Negative for myalgias.        He has chronic severe back pain and has been using Celebrex on a regular basis.   Skin: Negative for rash.   Neurological: Negative for dizziness and headaches.   Psychiatric/Behavioral: Negative for depression. The patient is not nervous/anxious.         Objective:   /74 mmHg  Pulse 63  Wt 85.73 kg (189 lb)  SpO2 95%    Physical Exam   Constitutional: He is oriented to person, place, and time. He appears well-developed and well-nourished.   HENT:   Mouth/Throat: Oropharynx is clear and moist.   Eyes: Conjunctivae and EOM are normal.   Neck: No JVD present. No thyroid mass and no thyromegaly present.   There is no JVD.   Cardiovascular: Normal rate, S1 normal, S2 normal and normal pulses.  An irregularly irregular rhythm present. PMI is not displaced.  Exam reveals no gallop.    Murmur heard.   Systolic murmur is present with a grade of 1/6   Pulses:       Carotid pulses are 2+ on the right side, and 2+ on the left side.       Radial pulses are 2+ on the right side, and 2+ on the left side.        Dorsalis pedis pulses are 2+ on the right side, and 2+ on the left side.   Soft systolic murmur best heard in the aortic area.   Pulmonary/Chest: Effort normal and breath sounds normal. No respiratory distress. He has no wheezes. He has no rales.   Abdominal: Soft. Normal appearance. He exhibits no abdominal bruit. There is  no hepatosplenomegaly. There is no tenderness.   Musculoskeletal: Normal range of motion. He exhibits no edema.        Thoracic back: He exhibits no tenderness and no spasm.   Lymphadenopathy:     He has no cervical adenopathy.   Neurological: He is alert and oriented to person, place, and time.   Skin: No rash noted. No cyanosis. Nails show no clubbing.   Psychiatric: He has a normal mood and affect.     TTE: 2/21/17  Left ventricular ejection fraction is visually estimated to be 20%.  Small-moderate sized (<1 cm in end diastole) pericardial effusion without evidence of hemodynamic compromise.  Known bioprosthetic aortic valve, mean gradient 18 mmHg - with severely depressed cariac output, flow impairment may be underestimated.  Large left pleural effusion.  Right ventricular systolic pressure is estimated to be at least 35 mmHg.  Compared with the intraoperative JUSTINO 2/8 - the effusions are new.    Carotid doppler: 1/13/17  Normal bilateral carotid exam.   Flow within both subclavian arteries appears to be within normal limits. Antegrade flow, bilateral vertebral arteries    Angiogram: 12/16/16   1.  Minor plaquing in the left anterior descending artery proximal, otherwise, normal coronary angiography.  2.  Moderate dilatation of the ascending aorta    Dobutamine stress echo: 12/16/17  1.  Minor plaquing in the left anterior descending artery proximal, otherwise, normal coronary angiography.  2.  Moderate dilatation of the ascending aorta.  Results for EDUARDO ALONSO HARRY (MRN [id number]) as of 12/27/2016 10:38   7/16/2013 11/21/2016 12/14/2016    Sodium 140 138 138   Potassium 3.5 (L) 4.0 4.0   Chloride 104 102 102   Co2 30 28 28   Anion Gap 6.0 8.0 8.0   Glucose 81 95 78   Bun 18 19 21   Creatinine 0.97 1.01 1.13   GFR If Non  >60 >60 >60   Calcium 9.7 10.4 9.9   AST(SGOT) 19 20    ALT(SGPT) 20 28    Alkaline Phosphatase 44 47    Lactic Acid      Cholesterol,Tot 159     Triglycerides 118     HDL 40      LDL 95       EKG: 10/20/15  EKG personally reviewed by me.  AV paced at 63 BPM.    TTE: 5/5/15  Left ventricle is moderately dilated. Severely reduced left ventricular systolic function. Global hypokinesis. Left ventricular ejection fraction is 20% to 25%. Grade IV diastolic dysfunction.  Severely increased LA volume.  Mild mitral regurgitation.  No aortic stenosis based on gradients across aortic valve. Peak gradient 29 mmHg. Mean 16 mmHg. Dimensionless index=.25. Ttrace aortic insufficiency. Gradients could be underestimated with low LVEF consider   clinical correlation (based on Dimensionless index of 0.25 severe aortic stenosis).  Mild tricuspid regurgitation. Right ventricular systolic pressure is estimated to be 23. RA pressure 15 mmHg.  Aortic root diameter 3.7 cm.  Compared to the images of the prior study done 9/4/13  there has been drop in LVEF from 35-40% to current echo LVEF 20-25%. Prior echo AV MG 23 mmHg and PG 38 mmHg.    Transthoracic echo: 9/4/13   Aortic stenosis, w low flow and low DI suggests moderate to severe, but does not appeart severeLeft ventricular ejection fraction is 35% to 40%. Grade I-II diastolic dysfunction is present.  Left ventricle is moderately dilated.  Compared w the prior echo, the EF is slightly less    Nuclear stress test: 7/19/11  1. Normal left ventricular perfusion with no fixed or reversible defects.  2. Marked left ventricular dilatation, with profound global hypokinesis, and a low ejection fraction of 17%.    Coronary angiogram: 8/29/2011  1.  Normal coronary arteries.  2.  Moderate to severely reduced systolic function.  3.  Normal end-diastolic pressure.  4.  Pacemaker leads in place.  5. LVEF 25-30%    Coronary angiogram: 10/16/2006  1. Left ventricular dysfunction with left ventricular enlargement and moderate hypokinesis of the mid anterior wall and inferobasal segment.  2. Normal epicardial coronary arteries with atypical left main which is more of a  pouch-like structure.  3. Normal resting hemodynamics.    Assessment:     1. NICMP LVEF 25%  2. Aortic stenosis s/p AVR  3. BIV ICD.  4. Dyslipidemia  5. Atrial fibrillation  Medical Decision Making:  Today's Assessment / Status / Plan:     1.  Patient appears euvolemic.  Continue eplerenone 25 mg daily.  Continue ramipril 5 mg daily  2. Recent echocardiogram showed stable gradients across aortic valve.   Patient will need antibiotics before any dental workup.  Echocardiogram in one year.  3. Device interrogation showed patient to be in A. fib.   Restart Coumadin. Will refer patient to Coumadin clinic.   4. Continue Lipitor 40 mg qHs.  5. LV wall thickness is more than 1.5 cm not a candidate for flecainide up with off and on.  Will refer patient to see EP for initiation of either sotalol or Tikosyn.    Follow-up in one year.  Referral to EP for management of atrial fibrillation.  Echo and labs prior to next visit.    Thank you for allowing me to participate in taking care of patient.    Abbie Gore MD.

## 2017-05-31 NOTE — MR AVS SNAPSHOT
Alberto Fam   2017 2:00 PM   Office Visit   MRN: [id number]    Department:  Heart [facility]   Dept Phone:  [phone]    Description:  Male : 1955   Provider:  Abbie Elder M.D.           Reason for Visit     Follow-Up           Allergies as of 2017     Allergen Noted Reactions    Sulfa Drugs 2010   Rash, Vomiting    RXN=20 years ago      You were diagnosed with     Essential hypertension   [8542616]       Dilated cardiomyopathy (CMS-formerly [facility])   [660513]       SSS (sick sinus syndrome) (CMS-HCC)   [561476]       AV block, 3rd degree (CMS-HCC)   [901274]       S/P AVR (aortic valve replacement)   [945190]       Dyslipidemia   [988984]         Vital Signs     Blood Pressure Pulse Weight Oxygen Saturation Smoking Status       112/74 mmHg 63 85.73 kg (189 lb) 95% Never Smoker        Basic Information     Date Of Birth Sex Race Ethnicity Preferred Language    1955 Male White Non- English      Your appointments     May 31, 2017  3:30 PM   GROUP EDUCATION with ICR EDUCATION    [facility] ([facility])    [address]  [address]  [place] [address]   [phone]            May 31, 2017  4:30 PM   EXERCISE with ICR RN   [facility] ([facility])    [address]  [address]  [place] [address]   [phone]            2017  3:30 PM   GROUP EDUCATION with ICR EDUCATION    [facility] ([facility])    [address]  [address]  [place] [address]   [phone]            2017  4:30 PM   EXERCISE with ICR RN   [facility] ([facility])    [address]  [address]  [place] [address]   [phone]            2017  3:30 PM   GROUP EDUCATION with ICR EDUCATION    [facility] ([facility])    [address]  [address]  [place] [address]   [phone]            2017  4:30 PM   EXERCISE with ICR ANTHONY Cote  [facility] ([facility])    [address]  [address]  [place] [address]   [phone]            Jun 07, 2017  3:30 PM   GROUP EDUCATION with ICR EDUCATION RM   [facility] ([facility])    [address]  [address]  [place] [address]   [phone]            Jun 07, 2017  4:30 PM   EXERCISE with ICR RN   [facility] ([facility])    [address]  [address]  [place] [address]   [phone]            Jun 09, 2017  3:30 PM   GROUP EDUCATION with ICR EDUCATION RM   [facility] ([facility])    [address]  [address]  [place] [address]   [phone]            Jun 09, 2017  4:30 PM   EXERCISE with ICR RN   [facility] ([facility])    [address]  [address]  [place] [address]   [phone]            Jun 12, 2017  3:30 PM   GROUP EDUCATION with ICR EDUCATION RM   [facility] ([facility])    [address]  [address]  [place] [address]   [phone]            Jun 12, 2017  4:30 PM   EXERCISE with ICR RN   [facility] ([facility])    [address]  [address]  [place] [address]   [phone]              Problem List              ICD-10-CM Priority Class Noted - Resolved    Hypertension I10 High  Unknown - Present    S/P AV servando ablation (Chronic) Z98.890 High  3/21/2012 - Present    Dilated cardiomyopathy (CMS-HCC) (Chronic) I42.0 High  3/21/2012 - Present    Male erectile disorder N52.9 Medium  3/21/2012 - Present    Ventricular tachycardia (CMS-HCC) I47.2 High  3/21/2012 - Present    Atypical chest pain (Chronic) R07.89 Medium  3/21/2012 - Present    AV block, 3rd degree (CMS-HCC) I44.2 High  3/21/2012 - Present    SSS (sick sinus syndrome) (CMS-HCC) I49.5 High  9/28/2012 - Present    Presence of biventricular AICD Z95.810 High  6/6/2013 - Present    Arthritis M19.90 Low  8/20/2013 - Present    Chronic systolic congestive  heart failure, NYHA class 3 (CMS-HCC) I50.22   10/13/2015 - Present    Homograft cardiac valve stenosis I38   12/16/2016 - Present    Severe aortic stenosis I35.0   1/13/2017 - Present    CAD (coronary artery disease) I25.10   1/13/2017 - Present    Heart valve replaced (Chronic) Z95.2   2/17/2017 - Present    Pericardial effusion I31.3   2/20/2017 - Present    Pleural effusion J90   2/20/2017 - Present    CHF exacerbation (CMS-HCC) I50.9   2/20/2017 - Present    Hemorrhagic disorder due to circulating anticoagulants (CMS-HCC) D68.318   2/21/2017 - Present    S/P AVR (aortic valve replacement) (Chronic) Z95.2   2/21/2017 - Present    Hypokalemia E87.6   2/21/2017 - Present    Long term (current) use of anticoagulants [Z79.01] Z79.01   3/27/2017 - Present      Health Maintenance        Date Due Completion Dates    IMM DTaP/Tdap/Td Vaccine (1 - Tdap) 4/5/1976 ---    COLONOSCOPY 4/5/2007 ---    IMM ZOSTER VACCINE 4/5/2017 ---            Current Immunizations     Influenza TIV (IM) 10/30/2009    Influenza Vaccine Adult HD 11/2/2016    Pneumococcal polysaccharide vaccine (PPSV-23) 2/21/2017  5:32 AM      Below and/or attached are the medications your provider expects you to take. Review all of your home medications and newly ordered medications with your provider and/or pharmacist. Follow medication instructions as directed by your provider and/or pharmacist. Please keep your medication list with you and share with your provider. Update the information when medications are discontinued, doses are changed, or new medications (including over-the-counter products) are added; and carry medication information at all times in the event of emergency situations     Allergies:  SULFA DRUGS - Rash,Vomiting               Medications  Valid as of: May 31, 2017 -  2:55 PM    Generic Name Brand Name Tablet Size Instructions for use    Aspirin (Tablet Delayed Response) ECOTRIN 81 MG Take 81 mg by mouth every day.        Atorvastatin  Calcium (Tab) LIPITOR 40 MG Take 1 Tab by mouth every bedtime.        Carvedilol (Tab) COREG 3.125 MG Take 1 Tab by mouth 2 times a day, with meals.        Eplerenone (Tab) INSPRA 25 MG Take 1 Tab by mouth every day.        Multiple Vitamins-Minerals (Tab) THERAGRAN-M  Take 1 Tab by mouth every day.        Multiple Vitamins-Minerals   Spray 1 Tab in mouth/throat every day at 6 PM.        Ramipril (Cap) ALTACE 5 MG Take 1 Cap by mouth every day.        .                 Medicines prescribed today were sent to:     [facility] - [place], [place] - [address]    [address] [facility] [address]    Phone: [phone] Fax: [phone]    Open 24 Hours?: No      Medication refill instructions:       If your prescription bottle indicates you have medication refills left, it is not necessary to call your provider’s office. Please contact your pharmacy and they will refill your medication.    If your prescription bottle indicates you do not have any refills left, you may request refills at any time through one of the following ways: The online [employer] system (except Urgent Care), by calling your provider’s office, or by asking your pharmacy to contact your provider’s office with a refill request. Medication refills are processed only during regular business hours and may not be available until the next business day. Your provider may request additional information or to have a follow-up visit with you prior to refilling your medication.   *Please Note: Medication refills are assigned a new Rx number when refilled electronically. Your pharmacy may indicate that no refills were authorized even though a new prescription for the same medication is available at the pharmacy. Please request the medicine by name with the pharmacy before contacting your provider for a refill.        Your To Do List     Future Labs/Procedures Complete By Expires    COMP METABOLIC PANEL  As directed 5/31/2018    Echocardiogram Comp  w/o Cont  As directed 5/31/2018    Scheduling Instructions:    In 1 year    LIPID PROFILE  As directed 5/31/2018         [employer] Access Code: Activation code not generated  Current MyChart Status: Active

## 2017-05-31 NOTE — MR AVS SNAPSHOT
Alberto Fam   2017 2:40 PM   Non-Provider Visit   MRN: [id number]    Department:  Heart [facility]   Dept Phone:  [phone]    Description:  Male : 1955   Provider:  KEIKO Rizvi           Reason for Visit     Biventricular AICD           Allergies as of 2017     Allergen Noted Reactions    Sulfa Drugs 2010   Rash, Vomiting    RXN=20 years ago      You were diagnosed with     Presence of biventricular AICD   [728257]       Dilated cardiomyopathy (CMS-HCC)   [913741]       AV block, 3rd degree (CMS-HCC)   [806551]       Persistent atrial fibrillation (CMS-[facility])   [418473]         Vital Signs     Smoking Status                   Never Smoker            Basic Information     Date Of Birth Sex Race Ethnicity Preferred Language    1955 Male White Non- English      Your appointments     May 31, 2017  4:30 PM   EXERCISE with ICR RN   [facility] ([facility])    [address]  [address]  [place] [address]   [phone]            2017  3:30 PM   GROUP EDUCATION with ICR EDUCATION    [facility] ([facility])    [address]  [address]  [place] [address]   [phone]            2017  4:30 PM   EXERCISE with ICR RN   [facility])    [address]  [address]  [place] [address]   [phone]            2017  3:30 PM   GROUP EDUCATION with ICR EDUCATION    [facility] ([facility])    [address]  [address]  [place] [address]   [phone]            2017  4:30 PM   EXERCISE with ICR RN   [facility] ([facility])    [address]  [address]  [place] [address]   [phone]            2017  3:30 PM   GROUP EDUCATION with ICR EDUCATION    [facility] ([facility])    [address]  [address]  [place] [address]   [phone]              2017  4:30 PM   EXERCISE with ICR RN   [facility] ([facility])    [address]  [address]  [place] [address]   [phone]            Jun 09, 2017  3:30 PM   GROUP EDUCATION with ICR EDUCATION RM   [facility] ([facility])    [address]  [address]  [place] [address]   [phone]            Jun 09, 2017  4:30 PM   EXERCISE with ICR RN   [facility] ([facility])    [address]  [address]  [place] [address]   [phone]            Jun 12, 2017  3:30 PM   GROUP EDUCATION with ICR EDUCATION RM   [facility] ([facility])    [address]  [address]  [place] [address]   [phone]            Jun 12, 2017  4:30 PM   EXERCISE with ICR RN   [facility] ([facility])    [address]  [address]  [facility] [address]   [phone]              Problem List              ICD-10-CM Priority Class Noted - Resolved    Hypertension I10 High  Unknown - Present    S/P AV servando ablation (Chronic) Z98.890 High  3/21/2012 - Present    Dilated cardiomyopathy (CMS-HCC) (Chronic) I42.0 High  3/21/2012 - Present    Male erectile disorder N52.9 Medium  3/21/2012 - Present    Ventricular tachycardia (CMS-HCC) I47.2 High  3/21/2012 - Present    Atypical chest pain (Chronic) R07.89 Medium  3/21/2012 - Present    AV block, 3rd degree (CMS-HCC) I44.2 High  3/21/2012 - Present    SSS (sick sinus syndrome) (CMS-HCC) I49.5 High  9/28/2012 - Present    Presence of biventricular AICD Z95.810 High  6/6/2013 - Present    Arthritis M19.90 Low  8/20/2013 - Present    Chronic systolic congestive heart failure, NYHA class 3 (CMS-HCC) I50.22   10/13/2015 - Present    Homograft cardiac valve stenosis I38   12/16/2016 - Present    Severe aortic stenosis I35.0   1/13/2017 - Present    CAD (coronary artery disease) I25.10   1/13/2017 - Present    Heart valve replaced (Chronic) Z95.2   2/17/2017 - Present     Pericardial effusion I31.3   2/20/2017 - Present    Pleural effusion J90   2/20/2017 - Present    CHF exacerbation (CMS-HCC) I50.9   2/20/2017 - Present    Hemorrhagic disorder due to circulating anticoagulants (CMS-HCC) D68.318   2/21/2017 - Present    S/P AVR (aortic valve replacement) (Chronic) Z95.2   2/21/2017 - Present    Hypokalemia E87.6   2/21/2017 - Present    Long term (current) use of anticoagulants [Z79.01] Z79.01   3/27/2017 - Present    Atrial fibrillation (CMS-HCC) I48.91   5/31/2017 - Present      Health Maintenance        Date Due Completion Dates    IMM DTaP/Tdap/Td Vaccine (1 - Tdap) 4/5/1976 ---    COLONOSCOPY 4/5/2007 ---    IMM ZOSTER VACCINE 4/5/2017 ---            Current Immunizations     Influenza TIV (IM) 10/30/2009    Influenza Vaccine Adult HD 11/2/2016    Pneumococcal polysaccharide vaccine (PPSV-23) 2/21/2017  5:32 AM      Below and/or attached are the medications your provider expects you to take. Review all of your home medications and newly ordered medications with your provider and/or pharmacist. Follow medication instructions as directed by your provider and/or pharmacist. Please keep your medication list with you and share with your provider. Update the information when medications are discontinued, doses are changed, or new medications (including over-the-counter products) are added; and carry medication information at all times in the event of emergency situations     Allergies:  SULFA DRUGS - Rash,Vomiting               Medications  Valid as of: May 31, 2017 -  3:48 PM    Generic Name Brand Name Tablet Size Instructions for use    Aspirin (Tablet Delayed Response) ECOTRIN 81 MG Take 81 mg by mouth every day.        Atorvastatin Calcium (Tab) LIPITOR 40 MG Take 1 Tab by mouth every bedtime.        Carvedilol (Tab) COREG 3.125 MG Take 1 Tab by mouth 2 times a day, with meals.        Eplerenone (Tab) INSPRA 25 MG Take 1 Tab by mouth every day.        Multiple Vitamins-Minerals  (Tab) THERAGRAN-M  Take 1 Tab by mouth every day.        Multiple Vitamins-Minerals   Spray 1 Tab in mouth/throat every day at 6 PM.        Ramipril (Cap) ALTACE 5 MG Take 1 Cap by mouth every day.        Warfarin Sodium (Tab) COUMADIN 5 MG Take 1 Tab by mouth every day.        .                 Medicines prescribed today were sent to:     [facility] - [facility] [address] [place], [place] [address]    [address] [facility] [address]    Phone: [phone] Fax: [phone]    Open 24 Hours?: No      Medication refill instructions:       If your prescription bottle indicates you have medication refills left, it is not necessary to call your provider’s office. Please contact your pharmacy and they will refill your medication.    If your prescription bottle indicates you do not have any refills left, you may request refills at any time through one of the following ways: The online [employer] system (except Urgent Care), by calling your provider’s office, or by asking your pharmacy to contact your provider’s office with a refill request. Medication refills are processed only during regular business hours and may not be available until the next business day. Your provider may request additional information or to have a follow-up visit with you prior to refilling your medication.   *Please Note: Medication refills are assigned a new Rx number when refilled electronically. Your pharmacy may indicate that no refills were authorized even though a new prescription for the same medication is available at the pharmacy. Please request the medicine by name with the pharmacy before contacting your provider for a refill.           [employer] Access Code: Activation code not generated  Current [employer] Status: Active

## 2017-05-31 NOTE — Clinical Note
[facility]   [address]   [address]  [place] [address]  Phone: [phone]  Fax: [phone]              Alberto Fam  1955    Encounter Date: 5/31/2017    Abbie Elder M.D.          PROGRESS NOTE:  Subjective:   Alberto Fam is a 62 y.o. male with known history of VSD status post repair at age 4 1/2 yr , NICMP angiogram from 2011 no significant CAD, type II Mobitz block s/p PPM 11/2006, later upgraded to BIV ICD in 8/2011, LVEF 35-40% in 2013. EF in 2015 showed drop in LVEF to 25%, severe aortic stenosis on dobutamine stress echo s/p AVR with 23mm Moy Perimount Magna pericardial valve on 2/17 presenting today for follow-up evaluation of valvular heart disease.    Since valve replacement patient functional status has significantly improved. Patient denied any complaints of exertional chest pain, pressure or tightness. No complaints of dizziness, lightheadedness or LOC. Currently not on Lasix or potassium supplements. Denied any complaints of orthopnea or PND.    Past Medical History   Diagnosis Date   • Congestive heart failure (CMS-[facility])    • S/P AV servando ablation     • Dilated cardiomyopathy September 2013     Echocardiogram with dilated LV, moderate concentric LVH, LVEF 35-40%.   • Male erectile disorder     • Ventricular tachycardia (CMS-[facility])     • SSS (sick sinus syndrome)     • AV block, 3rd degree     • Aortic stenosis December 2016     Dobutamine stress echo with severe AS (peak 89mmHg, mean 54mmHg, ACE 0.6cm2).   • CHF (congestive heart failure) (CMS-[facility])    • Bowel habit changes      Constipation   • Dental disorder      Upper   • Arthritis      Low back   • Hypertension      Pt states well controlled on meds   • Pneumonia 12/2014   • Pain      lower back pain    • Breath shortness      d/t heart condition   • AICD (automatic cardioverter/defibrillator) present      Past Surgical History   Procedure  Laterality Date   • Recovery  8/29/2011     Performed by SURGERY, CATH-RECOVERY at SURGERY SAME DAY [facility] ORS   • Aicd implant  August 2011     Medtronic Concerto II CRT-D [id number]   • Ventral septal defect repair  1961   • Pacemaker insertion  2006   • Aicd battery change  November 2016     Generator change with Medtronic Viva S CRT-D [id number] implanted by Dr. Kwong.   • Hernia repair  1966   • Sternotomy  2/8/2017     Procedure: STERNOTOMY - REDO;  Surgeon: Alfonso Llanos M.D.;  Location: SURGERY [facility];  Service:    • Aortic valve replacement  2/8/2017     Procedure: AORTIC VALVE REPLACEMENT;  Surgeon: Alfonso Llanos M.D.;  Location: SURGERY [facility];  Service:    • Marques  2/8/2017     Procedure: MARQUES;  Surgeon: Alfonso Llanos M.D.;  Location: SURGERY [facility];  Service:      History reviewed. No pertinent family history.  History   Smoking status   • Never Smoker    Smokeless tobacco   • Never Used     Comment: quit 35 years ago      Allergies   Allergen Reactions   • Sulfa Drugs Rash and Vomiting     RXN=20 years ago     Outpatient Encounter Prescriptions as of 5/31/2017   Medication Sig Dispense Refill   • atorvastatin (LIPITOR) 40 MG Tab Take 1 Tab by mouth every bedtime. 90 Tab 3   • eplerenone (INSPRA) 25 MG Tab Take 1 Tab by mouth every day. 90 Tab 3   • carvedilol (COREG) 3.125 MG Tab Take 1 Tab by mouth 2 times a day, with meals. 180 Tab 3   • Multiple Vitamins-Minerals (MULTIVITAMIN ADULT PO) Spray 1 Tab in mouth/throat every day at 6 PM.     • ramipril (ALTACE) 5 MG Cap Take 1 Cap by mouth every day. 30 Cap 11   • aspirin EC (ECOTRIN) 81 MG Tablet Delayed Response Take 81 mg by mouth every day.     • therapeutic multivitamin-minerals (THERAGRAN-M) Tab Take 1 Tab by mouth every day.     • Valerian Root 100 MG Cap Spray 100 mg in mouth/throat every bedtime.     • furosemide (LASIX) 40 MG Tab Take 40 mg by mouth every day.     • potassium chloride SA (KDUR) 20 MEQ Tab CR Take 20  mEq by mouth every day.     • hydrocodone-acetaminophen (NORCO) 5-325 MG Tab per tablet Take 1 Tab by mouth every 8 hours as needed. Indications: Moderate to Moderately Severe Pain     • docusate calcium (SURFAK) 240 MG Cap Take 240 mg by mouth every day.     • warfarin (COUMADIN) 5 MG Tab Take 1.5 Tabs by mouth COUMADIN-DAILY. 45 Tab 3     No facility-administered encounter medications on file as of 5/31/2017.     Review of Systems   Constitutional: Negative for fever.   Eyes: Negative for blurred vision.   Respiratory: Negative for cough and shortness of breath.    Cardiovascular: Negative for chest pain, palpitations, orthopnea, claudication, leg swelling and PND.   Gastrointestinal: Negative for abdominal pain and blood in stool.        Abdominal bloating   Genitourinary: Negative for dysuria and hematuria.   Musculoskeletal: Positive for joint pain. Negative for myalgias.        He has chronic severe back pain and has been using Celebrex on a regular basis.   Skin: Negative for rash.   Neurological: Negative for dizziness and headaches.   Psychiatric/Behavioral: Negative for depression. The patient is not nervous/anxious.         Objective:   /74 mmHg  Pulse 63  Wt 85.73 kg (189 lb)  SpO2 95%    Physical Exam   Constitutional: He is oriented to person, place, and time. He appears well-developed and well-nourished.   HENT:   Mouth/Throat: Oropharynx is clear and moist.   Eyes: Conjunctivae and EOM are normal.   Neck: No JVD present. No thyroid mass and no thyromegaly present.   There is no JVD.   Cardiovascular: Normal rate, S1 normal, S2 normal and normal pulses.  An irregularly irregular rhythm present. PMI is not displaced.  Exam reveals no gallop.    Murmur heard.   Systolic murmur is present with a grade of 1/6   Pulses:       Carotid pulses are 2+ on the right side, and 2+ on the left side.       Radial pulses are 2+ on the right side, and 2+ on the left side.        Dorsalis pedis pulses are 2+  on the right side, and 2+ on the left side.   Soft systolic murmur best heard in the aortic area.   Pulmonary/Chest: Effort normal and breath sounds normal. No respiratory distress. He has no wheezes. He has no rales.   Abdominal: Soft. Normal appearance. He exhibits no abdominal bruit. There is no hepatosplenomegaly. There is no tenderness.   Musculoskeletal: Normal range of motion. He exhibits no edema.        Thoracic back: He exhibits no tenderness and no spasm.   Lymphadenopathy:     He has no cervical adenopathy.   Neurological: He is alert and oriented to person, place, and time.   Skin: No rash noted. No cyanosis. Nails show no clubbing.   Psychiatric: He has a normal mood and affect.     TTE: 2/21/17  Left ventricular ejection fraction is visually estimated to be 20%.  Small-moderate sized (<1 cm in end diastole) pericardial effusion without evidence of hemodynamic compromise.  Known bioprosthetic aortic valve, mean gradient 18 mmHg - with severely depressed cariac output, flow impairment may be underestimated.  Large left pleural effusion.  Right ventricular systolic pressure is estimated to be at least 35 mmHg.  Compared with the intraoperative JUSTINO 2/8 - the effusions are new.    Carotid doppler: 1/13/17  Normal bilateral carotid exam.   Flow within both subclavian arteries appears to be within normal limits. Antegrade flow, bilateral vertebral arteries    Angiogram: 12/16/16   1.  Minor plaquing in the left anterior descending artery proximal, otherwise, normal coronary angiography.  2.  Moderate dilatation of the ascending aorta    Dobutamine stress echo: 12/16/17  1.  Minor plaquing in the left anterior descending artery proximal, otherwise, normal coronary angiography.  2.  Moderate dilatation of the ascending aorta.  Results for LUDAJAY EDUARDO HARRY (MRN [id number]) as of 12/27/2016 10:38   7/16/2013 11/21/2016 12/14/2016    Sodium 140 138 138   Potassium 3.5 (L) 4.0 4.0   Chloride 104 102 102   Co2 30  28 28   Anion Gap 6.0 8.0 8.0   Glucose 81 95 78   Bun 18 19 21   Creatinine 0.97 1.01 1.13   GFR If Non  >60 >60 >60   Calcium 9.7 10.4 9.9   AST(SGOT) 19 20    ALT(SGPT) 20 28    Alkaline Phosphatase 44 47    Lactic Acid      Cholesterol,Tot 159     Triglycerides 118     HDL 40     LDL 95       EKG: 10/20/15  EKG personally reviewed by me.  AV paced at 63 BPM.    TTE: 5/5/15  Left ventricle is moderately dilated. Severely reduced left ventricular systolic function. Global hypokinesis. Left ventricular ejection fraction is 20% to 25%. Grade IV diastolic dysfunction.  Severely increased LA volume.  Mild mitral regurgitation.  No aortic stenosis based on gradients across aortic valve. Peak gradient 29 mmHg. Mean 16 mmHg. Dimensionless index=.25. Ttrace aortic insufficiency. Gradients could be underestimated with low LVEF consider   clinical correlation (based on Dimensionless index of 0.25 severe aortic stenosis).  Mild tricuspid regurgitation. Right ventricular systolic pressure is estimated to be 23. RA pressure 15 mmHg.  Aortic root diameter 3.7 cm.  Compared to the images of the prior study done 9/4/13  there has been drop in LVEF from 35-40% to current echo LVEF 20-25%. Prior echo AV MG 23 mmHg and PG 38 mmHg.    Transthoracic echo: 9/4/13   Aortic stenosis, w low flow and low DI suggests moderate to severe, but does not appeart severeLeft ventricular ejection fraction is 35% to 40%. Grade I-II diastolic dysfunction is present.  Left ventricle is moderately dilated.  Compared w the prior echo, the EF is slightly less    Nuclear stress test: 7/19/11  1. Normal left ventricular perfusion with no fixed or reversible defects.  2. Marked left ventricular dilatation, with profound global hypokinesis, and a low ejection fraction of 17%.    Coronary angiogram: 8/29/2011  1.  Normal coronary arteries.  2.  Moderate to severely reduced systolic function.  3.  Normal end-diastolic pressure.  4.  Pacemaker  leads in place.  5. LVEF 25-30%    Coronary angiogram: 10/16/2006  1. Left ventricular dysfunction with left ventricular enlargement and moderate hypokinesis of the mid anterior wall and inferobasal segment.  2. Normal epicardial coronary arteries with atypical left main which is more of a pouch-like structure.  3. Normal resting hemodynamics.    Assessment:     1. NICMP LVEF 25%  2. Aortic stenosis s/p AVR  3. BIV ICD.  4. Dyslipidemia  5. Atrial fibrillation  Medical Decision Making:  Today's Assessment / Status / Plan:     1.  Patient appears euvolemic.  Continue eplerenone 25 mg daily.  Continue ramipril 5 mg daily  2. Recent echocardiogram showed stable gradients across aortic valve.   Patient will need antibiotics before any dental workup.  Echocardiogram in one year.  3. Device interrogation showed patient to be in A. fib.   Restart Coumadin. Will refer patient to Coumadin clinic.   4. Continue Lipitor 40 mg qHs.  5. LV wall thickness is more than 1.5 cm not a candidate for flecainide up with off and on.  Will refer patient to see EP for initiation of either sotalol or Tikosyn.    Follow-up in one year.  Referral to EP for management of atrial fibrillation.  Echo and labs prior to next visit.    Thank you for allowing me to participate in taking care of patient.    Abbie Elder. MD.      Trevor Stephen, [place].  [address] [place] [address]  VIA Facsimile: [phone]

## 2017-05-31 NOTE — PROGRESS NOTES
Device is working normally.  No device therapy.  Mode switching episodes 98.3% of total time; persistent atrial fibrillation since February 2017 (new finding).  Normal sensing of RA and RV leads; stable capture of RV and LV leads; stable impedances. Battery longevity is 4.5 years.  No changes are made today.    Above information is relayed to Dr. Elder, who started him on Coumadin and referred him to EP for Sotalol initiation.    FU to be determined after that.    Collaborating MD: Jerrod

## 2017-05-31 NOTE — PROGRESS NOTES
Alberto Fam attending cooking school.  Today  prepared pumpkin pancakes.    Patient received handouts and nutrition information regarding the specific recipes.

## 2017-06-02 ENCOUNTER — NON-PROVIDER VISIT (OUTPATIENT)
Dept: CARDIOLOGY | Facility: MEDICAL CENTER | Age: 62
End: 2017-06-02
Payer: COMMERCIAL

## 2017-06-02 VITALS
HEIGHT: 71 IN | WEIGHT: 191.5 LBS | HEART RATE: 81 BPM | SYSTOLIC BLOOD PRESSURE: 126 MMHG | BODY MASS INDEX: 26.81 KG/M2 | DIASTOLIC BLOOD PRESSURE: 76 MMHG

## 2017-06-02 DIAGNOSIS — Z95.2 S/P AVR (AORTIC VALVE REPLACEMENT): Chronic | ICD-10-CM

## 2017-06-02 DIAGNOSIS — Z95.2 HEART VALVE REPLACED: Chronic | ICD-10-CM

## 2017-06-02 PROCEDURE — G0422 INTENS CARDIAC REHAB W/EXERC: HCPCS | Performed by: FAMILY MEDICINE

## 2017-06-02 PROCEDURE — G0423 INTENS CARDIAC REHAB NO EXER: HCPCS | Mod: 59 | Performed by: FAMILY MEDICINE

## 2017-06-02 ASSESSMENT — PAIN SCALES - GENERAL: PAINLEVEL: NO PAIN

## 2017-06-05 ENCOUNTER — NON-PROVIDER VISIT (OUTPATIENT)
Dept: CARDIOLOGY | Facility: MEDICAL CENTER | Age: 62
End: 2017-06-05
Payer: COMMERCIAL

## 2017-06-05 VITALS
BODY MASS INDEX: 26.95 KG/M2 | WEIGHT: 192.5 LBS | SYSTOLIC BLOOD PRESSURE: 118 MMHG | HEART RATE: 79 BPM | DIASTOLIC BLOOD PRESSURE: 76 MMHG | HEIGHT: 71 IN

## 2017-06-05 DIAGNOSIS — I44.2 AV BLOCK, 3RD DEGREE (HCC): ICD-10-CM

## 2017-06-05 DIAGNOSIS — I25.10 CORONARY ARTERY DISEASE INVOLVING NATIVE CORONARY ARTERY OF NATIVE HEART WITHOUT ANGINA PECTORIS: ICD-10-CM

## 2017-06-05 DIAGNOSIS — Z95.2 S/P AVR (AORTIC VALVE REPLACEMENT): Chronic | ICD-10-CM

## 2017-06-05 DIAGNOSIS — I50.22 CHRONIC SYSTOLIC CONGESTIVE HEART FAILURE, NYHA CLASS 3 (HCC): ICD-10-CM

## 2017-06-05 DIAGNOSIS — I42.0 DILATED CARDIOMYOPATHY (HCC): Chronic | ICD-10-CM

## 2017-06-05 DIAGNOSIS — Z95.2 HEART VALVE REPLACED: Chronic | ICD-10-CM

## 2017-06-05 DIAGNOSIS — I48.19 PERSISTENT ATRIAL FIBRILLATION (HCC): ICD-10-CM

## 2017-06-05 DIAGNOSIS — I35.0 SEVERE AORTIC STENOSIS: ICD-10-CM

## 2017-06-05 DIAGNOSIS — Z98.890 S/P AV NODAL ABLATION: Chronic | ICD-10-CM

## 2017-06-05 PROCEDURE — G0422 INTENS CARDIAC REHAB W/EXERC: HCPCS | Performed by: FAMILY MEDICINE

## 2017-06-05 PROCEDURE — G0423 INTENS CARDIAC REHAB NO EXER: HCPCS | Mod: 59 | Performed by: FAMILY MEDICINE

## 2017-06-05 ASSESSMENT — PAIN SCALES - GENERAL: PAINLEVEL: NO PAIN

## 2017-06-07 ENCOUNTER — NON-PROVIDER VISIT (OUTPATIENT)
Dept: CARDIOLOGY | Facility: MEDICAL CENTER | Age: 62
End: 2017-06-07
Payer: COMMERCIAL

## 2017-06-07 VITALS
WEIGHT: 194.5 LBS | HEIGHT: 71 IN | SYSTOLIC BLOOD PRESSURE: 110 MMHG | HEART RATE: 78 BPM | DIASTOLIC BLOOD PRESSURE: 72 MMHG | BODY MASS INDEX: 27.23 KG/M2

## 2017-06-07 DIAGNOSIS — Z95.2 HEART VALVE REPLACED: Chronic | ICD-10-CM

## 2017-06-07 DIAGNOSIS — Z95.2 S/P AVR (AORTIC VALVE REPLACEMENT): Chronic | ICD-10-CM

## 2017-06-07 PROCEDURE — G0423 INTENS CARDIAC REHAB NO EXER: HCPCS | Mod: 59 | Performed by: FAMILY MEDICINE

## 2017-06-07 PROCEDURE — G0422 INTENS CARDIAC REHAB W/EXERC: HCPCS | Performed by: FAMILY MEDICINE

## 2017-06-07 ASSESSMENT — PAIN SCALES - GENERAL: PAINLEVEL: NO PAIN

## 2017-06-09 ENCOUNTER — NON-PROVIDER VISIT (OUTPATIENT)
Dept: CARDIOLOGY | Facility: MEDICAL CENTER | Age: 62
End: 2017-06-09
Payer: COMMERCIAL

## 2017-06-09 VITALS
WEIGHT: 192.5 LBS | SYSTOLIC BLOOD PRESSURE: 105 MMHG | HEIGHT: 71 IN | DIASTOLIC BLOOD PRESSURE: 63 MMHG | HEART RATE: 75 BPM | BODY MASS INDEX: 26.95 KG/M2

## 2017-06-09 DIAGNOSIS — I35.0 SEVERE AORTIC STENOSIS: ICD-10-CM

## 2017-06-09 DIAGNOSIS — Z95.2 HEART VALVE REPLACED: Chronic | ICD-10-CM

## 2017-06-09 DIAGNOSIS — Z95.2 S/P AVR (AORTIC VALVE REPLACEMENT): Chronic | ICD-10-CM

## 2017-06-09 DIAGNOSIS — Z98.890 S/P AV NODAL ABLATION: Chronic | ICD-10-CM

## 2017-06-09 PROCEDURE — G0423 INTENS CARDIAC REHAB NO EXER: HCPCS | Mod: 59 | Performed by: FAMILY MEDICINE

## 2017-06-09 PROCEDURE — G0422 INTENS CARDIAC REHAB W/EXERC: HCPCS | Performed by: FAMILY MEDICINE

## 2017-06-09 ASSESSMENT — PAIN SCALES - GENERAL: PAINLEVEL: NO PAIN

## 2017-06-12 ENCOUNTER — NON-PROVIDER VISIT (OUTPATIENT)
Dept: CARDIOLOGY | Facility: MEDICAL CENTER | Age: 62
End: 2017-06-12
Payer: COMMERCIAL

## 2017-06-12 VITALS
HEART RATE: 70 BPM | HEIGHT: 71 IN | BODY MASS INDEX: 26.88 KG/M2 | DIASTOLIC BLOOD PRESSURE: 77 MMHG | SYSTOLIC BLOOD PRESSURE: 122 MMHG | WEIGHT: 192 LBS

## 2017-06-12 DIAGNOSIS — Z95.2 S/P AVR (AORTIC VALVE REPLACEMENT): Chronic | ICD-10-CM

## 2017-06-12 DIAGNOSIS — Z95.2 HEART VALVE REPLACED: Chronic | ICD-10-CM

## 2017-06-12 DIAGNOSIS — I50.22 CHRONIC SYSTOLIC CONGESTIVE HEART FAILURE, NYHA CLASS 3 (HCC): ICD-10-CM

## 2017-06-12 DIAGNOSIS — I42.0 DILATED CARDIOMYOPATHY (HCC): Chronic | ICD-10-CM

## 2017-06-12 PROCEDURE — G0423 INTENS CARDIAC REHAB NO EXER: HCPCS | Mod: 59 | Performed by: FAMILY MEDICINE

## 2017-06-12 PROCEDURE — G0422 INTENS CARDIAC REHAB W/EXERC: HCPCS | Performed by: FAMILY MEDICINE

## 2017-06-12 ASSESSMENT — PATIENT HEALTH QUESTIONNAIRE - PHQ9: SUM OF ALL RESPONSES TO PHQ QUESTIONS 1-9: 3

## 2017-06-12 ASSESSMENT — PAIN SCALES - GENERAL: PAINLEVEL: NO PAIN

## 2017-06-13 NOTE — PROGRESS NOTES
Cardiac Rehab Individual Treatment Plan Assessment: Discharge 2017 Session #     72   MRN: [id number]   Allergies: Sulfa drugs   Patient Name: Alberto Fam : 1955 Risk Stratification: High    Diagnoses:   1. Chronic systolic congestive heart failure, NYHA class 3 (CMS-HCC)    2. Dilated cardiomyopathy (CMS-HCC)    3. Heart valve replaced    4. S/P AVR (aortic valve replacement)     Age: 62 y.o. Physician: Trevor Stephen D.O.    Date of Event: 17 Specialist: Jerrod   Risk Factors:  Hypertension, Hyperlipidemia, Age, Male > 45   Exercise Nutrition Education Psychosocial   Stages of change Stages of change Stages of change Stages of change   Action Action Action Action   Fitness Test Lipids Learning Barriers Outcome Survey Tools   DIST: [id number]  Available: No new Learning Barriers: Vision FP QOL Overall Score: 26.25   Max HR: 85 Date: 17 Family Support PHQ-9: 3   RPE: 14 Total: 121 mg/dL Yes Nutrition Screen: 53 %   SPO2: (!) 87 % Tri mg/dL Lives: Spouse Intervention   MET: 4.6 HDL: 43 mg/dL Tobacco Use Behavioral Health Consult: N/A   EF= 45 (2017) LDL: 54 mg/dL History   Smoking status   • Never Smoker    Smokeless tobacco   • Never Used     Comment: quit 35 years ago       Physician Referral: No   Ambulatory Status Diabetes Smoking Intervention Identifies Stressors: Yes   Fall Risk Assessed: No Diabetes: No Smoking Cessation Referral: N/A Drug Intervention: Yes   Exercise Ambulatory Status Assist Devices: None HbA1C:  (na) Date:  (na) Ind. Education / Counseling: N/A Education   Exercise Prescription Monitors BS at Home: No Tobacco Adjunct: N/A Psychosocial Education: Coping Techniques, Positive Support System, S/S Depression   Mode: Treadmill, NuStep, Bike, Airdyne   Frequency: na Random BS: 95 Education Intervention Target Goal   Frequency:  3 days/week Weight Management Healthy Heart Education: Class Schedule Given, Cooking School Attended, Dietician One-on-One  "Meeting Attended, Medications Reviewed, Patient Education Binder Provided, Risk Factors Discussed, Videos Viewed per Janice, Workshops Attended Assess presence or absence of depression using a valid screening: N/A   Duration:  45-60min Weight: 87.091 kg (192 lb) Target Goal Use Stress Management: Yes   Intensity:  11-13 RPE Height: 180.3 cm (5' 10.98\") Complete Tobacco Cessation: Complete Tobacco Cessation: N/A Adverse Events: No   METS:  1.0-3.0 BMI (Calculated): 26.79 Educate / Review and have understanding of cardiac disease prevention: Educate / Review and have understanding of cardiac disease prevention: Yes Unexpected Events: No   Progression:  ^ increments of 1-3 to THR/RPE as tolerated Goal weight: 192lb Medication Compliance: Yes    THR: THR: 20-30 BPM ^Resting HR History   Alcohol Use   • 1.2 oz/week   • 2 Standard drinks or equivalent per week     Comment: 1-2 drinks per day          Angina with Exercise?  Angina with Exercise: No      Resistance Training?  Resistance Training: Yes      Hypertension      Diagnosed with HTN: Yes Intervention      Resting BP: 119/71 mmHg Dietician Consult/Class: Yes      Peak Ex BP: 150/101 mmHg Nurse/Patient Discussion: Yes     Intervention Diabetes Ed Referral: N/A     Home Exercise:  Yes Discuss Maintenance /Wt Loss: Yes     Mode: Walk, Other Attend Cooking School: Yes     Duration: 30-45min Dietary Goal: low fat 24%, Low sodium     Frequency: 7 days active Education     Education Nutrition Education: Healthy Eating, Sodium Reduction     Equipment Orientation, Exercise Safety, S/S to Report, RPE Scale, Warm Up / Cool Down, Physically Active Target Goal     Target Goal LDL-C < 100 if trig. > 200:  N/A       Start Individual Exercise Rx Yes LDL-C < 70 for high risk patient:  Yes       BP < 140/90 or < 130/80 if DM or CKD Yes Non HDL-C Should be < 130:  Yes       Aerobic activity 30 + min / day 5 days / wk Yes HbA1C < 7%: Yes         BMI < 25: Yes       Notes: Myles has " been really busy recently, so his only exercising has been at [facility]. He plans to start exercising regularly again once his schedule clears back up.  Myles doesn't follow the Pritikin diet exactly but he impliments it into his diet. He states that his diet has improved alot and he is making better choices. 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 Myles said that the Program was wonderful and helped put him into a routine. He felt like it was easier to come in because to people that he worked out with and people that helped him work out. He didn't have any expectations coming into the program but felt all his goals were met.

## 2017-06-16 ENCOUNTER — TELEPHONE (OUTPATIENT)
Dept: CARDIOLOGY | Facility: MEDICAL CENTER | Age: 62
End: 2017-06-16

## 2017-06-16 DIAGNOSIS — Z79.01 LONG TERM (CURRENT) USE OF ANTICOAGULANTS: ICD-10-CM

## 2017-06-16 DIAGNOSIS — I48.19 PERSISTENT ATRIAL FIBRILLATION (HCC): ICD-10-CM

## 2017-06-16 NOTE — TELEPHONE ENCOUNTER
Agree with you  Get stat INR than we can adjust coumadin.  Can u also forward information to coumadin clinic    raymundo

## 2017-06-16 NOTE — TELEPHONE ENCOUNTER
----- Message from Cherri Dumas sent at 6/16/2017 10:40 AM PDT -----  Regarding: Patient wants call back about INR testing  AM/Amanda    Patient wants to find out about his INR testing and can be reached at [phone].

## 2017-06-16 NOTE — TELEPHONE ENCOUNTER
Patient was referred to [facility] on 5/31 and has not heard anything yet- and has not had an INR yet.  I asked him to have his INR done today or tomorrow and call the clinic on Monday if they do not contact him by noon.  If it is abnormal over the weekend, I explained that the on-call doctor would contact him.  I contacted the Coumadin Clinic regarding this and calling him on Monday to get him scheduled.    Patient also thought that he was having a cardioversion and has not heard anything about that.  I explained to him that an EP referral was ordered and I made him an appointment for that and contacted Krystal to try to get him in sooner.  Per Krystal- Dr. Kwong is available at the end of the month so that will be rescheduled.      Patient was instructed to call me if he has any questions.

## 2017-06-18 NOTE — TELEPHONE ENCOUNTER
Patient did not get his INR done as recommended.  To Deirdre H to look for it on Monday.  I sent him a MY CHART message to get it done.

## 2017-06-19 ENCOUNTER — ANTICOAGULATION VISIT (OUTPATIENT)
Dept: MEDICAL GROUP | Facility: MEDICAL CENTER | Age: 62
End: 2017-06-19
Payer: COMMERCIAL

## 2017-06-19 VITALS — DIASTOLIC BLOOD PRESSURE: 73 MMHG | SYSTOLIC BLOOD PRESSURE: 115 MMHG | HEART RATE: 63 BPM

## 2017-06-19 DIAGNOSIS — Z95.2 S/P AVR (AORTIC VALVE REPLACEMENT): ICD-10-CM

## 2017-06-19 DIAGNOSIS — I48.19 PERSISTENT ATRIAL FIBRILLATION (HCC): ICD-10-CM

## 2017-06-19 DIAGNOSIS — Z79.01 LONG TERM (CURRENT) USE OF ANTICOAGULANTS: ICD-10-CM

## 2017-06-19 LAB — INR PPP: 2.4 (ref 2–3.5)

## 2017-06-19 PROCEDURE — 85610 PROTHROMBIN TIME: CPT | Performed by: PHYSICIAN ASSISTANT

## 2017-06-19 NOTE — MR AVS SNAPSHOT
Alberto Fam   2017 1:45 PM   Anticoagulation Visit   MRN: [id number]    Department:  [facility]   Dept Phone:  [phone]    Description:  Male : 1955   Provider:  [facility]           Allergies as of 2017     Allergen Noted Reactions    Sulfa Drugs 2010   Rash, Vomiting    RXN=20 years ago      You were diagnosed with     Persistent atrial fibrillation (CMS-HCC)   [170230]       S/P AVR (aortic valve replacement)   [474290]       Long term (current) use of anticoagulants   [V58.61.ICD-9-CM]         Vital Signs     Blood Pressure Pulse Smoking Status             115/73 mmHg 63 Never Smoker          Basic Information     Date Of Birth Sex Race Ethnicity Preferred Language    1955 Male White Non- English      Your appointments     2017  2:00 PM   Anti-Coag Routine with [facility]ARM[facility]   [facility] ([place])    [address]  [address]  [place] [address] [phone]            2017 12:40 PM   PREVIOUS PATIENT with Fabrice Kwong M.D.   [facility] (--)    [address], [address]  [place] [address] [phone]              Problem List              ICD-10-CM Priority Class Noted - Resolved    Hypertension I10 High  Unknown - Present    S/P AV servando ablation (Chronic) Z98.890 High  3/21/2012 - Present    Dilated cardiomyopathy (CMS-HCC) (Chronic) I42.0 High  3/21/2012 - Present    Male erectile disorder N52.9 Medium  3/21/2012 - Present    Ventricular tachycardia (CMS-HCC) I47.2 High  3/21/2012 - Present    Atypical chest pain (Chronic) R07.89 Medium  3/21/2012 - Present    AV block, 3rd degree (CMS-HCC) I44.2 High  3/21/2012 - Present    SSS (sick sinus syndrome) (CMS-HCC) I49.5 High  2012 - Present    Presence of biventricular AICD Z95.810 High  2013 - Present    Arthritis M19.90 Low  2013 - Present    Chronic systolic  congestive heart failure, NYHA class 3 (CMS-HCC) I50.22   10/13/2015 - Present    Homograft cardiac valve stenosis I38   12/16/2016 - Present    Severe aortic stenosis I35.0   1/13/2017 - Present    CAD (coronary artery disease) I25.10   1/13/2017 - Present    Heart valve replaced (Chronic) Z95.2   2/17/2017 - Present    Pericardial effusion I31.3   2/20/2017 - Present    Pleural effusion J90   2/20/2017 - Present    CHF exacerbation (CMS-HCC) I50.9   2/20/2017 - Present    Hemorrhagic disorder due to circulating anticoagulants (CMS-HCC) D68.318   2/21/2017 - Present    S/P AVR (aortic valve replacement) (Chronic) Z95.2   2/21/2017 - Present    Hypokalemia E87.6   2/21/2017 - Present    Long term (current) use of anticoagulants [Z79.01] Z79.01   3/27/2017 - Present    Atrial fibrillation (CMS-HCC) I48.91   5/31/2017 - Present      Health Maintenance        Date Due Completion Dates    IMM DTaP/Tdap/Td Vaccine (1 - Tdap) 4/5/1976 ---    COLONOSCOPY 4/5/2007 ---    IMM ZOSTER VACCINE 4/5/2017 ---            Results     POCT Protime      Component    INR    2.4    Comment:     ic valid [id number] 160 exp 03/2018                        Current Immunizations     Influenza TIV (IM) 10/30/2009    Influenza Vaccine Adult HD 11/2/2016    Pneumococcal polysaccharide vaccine (PPSV-23) 2/21/2017  5:32 AM      Below and/or attached are the medications your provider expects you to take. Review all of your home medications and newly ordered medications with your provider and/or pharmacist. Follow medication instructions as directed by your provider and/or pharmacist. Please keep your medication list with you and share with your provider. Update the information when medications are discontinued, doses are changed, or new medications (including over-the-counter products) are added; and carry medication information at all times in the event of emergency situations     Allergies:  SULFA DRUGS - Rash,Vomiting               Medications   Valid as of: June 19, 2017 -  1:45 PM    Generic Name Brand Name Tablet Size Instructions for use    Aspirin (Tablet Delayed Response) ECOTRIN 81 MG Take 81 mg by mouth every day.        Atorvastatin Calcium (Tab) LIPITOR 40 MG Take 1 Tab by mouth every bedtime.        Carvedilol (Tab) COREG 3.125 MG Take 1 Tab by mouth 2 times a day, with meals.        Eplerenone (Tab) INSPRA 25 MG Take 1 Tab by mouth every day.        Multiple Vitamins-Minerals (Tab) THERAGRAN-M  Take 1 Tab by mouth every day.        Multiple Vitamins-Minerals   Spray 1 Tab in mouth/throat every day at 6 PM.        Ramipril (Cap) ALTACE 5 MG Take 1 Cap by mouth every day.        Warfarin Sodium (Tab) COUMADIN 5 MG Take 1 Tab by mouth every day.        .                 Medicines prescribed today were sent to:     [facility] - [address]    [address] [facility] [address]    Phone: [phone] Fax: [phone]    Open 24 Hours?: No      Medication refill instructions:       If your prescription bottle indicates you have medication refills left, it is not necessary to call your provider’s office. Please contact your pharmacy and they will refill your medication.    If your prescription bottle indicates you do not have any refills left, you may request refills at any time through one of the following ways: The online [employer] system (except Urgent Care), by calling your provider’s office, or by asking your pharmacy to contact your provider’s office with a refill request. Medication refills are processed only during regular business hours and may not be available until the next business day. Your provider may request additional information or to have a follow-up visit with you prior to refilling your medication.   *Please Note: Medication refills are assigned a new Rx number when refilled electronically. Your pharmacy may indicate that no refills were authorized even though a new prescription for the same  medication is available at the pharmacy. Please request the medicine by name with the pharmacy before contacting your provider for a refill.        Warfarin Dosing Calendar   June 2017 Details    Sun Mon Tue Wed Thu Fri Sat         1               2               3                 4               5               6               7               8               9               10                 11               12               13               14               15               16               17                 18               19   2.4   5 mg   See details      20      5 mg         21      5 mg         22      5 mg         23      5 mg         24      5 mg           25      5 mg         26      5 mg         27               28               29               30                 Date Details   06/19 This INR check   INR: 2.4   ic valid [id number] 160 exp 03/2018       Date of next INR:  6/26/2017         How to take your warfarin dose     To take:  5 mg Take 1 of the 5 mg tablets.              [employer] Access Code: Activation code not generated  Current [employer] Status: Active

## 2017-06-19 NOTE — PROGRESS NOTES
Anticoagulation Summary as of 6/19/2017     INR goal 2.0-3.0   Selected INR 2.4 (6/19/2017)   Maintenance plan 5 mg (5 mg x 1) every day   Weekly total 35 mg   Plan last modified Ryder Salgado, PHARMD (6/19/2017)   Next INR check 6/26/2017   Target end date     Indications   Atrial fibrillation (CMS-[facility]) [I48.91]  S/P AVR (aortic valve replacement) [Z95.2]  Long term (current) use of anticoagulants [Z79.01] [Z79.01]         Anticoagulation Episode Summary     INR check location     Preferred lab     Send INR reminders to     Comments       Anticoagulation Care Providers     Provider Role Specialty Phone number    [facility] Responsible  [phone]        Anticoagulation Patient Findings      Current Outpatient Prescriptions on File Prior to Visit   Medication Sig Dispense Refill   • carvedilol (COREG) 3.125 MG Tab Take 1 Tab by mouth 2 times a day, with meals. 180 Tab 3   • eplerenone (INSPRA) 25 MG Tab Take 1 Tab by mouth every day. 90 Tab 3   • atorvastatin (LIPITOR) 40 MG Tab Take 1 Tab by mouth every bedtime. 90 Tab 3   • ramipril (ALTACE) 5 MG Cap Take 1 Cap by mouth every day. 90 Cap 3   • warfarin (COUMADIN) 5 MG Tab Take 1 Tab by mouth every day. 30 Tab 3   • Multiple Vitamins-Minerals (MULTIVITAMIN ADULT PO) Spray 1 Tab in mouth/throat every day at 6 PM.     • aspirin EC (ECOTRIN) 81 MG Tablet Delayed Response Take 81 mg by mouth every day.     • therapeutic multivitamin-minerals (THERAGRAN-M) Tab Take 1 Tab by mouth every day.       No current facility-administered medications on file prior to visit.       Lab Results   Component Value Date/Time    SODIUM 137 03/14/2017 09:05 AM    POTASSIUM 3.9 03/14/2017 09:05 AM    CHLORIDE 103 03/14/2017 09:05 AM    CO2 26 03/14/2017 09:05 AM    GLUCOSE 95 03/14/2017 09:05 AM    BUN 20 03/14/2017 09:05 AM    CREATININE 1.14 03/14/2017 09:05 AM          Alberto Johnny Sarwat seen in clinic today, he is not new to use, just put back on warfarin for  a ablation. Date TBD. He has been back on his normal dose for about 2-3 weeks   INR  therapeutic.    Denies signs/symptoms of bleeding and/or thrombosis.    Denies changes to diet or medications.   Follow up appointment in 1 week(s).    Continue weekly warfarin dose as noted     Ryder Salgado, PHARMD

## 2017-06-26 ENCOUNTER — ANTICOAGULATION VISIT (OUTPATIENT)
Dept: MEDICAL GROUP | Facility: MEDICAL CENTER | Age: 62
End: 2017-06-26
Payer: COMMERCIAL

## 2017-06-26 VITALS — DIASTOLIC BLOOD PRESSURE: 77 MMHG | HEART RATE: 73 BPM | SYSTOLIC BLOOD PRESSURE: 124 MMHG

## 2017-06-26 DIAGNOSIS — Z79.01 LONG TERM (CURRENT) USE OF ANTICOAGULANTS: ICD-10-CM

## 2017-06-26 DIAGNOSIS — I48.19 PERSISTENT ATRIAL FIBRILLATION (HCC): ICD-10-CM

## 2017-06-26 DIAGNOSIS — Z95.2 S/P AVR (AORTIC VALVE REPLACEMENT): ICD-10-CM

## 2017-06-26 LAB — INR PPP: 2.9 (ref 2–3.5)

## 2017-06-26 PROCEDURE — 85610 PROTHROMBIN TIME: CPT | Performed by: PHYSICIAN ASSISTANT

## 2017-06-26 NOTE — PROGRESS NOTES
"Date: 6/26/2017    Graduation/Discharge Individual Treatment Plan review for the [facility] Intensive Cardiac Rehabilitation ([facility]) Program.    Alberto Fam, 62 y.o. male, with qualifying diagnosis/diagnoses of S/P Heart Valve Replacement  Co-morbid conditions include Hypertension, Dyslipidemia.    Primary Care Provider: Trevor Stephen D.O.    Heart Specialist (s): Dr. Elder    Patient has successfully completed 72 Exercise Sessions/Education Sessions.    Patient is (continues to be) an excellent candidate for the [facility] Intensive [facility] (ICR) Program.    Per [facility] Staff: \"...Myles has been really busy recently, so his only exercising has been at [facility]. He plans to start exercising regularly again once his schedule clears back up.  Myles doesn't follow the Pritikin diet exactly but he impliments it into his diet. He states that his diet has improved alot and he is making better choices. 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 Myles said that the Program was wonderful and helped put him into a routine. He felt like it was easier to come in because to people that he worked out with and people that helped him work out. He didn't have any expectations coming into the program but felt all his goals were met...\"    Rashid Edwards MD, [facility]  , [facility] Intensive Cardiac Rehabilitation (ICR) Program    "

## 2017-06-26 NOTE — MR AVS SNAPSHOT
Alberto Fam   2017 2:00 PM   Anticoagulation Visit   MRN: [id number]    Department:  [facility]   Dept Phone:  [phone]    Description:  Male : 1955   Provider:  [facility]           Allergies as of 2017     Allergen Noted Reactions    Sulfa Drugs 2010   Rash, Vomiting    RXN=20 years ago      You were diagnosed with     Persistent atrial fibrillation (CMS-HCC)   [053461]       Long term (current) use of anticoagulants   [V58.61.ICD-9-CM]       S/P AVR (aortic valve replacement)   [622681]         Vital Signs     Blood Pressure Pulse Smoking Status             124/77 mmHg 73 Never Smoker          Basic Information     Date Of Birth Sex Race Ethnicity Preferred Language    1955 Male White Non- English      Your appointments     2017  2:00 PM   Anti-Coag Routine with [facility] PHARMACIST   [facility]    [address]  [address]  [place] [address] [phone]            2017 12:40 PM   PREVIOUS PATIENT with Fabrice Kwong M.D.   [facility] (--)    [address], [address]  [place] [address] [phone]            2017  2:15 PM   Anti-Coag Routine with [facility] PHARMACIST   [facility]    [address]  [address]  [place] [address] [phone]              Problem List              ICD-10-CM Priority Class Noted - Resolved    Hypertension I10 High  Unknown - Present    S/P AV servando ablation (Chronic) Z98.890 High  3/21/2012 - Present    Dilated cardiomyopathy (CMS-HCC) (Chronic) I42.0 High  3/21/2012 - Present    Male erectile disorder N52.9 Medium  3/21/2012 - Present    Ventricular tachycardia (CMS-HCC) I47.2 High  3/21/2012 - Present    Atypical chest pain (Chronic) R07.89 Medium  3/21/2012 - Present    AV block, 3rd degree (CMS-HCC) I44.2 High  3/21/2012 -  Present    SSS (sick sinus syndrome) (CMS-HCC) I49.5 High  9/28/2012 - Present    Presence of biventricular AICD Z95.810 High  6/6/2013 - Present    Arthritis M19.90 Low  8/20/2013 - Present    Chronic systolic congestive heart failure, NYHA class 3 (CMS-HCC) I50.22   10/13/2015 - Present    Homograft cardiac valve stenosis I38   12/16/2016 - Present    Severe aortic stenosis I35.0   1/13/2017 - Present    CAD (coronary artery disease) I25.10   1/13/2017 - Present    Heart valve replaced (Chronic) Z95.2   2/17/2017 - Present    Pericardial effusion I31.3   2/20/2017 - Present    Pleural effusion J90   2/20/2017 - Present    CHF exacerbation (CMS-HCC) I50.9   2/20/2017 - Present    Hemorrhagic disorder due to circulating anticoagulants (CMS-HCC) D68.318   2/21/2017 - Present    S/P AVR (aortic valve replacement) (Chronic) Z95.2   2/21/2017 - Present    Hypokalemia E87.6   2/21/2017 - Present    Long term (current) use of anticoagulants [Z79.01] Z79.01   3/27/2017 - Present    Atrial fibrillation (CMS-HCC) I48.91   5/31/2017 - Present      Health Maintenance        Date Due Completion Dates    IMM DTaP/Tdap/Td Vaccine (1 - Tdap) 4/5/1976 ---    COLONOSCOPY 4/5/2007 ---    IMM ZOSTER VACCINE 4/5/2017 ---            Results     POCT Protime      Component    INR    2.9    Comment:     ic valid [id number] 160 exp 03/2018                        Current Immunizations     Influenza TIV (IM) 10/30/2009    Influenza Vaccine Adult HD 11/2/2016    Pneumococcal polysaccharide vaccine (PPSV-23) 2/21/2017  5:32 AM      Below and/or attached are the medications your provider expects you to take. Review all of your home medications and newly ordered medications with your provider and/or pharmacist. Follow medication instructions as directed by your provider and/or pharmacist. Please keep your medication list with you and share with your provider. Update the information when medications are discontinued, doses are changed, or new  medications (including over-the-counter products) are added; and carry medication information at all times in the event of emergency situations     Allergies:  SULFA DRUGS - Rash,Vomiting               Medications  Valid as of: June 26, 2017 -  1:59 PM    Generic Name Brand Name Tablet Size Instructions for use    Aspirin (Tablet Delayed Response) ECOTRIN 81 MG Take 81 mg by mouth every day.        Atorvastatin Calcium (Tab) LIPITOR 40 MG Take 1 Tab by mouth every bedtime.        Carvedilol (Tab) COREG 3.125 MG Take 1 Tab by mouth 2 times a day, with meals.        Eplerenone (Tab) INSPRA 25 MG Take 1 Tab by mouth every day.        Multiple Vitamins-Minerals (Tab) THERAGRAN-M  Take 1 Tab by mouth every day.        Multiple Vitamins-Minerals   Spray 1 Tab in mouth/throat every day at 6 PM.        Ramipril (Cap) ALTACE 5 MG Take 1 Cap by mouth every day.        Warfarin Sodium (Tab) COUMADIN 5 MG Take 1 Tab by mouth every day.        .                 Medicines prescribed today were sent to:     [facility] - [place] [id number] [place] [address]    Phone: [phone] Fax: [phone]    Open 24 Hours?: No      Medication refill instructions:       If your prescription bottle indicates you have medication refills left, it is not necessary to call your provider’s office. Please contact your pharmacy and they will refill your medication.    If your prescription bottle indicates you do not have any refills left, you may request refills at any time through one of the following ways: The online [employer] system (except Urgent Care), by calling your provider’s office, or by asking your pharmacy to contact your provider’s office with a refill request. Medication refills are processed only during regular business hours and may not be available until the next business day. Your provider may request additional information or to have a follow-up visit with you prior to refilling your  medication.   *Please Note: Medication refills are assigned a new Rx number when refilled electronically. Your pharmacy may indicate that no refills were authorized even though a new prescription for the same medication is available at the pharmacy. Please request the medicine by name with the pharmacy before contacting your provider for a refill.        Warfarin Dosing Calendar   June 2017 Details    Sun Mon Tue Wed Thu Fri Sat         1               2               3                 4               5               6               7               8               9               10                 11               12               13               14               15               16               17                 18               19               20               21               22               23               24                 25               26   2.9   5 mg   See details      27      5 mg         28      5 mg         29      5 mg         30      5 mg           Date Details   06/26 This INR check   INR: 2.9   ic valid [id number] 160 exp 03/2018               How to take your warfarin dose     To take:  5 mg Take 1 of the 5 mg tablets.           Warfarin Dosing Calendar   July 2017 Details    Sun Mon Tue Wed Thu Fri Sat           1      5 mg           2      5 mg         3      5 mg         4               5               6               7               8                 9               10               11               12               13               14               15                 16               17               18               19               20               21               22                 23               24               25               26               27               28               29                 30               31                     Date Details   No additional details    Date of next INR:  7/3/2017         How to take your warfarin dose     To take:  5 mg Take 1 of the 5 mg  tablets.              [employer] Access Code: Activation code not generated  Current [employer] Status: Active

## 2017-06-26 NOTE — PROGRESS NOTES
Anticoagulation Summary as of 6/26/2017     INR goal 2.0-3.0   Selected INR 2.9 (6/26/2017)   Maintenance plan 5 mg (5 mg x 1) every day   Weekly total 35 mg   Plan last modified Ryder Salgado, PHARMD (6/19/2017)   Next INR check 7/3/2017   Target end date     Indications   Atrial fibrillation (CMS-[facility]) [I48.91]  S/P AVR (aortic valve replacement) [Z95.2]  Long term (current) use of anticoagulants [Z79.01] [Z79.01]         Anticoagulation Episode Summary     INR check location     Preferred lab     Send INR reminders to     Comments       Anticoagulation Care Providers     Provider Role Specialty Phone number    [facility] Responsible  [phone]        Anticoagulation Patient Findings      Current Outpatient Prescriptions on File Prior to Visit   Medication Sig Dispense Refill   • carvedilol (COREG) 3.125 MG Tab Take 1 Tab by mouth 2 times a day, with meals. 180 Tab 3   • eplerenone (INSPRA) 25 MG Tab Take 1 Tab by mouth every day. 90 Tab 3   • atorvastatin (LIPITOR) 40 MG Tab Take 1 Tab by mouth every bedtime. 90 Tab 3   • ramipril (ALTACE) 5 MG Cap Take 1 Cap by mouth every day. 90 Cap 3   • warfarin (COUMADIN) 5 MG Tab Take 1 Tab by mouth every day. 30 Tab 3   • Multiple Vitamins-Minerals (MULTIVITAMIN ADULT PO) Spray 1 Tab in mouth/throat every day at 6 PM.     • aspirin EC (ECOTRIN) 81 MG Tablet Delayed Response Take 81 mg by mouth every day.     • therapeutic multivitamin-minerals (THERAGRAN-M) Tab Take 1 Tab by mouth every day.       No current facility-administered medications on file prior to visit.       Lab Results   Component Value Date/Time    SODIUM 137 03/14/2017 09:05 AM    POTASSIUM 3.9 03/14/2017 09:05 AM    CHLORIDE 103 03/14/2017 09:05 AM    CO2 26 03/14/2017 09:05 AM    GLUCOSE 95 03/14/2017 09:05 AM    BUN 20 03/14/2017 09:05 AM    CREATININE 1.14 03/14/2017 09:05 AM          Alberto Fam seen in clinic today  INR  therapeutic.    Denies signs/symptoms of  bleeding and/or thrombosis.    Denies changes to diet or medications.   Follow up appointment in 1 week(s).    Continue weekly warfarin dose as noted       Ryder Salgado, GINGERD

## 2017-06-28 ENCOUNTER — OFFICE VISIT (OUTPATIENT)
Dept: CARDIOLOGY | Facility: MEDICAL CENTER | Age: 62
End: 2017-06-28
Payer: COMMERCIAL

## 2017-06-28 ENCOUNTER — TELEPHONE (OUTPATIENT)
Dept: CARDIOLOGY | Facility: MEDICAL CENTER | Age: 62
End: 2017-06-28

## 2017-06-28 VITALS
SYSTOLIC BLOOD PRESSURE: 118 MMHG | WEIGHT: 195.6 LBS | HEART RATE: 66 BPM | OXYGEN SATURATION: 97 % | DIASTOLIC BLOOD PRESSURE: 74 MMHG | BODY MASS INDEX: 27.38 KG/M2 | HEIGHT: 71 IN

## 2017-06-28 DIAGNOSIS — I49.5 SSS (SICK SINUS SYNDROME) (HCC): ICD-10-CM

## 2017-06-28 DIAGNOSIS — Z79.01 LONG TERM (CURRENT) USE OF ANTICOAGULANTS: ICD-10-CM

## 2017-06-28 DIAGNOSIS — Z95.2 S/P AVR (AORTIC VALVE REPLACEMENT): ICD-10-CM

## 2017-06-28 DIAGNOSIS — I48.91 ATRIAL FIBRILLATION, UNSPECIFIED TYPE (HCC): ICD-10-CM

## 2017-06-28 LAB — EKG IMPRESSION: NORMAL

## 2017-06-28 PROCEDURE — 99214 OFFICE O/P EST MOD 30 MIN: CPT | Mod: 25 | Performed by: INTERNAL MEDICINE

## 2017-06-28 PROCEDURE — 93283 PRGRMG EVAL IMPLANTABLE DFB: CPT | Performed by: INTERNAL MEDICINE

## 2017-06-28 PROCEDURE — 93000 ELECTROCARDIOGRAM COMPLETE: CPT | Mod: 59 | Performed by: INTERNAL MEDICINE

## 2017-06-28 ASSESSMENT — ENCOUNTER SYMPTOMS: PALPITATIONS: 0

## 2017-06-28 NOTE — PROGRESS NOTES
Subjective:   Alberto Fam is a 62 y.o. male who presents today for follow up of a fib    New aortic valve February 8.  Did blood thinner for 90 days and stopped.  then restarted with Dr. Elder but first inr 6/19    avr at that time for as.      Feeling better than before.  Unsure if limited by the a fib.    Past Medical History   Diagnosis Date   • Congestive heart failure (CMS-[facility])    • S/P AV servando ablation     • Dilated cardiomyopathy September 2013     Echocardiogram with dilated LV, moderate concentric LVH, LVEF 35-40%.   • Male erectile disorder     • Ventricular tachycardia (CMS-[facility])     • SSS (sick sinus syndrome)     • AV block, 3rd degree     • Aortic stenosis December 2016     Dobutamine stress echo with severe AS (peak 89mmHg, mean 54mmHg, ACE 0.6cm2).   • CHF (congestive heart failure) (CMS-[facility])    • Bowel habit changes      Constipation   • Dental disorder      Upper   • Arthritis      Low back   • Hypertension      Pt states well controlled on meds   • Pneumonia 12/2014   • Pain      lower back pain    • Breath shortness      d/t heart condition   • AICD (automatic cardioverter/defibrillator) present      Past Surgical History   Procedure Laterality Date   • Recovery  8/29/2011     Performed by SURGERY, CATH-RECOVERY at SURGERY SAME DAY [facility] ORS   • Aicd implant  August 2011     Medtronic Concerto II CRT-D [id number]   • Ventral septal defect repair  1961   • Pacemaker insertion  2006   • Aicd battery change  November 2016     Generator change with Medtronic Viva S CRT-D [id number] implanted by Dr. Kwong.   • Hernia repair  1966   • Sternotomy  2/8/2017     Procedure: STERNOTOMY - REDO;  Surgeon: Alfonso Llanos M.D.;  Location: SURGERY [facility];  Service:    • Aortic valve replacement  2/8/2017     Procedure: AORTIC VALVE REPLACEMENT;  Surgeon: Alfonso Llanos M.D.;  Location: SURGERY [facility];  Service:    • Marques  2/8/2017     Procedure: MARQUES;  Surgeon: Alfonso Llanos M.D.;   "Location: SURGERY [facility];  Service:      History reviewed. No pertinent family history.  History   Smoking status   • Never Smoker    Smokeless tobacco   • Never Used     Comment: quit 35 years ago      Allergies   Allergen Reactions   • Sulfa Drugs Rash and Vomiting     RXN=20 years ago     Outpatient Encounter Prescriptions as of 6/28/2017   Medication Sig Dispense Refill   • carvedilol (COREG) 3.125 MG Tab Take 1 Tab by mouth 2 times a day, with meals. 180 Tab 3   • eplerenone (INSPRA) 25 MG Tab Take 1 Tab by mouth every day. 90 Tab 3   • atorvastatin (LIPITOR) 40 MG Tab Take 1 Tab by mouth every bedtime. 90 Tab 3   • ramipril (ALTACE) 5 MG Cap Take 1 Cap by mouth every day. 90 Cap 3   • warfarin (COUMADIN) 5 MG Tab Take 1 Tab by mouth every day. 30 Tab 3   • Multiple Vitamins-Minerals (MULTIVITAMIN ADULT PO) Spray 1 Tab in mouth/throat every day at 6 PM.     • aspirin EC (ECOTRIN) 81 MG Tablet Delayed Response Take 81 mg by mouth every day.     • therapeutic multivitamin-minerals (THERAGRAN-M) Tab Take 1 Tab by mouth every day.       No facility-administered encounter medications on file as of 6/28/2017.     Review of Systems   Cardiovascular: Negative for palpitations.        Objective:   /74 mmHg  Pulse 66  Ht 1.803 m (5' 10.98\")  Wt 88.724 kg (195 lb 9.6 oz)  BMI 27.29 kg/m2  SpO2 97%    Physical Exam   Constitutional: He is oriented to person, place, and time. He appears well-developed and well-nourished. No distress.   HENT:   Head: Normocephalic and atraumatic.   Right Ear: External ear normal.   Left Ear: External ear normal.   Nose: Nose normal.   Mouth/Throat: Oropharynx is clear and moist.   Eyes: Conjunctivae and EOM are normal. Pupils are equal, round, and reactive to light. Right eye exhibits no discharge. Left eye exhibits no discharge. No scleral icterus.   Neck: Normal range of motion. Neck supple. No JVD present. No tracheal deviation present.   Cardiovascular: Normal rate, " regular rhythm, normal heart sounds and intact distal pulses.  Exam reveals no gallop and no friction rub.    No murmur heard.  Sternotomy and device pocket well healed   Pulmonary/Chest: Effort normal and breath sounds normal. No stridor. No respiratory distress. He has no wheezes. He has no rales. He exhibits no tenderness.   Abdominal: Soft. He exhibits no distension. There is no tenderness.   Musculoskeletal: He exhibits no edema or tenderness.   Neurological: He is alert and oriented to person, place, and time. No cranial nerve deficit. Coordination normal.   Skin: Skin is warm and dry. No rash noted. He is not diaphoretic. No erythema. No pallor.   Psychiatric: He has a normal mood and affect. His behavior is normal. Judgment and thought content normal.   Vitals reviewed.      Assessment:     1. SSS (sick sinus syndrome) (CMS-HCC)  EKG   2. Atrial fibrillation, unspecified type (CMS-HCC)  EKG       Medical Decision Making:  Today's Assessment / Status / Plan:     Schedule dccv 4 weeks from 6/19 for therapeutic inr.  May be post op a fib so if no recurrence after 3 months could stop oac.  If recurrence will see if symptomatic to decide role of rhythm control.  understnads risk and benefits and ready to proceed.    Otherwise doing very well

## 2017-06-28 NOTE — TELEPHONE ENCOUNTER
Patient is scheduled on 7-10-17 for CV with  at [facility]. No meds to stop. Patient was told to check in at 11:00 for a 1:00 procedure. Jerson with [employer] notified on 6-28-17. This day was ok'd per . Oriana with [employer] notified on 6-28-17.

## 2017-06-28 NOTE — MR AVS SNAPSHOT
"        Alberto Fam   2017 12:40 PM   Office Visit   MRN: [id number]    Department:  [facility]   Dept Phone:  [phone]    Description:  Male : 1955   Provider:  Fabrice Kwong M.D.           Reason for Visit     Follow-Up           Allergies as of 2017     Allergen Noted Reactions    Sulfa Drugs 2010   Rash, Vomiting    RXN=20 years ago      You were diagnosed with     SSS (sick sinus syndrome) (CMS-HCC)   [874334]       Atrial fibrillation, unspecified type (CMS-HCC)   [3315843]       Long term (current) use of anticoagulants   [V58.61.ICD-9-CM]       S/P AVR (aortic valve replacement)   [396292]         Vital Signs     Blood Pressure Pulse Height Weight Body Mass Index Oxygen Saturation    118/74 mmHg 66 1.803 m (5' 10.98\") 88.724 kg (195 lb 9.6 oz) 27.29 kg/m2 97%    Smoking Status                   Never Smoker            Basic Information     Date Of Birth Sex Race Ethnicity Preferred Language    1955 Male White Non- English      Your appointments     2017  3:30 PM   Anti-Coag Routine with [facility] PAV PHARMACIST   [facility]    [address]  [address]  [facility] [id number] [phone]            2017  1:15 PM   Scheduled Pre Admission with PREADMIT 5   [facility] [place]    [address] [facility] [id number] [phone]              Problem List              ICD-10-CM Priority Class Noted - Resolved    Hypertension I10 High  Unknown - Present    S/P AV servando ablation (Chronic) Z98.890 High  3/21/2012 - Present    Dilated cardiomyopathy (CMS-HCC) (Chronic) I42.0 High  3/21/2012 - Present    Male erectile disorder N52.9 Medium  3/21/2012 - Present    Ventricular tachycardia (CMS-HCC) I47.2 High  3/21/2012 - Present    Atypical chest pain (Chronic) R07.89 Medium  3/21/2012 - Present    AV block, 3rd degree (CMS-HCC) I44.2 High  3/21/2012 - Present    SSS (sick sinus syndrome) " (CMS-HCC) I49.5 High  9/28/2012 - Present    Presence of biventricular AICD Z95.810 High  6/6/2013 - Present    Arthritis M19.90 Low  8/20/2013 - Present    Chronic systolic congestive heart failure, NYHA class 3 (CMS-HCC) I50.22   10/13/2015 - Present    Homograft cardiac valve stenosis I38   12/16/2016 - Present    Severe aortic stenosis I35.0   1/13/2017 - Present    CAD (coronary artery disease) I25.10   1/13/2017 - Present    Heart valve replaced (Chronic) Z95.2   2/17/2017 - Present    Pericardial effusion I31.3   2/20/2017 - Present    Pleural effusion J90   2/20/2017 - Present    CHF exacerbation (CMS-HCC) I50.9   2/20/2017 - Present    Hemorrhagic disorder due to circulating anticoagulants (CMS-HCC) D68.318   2/21/2017 - Present    S/P AVR (aortic valve replacement) (Chronic) Z95.2   2/21/2017 - Present    Hypokalemia E87.6   2/21/2017 - Present    Long term (current) use of anticoagulants [Z79.01] Z79.01   3/27/2017 - Present    Atrial fibrillation (CMS-HCC) I48.91   5/31/2017 - Present      Health Maintenance        Date Due Completion Dates    IMM DTaP/Tdap/Td Vaccine (1 - Tdap) 4/5/1976 ---    COLONOSCOPY 4/5/2007 ---    IMM ZOSTER VACCINE 4/5/2017 ---            Results       Current Immunizations     Influenza TIV (IM) 10/30/2009    Influenza Vaccine Adult HD 11/2/2016    Pneumococcal polysaccharide vaccine (PPSV-23) 2/21/2017  5:32 AM      Below and/or attached are the medications your provider expects you to take. Review all of your home medications and newly ordered medications with your provider and/or pharmacist. Follow medication instructions as directed by your provider and/or pharmacist. Please keep your medication list with you and share with your provider. Update the information when medications are discontinued, doses are changed, or new medications (including over-the-counter products) are added; and carry medication information at all times in the event of emergency situations      Allergies:  SULFA DRUGS - Rash,Vomiting               Medications  Valid as of: June 28, 2017 -  1:29 PM    Generic Name Brand Name Tablet Size Instructions for use    Aspirin (Tablet Delayed Response) ECOTRIN 81 MG Take 81 mg by mouth every day.        Atorvastatin Calcium (Tab) LIPITOR 40 MG Take 1 Tab by mouth every bedtime.        Carvedilol (Tab) COREG 3.125 MG Take 1 Tab by mouth 2 times a day, with meals.        Eplerenone (Tab) INSPRA 25 MG Take 1 Tab by mouth every day.        Multiple Vitamins-Minerals (Tab) THERAGRAN-M  Take 1 Tab by mouth every day.        Multiple Vitamins-Minerals   Spray 1 Tab in mouth/throat every day at 6 PM.        Ramipril (Cap) ALTACE 5 MG Take 1 Cap by mouth every day.        Warfarin Sodium (Tab) COUMADIN 5 MG Take 1 Tab by mouth every day.        .                 Medicines prescribed today were sent to:     [facility] - [address]    [address] [email or web address] [facility] [address]    Phone: [phone] Fax: [phone]    Open 24 Hours?: No      Medication refill instructions:       If your prescription bottle indicates you have medication refills left, it is not necessary to call your provider’s office. Please contact your pharmacy and they will refill your medication.    If your prescription bottle indicates you do not have any refills left, you may request refills at any time through one of the following ways: The online [employer] system (except Urgent Care), by calling your provider’s office, or by asking your pharmacy to contact your provider’s office with a refill request. Medication refills are processed only during regular business hours and may not be available until the next business day. Your provider may request additional information or to have a follow-up visit with you prior to refilling your medication.   *Please Note: Medication refills are assigned a new Rx number when refilled electronically. Your pharmacy may indicate that no refills  were authorized even though a new prescription for the same medication is available at the pharmacy. Please request the medicine by name with the pharmacy before contacting your provider for a refill.           MyChart Access Code: Activation code not generated  Current [employer] Status: Active

## 2017-07-06 ENCOUNTER — ANTICOAGULATION VISIT (OUTPATIENT)
Dept: MEDICAL GROUP | Facility: MEDICAL CENTER | Age: 62
End: 2017-07-06
Payer: COMMERCIAL

## 2017-07-06 DIAGNOSIS — Z95.2 S/P AVR (AORTIC VALVE REPLACEMENT): ICD-10-CM

## 2017-07-06 DIAGNOSIS — Z79.01 LONG TERM (CURRENT) USE OF ANTICOAGULANTS: ICD-10-CM

## 2017-07-06 LAB — INR PPP: 3.2 (ref 2–3.5)

## 2017-07-06 PROCEDURE — 85610 PROTHROMBIN TIME: CPT | Performed by: PHYSICIAN ASSISTANT

## 2017-07-06 NOTE — PROGRESS NOTES
OP Anticoagulation Service Note    Date: 7/6/2017    Anticoagulation Summary as of 7/6/2017     INR goal 2.0-3.0   Selected INR 3.2! (7/6/2017)   Maintenance plan 2.5 mg (5 mg x 0.5) on Thu; 5 mg (5 mg x 1) all other days   Weekly total 32.5 mg   Plan last modified Anaid Merino PHARMD (7/6/2017)   Next INR check 7/17/2017   Target end date     Indications   Atrial fibrillation (CMS-HCC) [I48.91]  S/P AVR (aortic valve replacement) [Z95.2]  Long term (current) use of anticoagulants [Z79.01] [Z79.01]         Anticoagulation Episode Summary     INR check location     Preferred lab     Send INR reminders to     Comments       Anticoagulation Care Providers     Provider Role Specialty Phone number    [facility] Responsible  [phone]        Anticoagulation Patient Findings      Plan:  INR is high today. Confirmed dosing regimen. No missed or extra doses taken. Patient denies sign/symptoms of bleeding/clotting. No recent medication changes and patient has been eating a consistent diet. Instructed pt to call clinic with any concerns of bleeding or thrombosis. Patient is having a cardioversion 7-10-17, instructed pt to begin reduced weekly regimen as outlined.  Follow up in 1.5 week(s)        Anaid Merino PHARMD

## 2017-07-06 NOTE — MR AVS SNAPSHOT
Alberto Fam   2017 3:30 PM   Anticoagulation Visit   MRN: [id number]    Department:  [facility]   Dept Phone:  [phone]    Description:  Male : 1955   Provider:  [facility] PHARMACIST           Allergies as of 2017     Allergen Noted Reactions    Sulfa Drugs 2010   Rash, Vomiting    RXN=20 years ago      You were diagnosed with     Long term (current) use of anticoagulants   [V58.61.ICD-9-CM]       S/P AVR (aortic valve replacement)   [749750]         Vital Signs     Smoking Status                   Never Smoker            Basic Information     Date Of Birth Sex Race Ethnicity Preferred Language    1955 Male White Non- English      Your appointments     2017  3:30 PM   Anti-Coag Routine with [facility] PAV PHARMACIST   [facility]    [address]  [address]  [place] [address] [phone]            2017  1:15 PM   Scheduled Pre Admission with PREADMIT 5   [facility] TESTS [facility] [place])    [address] [facility]  [place] [address]   [phone]            2017  4:00 PM   Anti-Coag Routine with [facility] PAV PHARMACIST   [facility]    [address]  [address]  [place] [address] [phone]              Problem List              ICD-10-CM Priority Class Noted - Resolved    Hypertension I10 High  Unknown - Present    S/P AV servando ablation (Chronic) Z98.890 High  3/21/2012 - Present    Dilated cardiomyopathy (CMS-HCC) (Chronic) I42.0 High  3/21/2012 - Present    Male erectile disorder N52.9 Medium  3/21/2012 - Present    Ventricular tachycardia (CMS-HCC) I47.2 High  3/21/2012 - Present    Atypical chest pain (Chronic) R07.89 Medium  3/21/2012 - Present    AV block, 3rd degree (CMS-HCC) I44.2 High  3/21/2012 - Present    SSS (sick sinus syndrome) (CMS-HCC) I49.5 High  2012 - Present    Presence of biventricular AICD Z95.810 High   6/6/2013 - Present    Arthritis M19.90 Low  8/20/2013 - Present    Chronic systolic congestive heart failure, NYHA class 3 (CMS-HCC) I50.22   10/13/2015 - Present    Homograft cardiac valve stenosis I38   12/16/2016 - Present    Severe aortic stenosis I35.0   1/13/2017 - Present    CAD (coronary artery disease) I25.10   1/13/2017 - Present    Heart valve replaced (Chronic) Z95.2   2/17/2017 - Present    Pericardial effusion I31.3   2/20/2017 - Present    Pleural effusion J90   2/20/2017 - Present    CHF exacerbation (CMS-HCC) I50.9   2/20/2017 - Present    Hemorrhagic disorder due to circulating anticoagulants (CMS-HCC) D68.318   2/21/2017 - Present    S/P AVR (aortic valve replacement) (Chronic) Z95.2   2/21/2017 - Present    Hypokalemia E87.6   2/21/2017 - Present    Long term (current) use of anticoagulants [Z79.01] Z79.01   3/27/2017 - Present    Atrial fibrillation (CMS-HCC) I48.91   5/31/2017 - Present      Health Maintenance        Date Due Completion Dates    IMM DTaP/Tdap/Td Vaccine (1 - Tdap) 4/5/1976 ---    COLONOSCOPY 4/5/2007 ---    IMM ZOSTER VACCINE 4/5/2017 ---    IMM INFLUENZA (1) 9/1/2017 11/2/2016, 10/30/2009            Results     POCT Protime      Component    INR    3.2                        Current Immunizations     Influenza TIV (IM) 10/30/2009    Influenza Vaccine Adult HD 11/2/2016    Pneumococcal polysaccharide vaccine (PPSV-23) 2/21/2017  5:32 AM      Below and/or attached are the medications your provider expects you to take. Review all of your home medications and newly ordered medications with your provider and/or pharmacist. Follow medication instructions as directed by your provider and/or pharmacist. Please keep your medication list with you and share with your provider. Update the information when medications are discontinued, doses are changed, or new medications (including over-the-counter products) are added; and carry medication information at all times in the event of  emergency situations     Allergies:  SULFA DRUGS - Rash,Vomiting               Medications  Valid as of: July 06, 2017 -  3:28 PM    Generic Name Brand Name Tablet Size Instructions for use    Aspirin (Tablet Delayed Response) ECOTRIN 81 MG Take 81 mg by mouth every day.        Atorvastatin Calcium (Tab) LIPITOR 40 MG Take 1 Tab by mouth every bedtime.        Carvedilol (Tab) COREG 3.125 MG Take 1 Tab by mouth 2 times a day, with meals.        Eplerenone (Tab) INSPRA 25 MG Take 1 Tab by mouth every day.        Multiple Vitamins-Minerals (Tab) THERAGRAN-M  Take 1 Tab by mouth every day.        Multiple Vitamins-Minerals   Spray 1 Tab in mouth/throat every day at 6 PM.        Ramipril (Cap) ALTACE 5 MG Take 1 Cap by mouth every day.        Warfarin Sodium (Tab) COUMADIN 5 MG Take 1 Tab by mouth every day.        .                 Medicines prescribed today were sent to:     [email or web address] - [address]    [address] [facility] [address] [place], [place] [address]    Phone: [phone] Fax: [phone]    Open 24 Hours?: No      Medication refill instructions:       If your prescription bottle indicates you have medication refills left, it is not necessary to call your provider’s office. Please contact your pharmacy and they will refill your medication.    If your prescription bottle indicates you do not have any refills left, you may request refills at any time through one of the following ways: The online [employer] system (except Urgent Care), by calling your provider’s office, or by asking your pharmacy to contact your provider’s office with a refill request. Medication refills are processed only during regular business hours and may not be available until the next business day. Your provider may request additional information or to have a follow-up visit with you prior to refilling your medication.   *Please Note: Medication refills are assigned a new Rx number when refilled electronically. Your pharmacy may  indicate that no refills were authorized even though a new prescription for the same medication is available at the pharmacy. Please request the medicine by name with the pharmacy before contacting your provider for a refill.        Warfarin Dosing Calendar   July 2017 Details    Sun Mon Tue Wed Thu Fri Sat           1                 2               3               4               5               6   3.2   2.5 mg   See details      7      5 mg         8      5 mg           9      5 mg         10      5 mg         11      5 mg         12      5 mg         13      5 mg         14      5 mg         15      5 mg           16      5 mg         17      5 mg         18               19               20               21               22                 23               24               25               26               27               28               29                 30               31                     Date Details   07/06 This INR check   INR: 3.2       Date of next INR:  7/17/2017         How to take your warfarin dose     To take:  2.5 mg Take 0.5 of a 5 mg tablet.    To take:  5 mg Take 1 of the 5 mg tablets.              [employer] Access Code: Activation code not generated  Current [employer] Status: Active

## 2017-07-07 DIAGNOSIS — Z01.812 PRE-OPERATIVE LABORATORY EXAMINATION: ICD-10-CM

## 2017-07-07 DIAGNOSIS — Z01.810 PRE-OPERATIVE CARDIOVASCULAR EXAMINATION: ICD-10-CM

## 2017-07-07 LAB
ANION GAP SERPL CALC-SCNC: 6 MMOL/L (ref 0–11.9)
BUN SERPL-MCNC: 21 MG/DL (ref 8–22)
CALCIUM SERPL-MCNC: 9.5 MG/DL (ref 8.5–10.5)
CHLORIDE SERPL-SCNC: 104 MMOL/L (ref 96–112)
CO2 SERPL-SCNC: 28 MMOL/L (ref 20–33)
CREAT SERPL-MCNC: 1.09 MG/DL (ref 0.5–1.4)
EKG IMPRESSION: NORMAL
ERYTHROCYTE [DISTWIDTH] IN BLOOD BY AUTOMATED COUNT: 43.8 FL (ref 35.9–50)
GFR SERPL CREATININE-BSD FRML MDRD: >60 ML/MIN/1.73 M 2
GLUCOSE SERPL-MCNC: 84 MG/DL (ref 65–99)
HCT VFR BLD AUTO: 41.7 % (ref 42–52)
HGB BLD-MCNC: 14.1 G/DL (ref 14–18)
INR PPP: 2.37 (ref 0.87–1.13)
MCH RBC QN AUTO: 30.2 PG (ref 27–33)
MCHC RBC AUTO-ENTMCNC: 33.8 G/DL (ref 33.7–35.3)
MCV RBC AUTO: 89.3 FL (ref 81.4–97.8)
PLATELET # BLD AUTO: 115 K/UL (ref 164–446)
PMV BLD AUTO: 10.9 FL (ref 9–12.9)
POTASSIUM SERPL-SCNC: 3.6 MMOL/L (ref 3.6–5.5)
PROTHROMBIN TIME: 26.6 SEC (ref 12–14.6)
RBC # BLD AUTO: 4.67 M/UL (ref 4.7–6.1)
SODIUM SERPL-SCNC: 138 MMOL/L (ref 135–145)
WBC # BLD AUTO: 3.8 K/UL (ref 4.8–10.8)

## 2017-07-07 PROCEDURE — 85610 PROTHROMBIN TIME: CPT

## 2017-07-07 PROCEDURE — 36415 COLL VENOUS BLD VENIPUNCTURE: CPT

## 2017-07-07 PROCEDURE — 93010 ELECTROCARDIOGRAM REPORT: CPT | Performed by: INTERNAL MEDICINE

## 2017-07-07 PROCEDURE — 80048 BASIC METABOLIC PNL TOTAL CA: CPT

## 2017-07-07 PROCEDURE — 93005 ELECTROCARDIOGRAM TRACING: CPT

## 2017-07-07 PROCEDURE — 85027 COMPLETE CBC AUTOMATED: CPT

## 2017-07-10 ENCOUNTER — HOSPITAL ENCOUNTER (OUTPATIENT)
Facility: MEDICAL CENTER | Age: 62
End: 2017-07-10
Attending: INTERNAL MEDICINE | Admitting: INTERNAL MEDICINE
Payer: COMMERCIAL

## 2017-07-10 VITALS
BODY MASS INDEX: 26.46 KG/M2 | OXYGEN SATURATION: 99 % | HEIGHT: 72 IN | DIASTOLIC BLOOD PRESSURE: 85 MMHG | RESPIRATION RATE: 17 BRPM | SYSTOLIC BLOOD PRESSURE: 128 MMHG | WEIGHT: 195.33 LBS | TEMPERATURE: 97.4 F | HEART RATE: 65 BPM

## 2017-07-10 LAB — EKG IMPRESSION: NORMAL

## 2017-07-10 PROCEDURE — 304952 HCHG R 2 PADS

## 2017-07-10 PROCEDURE — 700111 HCHG RX REV CODE 636 W/ 250 OVERRIDE (IP)

## 2017-07-10 PROCEDURE — 160002 HCHG RECOVERY MINUTES (STAT)

## 2017-07-10 PROCEDURE — 93005 ELECTROCARDIOGRAM TRACING: CPT | Performed by: INTERNAL MEDICINE

## 2017-07-10 PROCEDURE — 92960 CARDIOVERSION ELECTRIC EXT: CPT

## 2017-07-10 PROCEDURE — 93010 ELECTROCARDIOGRAM REPORT: CPT | Performed by: INTERNAL MEDICINE

## 2017-07-10 ASSESSMENT — PAIN SCALES - GENERAL
PAINLEVEL_OUTOF10: 0

## 2017-07-10 NOTE — OR NURSING
1338   PATIENT RECEIVED FROM CATH LAB,  S/P SUCCESSFUL CARDIOVERSION.  PATIENT IS A/A/OX4.  WIFE IS AT BEDSIDE.    1400  PATIENT TAKING PO FLUID WELL.    1420   DC INSTRUCTIONS GIVEN TO PATIENT AND FAMILY.  VERBALIZED UNDERSTANDING OF ALL.  HL DC.  PATIENT DC TO HOME,  VIA WHEELCHAIR,  ESCORTED OUT BY CNA.

## 2017-07-10 NOTE — IP AVS SNAPSHOT
7/10/2017    Alberto Fam  [address] [place] [address]    Dear Alberto:    [facility] wants to ensure your discharge home is safe and you or your loved ones have had all of your questions answered regarding your care after you leave the hospital.    Below is a list of resources and contact information should you have any questions regarding your hospital stay, follow-up instructions, or active medical symptoms.    Questions or Concerns Regarding… Contact   Medical Questions Related to Your Discharge  (7 days a week, 8am-5pm) Contact a Nurse Care Coordinator   [phone]   Medical Questions Not Related to Your Discharge  (24 hours a day / 7 days a week)  Contact the Nurse Health Line   [phone]    Medications or Discharge Instructions Refer to your discharge packet   or contact your [facility] Primary Care Provider   [phone]   Follow-up Appointment(s) Schedule your appointment via [employer]   or contact Scheduling [phone]   Billing Review your statement via [employer]  or contact Billing [phone]   Medical Records Review your records via [employer]   or contact Medical Records [phone]     You may receive a telephone call within two days of discharge. This call is to make certain you understand your discharge instructions and have the opportunity to have any questions answered. You can also easily access your medical information, test results and upcoming appointments via the [employer] free online health management tool. You can learn more and sign up at [employer]/Mobile [employer]. For assistance setting up your [employer] account, please call [phone].    Once again, we want to ensure your discharge home is safe and that you have a clear understanding of any next steps in your care. If you have any questions or concerns, please do not hesitate to contact us, we are here for you. Thank you for choosing [facility] for your healthcare needs.    Sincerely,    Your [facility] Healthcare Team

## 2017-07-10 NOTE — IP AVS SNAPSHOT
Home Care Instructions                                                                                                                Name:Alberto Fam  Medical Record Number:[id number]  CSN: [id number]    YOB: 1955   Age: 62 y.o.  Sex: male  HT:1.829 m (6') WT: 88.6 kg (195 lb 5.2 oz)          Admit Date: 7/10/2017     Discharge Date:   Today's Date: 7/10/2017  Attending Doctor:  Fabrice Kwong M.D.                  Allergies:  Sulfa drugs                Discharge Instructions         ACTIVITY: Rest and take it easy for the first 24 hours.  A responsible adult is recommended to remain with you during that time.  It is normal to feel sleepy.  We encourage you to not do anything that requires balance, judgment or coordination.    MILD FLU-LIKE SYMPTOMS ARE NORMAL. YOU MAY EXPERIENCE GENERALIZED MUSCLE ACHES, THROAT IRRITATION, HEADACHE AND/OR SOME NAUSEA.    FOR 24 HOURS DO NOT:  Drive, operate machinery or run household appliances.  Drink beer or alcoholic beverages.   Make important decisions or sign legal documents.    SPECIAL INSTRUCTIONS: *TAKE IT EASY FOR 24 HRS**    DIET: To avoid nausea, slowly advance diet as tolerated, avoiding spicy or greasy foods for the first day.  Add more substantial food to your diet according to your physician's instructions.  Babies can be fed formula or breast milk as soon as they are hungry.  INCREASE FLUIDS AND FIBER TO AVOID CONSTIPATION.    SURGICAL DRESSING/BATHING: *NO RESTRICTION**    FOLLOW-UP APPOINTMENT:  A follow-up appointment should be arranged with your doctor in *DR KWONG   [phone]**; call to schedule.    You should CALL YOUR PHYSICIAN if you develop:  Fever greater than 101 degrees F.  Pain not relieved by medication, or persistent nausea or vomiting.  Excessive bleeding (blood soaking through dressing) or unexpected drainage from the wound.  Extreme redness or swelling around the incision site, drainage of pus or foul smelling drainage.  Inability to  urinate or empty your bladder within 8 hours.  Problems with breathing or chest pain.    You should call 911 if you develop problems with breathing or chest pain.  If you are unable to contact your doctor or surgical center, you should go to the nearest emergency room or urgent care center.  Physician's telephone #: *[phone]**    If any questions arise, call your doctor.  If your doctor is not available, please feel free to call the Surgical Center at [phone]  The Center is open Monday through Friday from 7AM to 7PM.  You can also call the [facility] HOTLINE open 24 hours/day, 7 days/week and speak to a nurse at [phone], or toll free at [phone].    A registered nurse may call you a few days after your surgery to see how you are doing after your procedure.    MEDICATIONS: Resume taking daily medication.  Take prescribed pain medication with food.  If no medication is prescribed, you may take non-aspirin pain medication if needed.  PAIN MEDICATION CAN BE VERY CONSTIPATING.  Take a stool softener or laxative such as senokot, pericolace, or milk of magnesia if needed.      If your physician has prescribed pain medication that includes Acetaminophen (Tylenol), do not take additional Acetaminophen (Tylenol) while taking the prescribed medication.    Depression / Suicide Risk    As you are discharged from this Critical access hospital facility, it is important to learn how to keep safe from harming yourself.    Recognize the warning signs:  · Abrupt changes in personality, positive or negative- including increase in energy   · Giving away possessions  · Change in eating patterns- significant weight changes-  positive or negative  · Change in sleeping patterns- unable to sleep or sleeping all the time   · Unwillingness or inability to communicate  · Depression  · Unusual sadness, discouragement and loneliness  · Talk of wanting to die  · Neglect of personal appearance   · Rebelliousness- reckless  behavior  · Withdrawal from people/activities they love  · Confusion- inability to concentrate     If you or a loved one observes any of these behaviors or has concerns about self-harm, here's what you can do:  · Talk about it- your feelings and reasons for harming yourself  · Remove any means that you might use to hurt yourself (examples: pills, rope, extension cords, firearm)  · Get professional help from the community (Mental Health, Substance Abuse, psychological counseling)  · Do not be alone:Call your Safe Contact- someone whom you trust who will be there for you.  · Call your local CRISIS HOTLINE 398-2575 or 601-069-0271  · Call your local Children's Mobile Crisis Response Team [place] (568) 034-6965 or www.Cuedd  · Call the toll free National Suicide Prevention Hotlines   · National Suicide Prevention Lifeline 425-929-XWPY (1222)  · [employer] Line Network 800-SUICIDE (452-2883)       Medication List      ASK your doctor about these medications        Instructions    Morning Afternoon Evening Bedtime    aspirin EC 81 MG Tbec   Commonly known as:  ECOTRIN        Take 81 mg by mouth every day.   Dose:  81 mg                        atorvastatin 40 MG Tabs   Commonly known as:  LIPITOR        Take 1 Tab by mouth every bedtime.   Dose:  40 mg                        carvedilol 3.125 MG Tabs   Commonly known as:  COREG        Take 1 Tab by mouth 2 times a day, with meals.   Dose:  3.125 mg                        eplerenone 25 MG Tabs   Commonly known as:  INSPRA        Take 1 Tab by mouth every day.   Dose:  25 mg                        ramipril 5 MG Caps   Commonly known as:  ALTACE        Take 1 Cap by mouth every day.   Dose:  5 mg                        therapeutic multivitamin-minerals Tabs        Take 1 Tab by mouth every day.   Dose:  1 Tab                        warfarin 5 MG Tabs   Commonly known as:  COUMADIN        Take 1 Tab by mouth every day.   Dose:  5 mg                                 Medication Information     Above and/or attached are the medications your physician expects you to take upon discharge. Review all of your home medications and newly ordered medications with your doctor and/or pharmacist. Follow medication instructions as directed by your doctor and/or pharmacist. Please keep your medication list with you and share with your physician. Update the information when medications are discontinued, doses are changed, or new medications (including over-the-counter products) are added; and carry medication information at all times in the event of emergency situations.        Resources     Quit Smoking / Tobacco Use:    I understand the use of any tobacco products increases my chance of suffering from future heart disease or stroke and could cause other illnesses which may shorten my life. Quitting the use of tobacco products is the single most important thing I can do to improve my health. For further information on smoking / tobacco cessation call a Toll Free Quit Line at 1-874.955.8785 (*[employer]) or 1-300.782.8022 (American Lung Association) or you can access the web based program at www.lungCarbonetworks.org.    [place] Tobacco Users Help Line:  (320) 191-2228       Toll Free: 1-732.623.3554  Quit Tobacco Program [facility] (919)870-0638    Crisis Hotline:    [place] Crisis Hotline:  9-944-VTMRDMM or 1-960.185.4244    [place] Crisis Hotline:    1-364.239.1222 or 692-309-5037    Discharge Survey:   Thank you for choosing [facility]. We hope we did everything we could to make your hospital stay a pleasant one. You may be receiving a survey and we would appreciate your time and participation in answering the questions. Your input is very valuable to us in our efforts to improve our service to our patients and their families.            Signatures     My signature on this form indicates that:    1. I acknowledge receipt and understanding of these Home  Care Instruction.  2. My questions regarding this information have been answered to my satisfaction.  3. I have formulated a plan with my discharge nurse to obtain my prescribed medications for home.    __________________________________      __________________________________                   Patient Signature                                 Guardian/Responsible Adult Signature      __________________________________                 __________       ________                       Nurse Signature                                               Date                 Time

## 2017-07-10 NOTE — DISCHARGE INSTRUCTIONS
ACTIVITY: Rest and take it easy for the first 24 hours.  A responsible adult is recommended to remain with you during that time.  It is normal to feel sleepy.  We encourage you to not do anything that requires balance, judgment or coordination.    MILD FLU-LIKE SYMPTOMS ARE NORMAL. YOU MAY EXPERIENCE GENERALIZED MUSCLE ACHES, THROAT IRRITATION, HEADACHE AND/OR SOME NAUSEA.    FOR 24 HOURS DO NOT:  Drive, operate machinery or run household appliances.  Drink beer or alcoholic beverages.   Make important decisions or sign legal documents.    SPECIAL INSTRUCTIONS: *TAKE IT EASY FOR 24 HRS**    DIET: To avoid nausea, slowly advance diet as tolerated, avoiding spicy or greasy foods for the first day.  Add more substantial food to your diet according to your physician's instructions.  Babies can be fed formula or breast milk as soon as they are hungry.  INCREASE FLUIDS AND FIBER TO AVOID CONSTIPATION.    SURGICAL DRESSING/BATHING: *NO RESTRICTION**    FOLLOW-UP APPOINTMENT:  A follow-up appointment should be arranged with your doctor in *DR MALONEY   [phone]**; call to schedule.    You should CALL YOUR PHYSICIAN if you develop:  Fever greater than 101 degrees F.  Pain not relieved by medication, or persistent nausea or vomiting.  Excessive bleeding (blood soaking through dressing) or unexpected drainage from the wound.  Extreme redness or swelling around the incision site, drainage of pus or foul smelling drainage.  Inability to urinate or empty your bladder within 8 hours.  Problems with breathing or chest pain.    You should call 911 if you develop problems with breathing or chest pain.  If you are unable to contact your doctor or surgical center, you should go to the nearest emergency room or urgent care center.  Physician's telephone #: *[phone]**    If any questions arise, call your doctor.  If your doctor is not available, please feel free to call the Surgical Center at [phone]  The Center is open Monday  through Friday from 7AM to 7PM.  You can also call the HEALTH HOTLINE open 24 hours/day, 7 days/week and speak to a nurse at [phone], or toll free at [phone].    A registered nurse may call you a few days after your surgery to see how you are doing after your procedure.    MEDICATIONS: Resume taking daily medication.  Take prescribed pain medication with food.  If no medication is prescribed, you may take non-aspirin pain medication if needed.  PAIN MEDICATION CAN BE VERY CONSTIPATING.  Take a stool softener or laxative such as senokot, pericolace, or milk of magnesia if needed.      If your physician has prescribed pain medication that includes Acetaminophen (Tylenol), do not take additional Acetaminophen (Tylenol) while taking the prescribed medication.    Depression / Suicide Risk    As you are discharged from this [facility] facility, it is important to learn how to keep safe from harming yourself.    Recognize the warning signs:  · Abrupt changes in personality, positive or negative- including increase in energy   · Giving away possessions  · Change in eating patterns- significant weight changes-  positive or negative  · Change in sleeping patterns- unable to sleep or sleeping all the time   · Unwillingness or inability to communicate  · Depression  · Unusual sadness, discouragement and loneliness  · Talk of wanting to die  · Neglect of personal appearance   · Rebelliousness- reckless behavior  · Withdrawal from people/activities they love  · Confusion- inability to concentrate     If you or a loved one observes any of these behaviors or has concerns about self-harm, here's what you can do:  · Talk about it- your feelings and reasons for harming yourself  · Remove any means that you might use to hurt yourself (examples: pills, rope, extension cords, firearm)  · Get professional help from the community (Mental Health, Substance Abuse, psychological counseling)  · Do not be alone:Call your Safe  Contact- someone whom you trust who will be there for you.  · Call your local CRISIS HOTLINE 001-6565 or 892-756-5421  · Call your local Children's Mobile Crisis Response Team [place] (952) 338-6542 or www.mgMEDIA  · Call the toll free National Suicide Prevention Hotlines   · National Suicide Prevention Lifeline 374-993-DCFC (4391)  · National [employer] Line Network 800-SUICIDE (468-1934)

## 2017-07-11 NOTE — PROCEDURES
DATE OF PROCEDURE:  7/10/2017.     PROCEDURE:  Direct current cardioversion with biventricular icd interrogation.     PROCEDURE IN DETAIL:  After obtaining informed consent, patient was brought to   the cardiac procedure area in the fasted state.  He was given propofol by the    anesthesiologist and then was direct current cardioverted with pads in the AP    position with 120 joules of synchronized energy. He successfully cardioverted    to atrial paced rhythm.     CONCLUSION:  Successful cardioversion of atrial fibrillation to atrial paced    rhythm.  The device was reinterogated and everything remained stable.        ____________________________________     Fabrice Kwong MD

## 2017-07-17 ENCOUNTER — ANTICOAGULATION VISIT (OUTPATIENT)
Dept: MEDICAL GROUP | Facility: MEDICAL CENTER | Age: 62
End: 2017-07-17
Payer: COMMERCIAL

## 2017-07-17 VITALS — HEART RATE: 84 BPM | SYSTOLIC BLOOD PRESSURE: 117 MMHG | DIASTOLIC BLOOD PRESSURE: 81 MMHG

## 2017-07-17 DIAGNOSIS — Z79.01 LONG TERM (CURRENT) USE OF ANTICOAGULANTS: ICD-10-CM

## 2017-07-17 DIAGNOSIS — Z95.2 S/P AVR (AORTIC VALVE REPLACEMENT): ICD-10-CM

## 2017-07-17 LAB — INR PPP: 2.9 (ref 2–3.5)

## 2017-07-17 PROCEDURE — 85610 PROTHROMBIN TIME: CPT | Performed by: PHYSICIAN ASSISTANT

## 2017-07-17 NOTE — PROGRESS NOTES
Anticoagulation Summary as of 7/17/2017     INR goal 2.0-3.0   Selected INR 2.9 (7/17/2017)   Maintenance plan 2.5 mg (5 mg x 0.5) on Thu; 5 mg (5 mg x 1) all other days   Weekly total 32.5 mg   Plan last modified Anaid Merino, PHARMD (7/6/2017)   Next INR check 7/31/2017   Target end date     Indications   Atrial fibrillation (CMS-HCC) [I48.91]  S/P AVR (aortic valve replacement) [Z95.2]  Long term (current) use of anticoagulants [Z79.01] [Z79.01]         Anticoagulation Episode Summary     INR check location     Preferred lab     Send INR reminders to     Comments       Anticoagulation Care Providers     Provider Role Specialty Phone number    [facility] Responsible  [phone]        Anticoagulation Patient Findings      Current Outpatient Prescriptions on File Prior to Visit   Medication Sig Dispense Refill   • carvedilol (COREG) 3.125 MG Tab Take 1 Tab by mouth 2 times a day, with meals. 180 Tab 3   • eplerenone (INSPRA) 25 MG Tab Take 1 Tab by mouth every day. 90 Tab 3   • atorvastatin (LIPITOR) 40 MG Tab Take 1 Tab by mouth every bedtime. 90 Tab 3   • ramipril (ALTACE) 5 MG Cap Take 1 Cap by mouth every day. 90 Cap 3   • warfarin (COUMADIN) 5 MG Tab Take 1 Tab by mouth every day. 30 Tab 3   • aspirin EC (ECOTRIN) 81 MG Tablet Delayed Response Take 81 mg by mouth every day.     • therapeutic multivitamin-minerals (THERAGRAN-M) Tab Take 1 Tab by mouth every day.       No current facility-administered medications on file prior to visit.       Lab Results   Component Value Date/Time    SODIUM 138 07/07/2017 01:28 PM    POTASSIUM 3.6 07/07/2017 01:28 PM    CHLORIDE 104 07/07/2017 01:28 PM    CO2 28 07/07/2017 01:28 PM    GLUCOSE 84 07/07/2017 01:28 PM    BUN 21 07/07/2017 01:28 PM    CREATININE 1.09 07/07/2017 01:28 PM        Alberto Fam seen in clinic today  INR  therapeutic.    Denies signs/symptoms of bleeding and/or thrombosis.    Denies changes to diet or medications.   Follow  up appointment in 2 week(s).    Continue weekly warfarin dose as noted     Ryder Salgado, PHARMD

## 2017-07-17 NOTE — MR AVS SNAPSHOT
Alberto Fam   2017 4:00 PM   Anticoagulation Visit   MRN: [id number]    Department:  [facility]   Dept Phone:  [phone]    Description:  Male : 1955   Provider:  [facility]           Allergies as of 2017     Allergen Noted Reactions    Sulfa Drugs 2010   Rash, Vomiting    RXN=20 years ago      You were diagnosed with     Long term (current) use of anticoagulants   [V58.61.ICD-9-CM]       S/P AVR (aortic valve replacement)   [761018]         Vital Signs     Blood Pressure Pulse Smoking Status             117/81 mmHg 84 Never Smoker          Basic Information     Date Of Birth Sex Race Ethnicity Preferred Language    1955 Male White Non- English      Your appointments     2017  4:00 PM   Anti-Coag Routine with [facility]   [facility]    [address]  [address]  [place] [address] [phone]            2017  3:45 PM   Anti-Coag Routine with [facility]   [facility]    [address]  [address]  [place] [address] [phone]              Problem List              ICD-10-CM Priority Class Noted - Resolved    Hypertension I10 High  Unknown - Present    S/P AV servando ablation (Chronic) Z98.890 High  3/21/2012 - Present    Dilated cardiomyopathy (CMS-HCC) (Chronic) I42.0 High  3/21/2012 - Present    Male erectile disorder N52.9 Medium  3/21/2012 - Present    Ventricular tachycardia (CMS-HCC) I47.2 High  3/21/2012 - Present    Atypical chest pain (Chronic) R07.89 Medium  3/21/2012 - Present    AV block, 3rd degree (CMS-HCC) I44.2 High  3/21/2012 - Present    SSS (sick sinus syndrome) (CMS-HCC) I49.5 High  2012 - Present    Presence of biventricular AICD Z95.810 High  2013 - Present    Arthritis M19.90 Low  2013 - Present    Chronic systolic congestive heart failure, NYHA class 3  (CMS-HCC) I50.22   10/13/2015 - Present    Homograft cardiac valve stenosis I38   12/16/2016 - Present    Severe aortic stenosis I35.0   1/13/2017 - Present    CAD (coronary artery disease) I25.10   1/13/2017 - Present    Heart valve replaced (Chronic) Z95.2   2/17/2017 - Present    Pericardial effusion I31.3   2/20/2017 - Present    Pleural effusion J90   2/20/2017 - Present    CHF exacerbation (CMS-HCC) I50.9   2/20/2017 - Present    Hemorrhagic disorder due to circulating anticoagulants (CMS-HCC) D68.318   2/21/2017 - Present    S/P AVR (aortic valve replacement) (Chronic) Z95.2   2/21/2017 - Present    Hypokalemia E87.6   2/21/2017 - Present    Long term (current) use of anticoagulants [Z79.01] Z79.01   3/27/2017 - Present    Atrial fibrillation (CMS-HCC) I48.91   5/31/2017 - Present      Health Maintenance        Date Due Completion Dates    IMM DTaP/Tdap/Td Vaccine (1 - Tdap) 4/5/1976 ---    COLONOSCOPY 4/5/2007 ---    IMM ZOSTER VACCINE 4/5/2017 ---    IMM INFLUENZA (1) 9/1/2017 11/2/2016, 10/30/2009            Results     POCT Protime      Component    INR    2.9    Comment:      valid [id number] 160 exp 03/2018                        Current Immunizations     Influenza TIV (IM) 10/30/2009    Influenza Vaccine Adult HD 11/2/2016    Pneumococcal polysaccharide vaccine (PPSV-23) 2/21/2017  5:32 AM      Below and/or attached are the medications your provider expects you to take. Review all of your home medications and newly ordered medications with your provider and/or pharmacist. Follow medication instructions as directed by your provider and/or pharmacist. Please keep your medication list with you and share with your provider. Update the information when medications are discontinued, doses are changed, or new medications (including over-the-counter products) are added; and carry medication information at all times in the event of emergency situations     Allergies:  SULFA DRUGS - Rash,Vomiting                  Medications  Valid as of: July 17, 2017 -  3:35 PM    Generic Name Brand Name Tablet Size Instructions for use    Aspirin (Tablet Delayed Response) ECOTRIN 81 MG Take 81 mg by mouth every day.        Atorvastatin Calcium (Tab) LIPITOR 40 MG Take 1 Tab by mouth every bedtime.        Carvedilol (Tab) COREG 3.125 MG Take 1 Tab by mouth 2 times a day, with meals.        Eplerenone (Tab) INSPRA 25 MG Take 1 Tab by mouth every day.        Multiple Vitamins-Minerals (Tab) THERAGRAN-M  Take 1 Tab by mouth every day.        Ramipril (Cap) ALTACE 5 MG Take 1 Cap by mouth every day.        Warfarin Sodium (Tab) COUMADIN 5 MG Take 1 Tab by mouth every day.        .                 Medicines prescribed today were sent to:     [facility] [address] [place], [place] [address]    [address] [facility] [address]    Phone: [phone] Fax: [phone]    Open 24 Hours?: No      Medication refill instructions:       If your prescription bottle indicates you have medication refills left, it is not necessary to call your provider’s office. Please contact your pharmacy and they will refill your medication.    If your prescription bottle indicates you do not have any refills left, you may request refills at any time through one of the following ways: The online [employer] system (except Urgent Care), by calling your provider’s office, or by asking your pharmacy to contact your provider’s office with a refill request. Medication refills are processed only during regular business hours and may not be available until the next business day. Your provider may request additional information or to have a follow-up visit with you prior to refilling your medication.   *Please Note: Medication refills are assigned a new Rx number when refilled electronically. Your pharmacy may indicate that no refills were authorized even though a new prescription for the same medication is available at the pharmacy. Please request the medicine by  name with the pharmacy before contacting your provider for a refill.        Warfarin Dosing Calendar   July 2017 Details    Sun Mon Tue Wed Thu Fri Sat           1                 2               3               4               5               6               7               8                 9               10               11               12               13               14               15                 16               17   2.9   5 mg   See details      18      5 mg         19      5 mg         20      2.5 mg         21      5 mg         22      5 mg           23      5 mg         24      5 mg         25      5 mg         26      5 mg         27      2.5 mg         28      5 mg         29      5 mg           30      5 mg         31      5 mg               Date Details   07/17 This INR check   INR: 2.9   ic valid [id number] 160 exp 03/2018       Date of next INR:  7/31/2017         How to take your warfarin dose     To take:  2.5 mg Take 0.5 of a 5 mg tablet.    To take:  5 mg Take 1 of the 5 mg tablets.              [employer] Access Code: Activation code not generated  Current [employer] Status: Active

## 2017-07-19 ENCOUNTER — OFFICE VISIT (OUTPATIENT)
Dept: CARDIOLOGY | Facility: MEDICAL CENTER | Age: 62
End: 2017-07-19
Payer: COMMERCIAL

## 2017-07-19 VITALS
WEIGHT: 191 LBS | HEART RATE: 76 BPM | HEIGHT: 71 IN | OXYGEN SATURATION: 95 % | DIASTOLIC BLOOD PRESSURE: 72 MMHG | BODY MASS INDEX: 26.74 KG/M2 | SYSTOLIC BLOOD PRESSURE: 104 MMHG

## 2017-07-19 DIAGNOSIS — I10 ESSENTIAL HYPERTENSION: ICD-10-CM

## 2017-07-19 DIAGNOSIS — I44.2 AV BLOCK, 3RD DEGREE (HCC): ICD-10-CM

## 2017-07-19 DIAGNOSIS — I48.0 PAROXYSMAL ATRIAL FIBRILLATION (HCC): ICD-10-CM

## 2017-07-19 DIAGNOSIS — Z95.2 S/P AVR (AORTIC VALVE REPLACEMENT): Chronic | ICD-10-CM

## 2017-07-19 DIAGNOSIS — I49.5 SSS (SICK SINUS SYNDROME) (HCC): ICD-10-CM

## 2017-07-19 PROCEDURE — 99214 OFFICE O/P EST MOD 30 MIN: CPT | Performed by: INTERNAL MEDICINE

## 2017-07-19 PROCEDURE — 93000 ELECTROCARDIOGRAM COMPLETE: CPT | Performed by: INTERNAL MEDICINE

## 2017-07-19 RX ORDER — LORATADINE 10 MG/1
10 TABLET ORAL DAILY
COMMUNITY

## 2017-07-19 ASSESSMENT — ENCOUNTER SYMPTOMS
PALPITATIONS: 0
ABDOMINAL PAIN: 0
BLURRED VISION: 0
NERVOUS/ANXIOUS: 0
MYALGIAS: 0
COUGH: 0
BLOOD IN STOOL: 0
ROS GI COMMENTS: ABDOMINAL BLOATING
DEPRESSION: 0
DIZZINESS: 0
PND: 0
CLAUDICATION: 0
ORTHOPNEA: 0
SHORTNESS OF BREATH: 0
FEVER: 0
HEADACHES: 0

## 2017-07-19 NOTE — MR AVS SNAPSHOT
"        Alberto Fam   2017 3:15 PM   Office Visit   MRN: [id number]    Department:  [facility]   Dept Phone:  [phone]    Description:  Male : 1955   Provider:  Abbie Elder M.D.           Reason for Visit     Follow-Up           Allergies as of 2017     Allergen Noted Reactions    Sulfa Drugs 2010   Rash, Vomiting    RXN=20 years ago      You were diagnosed with     SSS (sick sinus syndrome) (CMS-[facility])   [733122]       Essential hypertension   [0307900]       AV block, 3rd degree (CMS-HCC)   [509579]       S/P AVR (aortic valve replacement)   [390963]       Paroxysmal atrial fibrillation (CMS-[facility])   [440030]         Vital Signs     Blood Pressure Pulse Height Weight Body Mass Index Oxygen Saturation    104/72 mmHg 76 1.803 m (5' 10.98\") 86.637 kg (191 lb) 26.65 kg/m2 95%    Smoking Status                   Never Smoker            Basic Information     Date Of Birth Sex Race Ethnicity Preferred Language    1955 Male White Non- English      Your appointments     2017  3:45 PM   Anti-Coag Routine with [facility] PHARMACIST   [facility] [place]    [address]  [address]  [place] [id number] [phone]              Problem List              ICD-10-CM Priority Class Noted - Resolved    Hypertension I10 High  Unknown - Present    S/P AV servando ablation (Chronic) Z98.890 High  3/21/2012 - Present    Dilated cardiomyopathy (CMS-HCC) (Chronic) I42.0 High  3/21/2012 - Present    Male erectile disorder N52.9 Medium  3/21/2012 - Present    Ventricular tachycardia (CMS-HCC) I47.2 High  3/21/2012 - Present    Atypical chest pain (Chronic) R07.89 Medium  3/21/2012 - Present    AV block, 3rd degree (CMS-HCC) I44.2 High  3/21/2012 - Present    SSS (sick sinus syndrome) (CMS-HCC) I49.5 High  2012 - Present    Presence of biventricular AICD Z95.810 High  2013 - Present    Arthritis M19.90 Low  2013 - Present "    Chronic systolic congestive heart failure, NYHA class 3 (CMS-HCC) I50.22   10/13/2015 - Present    Homograft cardiac valve stenosis I38   12/16/2016 - Present    Severe aortic stenosis I35.0   1/13/2017 - Present    CAD (coronary artery disease) I25.10   1/13/2017 - Present    Heart valve replaced (Chronic) Z95.2   2/17/2017 - Present    Pericardial effusion I31.3   2/20/2017 - Present    Pleural effusion J90   2/20/2017 - Present    CHF exacerbation (CMS-HCC) I50.9   2/20/2017 - Present    Hemorrhagic disorder due to circulating anticoagulants (CMS-HCC) D68.318   2/21/2017 - Present    S/P AVR (aortic valve replacement) (Chronic) Z95.2   2/21/2017 - Present    Hypokalemia E87.6   2/21/2017 - Present    Long term (current) use of anticoagulants [Z79.01] Z79.01   3/27/2017 - Present    Atrial fibrillation (CMS-HCC) I48.91   5/31/2017 - Present      Health Maintenance        Date Due Completion Dates    IMM DTaP/Tdap/Td Vaccine (1 - Tdap) 4/5/1976 ---    COLONOSCOPY 4/5/2007 ---    IMM ZOSTER VACCINE 4/5/2017 ---    IMM INFLUENZA (1) 9/1/2017 11/2/2016, 10/30/2009            Results       Current Immunizations     Influenza TIV (IM) 10/30/2009    Influenza Vaccine Adult HD 11/2/2016    Pneumococcal polysaccharide vaccine (PPSV-23) 2/21/2017  5:32 AM      Below and/or attached are the medications your provider expects you to take. Review all of your home medications and newly ordered medications with your provider and/or pharmacist. Follow medication instructions as directed by your provider and/or pharmacist. Please keep your medication list with you and share with your provider. Update the information when medications are discontinued, doses are changed, or new medications (including over-the-counter products) are added; and carry medication information at all times in the event of emergency situations     Allergies:  SULFA DRUGS - Rash,Vomiting               Medications  Valid as of: July 19, 2017 -  4:01 PM     Generic Name Brand Name Tablet Size Instructions for use    Aspirin (Tablet Delayed Response) ECOTRIN 81 MG Take 81 mg by mouth every day.        Atorvastatin Calcium (Tab) LIPITOR 40 MG Take 1 Tab by mouth every bedtime.        Carvedilol (Tab) COREG 3.125 MG Take 1 Tab by mouth 2 times a day, with meals.        Eplerenone (Tab) INSPRA 25 MG Take 1 Tab by mouth every day.        Loratadine (Tab) CLARITIN 10 MG Take 10 mg by mouth every day.        Multiple Vitamins-Minerals (Tab) THERAGRAN-M  Take 1 Tab by mouth every day.        Ramipril (Cap) ALTACE 5 MG Take 1 Cap by mouth every day.        Warfarin Sodium (Tab) COUMADIN 5 MG Take 1 Tab by mouth every day.        .                 Medicines prescribed today were sent to:     [facility] [address] [place], [place] [address]Y [address]    [address] [facility] [address]    Phone: [phone] Fax: [phone]    Open 24 Hours?: No      Medication refill instructions:       If your prescription bottle indicates you have medication refills left, it is not necessary to call your provider’s office. Please contact your pharmacy and they will refill your medication.    If your prescription bottle indicates you do not have any refills left, you may request refills at any time through one of the following ways: The online [employer] system (except Urgent Care), by calling your provider’s office, or by asking your pharmacy to contact your provider’s office with a refill request. Medication refills are processed only during regular business hours and may not be available until the next business day. Your provider may request additional information or to have a follow-up visit with you prior to refilling your medication.   *Please Note: Medication refills are assigned a new Rx number when refilled electronically. Your pharmacy may indicate that no refills were authorized even though a new prescription for the same medication is available at the pharmacy. Please request the  medicine by name with the pharmacy before contacting your provider for a refill.           MyChart Access Code: Activation code not generated  Current MyChart Status: Active

## 2017-07-19 NOTE — PROGRESS NOTES
Subjective:   Alberto Fam is a 62 y.o. male with known history of VSD status post repair at age 4 1/2 , NICMP angiogram from 2011 no significant CAD, type II Mobitz block s/p PPM 11/2006, later upgraded to BIV ICD in 8/2011, LVEF 35-40% in 2013. EF in 2015 showed drop in LVEF to 25%, severe aortic stenosis on dobutamine stress echo s/p AVR with 23mm Moy Perimount Magna pericardial valve on 2/17. Atrial fibrillation status post cardioversion presenting today for follow-up evaluation of cardiomyopathy.    Patient is in general is feeling great since cardioversion. No complaints of exertional chest pain pressure or tightness. Denied any complaints of palpitations orthopnea or PND. No complaints of dizziness lightheadedness or LOC. No complaints of lower extremity edema or claudication.    Past Medical History   Diagnosis Date   • Congestive heart failure (CMS-HCC)    • S/P AV servando ablation     • Dilated cardiomyopathy September 2013     Echocardiogram with dilated LV, moderate concentric LVH, LVEF 35-40%.   • Male erectile disorder     • Ventricular tachycardia (CMS-[facility])     • SSS (sick sinus syndrome)     • AV block, 3rd degree     • Aortic stenosis December 2016     Dobutamine stress echo with severe AS (peak 89mmHg, mean 54mmHg, ACE 0.6cm2).   • CHF (congestive heart failure) (CMS-[facility])    • Bowel habit changes      Constipation   • Arthritis      Low back   • Pneumonia 12/2014   • Pain      lower back pain    • Breath shortness      d/t heart condition   • AICD (automatic cardioverter/defibrillator) present    • Pacemaker    • Hypertension      Pt states well controlled on meds   • Dental disorder      Upper implanted     Past Surgical History   Procedure Laterality Date   • Recovery  8/29/2011     Performed by SURGERY, CATH-RECOVERY at SURGERY SAME DAY [facility]   • Aicd implant  August 2011     Medtronic Concerto II CRT-D [id number]   • Ventral septal defect repair  1961   • Pacemaker insertion  2006    • Aicd battery change  November 2016     Generator change with Medtronic Viva S CRT-D [id number] implanted by Dr. Kwong.   • Hernia repair  1966   • Sternotomy  2/8/2017     Procedure: STERNOTOMY - REDO;  Surgeon: Alfonso Llanos M.D.;  Location: SURGERY [facility];  Service:    • Aortic valve replacement  2/8/2017     Procedure: AORTIC VALVE REPLACEMENT;  Surgeon: Alfonso Llanos M.D.;  Location: SURGERY [facility];  Service:    • Marques  2/8/2017     Procedure: MARQUES;  Surgeon: Alfonso Llanos M.D.;  Location: SURGERY [facility];  Service:      History reviewed. No pertinent family history.  History   Smoking status   • Never Smoker    Smokeless tobacco   • Never Used     Comment: quit 35 years ago      Allergies   Allergen Reactions   • Sulfa Drugs Rash and Vomiting     RXN=20 years ago     Outpatient Encounter Prescriptions as of 7/19/2017   Medication Sig Dispense Refill   • loratadine (CLARITIN) 10 MG Tab Take 10 mg by mouth every day.     • carvedilol (COREG) 3.125 MG Tab Take 1 Tab by mouth 2 times a day, with meals. 180 Tab 3   • eplerenone (INSPRA) 25 MG Tab Take 1 Tab by mouth every day. 90 Tab 3   • atorvastatin (LIPITOR) 40 MG Tab Take 1 Tab by mouth every bedtime. 90 Tab 3   • ramipril (ALTACE) 5 MG Cap Take 1 Cap by mouth every day. 90 Cap 3   • warfarin (COUMADIN) 5 MG Tab Take 1 Tab by mouth every day. 30 Tab 3   • aspirin EC (ECOTRIN) 81 MG Tablet Delayed Response Take 81 mg by mouth every day.     • therapeutic multivitamin-minerals (THERAGRAN-M) Tab Take 1 Tab by mouth every day.       No facility-administered encounter medications on file as of 7/19/2017.     Review of Systems   Constitutional: Negative for fever.   Eyes: Negative for blurred vision.   Respiratory: Negative for cough and shortness of breath.    Cardiovascular: Positive for chest pain. Negative for palpitations, orthopnea, claudication, leg swelling and PND.   Gastrointestinal: Negative for abdominal pain and blood in  "stool.        Abdominal bloating   Genitourinary: Negative for dysuria and hematuria.   Musculoskeletal: Positive for joint pain. Negative for myalgias.        He has chronic severe back pain and has been using Celebrex on a regular basis.   Skin: Negative for rash.   Neurological: Negative for dizziness and headaches.   Psychiatric/Behavioral: Negative for depression. The patient is not nervous/anxious.         Objective:   /72 mmHg  Pulse 76  Ht 1.803 m (5' 10.98\")  Wt 86.637 kg (191 lb)  BMI 26.65 kg/m2  SpO2 95%    Physical Exam   Constitutional: He is oriented to person, place, and time. He appears well-developed and well-nourished.   HENT:   Mouth/Throat: Oropharynx is clear and moist.   Eyes: Conjunctivae and EOM are normal.   Neck: No JVD present. No thyroid mass and no thyromegaly present.   There is no JVD.   Cardiovascular: Normal rate, regular rhythm, S1 normal, S2 normal and normal pulses.  PMI is not displaced.  Exam reveals no gallop.    Murmur heard.   Systolic murmur is present with a grade of 4/6   Pulses:       Carotid pulses are 2+ on the right side, and 2+ on the left side.       Radial pulses are 2+ on the right side, and 2+ on the left side.        Dorsalis pedis pulses are 2+ on the right side, and 2+ on the left side.   Late peaking ejection systolic murmur best heard in the aortic area   Pulmonary/Chest: Effort normal and breath sounds normal. No respiratory distress. He has no wheezes. He has no rales.   Abdominal: Soft. Normal appearance. He exhibits no abdominal bruit. There is no hepatosplenomegaly. There is no tenderness.   Musculoskeletal: Normal range of motion. He exhibits no edema.        Thoracic back: He exhibits no tenderness and no spasm.   Lymphadenopathy:     He has no cervical adenopathy.   Neurological: He is alert and oriented to person, place, and time.   Skin: No rash noted. No cyanosis. Nails show no clubbing.   Psychiatric: He has a normal mood and affect. "      EK17  EKG personally reviewed by me.  Sinus rhythm V paced at 69 BPM.    TTE: 17  In some views the mitral valve leaflets appear thickened and rheumatic.    No mitral stenosis.   Akinetic apex and hypokinetic anterior walls.  Left ventricular ejection fraction is visually estimated to be 45%.  Right ventricular systolic pressure is estimated to be 35 mmHg.  Normal function of bioprosthetic valve    TTE: 17  Left ventricular ejection fraction is visually estimated to be 20%.  Small-moderate sized (<1 cm in end diastole) pericardial effusion without evidence of hemodynamic compromise.  Known bioprosthetic aortic valve, mean gradient 18 mmHg - with severely depressed cariac output, flow impairment may be underestimated.  Large left pleural effusion.  Right ventricular systolic pressure is estimated to be at least 35 mmHg.  Compared with the intraoperative JUSTINO  - the effusions are new.    Carotid doppler: 17  Normal bilateral carotid exam.   Flow within both subclavian arteries appears to be within normal limits. Antegrade flow, bilateral vertebral arteries    Angiogram: 16   1.  Minor plaquing in the left anterior descending artery proximal, otherwise, normal coronary angiography.  2.  Moderate dilatation of the ascending aorta    Dobutamine stress echo: 17  1.  Minor plaquing in the left anterior descending artery proximal, otherwise, normal coronary angiography.  2.  Moderate dilatation of the ascending aorta.  Results for EDUARDO ALONSO HARRY (MRN [id number]) as of 2016 10:38   2016    Sodium 140 138 138   Potassium 3.5 (L) 4.0 4.0   Chloride 104 102 102   Co2 30 28 28   Anion Gap 6.0 8.0 8.0   Glucose 81 95 78   Bun 18 19 21   Creatinine 0.97 1.01 1.13   GFR If Non  >60 >60 >60   Calcium 9.7 10.4 9.9   AST(SGOT) 19 20    ALT(SGPT) 20 28    Alkaline Phosphatase 44 47    Lactic Acid      Cholesterol,Tot 159     Triglycerides 118      HDL 40     LDL 95       EKG: 10/20/15  EKG personally reviewed by me.  AV paced at 63 BPM.    TTE: 5/5/15  Left ventricle is moderately dilated. Severely reduced left ventricular systolic function. Global hypokinesis. Left ventricular ejection fraction is 20% to 25%. Grade IV diastolic dysfunction.  Severely increased LA volume.  Mild mitral regurgitation.  No aortic stenosis based on gradients across aortic valve. Peak gradient 29 mmHg. Mean 16 mmHg. Dimensionless index=.25. Ttrace aortic insufficiency. Gradients could be underestimated with low LVEF consider   clinical correlation (based on Dimensionless index of 0.25 severe aortic stenosis).  Mild tricuspid regurgitation. Right ventricular systolic pressure is estimated to be 23. RA pressure 15 mmHg.  Aortic root diameter 3.7 cm.  Compared to the images of the prior study done 9/4/13  there has been drop in LVEF from 35-40% to current echo LVEF 20-25%. Prior echo AV MG 23 mmHg and PG 38 mmHg.    Transthoracic echo: 9/4/13   Aortic stenosis, w low flow and low DI suggests moderate to severe, but does not appeart severeLeft ventricular ejection fraction is 35% to 40%. Grade I-II diastolic dysfunction is present.  Left ventricle is moderately dilated.  Compared w the prior echo, the EF is slightly less    Nuclear stress test: 7/19/11  1. Normal left ventricular perfusion with no fixed or reversible defects.  2. Marked left ventricular dilatation, with profound global hypokinesis, and a low ejection fraction of 17%.    Coronary angiogram: 8/29/2011  1.  Normal coronary arteries.  2.  Moderate to severely reduced systolic function.  3.  Normal end-diastolic pressure.  4.  Pacemaker leads in place.  5. LVEF 25-30%    Coronary angiogram: 10/16/2006  1. Left ventricular dysfunction with left ventricular enlargement and moderate hypokinesis of the mid anterior wall and inferobasal segment.  2. Normal epicardial coronary arteries with atypical left main which is more  of a pouch-like structure.  3. Normal resting hemodynamics.    Assessment:     1. NICMP LVEF 45%  2. Aortic stenosis s/p AVR  3. BIV ICD.  4. Dyslipidemia  5. Postop atrial fibrillation  Medical Decision Making:  Today's Assessment / Status / Plan:     1.  Patient appears euvolemic.  Continue eplerenone 25 mg daily.  Continue ramipril 5 mg daily  Continue Coreg 25 mg BID.  2. Patient will need antibiotics before any dental workup.  Repeat echocardiogram in one year  3. Patient is moving to [place] will need device check in 3 months  4. Continue Lipitor 20 mg  qHs.  5. Patient cardioverted back to sinus rhythm will continue anticoagulation for another month.     Follow-up PRN since patient is moving to [place].    Thank you for allowing me to participate in taking care of patient.    Abbie Gore MD.

## 2017-07-19 NOTE — Clinical Note
[facility]   [address]   [address]  [place] [address]  Phone: [phone]  Fax: [phone]              Alberto Fam  1955    Encounter Date: 7/19/2017    Abbie Elder M.D.          PROGRESS NOTE:  Subjective:   Alberto Fam is a 62 y.o. male with known history of VSD status post repair at age 4 1/2 , NICMP angiogram from 2011 no significant CAD, type II Mobitz block s/p PPM 11/2006, later upgraded to BIV ICD in 8/2011, LVEF 35-40% in 2013. EF in 2015 showed drop in LVEF to 25%, severe aortic stenosis on dobutamine stress echo s/p AVR with 23mm Moy Perimount Magna pericardial valve on 2/17. Atrial fibrillation status post cardioversion presenting today for follow-up evaluation of cardiomyopathy.    Patient is in general is feeling great since cardioversion. No complaints of exertional chest pain pressure or tightness. Denied any complaints of palpitations orthopnea or PND. No complaints of dizziness lightheadedness or LOC. No complaints of lower extremity edema or claudication.    Past Medical History   Diagnosis Date   • Congestive heart failure (CMS-[facility])    • S/P AV servando ablation     • Dilated cardiomyopathy September 2013     Echocardiogram with dilated LV, moderate concentric LVH, LVEF 35-40%.   • Male erectile disorder     • Ventricular tachycardia (CMS-[facility])     • SSS (sick sinus syndrome)     • AV block, 3rd degree     • Aortic stenosis December 2016     Dobutamine stress echo with severe AS (peak 89mmHg, mean 54mmHg, ACE 0.6cm2).   • CHF (congestive heart failure) (CMS-[facility])    • Bowel habit changes      Constipation   • Arthritis      Low back   • Pneumonia 12/2014   • Pain      lower back pain    • Breath shortness      d/t heart condition   • AICD (automatic cardioverter/defibrillator) present    • Pacemaker    • Hypertension      Pt states well controlled on meds   • Dental disorder      Upper implanted        Past Surgical History   Procedure Laterality Date   • Recovery  8/29/2011     Performed by SURGERY, CATH-RECOVERY at SURGERY SAME DAY [facility] ORS   • Aicd implant  August 2011     Medtronic Concerto II CRT-D [id number]   • Ventral septal defect repair  1961   • Pacemaker insertion  2006   • Aicd battery change  November 2016     Generator change with Medtronic Viva S CRT-D [id number] implanted by Dr. Kwong.   • Hernia repair  1966   • Sternotomy  2/8/2017     Procedure: STERNOTOMY - REDO;  Surgeon: Alfonso Llanos M.D.;  Location: SURGERY [facility];  Service:    • Aortic valve replacement  2/8/2017     Procedure: AORTIC VALVE REPLACEMENT;  Surgeon: Alfonso Llanos M.D.;  Location: SURGERY [facility];  Service:    • Marques  2/8/2017     Procedure: MARQUES;  Surgeon: Alfonso Llanos M.D.;  Location: SURGERY [facility];  Service:      History reviewed. No pertinent family history.  History   Smoking status   • Never Smoker    Smokeless tobacco   • Never Used     Comment: quit 35 years ago      Allergies   Allergen Reactions   • Sulfa Drugs Rash and Vomiting     RXN=20 years ago     Outpatient Encounter Prescriptions as of 7/19/2017   Medication Sig Dispense Refill   • loratadine (CLARITIN) 10 MG Tab Take 10 mg by mouth every day.     • carvedilol (COREG) 3.125 MG Tab Take 1 Tab by mouth 2 times a day, with meals. 180 Tab 3   • eplerenone (INSPRA) 25 MG Tab Take 1 Tab by mouth every day. 90 Tab 3   • atorvastatin (LIPITOR) 40 MG Tab Take 1 Tab by mouth every bedtime. 90 Tab 3   • ramipril (ALTACE) 5 MG Cap Take 1 Cap by mouth every day. 90 Cap 3   • warfarin (COUMADIN) 5 MG Tab Take 1 Tab by mouth every day. 30 Tab 3   • aspirin EC (ECOTRIN) 81 MG Tablet Delayed Response Take 81 mg by mouth every day.     • therapeutic multivitamin-minerals (THERAGRAN-M) Tab Take 1 Tab by mouth every day.       No facility-administered encounter medications on file as of 7/19/2017.     Review of Systems   Constitutional:  "Negative for fever.   Eyes: Negative for blurred vision.   Respiratory: Negative for cough and shortness of breath.    Cardiovascular: Positive for chest pain. Negative for palpitations, orthopnea, claudication, leg swelling and PND.   Gastrointestinal: Negative for abdominal pain and blood in stool.        Abdominal bloating   Genitourinary: Negative for dysuria and hematuria.   Musculoskeletal: Positive for joint pain. Negative for myalgias.        He has chronic severe back pain and has been using Celebrex on a regular basis.   Skin: Negative for rash.   Neurological: Negative for dizziness and headaches.   Psychiatric/Behavioral: Negative for depression. The patient is not nervous/anxious.         Objective:   /72 mmHg  Pulse 76  Ht 1.803 m (5' 10.98\")  Wt 86.637 kg (191 lb)  BMI 26.65 kg/m2  SpO2 95%    Physical Exam   Constitutional: He is oriented to person, place, and time. He appears well-developed and well-nourished.   HENT:   Mouth/Throat: Oropharynx is clear and moist.   Eyes: Conjunctivae and EOM are normal.   Neck: No JVD present. No thyroid mass and no thyromegaly present.   There is no JVD.   Cardiovascular: Normal rate, regular rhythm, S1 normal, S2 normal and normal pulses.  PMI is not displaced.  Exam reveals no gallop.    Murmur heard.   Systolic murmur is present with a grade of 4/6   Pulses:       Carotid pulses are 2+ on the right side, and 2+ on the left side.       Radial pulses are 2+ on the right side, and 2+ on the left side.        Dorsalis pedis pulses are 2+ on the right side, and 2+ on the left side.   Late peaking ejection systolic murmur best heard in the aortic area   Pulmonary/Chest: Effort normal and breath sounds normal. No respiratory distress. He has no wheezes. He has no rales.   Abdominal: Soft. Normal appearance. He exhibits no abdominal bruit. There is no hepatosplenomegaly. There is no tenderness.   Musculoskeletal: Normal range of motion. He exhibits no edema. "        Thoracic back: He exhibits no tenderness and no spasm.   Lymphadenopathy:     He has no cervical adenopathy.   Neurological: He is alert and oriented to person, place, and time.   Skin: No rash noted. No cyanosis. Nails show no clubbing.   Psychiatric: He has a normal mood and affect.      EK17  EKG personally reviewed by me.  Sinus rhythm V paced at 69 BPM.    TTE: 17  In some views the mitral valve leaflets appear thickened and rheumatic.    No mitral stenosis.   Akinetic apex and hypokinetic anterior walls.  Left ventricular ejection fraction is visually estimated to be 45%.  Right ventricular systolic pressure is estimated to be 35 mmHg.  Normal function of bioprosthetic valve    TTE: 17  Left ventricular ejection fraction is visually estimated to be 20%.  Small-moderate sized (<1 cm in end diastole) pericardial effusion without evidence of hemodynamic compromise.  Known bioprosthetic aortic valve, mean gradient 18 mmHg - with severely depressed cariac output, flow impairment may be underestimated.  Large left pleural effusion.  Right ventricular systolic pressure is estimated to be at least 35 mmHg.  Compared with the intraoperative JUSTINO  - the effusions are new.    Carotid doppler: 17  Normal bilateral carotid exam.   Flow within both subclavian arteries appears to be within normal limits. Antegrade flow, bilateral vertebral arteries    Angiogram: 16   1.  Minor plaquing in the left anterior descending artery proximal, otherwise, normal coronary angiography.  2.  Moderate dilatation of the ascending aorta    Dobutamine stress echo: 17  1.  Minor plaquing in the left anterior descending artery proximal, otherwise, normal coronary angiography.  2.  Moderate dilatation of the ascending aorta.  Results for CELESTE EDUARDO HARRY (MRN [id number]) as of 2016 10:38   2016    Sodium 140 138 138   Potassium 3.5 (L) 4.0 4.0   Chloride 104 102 102    Co2 30 28 28   Anion Gap 6.0 8.0 8.0   Glucose 81 95 78   Bun 18 19 21   Creatinine 0.97 1.01 1.13   GFR If Non  >60 >60 >60   Calcium 9.7 10.4 9.9   AST(SGOT) 19 20    ALT(SGPT) 20 28    Alkaline Phosphatase 44 47    Lactic Acid      Cholesterol,Tot 159     Triglycerides 118     HDL 40     LDL 95       EKG: 10/20/15  EKG personally reviewed by me.  AV paced at 63 BPM.    TTE: 5/5/15  Left ventricle is moderately dilated. Severely reduced left ventricular systolic function. Global hypokinesis. Left ventricular ejection fraction is 20% to 25%. Grade IV diastolic dysfunction.  Severely increased LA volume.  Mild mitral regurgitation.  No aortic stenosis based on gradients across aortic valve. Peak gradient 29 mmHg. Mean 16 mmHg. Dimensionless index=.25. Ttrace aortic insufficiency. Gradients could be underestimated with low LVEF consider   clinical correlation (based on Dimensionless index of 0.25 severe aortic stenosis).  Mild tricuspid regurgitation. Right ventricular systolic pressure is estimated to be 23. RA pressure 15 mmHg.  Aortic root diameter 3.7 cm.  Compared to the images of the prior study done 9/4/13  there has been drop in LVEF from 35-40% to current echo LVEF 20-25%. Prior echo AV MG 23 mmHg and PG 38 mmHg.    Transthoracic echo: 9/4/13   Aortic stenosis, w low flow and low DI suggests moderate to severe, but does not appeart severeLeft ventricular ejection fraction is 35% to 40%. Grade I-II diastolic dysfunction is present.  Left ventricle is moderately dilated.  Compared w the prior echo, the EF is slightly less    Nuclear stress test: 7/19/11  1. Normal left ventricular perfusion with no fixed or reversible defects.  2. Marked left ventricular dilatation, with profound global hypokinesis, and a low ejection fraction of 17%.    Coronary angiogram: 8/29/2011  1.  Normal coronary arteries.  2.  Moderate to severely reduced systolic function.  3.  Normal end-diastolic pressure.  4.   Pacemaker leads in place.  5. LVEF 25-30%    Coronary angiogram: 10/16/2006  1. Left ventricular dysfunction with left ventricular enlargement and moderate hypokinesis of the mid anterior wall and inferobasal segment.  2. Normal epicardial coronary arteries with atypical left main which is more of a pouch-like structure.  3. Normal resting hemodynamics.    Assessment:     1. NICMP LVEF 45%  2. Aortic stenosis s/p AVR  3. BIV ICD.  4. Dyslipidemia  5. Postop atrial fibrillation  Medical Decision Making:  Today's Assessment / Status / Plan:     1.  Patient appears euvolemic.  Continue eplerenone 25 mg daily.  Continue ramipril 5 mg daily  Continue Coreg 25 mg BID.  2. Patient will need antibiotics before any dental workup.  Repeat echocardiogram in one year  3. Patient is moving to [place] will need device check in 3 months  4. Continue Lipitor 20 mg  qHs.  5. Patient cardioverted back to sinus rhythm will continue anticoagulation for another month.     Follow-up PRN since patient is moving to [place].    Thank you for allowing me to participate in taking care of patient.    Abbie Gore MD.      Trevor Stephen, [email or web address].[email or web address].  [address] [facility] [address]  VIA Facsimile: [phone]

## 2017-07-20 LAB — EKG IMPRESSION: NORMAL

## 2017-07-31 ENCOUNTER — ANTICOAGULATION VISIT (OUTPATIENT)
Dept: MEDICAL GROUP | Facility: MEDICAL CENTER | Age: 62
End: 2017-07-31
Payer: COMMERCIAL

## 2017-07-31 DIAGNOSIS — Z95.2 S/P AVR (AORTIC VALVE REPLACEMENT): ICD-10-CM

## 2017-07-31 DIAGNOSIS — Z79.01 LONG TERM (CURRENT) USE OF ANTICOAGULANTS: ICD-10-CM

## 2017-07-31 DIAGNOSIS — I48.0 PAROXYSMAL ATRIAL FIBRILLATION (HCC): ICD-10-CM

## 2017-07-31 LAB — INR PPP: 1.8 (ref 2–3.5)

## 2017-07-31 PROCEDURE — 85610 PROTHROMBIN TIME: CPT | Performed by: PHYSICIAN ASSISTANT

## 2017-07-31 NOTE — PROGRESS NOTES
Anticoagulation Summary as of 7/31/2017     INR goal 2.0-3.0   Selected INR 1.8! (7/31/2017)   Maintenance plan 2.5 mg (5 mg x 0.5) on Thu; 5 mg (5 mg x 1) all other days   Weekly total 32.5 mg   Plan last modified Anaid Merino, PHARMD (7/6/2017)   Next INR check    Target end date Indefinite    Indications   Atrial fibrillation (CMS-HCC) [I48.91]  S/P AVR (aortic valve replacement) [Z95.2]  Long term (current) use of anticoagulants [Z79.01] [Z79.01]         Anticoagulation Episode Summary     INR check location     Preferred lab     Discontinue date 7/31/2017    Discontinue reason Therapy Completed    Send INR reminders to     Comments       Anticoagulation Care Providers     Provider Role Specialty Phone number    [facility] Responsible  [phone]        Anticoagulation Patient Findings      Current Outpatient Prescriptions on File Prior to Visit   Medication Sig Dispense Refill   • loratadine (CLARITIN) 10 MG Tab Take 10 mg by mouth every day.     • carvedilol (COREG) 3.125 MG Tab Take 1 Tab by mouth 2 times a day, with meals. 180 Tab 3   • eplerenone (INSPRA) 25 MG Tab Take 1 Tab by mouth every day. 90 Tab 3   • atorvastatin (LIPITOR) 40 MG Tab Take 1 Tab by mouth every bedtime. 90 Tab 3   • ramipril (ALTACE) 5 MG Cap Take 1 Cap by mouth every day. 90 Cap 3   • warfarin (COUMADIN) 5 MG Tab Take 1 Tab by mouth every day. 30 Tab 3   • aspirin EC (ECOTRIN) 81 MG Tablet Delayed Response Take 81 mg by mouth every day.     • therapeutic multivitamin-minerals (THERAGRAN-M) Tab Take 1 Tab by mouth every day.       No current facility-administered medications on file prior to visit.       Lab Results   Component Value Date/Time    SODIUM 138 07/07/2017 01:28 PM    POTASSIUM 3.6 07/07/2017 01:28 PM    CHLORIDE 104 07/07/2017 01:28 PM    CO2 28 07/07/2017 01:28 PM    GLUCOSE 84 07/07/2017 01:28 PM    BUN 21 07/07/2017 01:28 PM    CREATININE 1.09 07/07/2017 01:28 PM        Alberto Fam  seen in clinic today  INR  sub-therapeutic.    Denies signs/symptoms of bleeding and/or thrombosis.    Denies changes to diet or medications.     He will stop Warfairn at the end of the week and be DC from clinic per cards for his AVR, he had some Afib post op but that has resolved.  He will stay on ASA once daily      Ryder Salgado, GINGERD

## 2017-07-31 NOTE — MR AVS SNAPSHOT
Alberto Fam   2017 3:45 PM   Anticoagulation Visit   MRN: [id number]    Department:  [facility] [address]   Dept Phone:  [phone]    Description:  Male : 1955   Provider:  [facility]           Allergies as of 2017     Allergen Noted Reactions    Sulfa Drugs 2010   Rash, Vomiting    RXN=20 years ago      You were diagnosed with     Paroxysmal atrial fibrillation (CMS-HCC)   [399876]       Long term (current) use of anticoagulants   [V58.61.ICD-9-CM]       S/P AVR (aortic valve replacement)   [456443]         Vital Signs     Smoking Status                   Never Smoker            Basic Information     Date Of Birth Sex Race Ethnicity Preferred Language    1955 Male White Non- English      Problem List              ICD-10-CM Priority Class Noted - Resolved    Hypertension I10 High  Unknown - Present    S/P AV servando ablation (Chronic) Z98.890 High  3/21/2012 - Present    Dilated cardiomyopathy (CMS-HCC) (Chronic) I42.0 High  3/21/2012 - Present    Male erectile disorder N52.9 Medium  3/21/2012 - Present    Ventricular tachycardia (CMS-HCC) I47.2 High  3/21/2012 - Present    Atypical chest pain (Chronic) R07.89 Medium  3/21/2012 - Present    AV block, 3rd degree (CMS-HCC) I44.2 High  3/21/2012 - Present    SSS (sick sinus syndrome) (CMS-HCC) I49.5 High  2012 - Present    Presence of biventricular AICD Z95.810 High  2013 - Present    Arthritis M19.90 Low  2013 - Present    Chronic systolic congestive heart failure, NYHA class 3 (CMS-HCC) I50.22   10/13/2015 - Present    Homograft cardiac valve stenosis I38   2016 - Present    Severe aortic stenosis I35.0   2017 - Present    CAD (coronary artery disease) I25.10   2017 - Present    Heart valve replaced (Chronic) Z95.2   2017 - Present    Pericardial effusion I31.3   2017 - Present    Pleural effusion J90   2017 - Present    CHF exacerbation (CMS-HCC) I50.9    2/20/2017 - Present    Hemorrhagic disorder due to circulating anticoagulants (CMS-HCC) D68.318   2/21/2017 - Present    S/P AVR (aortic valve replacement) (Chronic) Z95.2   2/21/2017 - Present    Hypokalemia E87.6   2/21/2017 - Present    Long term (current) use of anticoagulants [Z79.01] Z79.01   3/27/2017 - Present    Atrial fibrillation (CMS-HCC) I48.91   5/31/2017 - Present      Health Maintenance        Date Due Completion Dates    IMM DTaP/Tdap/Td Vaccine (1 - Tdap) 4/5/1976 ---    COLONOSCOPY 4/5/2007 ---    IMM ZOSTER VACCINE 4/5/2017 ---    IMM INFLUENZA (1) 9/1/2017 11/2/2016, 10/30/2009            Results     POCT Protime      Component    INR    1.8    Comment:     ic valid  [id number] 160 exp 03/2018                         Current Immunizations     Influenza TIV (IM) 10/30/2009    Influenza Vaccine Adult HD 11/2/2016    Pneumococcal polysaccharide vaccine (PPSV-23) 2/21/2017  5:32 AM      Below and/or attached are the medications your provider expects you to take. Review all of your home medications and newly ordered medications with your provider and/or pharmacist. Follow medication instructions as directed by your provider and/or pharmacist. Please keep your medication list with you and share with your provider. Update the information when medications are discontinued, doses are changed, or new medications (including over-the-counter products) are added; and carry medication information at all times in the event of emergency situations     Allergies:  SULFA DRUGS - Rash,Vomiting               Medications  Valid as of: July 31, 2017 -  3:50 PM    Generic Name Brand Name Tablet Size Instructions for use    Aspirin (Tablet Delayed Response) ECOTRIN 81 MG Take 81 mg by mouth every day.        Atorvastatin Calcium (Tab) LIPITOR 40 MG Take 1 Tab by mouth every bedtime.        Carvedilol (Tab) COREG 3.125 MG Take 1 Tab by mouth 2 times a day, with meals.        Eplerenone (Tab) INSPRA 25 MG Take 1 Tab by  mouth every day.        Loratadine (Tab) CLARITIN 10 MG Take 10 mg by mouth every day.        Multiple Vitamins-Minerals (Tab) THERAGRAN-M  Take 1 Tab by mouth every day.        Ramipril (Cap) ALTACE 5 MG Take 1 Cap by mouth every day.        Warfarin Sodium (Tab) COUMADIN 5 MG Take 1 Tab by mouth every day.        .                 Medicines prescribed today were sent to:     [facility] [address], [place] [address]    [address] [facility] [address]    Phone: [phone] Fax: [phone]    Open 24 Hours?: No      Medication refill instructions:       If your prescription bottle indicates you have medication refills left, it is not necessary to call your provider’s office. Please contact your pharmacy and they will refill your medication.    If your prescription bottle indicates you do not have any refills left, you may request refills at any time through one of the following ways: The online [employer] system (except Urgent Care), by calling your provider’s office, or by asking your pharmacy to contact your provider’s office with a refill request. Medication refills are processed only during regular business hours and may not be available until the next business day. Your provider may request additional information or to have a follow-up visit with you prior to refilling your medication.   *Please Note: Medication refills are assigned a new Rx number when refilled electronically. Your pharmacy may indicate that no refills were authorized even though a new prescription for the same medication is available at the pharmacy. Please request the medicine by name with the pharmacy before contacting your provider for a refill.        Warfarin Dosing Calendar   July 2017 Details    Sun Mon Tue Wed Thu Fri Sat           1                 2               3               4               5               6               7               8                 9               10               11               12                  13               14               15                 16               17               18               19               20               21               22                 23               24               25               26               27               28               29                 30               31                     Date Details   07/06 INR: 3.2      07/07 INR: 2.37   INR - Non-therapeutic Reference Range: 0.87-1.13 INR - Therapeutic Reference Range: 2.0-4.0       07/17 INR: 2.9    valid [id number] 160 exp 03/2018 07/31 This INR check      Therapy discontinued on  7/31/2017              MyChart Access Code: Activation code not generated  Current [email or web address] Status: Active

## 2017-08-15 DIAGNOSIS — I10 ESSENTIAL HYPERTENSION: ICD-10-CM

## 2017-08-15 RX ORDER — RAMIPRIL 5 MG/1
5 CAPSULE ORAL DAILY
Qty: 90 CAP | Refills: 3 | Status: SHIPPED | OUTPATIENT
Start: 2017-08-15 | End: 2017-10-17 | Stop reason: SDUPTHER

## 2017-10-17 ENCOUNTER — TELEPHONE (OUTPATIENT)
Dept: CARDIOLOGY | Facility: MEDICAL CENTER | Age: 62
End: 2017-10-17

## 2017-10-17 DIAGNOSIS — I42.0 DILATED CARDIOMYOPATHY (HCC): Chronic | ICD-10-CM

## 2017-10-17 DIAGNOSIS — I10 ESSENTIAL HYPERTENSION: ICD-10-CM

## 2017-10-17 RX ORDER — CARVEDILOL 3.12 MG/1
3.12 TABLET ORAL 2 TIMES DAILY WITH MEALS
Qty: 180 TAB | Refills: 0 | Status: SHIPPED
Start: 2017-10-17

## 2017-10-17 RX ORDER — RAMIPRIL 5 MG/1
5 CAPSULE ORAL DAILY
Qty: 90 CAP | Refills: 0 | Status: SHIPPED
Start: 2017-10-17

## 2017-10-17 NOTE — TELEPHONE ENCOUNTER
Mailed to [facility] address per patient request - he is given 90 days to give him time to establish with new cardiologist if he so chooses

## 2017-10-17 NOTE — TELEPHONE ENCOUNTER
----- Message from Greta Burkett, Med Ass't sent at 10/17/2017  8:35 AM PDT -----  Regarding: hard copy RX  Patient is requesting a hard copy for a 90 day supply prescription of:  ramipril  carvedilol    Mailed to him, I updated his address

## 2017-10-19 ENCOUNTER — PATIENT MESSAGE (OUTPATIENT)
Dept: HEALTH INFORMATION MANAGEMENT | Facility: OTHER | Age: 62
End: 2017-10-19

## (undated) DEVICE — LACTATED RINGERS INJ 1000 ML - (14EA/CA 60CA/PF)

## (undated) DEVICE — PROTECTOR ULNA NERVE - (36PR/CA)

## (undated) DEVICE — KIT CATHETERIZATION TWO-LUMEN VENOUS 8FR (5EA/CA)

## (undated) DEVICE — TUBING CLEARLINK DUO-VENT - C-FLO (48EA/CA)

## (undated) DEVICE — TUBE CHEST 32FR. RIGHT ANGLED (10EA/CA)

## (undated) DEVICE — PACK CV DRAPING/BASIN 2PART - (1/CA)

## (undated) DEVICE — ORGANIZER SUTURE GABBAY-FRAT - ER STERILE (3/SET 4ST/BX)

## (undated) DEVICE — SET FLUID WARMING STANDARD FLOW - (10/CA)

## (undated) DEVICE — ARMBOARD  SMALL IV 9 INLONG - (25EA/CA)

## (undated) DEVICE — TUBE CHEST 32FR. STRAIGHT - (10EA/CA)

## (undated) DEVICE — SUTURE 2-0 ETHIBOND V-5 30 (12EA/BX)"

## (undated) DEVICE — ELECTRODE DFBR ADLT 6.5X5IN (12PR/CA) *8900-4003 PART # FOR CA QTY. PART #8900-4004 IS EACH QTY

## (undated) DEVICE — SUCTION INSTRUMENT YANKAUER BULBOUS TIP W/O VENT (50EA/CA)

## (undated) DEVICE — PERFUSION STAT

## (undated) DEVICE — TUBING PRSS MNTR 72IN M/ M LL - (25/BX) MONIT. LINE W/MALE L/L

## (undated) DEVICE — PERFUSION

## (undated) DEVICE — BLADE STERNUM SAW SURGICAL 32.0 X 6.4 MM STERILE (1/EA)

## (undated) DEVICE — TRAY SURESTEP FOLEY TEMP SENSING 16FR (10EA/CA) ORDER  #18764 FOR TEMP FOLEY ONLY

## (undated) DEVICE — BAG RESUSCITATION DISPOSABLE - WITH MASK (10 EA/CA)

## (undated) DEVICE — SET EXTENSION WITH 2 PORTS (48EA/CA) ***PART #2C8610 IS A SUBSTITUTE*****

## (undated) DEVICE — CANISTER SUCTION 3000ML MECHANICAL FILTER AUTO SHUTOFF MEDI-VAC NONSTERILE LF DISP  (40EA/CA)

## (undated) DEVICE — CATHETER THERMALDILUTION SWAN - (5EA/CA)

## (undated) DEVICE — MICRODRIP PRIMARY VENTED 60 (48EA/CA) THIS WAS PART #2C8428 WHICH WAS DISCONTINUED

## (undated) DEVICE — TRANSDUCER BIFURCATED MONITORING KIT (10EA/CA)

## (undated) DEVICE — SLEEVE, VASO, THIGH, MED

## (undated) DEVICE — KIT RADIAL ARTERY 20GA W/MAX BARRIER AND BIOPATCH  (5EA/CA) #10740 IS FOR THE SET RADIAL ARTERIAL

## (undated) DEVICE — SODIUM CHL. INJ. 0.9% 500ML (24EA/CA 50CA/PF)

## (undated) DEVICE — QUICKLOADS COR-KNOT TITANIUM - (12/BX)

## (undated) DEVICE — LEAD SET 6 DISP. EKG NIHON KOHDEN

## (undated) DEVICE — STOPCOCK MALE 4-WAY - (50/CA)

## (undated) DEVICE — KIT INTROPERCUTANEOUS SHEATH - 8.5 FR W/MAX BARRIER AND BIOPATCH  (5/CA)

## (undated) DEVICE — GLOVE BIOGEL SZ 8.5 SURGICAL PF LTX - (50PR/BX 4BX/CA)

## (undated) DEVICE — DERMABOND ADVANCED - (12EA/BX)

## (undated) DEVICE — GLOVE BIOGEL INDICATOR SZ 8 SURGICAL PF LTX - (50/BX 4BX/CA)

## (undated) DEVICE — ELECTRODE DUAL RETURN W/ CORD - (50/PK)

## (undated) DEVICE — FIBRILLAR SURGICEL 4X4 - 10/CA

## (undated) DEVICE — D-5-W INJ. 500 ML - (24EA/CA)

## (undated) DEVICE — GLOVE BIOGEL SZ 7.5 SURGICAL PF LTX - (50PR/BX 4BX/CA)

## (undated) DEVICE — BLADE SURGICAL #15 - (50/BX 3BX/CA)

## (undated) DEVICE — KIT ANESTHESIA W/CIRCUIT & 3/LT BAG W/FILTER (20EA/CA)

## (undated) DEVICE — LEAD PACING TEMP MYO - (12/BX)

## (undated) DEVICE — ELECTRODE 850 FOAM ADHESIVE - HYDROGEL RADIOTRNSPRNT (50/PK)

## (undated) DEVICE — NEPTUNE 4 PORT MANIFOLD - (20/PK)

## (undated) DEVICE — GOWN WARMING STANDARD FLEX - (30/CA)

## (undated) DEVICE — SET LEADWIRE 5 LEAD BEDSIDE DISPOSABLE ECG (1SET OF 5/EA)

## (undated) DEVICE — GLOVE SURG LTX PWD ORTHO 7 - ALOETOUCH (40/BX)

## (undated) DEVICE — TUBE E-T HI-LO CUFF 8.5MM (10EA/PK)

## (undated) DEVICE — SET CATHETER CENTRAL VENOUS 5X15 ORDER BY THE EACH

## (undated) DEVICE — SENSOR CEREBRAL AND SOMATIC MONITORING (20/CA)

## (undated) DEVICE — SUTURE 5-0 PROLENE RB-1 D/A 36 (36PK/BX)"

## (undated) DEVICE — SODIUM CHL IRRIGATION 0.9% 1000ML (12EA/CA)

## (undated) DEVICE — TUBE CONNECT SUCTION CLEAR 120 X 1/4" (50EA/CA)"

## (undated) DEVICE — INSERT STEALTH #5 - (10/BX)

## (undated) DEVICE — DRAIN CHEST ADULT (6EA/CA) DELETED ITEM  ORDER #15909

## (undated) DEVICE — MASK ANESTHESIA ADULT  - (100/CA)

## (undated) DEVICE — KIT ROOM DECONTAMINATION

## (undated) DEVICE — DEVICE KIT COR-KNOT COMBO2 DEVICES & 12 KNOTS PER KIT (6KT/CA)

## (undated) DEVICE — SUTURE 4-0 30CM STRATAFIX SPIRAL PS-2 (12EA/BX)

## (undated) DEVICE — SUTURE OHS

## (undated) DEVICE — SET BIFURCATED BLOOD - (48EA/CS)

## (undated) DEVICE — PACK E SUTURE USED FOR - OPEN HEART  (5/BX)

## (undated) DEVICE — SUTURE 2-0 ETHIBOND V-5 - (6/BX)

## (undated) DEVICE — SENSOR SPO2 NEO LNCS ADHESIVE (20/BX) SEE USER NOTES

## (undated) DEVICE — SYS DLV COST CLS RM TEMP - INJECTATE (CO-SET II) (10EA/CA)

## (undated) DEVICE — HEAD HOLDER JUNIOR/ADULT

## (undated) DEVICE — SOD. CHL. INJ. 0.9% 1000 ML - (14EA/CA 60CA/PF)

## (undated) DEVICE — WIRE STEEL 5-0 B&S 20 OHS - 5/PK 12PK/BX ITEM. D5329 OR D6625 CAN BE USED AS A SUB

## (undated) DEVICE — GLOVE SURGICAL LATEX POWDER FREE STIRLE SZ 8 ENCORE MICROPTIC (200PR/CA)